# Patient Record
Sex: FEMALE | Race: WHITE | NOT HISPANIC OR LATINO | Employment: PART TIME | URBAN - METROPOLITAN AREA
[De-identification: names, ages, dates, MRNs, and addresses within clinical notes are randomized per-mention and may not be internally consistent; named-entity substitution may affect disease eponyms.]

---

## 2017-02-14 ENCOUNTER — OFFICE VISIT (OUTPATIENT)
Dept: PSYCHIATRY | Facility: CLINIC | Age: 67
End: 2017-02-14
Payer: MEDICARE

## 2017-02-14 VITALS
SYSTOLIC BLOOD PRESSURE: 132 MMHG | BODY MASS INDEX: 26.37 KG/M2 | HEIGHT: 67 IN | DIASTOLIC BLOOD PRESSURE: 80 MMHG | HEART RATE: 58 BPM | WEIGHT: 168 LBS

## 2017-02-14 DIAGNOSIS — F33.41 MDD (MAJOR DEPRESSIVE DISORDER), RECURRENT, IN PARTIAL REMISSION: ICD-10-CM

## 2017-02-14 DIAGNOSIS — F41.1 GAD (GENERALIZED ANXIETY DISORDER): Primary | ICD-10-CM

## 2017-02-14 PROCEDURE — 90833 PSYTX W PT W E/M 30 MIN: CPT | Mod: PBBFAC,PO | Performed by: PSYCHIATRY & NEUROLOGY

## 2017-02-14 PROCEDURE — 90833 PSYTX W PT W E/M 30 MIN: CPT | Mod: S$PBB,,, | Performed by: PSYCHIATRY & NEUROLOGY

## 2017-02-14 PROCEDURE — 99999 PR PBB SHADOW E&M-EST. PATIENT-LVL III: CPT | Mod: PBBFAC,,, | Performed by: PSYCHIATRY & NEUROLOGY

## 2017-02-14 PROCEDURE — 99214 OFFICE O/P EST MOD 30 MIN: CPT | Mod: S$PBB,,, | Performed by: PSYCHIATRY & NEUROLOGY

## 2017-02-14 PROCEDURE — 99213 OFFICE O/P EST LOW 20 MIN: CPT | Mod: PBBFAC,PO | Performed by: PSYCHIATRY & NEUROLOGY

## 2017-02-14 RX ORDER — DULOXETIN HYDROCHLORIDE 60 MG/1
60 CAPSULE, DELAYED RELEASE ORAL DAILY
Qty: 90 CAPSULE | Refills: 1 | Status: SHIPPED | OUTPATIENT
Start: 2017-02-14 | End: 2017-05-16 | Stop reason: SDUPTHER

## 2017-02-14 RX ORDER — MIRTAZAPINE 15 MG/1
15 TABLET, FILM COATED ORAL NIGHTLY
Qty: 90 TABLET | Refills: 1 | Status: SHIPPED | OUTPATIENT
Start: 2017-02-14 | End: 2017-05-16 | Stop reason: SDUPTHER

## 2017-02-14 RX ORDER — DULOXETIN HYDROCHLORIDE 30 MG/1
30 CAPSULE, DELAYED RELEASE ORAL DAILY
Qty: 90 CAPSULE | Refills: 1 | Status: SHIPPED | OUTPATIENT
Start: 2017-02-14 | End: 2017-05-16 | Stop reason: SDUPTHER

## 2017-02-14 NOTE — PROGRESS NOTES
"ID: 66yo WF with a previous diag of depression. Referred by PCP, , for eval of depressive sxs. Currently on cymbalta, recently added remeron, and valium. Pt has cont'd to remain stable on current meds. No longer using valium.     CC: "depression"    Interim hx: presents on time, chart reviewed, pt seen. Medications stable, mood continues to be greatly affected by anxiety which manifests as "guilt" surrounding her children, grandchild, relationship with daughter who lives in [place] is particularly difficult when they are together but when apart they are close via phone and/or text. When sharing space the pt feels greatly "concerned" about her life and choices and parenting. Daughter feels judged by this and therefore visits are hard. Leads to the pt limiting time she sees her grandson, then feels like he "doesn't know (her)."     Consistently rec therapy and pt consistently declines. Does better on weeks with work and bike riding. As weather improves and days lengthen the pt finds she rides more- mood follows.     PSYCHOTHERAPY ADD-ON   30 (16-37*) minutes    Time: 20 minutes  Participants: Met with patient    Therapeutic Intervention Type: insight oriented psychotherapy, behavior modifying psychotherapy, supportive psychotherapy  Why chosen therapy is appropriate versus another modality: relevant to diagnosis    Target symptoms: anxiety   Primary focus: anxiety surrounding family interactions, relationship with children, loneliness  Psychotherapeutic techniques: cbt, supportive, beh modification    Outcome monitoring methods: self-report, observation    Patient's response to intervention:  The patient's response to intervention is accepting, a little hesitant/weary as anxiety is a chronic constant since childhood for this pt    Progress toward goals:  The patient's progress toward goals is limited.    On Psychiatric ROS:    Endorses better sleep on the remeron, denies anhedonia, denies feeling " "helpless/hopeless, improved energy, riding bike regularly, denies dec concentration, denies change in appetite (despite the remeron), denies dec PMA "I push myself. I know keeping busy helps me." Denies thoughts of SI/intent/plan.     Denies recently feeling more easily overwhelmed, denies recent ruminative thinking "the obsessive thinking is MUCH better on the meds." denies feeling tense/"on edge"     PPHx: Denies h/o self injury, inpt psych hospitalization, denies h/o suicide attempt     Current Psych Meds: cymbalta 90mg po qam, remeron 15mg po qhs  Past Psych Meds: Lexapro and Wellbutrin (effective but led to h/a's), celexa (nausea), valium    PMHx: hypothyroid, GERD    SubstHx:   T- none  E- very rare  D- none    FamPHx: brother- suicide 1990, M-anxiety, F-anxiety, dtr- seizure while on wellbutrin    MSE: appears stated age, well groomed, appropriate dress, engages well with examiner. Good e/c. Speech reg rate and vol, nonpressured. Mood is "pretty good." Affect congruent with some mild anxiety. dec'd verbosity. Sensorium fully intact. Oriented to date/day/location, current events. Narrative memory intact. Intellectual function is avg based on vocab and basic fund of knowledge. Thought is c/l/gd. No tangentiality or circumstantiality. No FOI/LEBRON. Denies SI/HI. Denies A/VH. No evidence of delusions. Insight and Judgment intact.     Suicide Risk Assessment:   Protective- age, gender, no prior attempts, no prior hospitalizations, no ongoing substance abuse, no psychosis, , has children, denies SI/intent/plan, seeking treatment, access to treatment, future oriented, good primary support, Baptism  Risk- race, ongoing Axis I sxs, family h/o suicide (brother-1990)    **Pt is at LOW imminent and long term risk of suicide given current risk factors.     Assessment:  65yo WF with a previous diag of depression. Referred by PCP, , for eval of depressive sxs. Currently on cymbalta, recently added remeron, " "and valium per chart review. On eval the pt is reporting an improvement in all mood and anxiety sxs in the 3 wks leading to initial eval which correlates with the time Remeron was added. She had done well for many years on lexapro and wellbutrin but was having chronic headaches and self initiated a taper off meds and the headaches did remit, but mood/anxiety suffered until the recent combination of cymbalta and remeron which is currently managing sxs quite well. Prior to meds becoming effective the pt endorses sxs c/w diagnoses of MDD and YASMANI. No acute safety concerns and but the pt has cont'd to feel mood is "a little down. Other than the headaches, I feel that I was really better on the lexapro/wellbutrin combination". Pt is active, finds good support through kerry, family and friends. Will try an inc in cymbalta to 90mg and if ineffective may try an addition of wellbutrin xl 150mg- in the past pt headaches were on the 300mg dose when in combo with lexapro. On f/u the inc in cymbalta was effective- pt other ongoing issues are therapy related. Pt continues to decline therapy as she doesn't see the need at this time- majority of her appts are spent in therapy. Will f/u in 3mos.    Axis I: MDD, recurrent, in partial remission; YASMANI  Axis II: none at this time   Axis III: hypothyroid, GERD, HTN  Axis IV:  from , chronic mental illness  Axis V: GAF 70    Plan:   1. Cont cymbalta 90mg po qam  2. Cont remeron 15mg po qhs  3. Cont exercise, encouraged mindfulness of dietary choices in an effort to dec inflammation  4. rec therapy but pt declines at this time  5. RTC 3mos    -Spent 30min face to face with the pt; >50% time spent in counseling   -Supportive therapy and psychoeducation provided  -R/B/SE's of medications discussed with the pt who expresses understanding and chooses to take medications as prescribed.   -Pt instructed to call clinic, 911 or go to nearest emergency room if sxs worsen or pt is in "   crisis. The pt expresses understanding.

## 2017-03-02 ENCOUNTER — LAB VISIT (OUTPATIENT)
Dept: LAB | Facility: HOSPITAL | Age: 67
End: 2017-03-02
Attending: FAMILY MEDICINE
Payer: MEDICARE

## 2017-03-02 ENCOUNTER — PATIENT OUTREACH (OUTPATIENT)
Dept: ADMINISTRATIVE | Facility: HOSPITAL | Age: 67
End: 2017-03-02

## 2017-03-02 DIAGNOSIS — E78.5 HYPERLIPIDEMIA, UNSPECIFIED HYPERLIPIDEMIA TYPE: ICD-10-CM

## 2017-03-02 LAB
ALBUMIN SERPL BCP-MCNC: 3.7 G/DL
ALP SERPL-CCNC: 62 U/L
ALT SERPL W/O P-5'-P-CCNC: 23 U/L
ANION GAP SERPL CALC-SCNC: 6 MMOL/L
AST SERPL-CCNC: 24 U/L
BILIRUB SERPL-MCNC: 0.5 MG/DL
BUN SERPL-MCNC: 21 MG/DL
CALCIUM SERPL-MCNC: 9.5 MG/DL
CHLORIDE SERPL-SCNC: 106 MMOL/L
CHOLEST/HDLC SERPL: 4.2 {RATIO}
CO2 SERPL-SCNC: 29 MMOL/L
CREAT SERPL-MCNC: 0.9 MG/DL
EST. GFR  (AFRICAN AMERICAN): >60 ML/MIN/1.73 M^2
EST. GFR  (NON AFRICAN AMERICAN): >60 ML/MIN/1.73 M^2
GLUCOSE SERPL-MCNC: 94 MG/DL
HDL/CHOLESTEROL RATIO: 23.9 %
HDLC SERPL-MCNC: 247 MG/DL
HDLC SERPL-MCNC: 59 MG/DL
LDLC SERPL CALC-MCNC: 169 MG/DL
NONHDLC SERPL-MCNC: 188 MG/DL
POTASSIUM SERPL-SCNC: 4.5 MMOL/L
PROT SERPL-MCNC: 7 G/DL
SODIUM SERPL-SCNC: 141 MMOL/L
TRIGL SERPL-MCNC: 95 MG/DL

## 2017-03-02 PROCEDURE — 36415 COLL VENOUS BLD VENIPUNCTURE: CPT | Mod: PO

## 2017-03-02 PROCEDURE — 80061 LIPID PANEL: CPT

## 2017-03-02 PROCEDURE — 80053 COMPREHEN METABOLIC PANEL: CPT

## 2017-03-16 ENCOUNTER — LAB VISIT (OUTPATIENT)
Dept: LAB | Facility: HOSPITAL | Age: 67
End: 2017-03-16
Attending: FAMILY MEDICINE
Payer: MEDICARE

## 2017-03-16 ENCOUNTER — OFFICE VISIT (OUTPATIENT)
Dept: FAMILY MEDICINE | Facility: CLINIC | Age: 67
End: 2017-03-16
Payer: MEDICARE

## 2017-03-16 VITALS
WEIGHT: 164.88 LBS | SYSTOLIC BLOOD PRESSURE: 99 MMHG | DIASTOLIC BLOOD PRESSURE: 71 MMHG | HEIGHT: 67 IN | HEART RATE: 67 BPM | OXYGEN SATURATION: 96 % | BODY MASS INDEX: 25.88 KG/M2

## 2017-03-16 DIAGNOSIS — E03.9 HYPOTHYROIDISM, UNSPECIFIED TYPE: ICD-10-CM

## 2017-03-16 DIAGNOSIS — E78.5 HYPERLIPIDEMIA, UNSPECIFIED HYPERLIPIDEMIA TYPE: ICD-10-CM

## 2017-03-16 DIAGNOSIS — E03.9 HYPOTHYROIDISM, UNSPECIFIED TYPE: Primary | ICD-10-CM

## 2017-03-16 DIAGNOSIS — I10 ESSENTIAL HYPERTENSION: ICD-10-CM

## 2017-03-16 DIAGNOSIS — F33.41 MDD (MAJOR DEPRESSIVE DISORDER), RECURRENT, IN PARTIAL REMISSION: ICD-10-CM

## 2017-03-16 LAB — TSH SERPL DL<=0.005 MIU/L-ACNC: 0.76 UIU/ML

## 2017-03-16 PROCEDURE — 84443 ASSAY THYROID STIM HORMONE: CPT

## 2017-03-16 PROCEDURE — 99999 PR PBB SHADOW E&M-EST. PATIENT-LVL III: CPT | Mod: PBBFAC,,, | Performed by: FAMILY MEDICINE

## 2017-03-16 PROCEDURE — 99214 OFFICE O/P EST MOD 30 MIN: CPT | Mod: S$PBB,,, | Performed by: FAMILY MEDICINE

## 2017-03-16 PROCEDURE — 36415 COLL VENOUS BLD VENIPUNCTURE: CPT | Mod: PO

## 2017-03-16 NOTE — MR AVS SNAPSHOT
[facility]  [address] [facility] [address]  [place] [address]  Phone: [phone]  Fax: [phone]                  Coral Pandya   3/16/2017 11:20 AM   Office Visit    Description:  Female : 1950   Provider:  Ebenezer Souza MD   Department:  [facility]           Reason for Visit     Results           Diagnoses this Visit        Comments    Hypothyroidism, unspecified type    -  Primary     Hyperlipidemia, unspecified hyperlipidemia type                To Do List           Future Appointments        Provider Department Dept Phone    3/16/2017 12:05 PM LAB, [place] [facility] [phone]    2017 8:00 AM LAB, [place] [facility] [phone]    2017 11:20 AM Ebenezer Souza MD [facility] [phone]      Goals (5 Years of Data)     None      Follow-Up and Disposition     Return in about 6 months (around 2017).      [facility]     [facility] Care [facility] - 24/7 Assistance  Registered nurses in the [facility] provide clinical advisement, health education, appointment booking, and other advisory services.  Call for this free service at [phone].             Medications           Message regarding Medications     Verify the changes and/or additions to your medication regime listed below are the same as discussed with your clinician today.  If any of these changes or additions are incorrect, please notify your healthcare provider.             Verify that the below list of medications is an accurate representation of the medications you are currently taking.  If none reported, the list may be blank. If incorrect, please contact your healthcare provider. Carry this list with you in case of emergency.           Current Medications     calcium carbonate-vitamin D3 (CALCIUM 600 + D,3,) 600 mg calcium- 200 unit Cap Take 1 capsule by mouth Daily. 1 Capsule Oral Every  "day    desonide (DESOWEN) 0.05 % cream APPLY TO EYELID SKIN NIGHTLY PRF ITCHY LIDS    DOCOSAHEXANOIC ACID/EPA (FISH OIL ORAL) Take by mouth.    duloxetine (CYMBALTA) 30 MG capsule Take 1 capsule (30 mg total) by mouth once daily.    duloxetine (CYMBALTA) 60 MG capsule Take 1 capsule (60 mg total) by mouth once daily.    flaxseed oil 1,000 mg Cap Take by mouth 2 (two) times daily.    glucosamine-chondroitin 500-400 mg tablet     ketotifen (ZADITOR) 0.025 % ophthalmic solution 1 drop 2 (two) times daily.    levothyroxine (SYNTHROID) 100 MCG tablet Take 1 tablet (100 mcg total) by mouth once daily.    mirtazapine (REMERON) 15 MG tablet Take 1 tablet (15 mg total) by mouth every evening.    mometasone (NASONEX) 50 mcg/actuation nasal spray 2 sprays by Nasal route once daily.    multivitamin capsule Take 1 capsule by mouth once daily.    nebivolol (BYSTOLIC) 5 MG Tab Take 1 tablet (5 mg total) by mouth once daily.    rabeprazole (ACIPHEX) 20 mg tablet Take 1 tablet (20 mg total) by mouth once daily.    ranitidine (ZANTAC) 150 MG tablet TAKE 2 TABLETS (300MG      TOTAL) NIGHTLY    vitamin A-vitamin C-vit E-min (VISION FORMULA) Tab Take 1 tablet by mouth Daily. 1 Tablet Oral Every day           Clinical Reference Information           Your Vitals Were     BP Pulse Height Weight SpO2 BMI    99/71 67 5' 7" (1.702 m) 74.8 kg (164 lb 14.5 oz) 96% 25.83 kg/m2      Blood Pressure          Most Recent Value    BP  99/71      Allergies as of 3/16/2017     Codeine    Wellbutrin [Bupropion Hcl]      Immunizations Administered on Date of Encounter - 3/16/2017     None      Orders Placed During Today's Visit     Future Labs/Procedures Expected by Expires    TSH  3/16/2017 6/14/2017    Comprehensive metabolic panel  9/16/2017 6/14/2018    Lipid panel  9/16/2017 6/14/2018      Language Assistance Services     ATTENTION: Language assistance services are available, free of charge. Please call [phone].      ATENCIÓN: Cherry sparks " español, tiene a warner disposición servicios gratuitos de asistencia lingüística. Fabian al [phone].     NAFISA Ý: N?u b?n nói Ti?ng Vi?t, có các d?ch v? h? tr? ngôn ng? mi?n phí dành cho b?n. G?i s? [phone].         [facility] complies with applicable Federal civil rights laws and does not discriminate on the basis of race, color, national origin, age, disability, or sex.

## 2017-03-16 NOTE — PROGRESS NOTES
Subjective:       Patient ID: Coral Pandya is a 66 y.o. female.    Chief Complaint: Results    HPI     Reviewed recent labs.  Mildly elevated lipids. Normal cmp.      htn stable.      Sees Dr. Coe, psychiatrist, for treatment of depression and anxiety.         Review of Systems   Constitutional: Negative for activity change and unexpected weight change.   HENT: Negative for hearing loss, rhinorrhea and trouble swallowing.    Eyes: Negative for discharge and visual disturbance.   Respiratory: Negative for chest tightness and wheezing.    Cardiovascular: Negative for chest pain and palpitations.   Gastrointestinal: Negative for blood in stool, constipation, diarrhea and vomiting.   Endocrine: Negative for polydipsia and polyuria.   Genitourinary: Negative for difficulty urinating, dysuria, hematuria and menstrual problem.   Musculoskeletal: Positive for arthralgias and joint swelling.   Neurological: Negative for weakness and headaches.   Psychiatric/Behavioral: Positive for dysphoric mood. Negative for confusion.       Objective:      Physical Exam   Constitutional: She appears well-developed.   HENT:   Head: Normocephalic and atraumatic.   Eyes: Conjunctivae are normal. Pupils are equal, round, and reactive to light.   Neck: Normal range of motion.   Cardiovascular: Normal rate and regular rhythm.    No murmur heard.  Pulmonary/Chest: Effort normal and breath sounds normal. She has no wheezes.   Lymphadenopathy:     She has no cervical adenopathy.         Lab Visit on 03/16/2017   Component Date Value Ref Range Status    TSH 03/16/2017 0.760  0.400 - 4.000 uIU/mL Final   Lab Visit on 03/02/2017   Component Date Value Ref Range Status    Sodium 03/02/2017 141  136 - 145 mmol/L Final    Potassium 03/02/2017 4.5  3.5 - 5.1 mmol/L Final    Chloride 03/02/2017 106  95 - 110 mmol/L Final    CO2 03/02/2017 29  23 - 29 mmol/L Final    Glucose 03/02/2017 94  70 - 110 mg/dL Final    BUN, Bld 03/02/2017 21   8 - 23 mg/dL Final    Creatinine 03/02/2017 0.9  0.5 - 1.4 mg/dL Final    Calcium 03/02/2017 9.5  8.7 - 10.5 mg/dL Final    Total Protein 03/02/2017 7.0  6.0 - 8.4 g/dL Final    Albumin 03/02/2017 3.7  3.5 - 5.2 g/dL Final    Total Bilirubin 03/02/2017 0.5  0.1 - 1.0 mg/dL Final    Comment: For infants and newborns, interpretation of results should be based  on gestational age, weight and in agreement with clinical  observations.  Premature Infant recommended reference ranges:  Up to 24 hours.............<8.0 mg/dL  Up to 48 hours............<12.0 mg/dL  3-5 days..................<15.0 mg/dL  6-29 days.................<15.0 mg/dL      Alkaline Phosphatase 03/02/2017 62  55 - 135 U/L Final    AST 03/02/2017 24  10 - 40 U/L Final    ALT 03/02/2017 23  10 - 44 U/L Final    Anion Gap 03/02/2017 6* 8 - 16 mmol/L Final    eGFR if African American 03/02/2017 >60.0  >60 mL/min/1.73 m^2 Final    eGFR if non African American 03/02/2017 >60.0  >60 mL/min/1.73 m^2 Final    Comment: Calculation used to obtain the estimated glomerular filtration  rate (eGFR) is the CKD-EPI equation. Since race is unknown   in our information system, the eGFR values for   -American and Non--American patients are given   for each creatinine result.      Cholesterol 03/02/2017 247* 120 - 199 mg/dL Final    Comment: The National Cholesterol Education Program (NCEP) has set the  following guidelines (reference ranges) for Cholesterol:  Optimal.....................<200 mg/dL  Borderline High.............200-239 mg/dL  High........................> or = 240 mg/dL      Triglycerides 03/02/2017 95  30 - 150 mg/dL Final    Comment: The National Cholesterol Education Program (NCEP) has set the  following guidelines (reference values) for triglycerides:  Normal......................<150 mg/dL  Borderline High.............150-199 mg/dL  High........................200-499 mg/dL      HDL 03/02/2017 59  40 - 75 mg/dL Final     Comment: The National Cholesterol Education Program (NCEP) has set the  following guidelines (reference values) for HDL Cholesterol:  Low...............<40 mg/dL  Optimal...........>60 mg/dL      LDL Cholesterol 03/02/2017 169.0* 63.0 - 159.0 mg/dL Final    Comment: The National Cholesterol Education Program (NCEP) has set the  following guidelines (reference values) for LDL Cholesterol:  Optimal.......................<130 mg/dL  Borderline High...............130-159 mg/dL  High..........................160-189 mg/dL  Very High.....................>190 mg/dL      HDL/Chol Ratio 03/02/2017 23.9  20.0 - 50.0 % Final    Total Cholesterol/HDL Ratio 03/02/2017 4.2  2.0 - 5.0 Final    Non-HDL Cholesterol 03/02/2017 188  mg/dL Final    Comment: Risk category and Non-HDL cholesterol goals:  Coronary heart disease (CHD)or equivalent (10-year risk of CHD >20%):  Non-HDL cholesterol goal     <130 mg/dL  Two or more CHD risk factors and 10-year risk of CHD <= 20%:  Non-HDL cholesterol goal     <160 mg/dL  0 to 1 CHD risk factor:  Non-HDL cholesterol goal     <190 mg/dL             Assessment:       1. Hypothyroidism, unspecified type    2. Hyperlipidemia, unspecified hyperlipidemia type    3. Essential hypertension    4. MDD (major depressive disorder), recurrent, in partial remission        Plan:       Hypothyroidism, unspecified type  -     TSH; Future; Expected date: 3/16/17    Hyperlipidemia, unspecified hyperlipidemia type  -     Comprehensive metabolic panel; Future; Expected date: 9/16/17  -     Lipid panel; Future; Expected date: 9/16/17    Essential hypertension    MDD (major depressive disorder), recurrent, in partial remission      Plan:  See orders  Cont current meds        Medication List with Changes/Refills   Current Medications    CALCIUM CARBONATE-VITAMIN D3 (CALCIUM 600 + D,3,) 600 MG CALCIUM- 200 UNIT CAP    Take 1 capsule by mouth Daily. 1 Capsule Oral Every day    DESONIDE (DESOWEN) 0.05 % CREAM     APPLY TO EYELID SKIN NIGHTLY PRF ITCHY LIDS    DOCOSAHEXANOIC ACID/EPA (FISH OIL ORAL)    Take by mouth.    DULOXETINE (CYMBALTA) 30 MG CAPSULE    Take 1 capsule (30 mg total) by mouth once daily.    DULOXETINE (CYMBALTA) 60 MG CAPSULE    Take 1 capsule (60 mg total) by mouth once daily.    FLAXSEED OIL 1,000 MG CAP    Take by mouth 2 (two) times daily.    GLUCOSAMINE-CHONDROITIN 500-400 MG TABLET        KETOTIFEN (ZADITOR) 0.025 % OPHTHALMIC SOLUTION    1 drop 2 (two) times daily.    LEVOTHYROXINE (SYNTHROID) 100 MCG TABLET    Take 1 tablet (100 mcg total) by mouth once daily.    MIRTAZAPINE (REMERON) 15 MG TABLET    Take 1 tablet (15 mg total) by mouth every evening.    MOMETASONE (NASONEX) 50 MCG/ACTUATION NASAL SPRAY    2 sprays by Nasal route once daily.    MULTIVITAMIN CAPSULE    Take 1 capsule by mouth once daily.    NEBIVOLOL (BYSTOLIC) 5 MG TAB    Take 1 tablet (5 mg total) by mouth once daily.    RABEPRAZOLE (ACIPHEX) 20 MG TABLET    Take 1 tablet (20 mg total) by mouth once daily.    RANITIDINE (ZANTAC) 150 MG TABLET    TAKE 2 TABLETS (300MG      TOTAL) NIGHTLY    VITAMIN A-VITAMIN C-VIT E-MIN (VISION FORMULA) TAB    Take 1 tablet by mouth Daily. 1 Tablet Oral Every day

## 2017-03-19 ENCOUNTER — PATIENT MESSAGE (OUTPATIENT)
Dept: FAMILY MEDICINE | Facility: CLINIC | Age: 67
End: 2017-03-19

## 2017-03-20 RX ORDER — LEVOTHYROXINE SODIUM 100 UG/1
TABLET ORAL
Qty: 90 TABLET | Refills: 3 | Status: SHIPPED | OUTPATIENT
Start: 2017-03-20 | End: 2018-02-27 | Stop reason: SDUPTHER

## 2017-03-20 RX ORDER — NEBIVOLOL HYDROCHLORIDE 5 MG/1
TABLET ORAL
Qty: 90 TABLET | Refills: 3 | Status: SHIPPED | OUTPATIENT
Start: 2017-03-20 | End: 2018-02-27 | Stop reason: SDUPTHER

## 2017-05-16 ENCOUNTER — OFFICE VISIT (OUTPATIENT)
Dept: PSYCHIATRY | Facility: CLINIC | Age: 67
End: 2017-05-16
Payer: MEDICARE

## 2017-05-16 VITALS
DIASTOLIC BLOOD PRESSURE: 69 MMHG | WEIGHT: 165.88 LBS | HEIGHT: 67 IN | HEART RATE: 58 BPM | SYSTOLIC BLOOD PRESSURE: 113 MMHG | BODY MASS INDEX: 26.03 KG/M2

## 2017-05-16 DIAGNOSIS — F41.1 GAD (GENERALIZED ANXIETY DISORDER): Primary | ICD-10-CM

## 2017-05-16 DIAGNOSIS — F33.41 MDD (MAJOR DEPRESSIVE DISORDER), RECURRENT, IN PARTIAL REMISSION: ICD-10-CM

## 2017-05-16 PROCEDURE — 99214 OFFICE O/P EST MOD 30 MIN: CPT | Mod: S$PBB,,, | Performed by: PSYCHIATRY & NEUROLOGY

## 2017-05-16 PROCEDURE — 90833 PSYTX W PT W E/M 30 MIN: CPT | Mod: PBBFAC,PO | Performed by: PSYCHIATRY & NEUROLOGY

## 2017-05-16 PROCEDURE — 99213 OFFICE O/P EST LOW 20 MIN: CPT | Mod: PBBFAC,PO | Performed by: PSYCHIATRY & NEUROLOGY

## 2017-05-16 PROCEDURE — 90833 PSYTX W PT W E/M 30 MIN: CPT | Mod: S$PBB,,, | Performed by: PSYCHIATRY & NEUROLOGY

## 2017-05-16 PROCEDURE — 99999 PR PBB SHADOW E&M-EST. PATIENT-LVL III: CPT | Mod: PBBFAC,,, | Performed by: PSYCHIATRY & NEUROLOGY

## 2017-05-16 RX ORDER — MIRTAZAPINE 15 MG/1
15 TABLET, FILM COATED ORAL NIGHTLY
Qty: 90 TABLET | Refills: 1 | Status: SHIPPED | OUTPATIENT
Start: 2017-05-16 | End: 2017-08-16 | Stop reason: SDUPTHER

## 2017-05-16 RX ORDER — DULOXETIN HYDROCHLORIDE 60 MG/1
60 CAPSULE, DELAYED RELEASE ORAL DAILY
Qty: 90 CAPSULE | Refills: 1 | Status: SHIPPED | OUTPATIENT
Start: 2017-05-16 | End: 2017-08-16 | Stop reason: SDUPTHER

## 2017-05-16 RX ORDER — DULOXETIN HYDROCHLORIDE 30 MG/1
30 CAPSULE, DELAYED RELEASE ORAL DAILY
Qty: 90 CAPSULE | Refills: 1 | Status: SHIPPED | OUTPATIENT
Start: 2017-05-16 | End: 2017-08-16 | Stop reason: SDUPTHER

## 2017-05-16 NOTE — PROGRESS NOTES
"ID: 66yo WF with a previous diag of depression. Referred by PCP, , for eval of depressive sxs. Currently on cymbalta, recently added remeron, and valium. Pt has cont'd to remain stable on current meds. No longer using valium.     CC: "depression"    Interim hx: presents on time, chart reviewed, pt seen. Pt asking about cholesterol as it has inc'd in past 1.5 yrs- wondering if cymbalta causing but there has not been assoc weight gain.     Also inquiring about morning headaches- wellbutrin caused headaches but she is no longer taking- headaches had gone away until recent yardwork and "tree trimming" and now she notices headaches each morning which are resolved by afternoon without medication. Also notes some tingling down left arm assoc with headaches. Perhaps a nerve impingement in neck. Pt does have richa and uses an oral device through dentist for treatment- I rec'd that she see him as perhaps morning h/a's are from un-treated richa.     Picked up an extra day of work and got another raise "so I'm really noticing it because it's more per hour but also more hours." is going to [place] to see her daughter next week. "i'm doing a little better with that. I don't ask her any more. If she tells me then I listen, but I'm not going to ask for information that worries me." going next week and decided to rent a car "it gives me more freedom and it gives me the ability to offer to go somewhere." grandson is now 1yo "and it will be great to be outside with him."     Consistently rec therapy and pt consistently declines. Does better on weeks with work and bike riding. As weather improves and days lengthen the pt finds she rides more- mood follows.     PSYCHOTHERAPY ADD-ON   30 (16-37*) minutes    Time: 20 minutes  Participants: Met with patient    Therapeutic Intervention Type: insight oriented psychotherapy, behavior modifying psychotherapy, supportive psychotherapy  Why chosen therapy is appropriate versus another " "modality: relevant to diagnosis    Target symptoms: anxiety   Primary focus: anxiety surrounding family interactions, relationship with children, loneliness  Psychotherapeutic techniques: cbt, supportive, beh modification    Outcome monitoring methods: self-report, observation    Patient's response to intervention:  The patient's response to intervention is accepting, a little hesitant/weary as anxiety is a chronic constant since childhood for this pt    Progress toward goals:  The patient's progress toward goals is good-  Noticeable improvement today in boundary setting.     On Psychiatric ROS:    Endorses better sleep on the remeron, denies anhedonia, denies feeling helpless/hopeless, improved energy, riding bike regularly, denies dec concentration, denies change in appetite (despite the remeron), denies dec PMA "I push myself. I know keeping busy helps me." Denies thoughts of SI/intent/plan.     Denies recently feeling more easily overwhelmed, denies recent ruminative thinking "the obsessive thinking is MUCH better on the meds." denies feeling tense/"on edge"     PPHx: Denies h/o self injury, inpt psych hospitalization, denies h/o suicide attempt     Current Psych Meds: cymbalta 90mg po qam, remeron 15mg po qhs  Past Psych Meds: Lexapro and Wellbutrin (effective but led to h/a's), celexa (nausea), valium    PMHx: hypothyroid, GERD    SubstHx:   T- none  E- very rare  D- none    FamPHx: brother- suicide 1990, M-anxiety, F-anxiety, dtr- seizure while on wellbutrin    MSE: appears stated age, well groomed, appropriate dress, engages well with examiner. Good e/c. Speech reg rate and vol, nonpressured. Mood is "I've been feeling really good." Affect congruent with some mild anxiety. dec'd verbosity. Sensorium fully intact. Oriented to date/day/location, current events. Narrative memory intact. Intellectual function is avg based on vocab and basic fund of knowledge. Thought is c/l/gd. No tangentiality or " "circumstantiality. No FOI/LEBRON. Denies SI/HI. Denies A/VH. No evidence of delusions. Insight and Judgment intact.     Suicide Risk Assessment:   Protective- age, gender, no prior attempts, no prior hospitalizations, no ongoing substance abuse, no psychosis, , has children, denies SI/intent/plan, seeking treatment, access to treatment, future oriented, good primary support, Taoist  Risk- race, ongoing Axis I sxs, family h/o suicide (brother-1990)    **Pt is at LOW imminent and long term risk of suicide given current risk factors.     Assessment:  67yo WF with a previous diag of depression. Referred by PCP, , for eval of depressive sxs. Currently on cymbalta, recently added remeron, and valium per chart review. On eval the pt is reporting an improvement in all mood and anxiety sxs in the 3 wks leading to initial eval which correlates with the time Remeron was added. She had done well for many years on lexapro and wellbutrin but was having chronic headaches and self initiated a taper off meds and the headaches did remit, but mood/anxiety suffered until the recent combination of cymbalta and remeron which is currently managing sxs quite well. Prior to meds becoming effective the pt endorses sxs c/w diagnoses of MDD and YASMANI. No acute safety concerns but the pt has cont'd to feel mood is "a little down. Other than the headaches, I feel that I was really better on the lexapro/wellbutrin combination". Pt is active, finds good support through kerry, family and friends. Will try an inc in cymbalta to 90mg and if ineffective may try an addition of wellbutrin xl 150mg- in the past pt headaches were on the 300mg dose when in combo with lexapro. On f/u the inc in cymbalta was effective- pt other ongoing issues are therapy related. Pt continues to decline therapy as she doesn't see the need at this time- majority of her appts are spent in therapy, but today she is showing some signs of improved boundary setting " and themes are more positive. Doing well. meds stable. Will f/u in 3mos.    Axis I: MDD, recurrent, in partial remission; YASMANI  Axis II: none at this time   Axis III: hypothyroid, GERD, HTN  Axis IV:  from , chronic mental illness  Axis V: GAF 70    Plan:   1. Cont cymbalta 90mg po qam  2. Cont remeron 15mg po qhs  3. Cont exercise, encouraged mindfulness of dietary choices in an effort to dec inflammation  4. rec therapy but pt declines at this time  5. RTC 3mos    -Spent 30min face to face with the pt; >50% time spent in counseling   -Supportive therapy and psychoeducation provided  -R/B/SE's of medications discussed with the pt who expresses understanding and chooses to take medications as prescribed.   -Pt instructed to call clinic, 911 or go to nearest emergency room if sxs worsen or pt is in   crisis. The pt expresses understanding.

## 2017-08-11 ENCOUNTER — LAB VISIT (OUTPATIENT)
Dept: LAB | Facility: HOSPITAL | Age: 67
End: 2017-08-11
Attending: NURSE PRACTITIONER
Payer: MEDICARE

## 2017-08-11 ENCOUNTER — OFFICE VISIT (OUTPATIENT)
Dept: FAMILY MEDICINE | Facility: CLINIC | Age: 67
End: 2017-08-11
Payer: MEDICARE

## 2017-08-11 VITALS
BODY MASS INDEX: 25.96 KG/M2 | HEART RATE: 66 BPM | TEMPERATURE: 98 F | DIASTOLIC BLOOD PRESSURE: 74 MMHG | SYSTOLIC BLOOD PRESSURE: 120 MMHG | RESPIRATION RATE: 16 BRPM | OXYGEN SATURATION: 98 % | HEIGHT: 67 IN | WEIGHT: 165.38 LBS

## 2017-08-11 DIAGNOSIS — D72.9 ABNORMAL WHITE BLOOD CELL: ICD-10-CM

## 2017-08-11 DIAGNOSIS — Z01.818 PRE-OP EVALUATION: Primary | ICD-10-CM

## 2017-08-11 DIAGNOSIS — Z01.818 PRE-OP EVALUATION: ICD-10-CM

## 2017-08-11 DIAGNOSIS — H26.9 CATARACT OF BOTH EYES, UNSPECIFIED CATARACT TYPE: ICD-10-CM

## 2017-08-11 LAB
BASOPHILS # BLD AUTO: 0.02 K/UL
BASOPHILS NFR BLD: 0.4 %
DIFFERENTIAL METHOD: ABNORMAL
EOSINOPHIL # BLD AUTO: 0.1 K/UL
EOSINOPHIL NFR BLD: 2.4 %
ERYTHROCYTE [DISTWIDTH] IN BLOOD BY AUTOMATED COUNT: 13.2 %
HCT VFR BLD AUTO: 42.4 %
HGB BLD-MCNC: 14.3 G/DL
LYMPHOCYTES # BLD AUTO: 1.6 K/UL
LYMPHOCYTES NFR BLD: 35.5 %
MCH RBC QN AUTO: 31.5 PG
MCHC RBC AUTO-ENTMCNC: 33.7 G/DL
MCV RBC AUTO: 93 FL
MONOCYTES # BLD AUTO: 0.5 K/UL
MONOCYTES NFR BLD: 11.3 %
NEUTROPHILS # BLD AUTO: 2.3 K/UL
NEUTROPHILS NFR BLD: 50.2 %
PLATELET # BLD AUTO: 186 K/UL
PMV BLD AUTO: 11.5 FL
RBC # BLD AUTO: 4.54 M/UL
WBC # BLD AUTO: 4.59 K/UL

## 2017-08-11 PROCEDURE — 36415 COLL VENOUS BLD VENIPUNCTURE: CPT | Mod: PO

## 2017-08-11 PROCEDURE — 99213 OFFICE O/P EST LOW 20 MIN: CPT | Mod: S$PBB,,, | Performed by: NURSE PRACTITIONER

## 2017-08-11 PROCEDURE — 3078F DIAST BP <80 MM HG: CPT | Mod: ,,, | Performed by: NURSE PRACTITIONER

## 2017-08-11 PROCEDURE — 1159F MED LIST DOCD IN RCRD: CPT | Mod: ,,, | Performed by: NURSE PRACTITIONER

## 2017-08-11 PROCEDURE — 99999 PR PBB SHADOW E&M-EST. PATIENT-LVL IV: CPT | Mod: PBBFAC,,, | Performed by: NURSE PRACTITIONER

## 2017-08-11 PROCEDURE — 85025 COMPLETE CBC W/AUTO DIFF WBC: CPT

## 2017-08-11 PROCEDURE — 3074F SYST BP LT 130 MM HG: CPT | Mod: ,,, | Performed by: NURSE PRACTITIONER

## 2017-08-11 NOTE — PROGRESS NOTES
Subjective:       Patient ID: Coral Pandya is a 66 y.o. female.    Chief Complaint: Pre-op Exam  She was last seen in March 16,2017 by her PCP. This is her first time seeing me in the clinic  HPI   She is scheduled to have cataract surgery per Dr. Tio Olsen on 08/21/2017 and then again 09/18/2017.  Vitals:    08/11/17 1600   BP: 120/74   Pulse: 66   Resp: 16   Temp: 98.1 °F (36.7 °C)     Past Medical History:   Diagnosis Date    Anxiety     Arthralgia of knee     Cervical spondylosis     Depression     Fibromyalgia     GERD (gastroesophageal reflux disease)     HTN (hypertension)     Hypothyroid     DANYELL (obstructive sleep apnea)      Lab Results   Component Value Date    WBC 4.59 08/11/2017    HGB 14.3 08/11/2017    HCT 42.4 08/11/2017    MCV 93 08/11/2017     08/11/2017     CMP  Sodium   Date Value Ref Range Status   03/02/2017 141 136 - 145 mmol/L Final     Potassium   Date Value Ref Range Status   03/02/2017 4.5 3.5 - 5.1 mmol/L Final     Chloride   Date Value Ref Range Status   03/02/2017 106 95 - 110 mmol/L Final     CO2   Date Value Ref Range Status   03/02/2017 29 23 - 29 mmol/L Final     Glucose   Date Value Ref Range Status   03/02/2017 94 70 - 110 mg/dL Final     BUN, Bld   Date Value Ref Range Status   03/02/2017 21 8 - 23 mg/dL Final     Creatinine   Date Value Ref Range Status   03/02/2017 0.9 0.5 - 1.4 mg/dL Final     Calcium   Date Value Ref Range Status   03/02/2017 9.5 8.7 - 10.5 mg/dL Final     Total Protein   Date Value Ref Range Status   03/02/2017 7.0 6.0 - 8.4 g/dL Final     Albumin   Date Value Ref Range Status   03/02/2017 3.7 3.5 - 5.2 g/dL Final     Total Bilirubin   Date Value Ref Range Status   03/02/2017 0.5 0.1 - 1.0 mg/dL Final     Comment:     For infants and newborns, interpretation of results should be based  on gestational age, weight and in agreement with clinical  observations.  Premature Infant recommended reference ranges:  Up to 24  hours.............<8.0 mg/dL  Up to 48 hours............<12.0 mg/dL  3-5 days..................<15.0 mg/dL  6-29 days.................<15.0 mg/dL       Alkaline Phosphatase   Date Value Ref Range Status   03/02/2017 62 55 - 135 U/L Final     AST   Date Value Ref Range Status   03/02/2017 24 10 - 40 U/L Final     ALT   Date Value Ref Range Status   03/02/2017 23 10 - 44 U/L Final     Anion Gap   Date Value Ref Range Status   03/02/2017 6 (L) 8 - 16 mmol/L Final     eGFR if    Date Value Ref Range Status   03/02/2017 >60.0 >60 mL/min/1.73 m^2 Final     eGFR if non    Date Value Ref Range Status   03/02/2017 >60.0 >60 mL/min/1.73 m^2 Final     Comment:     Calculation used to obtain the estimated glomerular filtration  rate (eGFR) is the CKD-EPI equation. Since race is unknown   in our information system, the eGFR values for   -American and Non--American patients are given   for each creatinine result.       No results found for: LABA1C, HGBA1C  EKG: none  Cardiac Clearance Needed: NO   Anticoagulants: yes (fish oil)    Review of Systems    She has no voiced complaints today  Objective:      Physical Exam   Constitutional: She is oriented to person, place, and time. Vital signs are normal. She appears well-developed and well-nourished. She is active and cooperative.   HENT:   Head: Normocephalic and atraumatic.   Right Ear: Hearing, tympanic membrane, external ear and ear canal normal.   Left Ear: Hearing, tympanic membrane, external ear and ear canal normal.   Nose: Nose normal.   Mouth/Throat: Uvula is midline, oropharynx is clear and moist and mucous membranes are normal.   Eyes: Conjunctivae, EOM and lids are normal. Pupils are equal, round, and reactive to light. Right eye exhibits chemosis. Right eye exhibits no discharge, no exudate and no hordeolum. No foreign body present in the right eye. Left eye exhibits no chemosis, no discharge, no exudate and no hordeolum. No  foreign body present in the left eye. No scleral icterus.   Neck: Normal range of motion and full passive range of motion without pain. Neck supple.   Cardiovascular: Normal rate, regular rhythm, S1 normal, S2 normal and normal heart sounds.    Pulmonary/Chest: Effort normal and breath sounds normal.   Abdominal: Soft. Bowel sounds are normal. There is no splenomegaly or hepatomegaly. There is no tenderness.   Musculoskeletal: Normal range of motion.   Lymphadenopathy:        Head (right side): No submental, no submandibular, no tonsillar, no preauricular, no posterior auricular and no occipital adenopathy present.        Head (left side): No submental, no submandibular, no tonsillar, no preauricular, no posterior auricular and no occipital adenopathy present.     She has no cervical adenopathy.   Neurological: She is alert and oriented to person, place, and time.   Skin: Skin is warm, dry and intact.   Psychiatric: She has a normal mood and affect. Her speech is normal and behavior is normal. Judgment and thought content normal. Cognition and memory are normal.   Nursing note and vitals reviewed.      Assessment & Plan:       Pre-op evaluation  -     CBC auto differential; Future; Expected date: 08/11/2017    Cataract of both eyes, unspecified cataract type    Abnormal white blood cell  -     CBC auto differential; Future; Expected date: 08/11/2017    She is cleared for cataract surgery. I will have her form faxed to her eye surgeon office.      Return if symptoms worsen or fail to improve.

## 2017-08-16 ENCOUNTER — OFFICE VISIT (OUTPATIENT)
Dept: PSYCHIATRY | Facility: CLINIC | Age: 67
End: 2017-08-16
Payer: MEDICARE

## 2017-08-16 VITALS
HEART RATE: 64 BPM | SYSTOLIC BLOOD PRESSURE: 106 MMHG | HEIGHT: 67 IN | BODY MASS INDEX: 26.08 KG/M2 | DIASTOLIC BLOOD PRESSURE: 63 MMHG | WEIGHT: 166.13 LBS

## 2017-08-16 DIAGNOSIS — F41.1 GAD (GENERALIZED ANXIETY DISORDER): Primary | ICD-10-CM

## 2017-08-16 DIAGNOSIS — F33.41 MDD (MAJOR DEPRESSIVE DISORDER), RECURRENT, IN PARTIAL REMISSION: ICD-10-CM

## 2017-08-16 PROCEDURE — 1126F AMNT PAIN NOTED NONE PRSNT: CPT | Mod: ,,, | Performed by: PSYCHIATRY & NEUROLOGY

## 2017-08-16 PROCEDURE — 3074F SYST BP LT 130 MM HG: CPT | Mod: ,,, | Performed by: PSYCHIATRY & NEUROLOGY

## 2017-08-16 PROCEDURE — 3008F BODY MASS INDEX DOCD: CPT | Mod: ,,, | Performed by: PSYCHIATRY & NEUROLOGY

## 2017-08-16 PROCEDURE — 1159F MED LIST DOCD IN RCRD: CPT | Mod: ,,, | Performed by: PSYCHIATRY & NEUROLOGY

## 2017-08-16 PROCEDURE — 99999 PR PBB SHADOW E&M-EST. PATIENT-LVL III: CPT | Mod: PBBFAC,,, | Performed by: PSYCHIATRY & NEUROLOGY

## 2017-08-16 PROCEDURE — 99214 OFFICE O/P EST MOD 30 MIN: CPT | Mod: S$PBB,,, | Performed by: PSYCHIATRY & NEUROLOGY

## 2017-08-16 PROCEDURE — 99213 OFFICE O/P EST LOW 20 MIN: CPT | Mod: PBBFAC,PO | Performed by: PSYCHIATRY & NEUROLOGY

## 2017-08-16 PROCEDURE — 3078F DIAST BP <80 MM HG: CPT | Mod: ,,, | Performed by: PSYCHIATRY & NEUROLOGY

## 2017-08-16 PROCEDURE — 90833 PSYTX W PT W E/M 30 MIN: CPT | Mod: PBBFAC,PO | Performed by: PSYCHIATRY & NEUROLOGY

## 2017-08-16 PROCEDURE — 90833 PSYTX W PT W E/M 30 MIN: CPT | Mod: S$PBB,,, | Performed by: PSYCHIATRY & NEUROLOGY

## 2017-08-16 RX ORDER — DULOXETIN HYDROCHLORIDE 60 MG/1
60 CAPSULE, DELAYED RELEASE ORAL DAILY
Qty: 90 CAPSULE | Refills: 1 | Status: SHIPPED | OUTPATIENT
Start: 2017-08-16 | End: 2017-09-14 | Stop reason: SDUPTHER

## 2017-08-16 RX ORDER — DULOXETIN HYDROCHLORIDE 30 MG/1
30 CAPSULE, DELAYED RELEASE ORAL DAILY
Qty: 90 CAPSULE | Refills: 1 | Status: SHIPPED | OUTPATIENT
Start: 2017-08-16 | End: 2017-11-16 | Stop reason: SDUPTHER

## 2017-08-16 RX ORDER — MIRTAZAPINE 15 MG/1
15 TABLET, FILM COATED ORAL NIGHTLY
Qty: 90 TABLET | Refills: 1 | Status: SHIPPED | OUTPATIENT
Start: 2017-08-16 | End: 2017-11-16 | Stop reason: SDUPTHER

## 2017-08-16 NOTE — PROGRESS NOTES
"ID: 64yo WF with a previous diag of depression. Referred by PCP, , for eval of depressive sxs. Currently on cymbalta, recently added remeron, and valium. Pt has cont'd to remain stable on current meds. No longer using valium.     CC: "depression"    Interim hx: presents on time, chart reviewed, pt seen. Appears well and enters appt with conversation- positive themes, social, engaged.     When I comment on this she responds, "yes. I think that's true. I've been feeling better. Definitely. I'm not working as hard to not be depressed. I don't wake up in the morning depressed."     Went to [employer] and enjoyed her time there. Rented a car which helped with independence. While there her daughter told her she was engaged to be . This is something the pt has been eager for and yet today     Daughter came in to town for 2wks and only came to visit the pt 4 days. Only spent the night 1 night. "i've gotten a lot stronger. I didn't take off work. I didn't bring up the wedding." then reports that her daughter is engaged.     Consistently rec therapy and pt consistently declines. Does better on weeks with work and bike riding. As weather improves and days lengthen the pt finds she rides more- mood follows.     PSYCHOTHERAPY ADD-ON   30 (16-37*) minutes    Time: 20 minutes  Participants: Met with patient    Therapeutic Intervention Type: insight oriented psychotherapy, behavior modifying psychotherapy, supportive psychotherapy  Why chosen therapy is appropriate versus another modality: relevant to diagnosis    Target symptoms: anxiety   Primary focus: anxiety surrounding family interactions, relationship with children, loneliness  Psychotherapeutic techniques: cbt, supportive, beh modification    Outcome monitoring methods: self-report, observation    Patient's response to intervention:  The patient's response to intervention is accepting, a little hesitant/weary as anxiety is a chronic constant since " "childhood for this pt    Progress toward goals:  The patient's progress toward goals is good-  Noticeable improvement today in boundary setting.     On Psychiatric ROS:    Endorses better sleep on the remeron, denies anhedonia, denies feeling helpless/hopeless, improved energy, riding bike regularly, denies dec concentration, denies change in appetite (despite the remeron), denies dec PMA "I push myself. I know keeping busy helps me." Denies thoughts of SI/intent/plan.     Denies recently feeling more easily overwhelmed, denies recent ruminative thinking "the obsessive thinking is MUCH better on the meds." denies feeling tense/"on edge"     PPHx: Denies h/o self injury, inpt psych hospitalization, denies h/o suicide attempt     Current Psych Meds: cymbalta 90mg po qam, remeron 15mg po qhs  Past Psych Meds: Lexapro and Wellbutrin (effective but led to h/a's), celexa (nausea), valium    PMHx: hypothyroid, GERD    SubstHx:   T- none  E- very rare  D- none    FamPHx: brother- suicide 1990, M-anxiety, F-anxiety, dtr- seizure while on wellbutrin    MSE: appears stated age, well groomed, appropriate dress, engages well with examiner. Good e/c. Speech reg rate and vol, nonpressured. Mood is "I think i'm in a good mood. I feel pretty relaxed and good." Affect congruent with some mild anxiety. dec'd verbosity. Sensorium fully intact. Oriented to date/day/location, current events. Narrative memory intact. Intellectual function is avg based on vocab and basic fund of knowledge. Thought is c/l/gd. No tangentiality or circumstantiality. No FOI/LEBRON. Denies SI/HI. Denies A/VH. No evidence of delusions. Insight and Judgment intact.     Blood pressure 106/63, pulse 64, height 5' 7" (1.702 m), weight 75.4 kg (166 lb 1.9 oz).    Suicide Risk Assessment:   Protective- age, gender, no prior attempts, no prior hospitalizations, no ongoing substance abuse, no psychosis, , has children, denies SI/intent/plan, seeking treatment, " "access to treatment, future oriented, good primary support, Anglican  Risk- race, ongoing Axis I sxs, family h/o suicide (brother-1990)    **Pt is at LOW imminent and long term risk of suicide given current risk factors.     Assessment:  67yo WF with a previous diag of depression. Referred by PCP, , for eval of depressive sxs. Currently on cymbalta, recently added remeron, and valium per chart review. On eval the pt is reporting an improvement in all mood and anxiety sxs in the 3 wks leading to initial eval which correlates with the time Remeron was added. She had done well for many years on lexapro and wellbutrin but was having chronic headaches and self initiated a taper off meds and the headaches did remit, but mood/anxiety suffered until the recent combination of cymbalta and remeron which is currently managing sxs quite well. Prior to meds becoming effective the pt endorses sxs c/w diagnoses of MDD and YASMANI. No acute safety concerns but the pt has cont'd to feel mood is "a little down. Other than the headaches, I feel that I was really better on the lexapro/wellbutrin combination". Pt is active, finds good support through kerry, family and friends. Will try an inc in cymbalta to 90mg and if ineffective may try an addition of wellbutrin xl 150mg- in the past pt headaches were on the 300mg dose when in combo with lexapro. On f/u the inc in cymbalta was effective- pt other ongoing issues are therapy related. Pt continues to decline therapy as she doesn't see the need at this time- majority of her appts are spent in therapy, but today she is showing some signs of improved boundary setting and themes are more positive. Doing well. meds stable. Will f/u in 3mos.    Axis I: MDD, recurrent, in partial remission; YASMANI  Axis II: none at this time   Axis III: hypothyroid, GERD, HTN  Axis IV:  from , chronic mental illness  Axis V: GAF 70    Plan:   1. Cont cymbalta 90mg po qam  2. Cont remeron " 15mg po qhs  3. Cont exercise, encouraged mindfulness of dietary choices in an effort to dec inflammation  4. rec therapy but pt declines at this time  5. RTC 3mos    -Spent 30min face to face with the pt; >50% time spent in counseling   -Supportive therapy and psychoeducation provided  -R/B/SE's of medications discussed with the pt who expresses understanding and chooses to take medications as prescribed.   -Pt instructed to call clinic, 911 or go to nearest emergency room if sxs worsen or pt is in   crisis. The pt expresses understanding.

## 2017-09-09 ENCOUNTER — LAB VISIT (OUTPATIENT)
Dept: LAB | Facility: HOSPITAL | Age: 67
End: 2017-09-09
Attending: FAMILY MEDICINE
Payer: MEDICARE

## 2017-09-09 DIAGNOSIS — E78.5 HYPERLIPIDEMIA, UNSPECIFIED HYPERLIPIDEMIA TYPE: ICD-10-CM

## 2017-09-09 LAB
ALBUMIN SERPL BCP-MCNC: 3.5 G/DL
ALP SERPL-CCNC: 63 U/L
ALT SERPL W/O P-5'-P-CCNC: 24 U/L
ANION GAP SERPL CALC-SCNC: 7 MMOL/L
AST SERPL-CCNC: 25 U/L
BILIRUB SERPL-MCNC: 0.5 MG/DL
BUN SERPL-MCNC: 24 MG/DL
CALCIUM SERPL-MCNC: 9.1 MG/DL
CHLORIDE SERPL-SCNC: 104 MMOL/L
CHOLEST SERPL-MCNC: 227 MG/DL
CHOLEST/HDLC SERPL: 4.1 {RATIO}
CO2 SERPL-SCNC: 28 MMOL/L
CREAT SERPL-MCNC: 0.9 MG/DL
EST. GFR  (AFRICAN AMERICAN): >60 ML/MIN/1.73 M^2
EST. GFR  (NON AFRICAN AMERICAN): >60 ML/MIN/1.73 M^2
GLUCOSE SERPL-MCNC: 93 MG/DL
HDLC SERPL-MCNC: 56 MG/DL
HDLC SERPL: 24.7 %
LDLC SERPL CALC-MCNC: 155 MG/DL
NONHDLC SERPL-MCNC: 171 MG/DL
POTASSIUM SERPL-SCNC: 4.3 MMOL/L
PROT SERPL-MCNC: 6.8 G/DL
SODIUM SERPL-SCNC: 139 MMOL/L
TRIGL SERPL-MCNC: 80 MG/DL

## 2017-09-09 PROCEDURE — 36415 COLL VENOUS BLD VENIPUNCTURE: CPT | Mod: PO

## 2017-09-09 PROCEDURE — 80061 LIPID PANEL: CPT

## 2017-09-09 PROCEDURE — 80053 COMPREHEN METABOLIC PANEL: CPT

## 2017-09-14 ENCOUNTER — OFFICE VISIT (OUTPATIENT)
Dept: FAMILY MEDICINE | Facility: CLINIC | Age: 67
End: 2017-09-14
Payer: MEDICARE

## 2017-09-14 VITALS
DIASTOLIC BLOOD PRESSURE: 70 MMHG | OXYGEN SATURATION: 96 % | SYSTOLIC BLOOD PRESSURE: 98 MMHG | HEART RATE: 65 BPM | HEIGHT: 67 IN | BODY MASS INDEX: 25.92 KG/M2 | WEIGHT: 165.13 LBS

## 2017-09-14 DIAGNOSIS — I10 ESSENTIAL HYPERTENSION: Primary | ICD-10-CM

## 2017-09-14 DIAGNOSIS — E03.9 HYPOTHYROIDISM, UNSPECIFIED TYPE: ICD-10-CM

## 2017-09-14 DIAGNOSIS — E78.5 HYPERLIPIDEMIA, UNSPECIFIED HYPERLIPIDEMIA TYPE: ICD-10-CM

## 2017-09-14 DIAGNOSIS — L30.9 ECZEMA, UNSPECIFIED TYPE: ICD-10-CM

## 2017-09-14 DIAGNOSIS — M17.11 ARTHRITIS OF RIGHT KNEE: ICD-10-CM

## 2017-09-14 PROCEDURE — 1125F AMNT PAIN NOTED PAIN PRSNT: CPT | Mod: ,,, | Performed by: FAMILY MEDICINE

## 2017-09-14 PROCEDURE — 3074F SYST BP LT 130 MM HG: CPT | Mod: ,,, | Performed by: FAMILY MEDICINE

## 2017-09-14 PROCEDURE — 3078F DIAST BP <80 MM HG: CPT | Mod: ,,, | Performed by: FAMILY MEDICINE

## 2017-09-14 PROCEDURE — 1159F MED LIST DOCD IN RCRD: CPT | Mod: ,,, | Performed by: FAMILY MEDICINE

## 2017-09-14 PROCEDURE — 99214 OFFICE O/P EST MOD 30 MIN: CPT | Mod: S$PBB,,, | Performed by: FAMILY MEDICINE

## 2017-09-14 PROCEDURE — G0008 ADMIN INFLUENZA VIRUS VAC: HCPCS | Mod: PBBFAC,PO

## 2017-09-14 PROCEDURE — 99999 PR PBB SHADOW E&M-EST. PATIENT-LVL III: CPT | Mod: PBBFAC,,, | Performed by: FAMILY MEDICINE

## 2017-09-14 PROCEDURE — 99213 OFFICE O/P EST LOW 20 MIN: CPT | Mod: PBBFAC,PO | Performed by: FAMILY MEDICINE

## 2017-09-14 RX ORDER — DESONIDE 0.5 MG/G
CREAM TOPICAL
Qty: 60 G | Refills: 11 | Status: SHIPPED | OUTPATIENT
Start: 2017-09-14 | End: 2020-07-16 | Stop reason: SDUPTHER

## 2017-09-14 NOTE — PROGRESS NOTES
Subjective:       Patient ID: Coral Pandya is a 67 y.o. female.    Chief Complaint: Follow-up (6 mos)    HPI     Here for a f/u.     Reviewed recent labs.  Mildly elevated total cholesterol. Normal cmp.       htn stable.      Sees Dr. Coe, psychiatrist, for treatment of depression and anxiety.     Has intermittent chronic right knee arthritis. Reports that riding her bike helps with pain control.      Needs rf of desonide for eczema of bilat eyelids and skin.      Review of Systems   Constitutional: Negative for activity change and unexpected weight change.   HENT: Negative for hearing loss, rhinorrhea and trouble swallowing.    Eyes: Negative for discharge and visual disturbance.   Respiratory: Negative for chest tightness and wheezing.    Cardiovascular: Negative for chest pain and palpitations.   Gastrointestinal: Negative for blood in stool, constipation, diarrhea and vomiting.   Endocrine: Negative for polydipsia and polyuria.   Genitourinary: Negative for difficulty urinating, dysuria, hematuria and menstrual problem.   Musculoskeletal: Positive for arthralgias and joint swelling. Negative for neck pain.   Neurological: Positive for headaches. Negative for weakness.   Psychiatric/Behavioral: Negative for confusion and dysphoric mood.       Objective:      Physical Exam   Constitutional: She appears well-developed.   HENT:   Head: Normocephalic and atraumatic.   Eyes: Conjunctivae are normal. Pupils are equal, round, and reactive to light.   Neck: Normal range of motion.   Cardiovascular: Normal rate and regular rhythm.    No murmur heard.  Pulmonary/Chest: Effort normal and breath sounds normal. She has no wheezes.   Lymphadenopathy:     She has no cervical adenopathy.         Lab Visit on 09/09/2017   Component Date Value Ref Range Status    Sodium 09/09/2017 139  136 - 145 mmol/L Final    Potassium 09/09/2017 4.3  3.5 - 5.1 mmol/L Final    Chloride 09/09/2017 104  95 - 110 mmol/L Final     CO2 09/09/2017 28  23 - 29 mmol/L Final    Glucose 09/09/2017 93  70 - 110 mg/dL Final    BUN, Bld 09/09/2017 24* 8 - 23 mg/dL Final    Creatinine 09/09/2017 0.9  0.5 - 1.4 mg/dL Final    Calcium 09/09/2017 9.1  8.7 - 10.5 mg/dL Final    Total Protein 09/09/2017 6.8  6.0 - 8.4 g/dL Final    Albumin 09/09/2017 3.5  3.5 - 5.2 g/dL Final    Total Bilirubin 09/09/2017 0.5  0.1 - 1.0 mg/dL Final    Comment: For infants and newborns, interpretation of results should be based  on gestational age, weight and in agreement with clinical  observations.  Premature Infant recommended reference ranges:  Up to 24 hours.............<8.0 mg/dL  Up to 48 hours............<12.0 mg/dL  3-5 days..................<15.0 mg/dL  6-29 days.................<15.0 mg/dL      Alkaline Phosphatase 09/09/2017 63  55 - 135 U/L Final    AST 09/09/2017 25  10 - 40 U/L Final    ALT 09/09/2017 24  10 - 44 U/L Final    Anion Gap 09/09/2017 7* 8 - 16 mmol/L Final    eGFR if African American 09/09/2017 >60.0  >60 mL/min/1.73 m^2 Final    eGFR if non African American 09/09/2017 >60.0  >60 mL/min/1.73 m^2 Final    Comment: Calculation used to obtain the estimated glomerular filtration  rate (eGFR) is the CKD-EPI equation. Since race is unknown   in our information system, the eGFR values for   -American and Non--American patients are given   for each creatinine result.      Cholesterol 09/09/2017 227* 120 - 199 mg/dL Final    Comment: The National Cholesterol Education Program (NCEP) has set the  following guidelines (reference ranges) for Cholesterol:  Optimal.....................<200 mg/dL  Borderline High.............200-239 mg/dL  High........................> or = 240 mg/dL      Triglycerides 09/09/2017 80  30 - 150 mg/dL Final    Comment: The National Cholesterol Education Program (NCEP) has set the  following guidelines (reference values) for triglycerides:  Normal......................<150 mg/dL  Borderline  High.............150-199 mg/dL  High........................200-499 mg/dL      HDL 09/09/2017 56  40 - 75 mg/dL Final    Comment: The National Cholesterol Education Program (NCEP) has set the  following guidelines (reference values) for HDL Cholesterol:  Low...............<40 mg/dL  Optimal...........>60 mg/dL      LDL Cholesterol 09/09/2017 155.0  63.0 - 159.0 mg/dL Final    Comment: The National Cholesterol Education Program (NCEP) has set the  following guidelines (reference values) for LDL Cholesterol:  Optimal.......................<130 mg/dL  Borderline High...............130-159 mg/dL  High..........................160-189 mg/dL  Very High.....................>190 mg/dL      HDL/Chol Ratio 09/09/2017 24.7  20.0 - 50.0 % Final    Total Cholesterol/HDL Ratio 09/09/2017 4.1  2.0 - 5.0 Final    Non-HDL Cholesterol 09/09/2017 171  mg/dL Final    Comment: Risk category and Non-HDL cholesterol goals:  Coronary heart disease (CHD)or equivalent (10-year risk of CHD >20%):  Non-HDL cholesterol goal     <130 mg/dL  Two or more CHD risk factors and 10-year risk of CHD <= 20%:  Non-HDL cholesterol goal     <160 mg/dL  0 to 1 CHD risk factor:  Non-HDL cholesterol goal     <190 mg/dL             Assessment:       1. Essential hypertension    2. Hyperlipidemia, unspecified hyperlipidemia type    3. Hypothyroidism, unspecified type    4. Arthritis of right knee    5. Eczema, unspecified type        Plan:       Essential hypertension    Hyperlipidemia, unspecified hyperlipidemia type    Hypothyroidism, unspecified type    Arthritis of right knee    Eczema, unspecified type    Other orders  -     desonide (DESOWEN) 0.05 % cream; APPLY TO EYELID SKIN NIGHTLY PRF ITCHY LIDS  Dispense: 60 g; Refill: 11  -     Influenza - High Dose (65+) (PF) (IM)      Plan:  Cont current meds

## 2017-09-29 ENCOUNTER — PATIENT MESSAGE (OUTPATIENT)
Dept: FAMILY MEDICINE | Facility: CLINIC | Age: 67
End: 2017-09-29

## 2017-11-16 ENCOUNTER — OFFICE VISIT (OUTPATIENT)
Dept: PSYCHIATRY | Facility: CLINIC | Age: 67
End: 2017-11-16
Payer: MEDICARE

## 2017-11-16 VITALS
BODY MASS INDEX: 26.55 KG/M2 | HEIGHT: 67 IN | SYSTOLIC BLOOD PRESSURE: 99 MMHG | DIASTOLIC BLOOD PRESSURE: 65 MMHG | HEART RATE: 64 BPM | WEIGHT: 169.13 LBS

## 2017-11-16 DIAGNOSIS — F41.1 GAD (GENERALIZED ANXIETY DISORDER): ICD-10-CM

## 2017-11-16 DIAGNOSIS — F33.41 MDD (MAJOR DEPRESSIVE DISORDER), RECURRENT, IN PARTIAL REMISSION: Primary | ICD-10-CM

## 2017-11-16 DIAGNOSIS — I10 HYPERTENSION, UNSPECIFIED TYPE: ICD-10-CM

## 2017-11-16 PROCEDURE — 90833 PSYTX W PT W E/M 30 MIN: CPT | Mod: S$PBB,,, | Performed by: PSYCHIATRY & NEUROLOGY

## 2017-11-16 PROCEDURE — 99999 PR PBB SHADOW E&M-EST. PATIENT-LVL III: CPT | Mod: PBBFAC,,, | Performed by: PSYCHIATRY & NEUROLOGY

## 2017-11-16 PROCEDURE — 99214 OFFICE O/P EST MOD 30 MIN: CPT | Mod: S$PBB,,, | Performed by: PSYCHIATRY & NEUROLOGY

## 2017-11-16 PROCEDURE — 90833 PSYTX W PT W E/M 30 MIN: CPT | Mod: PBBFAC,PO | Performed by: PSYCHIATRY & NEUROLOGY

## 2017-11-16 PROCEDURE — 99213 OFFICE O/P EST LOW 20 MIN: CPT | Mod: PBBFAC,PO | Performed by: PSYCHIATRY & NEUROLOGY

## 2017-11-16 RX ORDER — MIRTAZAPINE 15 MG/1
15 TABLET, FILM COATED ORAL NIGHTLY
Qty: 90 TABLET | Refills: 1 | Status: SHIPPED | OUTPATIENT
Start: 2017-11-16 | End: 2018-12-04 | Stop reason: SDUPTHER

## 2017-11-16 RX ORDER — DULOXETIN HYDROCHLORIDE 60 MG/1
60 CAPSULE, DELAYED RELEASE ORAL DAILY
COMMUNITY
End: 2017-11-16 | Stop reason: SDUPTHER

## 2017-11-16 RX ORDER — DULOXETIN HYDROCHLORIDE 30 MG/1
30 CAPSULE, DELAYED RELEASE ORAL DAILY
Qty: 90 CAPSULE | Refills: 1 | Status: SHIPPED | OUTPATIENT
Start: 2017-11-16 | End: 2018-10-16 | Stop reason: SDUPTHER

## 2017-11-16 RX ORDER — DULOXETIN HYDROCHLORIDE 60 MG/1
60 CAPSULE, DELAYED RELEASE ORAL DAILY
Qty: 90 CAPSULE | Refills: 1 | Status: SHIPPED | OUTPATIENT
Start: 2017-11-16 | End: 2018-10-16 | Stop reason: SDUPTHER

## 2017-11-16 NOTE — PROGRESS NOTES
"ID: 66yo WF with a previous diag of depression. Referred by PCP, , for eval of depressive sxs. Currently on cymbalta, recently added remeron, and valium. Pt has cont'd to remain stable on current meds. No longer using valium.     CC: "depression"    Interim hx: presents on time, chart reviewed, pt seen. Appears well    Has been working more b/c they asked her to and "it's been good. I've actually really enjoyed it. 4 days a week and I've really had a lot of fun there so that's hard to beat."     Went for a visit to [place] and "it's better each time"-from pt tweaking her trip each time. This time flew in rented a car and drove herself. Allowed for even more independence.  "it's just so hard to see her having to live that way. No mother would feel ok about it." discuss that her daughter continues to choose it and therefore there must be some positive for her.     Consistently rec therapy and pt consistently declines. Does better on weeks with work and bike riding.     Had an awkward interaction when  had a recent health concern and the pt took him to . On introduction she is "wife", but in truth they no longer have any interaction.  had to undress with her in room. Neither sure of what to do. She laughs about this, but it did raise some questions about what is their relationship as the aging process continues. "i would never turn my back on him and he knows that."     On Psychiatric ROS:    Endorses better sleep on the remeron, denies anhedonia, denies feeling helpless/hopeless, improved energy, riding bike regularly, denies dec concentration, denies change in appetite (despite the remeron), denies dec PMA "I push myself. I know keeping busy helps me." Denies thoughts of SI/intent/plan.     Denies recently feeling more easily overwhelmed, denies recent ruminative thinking "the obsessive thinking is MUCH better on the meds." denies feeling tense/"on edge"     PSYCHOTHERAPY ADD-ON   30 " "(16-37*) minutes    Time: 20 minutes  Participants: Met with patient    Therapeutic Intervention Type: insight oriented psychotherapy, behavior modifying psychotherapy, supportive psychotherapy  Why chosen therapy is appropriate versus another modality: relevant to diagnosis    Target symptoms: anxiety   Primary focus: anxiety surrounding family interactions, relationship with children, loneliness  Psychotherapeutic techniques: cbt, supportive, beh modification    Outcome monitoring methods: self-report, observation    Patient's response to intervention:  The patient's response to intervention is accepting, a little hesitant/weary as anxiety is a chronic constant since childhood for this pt    Progress toward goals:  The patient's progress toward goals is good-  Noticeable improvement today in boundary setting.     On Psychiatric ROS:    Endorses better sleep on the remeron, denies anhedonia, denies feeling helpless/hopeless, improved energy, riding bike regularly, denies dec concentration, denies change in appetite (despite the remeron), denies dec PMA "I push myself. I know keeping busy helps me." Denies thoughts of SI/intent/plan.     Denies recently feeling more easily overwhelmed, denies recent ruminative thinking "the obsessive thinking is MUCH better on the meds." denies feeling tense/"on edge"     PPHx: Denies h/o self injury, inpt psych hospitalization, denies h/o suicide attempt     Current Psych Meds: cymbalta 90mg po qam, remeron 15mg po qhs  Past Psych Meds: Lexapro and Wellbutrin (effective but led to h/a's), celexa (nausea), valium    PMHx: hypothyroid, GERD    SubstHx:   T- none  E- very rare  D- none    FamPHx: brother- suicide 1990, M-anxiety, F-anxiety, dtr- seizure while on wellbutrin    MSE: appears stated age, well groomed, appropriate dress, engages well with examiner. Good e/c. Speech reg rate and vol, nonpressured. Mood is "I feel really good." Affect congruent with some mild anxiety. dec'd " "verbosity. Sensorium fully intact. Oriented to date/day/location, current events. Narrative memory intact. Intellectual function is avg based on vocab and basic fund of knowledge. Thought is c/l/gd. No tangentiality or circumstantiality. No FOI/LEBRON. Denies SI/HI. Denies A/VH. No evidence of delusions. Insight and Judgment intact.     Blood pressure 99/65, pulse 64, height 5' 7" (1.702 m), weight 76.7 kg (169 lb 1.6 oz).    Suicide Risk Assessment:   Protective- age, gender, no prior attempts, no prior hospitalizations, no ongoing substance abuse, no psychosis, , has children, denies SI/intent/plan, seeking treatment, access to treatment, future oriented, good primary support, Confucianist  Risk- race, ongoing Axis I sxs, family h/o suicide (brother-1990)    **Pt is at LOW imminent and long term risk of suicide given current risk factors.     Assessment:  65yo WF with a previous diag of depression. Referred by PCP, , for eval of depressive sxs. Currently on cymbalta, recently added remeron, and valium per chart review. On eval the pt is reporting an improvement in all mood and anxiety sxs in the 3 wks leading to initial eval which correlates with the time Remeron was added. She had done well for many years on lexapro and wellbutrin but was having chronic headaches and self initiated a taper off meds and the headaches did remit, but mood/anxiety suffered until the recent combination of cymbalta and remeron which is currently managing sxs quite well. Prior to meds becoming effective the pt endorses sxs c/w diagnoses of MDD and YASMANI. No acute safety concerns but the pt has cont'd to feel mood is "a little down. Other than the headaches, I feel that I was really better on the lexapro/wellbutrin combination". Pt is active, finds good support through kerry, family and friends. Will try an inc in cymbalta to 90mg and if ineffective may try an addition of wellbutrin xl 150mg- in the past pt headaches were on the " 300mg dose when in combo with lexapro. On f/u the inc in cymbalta was effective- pt other ongoing issues are therapy related. Pt continues to decline therapy as she doesn't see the need at this time- majority of her appts are spent in therapy, but today she is showing some signs of improved boundary setting and themes are more positive. Doing well. meds stable. Will f/u in 3mos.    Axis I: MDD, recurrent, in partial remission; YASMANI  Axis II: none at this time   Axis III: hypothyroid, GERD, HTN  Axis IV:  from , chronic mental illness  Axis V: GAF 70    Plan:   1. Cont cymbalta 90mg po qam  2. Cont remeron 15mg po qhs  3. Cont exercise, encouraged mindfulness of dietary choices in an effort to dec inflammation  4. rec therapy but pt declines at this time  5. RTC 3mos    -Spent 30min face to face with the pt; >50% time spent in counseling   -Supportive therapy and psychoeducation provided  -R/B/SE's of medications discussed with the pt who expresses understanding and chooses to take medications as prescribed.   -Pt instructed to call clinic, 911 or go to nearest emergency room if sxs worsen or pt is in   crisis. The pt expresses understanding.

## 2017-11-22 RX ORDER — RABEPRAZOLE SODIUM 20 MG/1
TABLET, DELAYED RELEASE ORAL
Qty: 90 TABLET | Refills: 3 | Status: SHIPPED | OUTPATIENT
Start: 2017-11-22 | End: 2018-12-05 | Stop reason: SDUPTHER

## 2018-02-08 ENCOUNTER — OFFICE VISIT (OUTPATIENT)
Dept: PSYCHIATRY | Facility: CLINIC | Age: 68
End: 2018-02-08
Payer: MEDICARE

## 2018-02-08 VITALS
DIASTOLIC BLOOD PRESSURE: 68 MMHG | SYSTOLIC BLOOD PRESSURE: 111 MMHG | BODY MASS INDEX: 26.32 KG/M2 | HEIGHT: 67 IN | WEIGHT: 167.69 LBS | HEART RATE: 68 BPM

## 2018-02-08 DIAGNOSIS — F33.41 MDD (MAJOR DEPRESSIVE DISORDER), RECURRENT, IN PARTIAL REMISSION: Primary | ICD-10-CM

## 2018-02-08 DIAGNOSIS — F41.1 GAD (GENERALIZED ANXIETY DISORDER): ICD-10-CM

## 2018-02-08 PROCEDURE — 99999 PR PBB SHADOW E&M-EST. PATIENT-LVL III: CPT | Mod: PBBFAC,,, | Performed by: PSYCHIATRY & NEUROLOGY

## 2018-02-08 PROCEDURE — 90833 PSYTX W PT W E/M 30 MIN: CPT | Mod: PBBFAC,PO | Performed by: PSYCHIATRY & NEUROLOGY

## 2018-02-08 PROCEDURE — 99213 OFFICE O/P EST LOW 20 MIN: CPT | Mod: PBBFAC,PO | Performed by: PSYCHIATRY & NEUROLOGY

## 2018-02-08 PROCEDURE — 1159F MED LIST DOCD IN RCRD: CPT | Mod: ,,, | Performed by: PSYCHIATRY & NEUROLOGY

## 2018-02-08 PROCEDURE — 99214 OFFICE O/P EST MOD 30 MIN: CPT | Mod: S$PBB,,, | Performed by: PSYCHIATRY & NEUROLOGY

## 2018-02-08 PROCEDURE — 90833 PSYTX W PT W E/M 30 MIN: CPT | Mod: S$PBB,,, | Performed by: PSYCHIATRY & NEUROLOGY

## 2018-02-08 PROCEDURE — 1126F AMNT PAIN NOTED NONE PRSNT: CPT | Mod: ,,, | Performed by: PSYCHIATRY & NEUROLOGY

## 2018-02-08 NOTE — PROGRESS NOTES
"ID: 64yo WF with a previous diag of depression. Referred by PCP, , for eval of depressive sxs. Currently on cymbalta, recently added remeron, and valium. Pt has cont'd to remain stable on current meds. No longer using valium.     CC: "depression"    Interim hx: presents on time, chart reviewed, pt seen. Appears well, but has on surgical shoes- had hammer toe surgery on both feet.     Has been hard for pt's vanity- going to work in the shoes "so it's just embarrassing, but that's not the big deal, although it has affected my mood a little to not feel I look my best, but the big things are i'm also not gardening or riding my bike which you know affects me but the BIG thing is, my daughter had a seizure while at my house. after 8 years. i'm so upset. I thought it was maybe gone- that's where I let my brain go. I was moving through life thinking she no longer had a seizure problem. We had even talked about her stopping her meds. i've just been so upset by that. It's situational and she sounds totally great. She's riding her bike to work and just sounds great, but i've had a hard time and now I have to get myself back to the place where I'm not worrying she's going to have the next seizure."      Pt recognizes this will dissipate with time and states for herself, "the worrying never did anything to help her the first time."     Consistently rec therapy and pt consistently declines. Does better on weeks with work and bike riding.     On Psychiatric ROS:    Endorses better sleep on the remeron, denies anhedonia, denies feeling helpless/hopeless, improved energy, riding bike regularly, denies dec concentration, denies change in appetite (despite the remeron), denies dec PMA "I push myself. I know keeping busy helps me." Denies thoughts of SI/intent/plan.     Denies recently feeling more easily overwhelmed, denies recent ruminative thinking "the obsessive thinking is MUCH better on the meds." denies feeling " "tense/"on edge"     PSYCHOTHERAPY ADD-ON   30 (16-37*) minutes    Time: 20 minutes  Participants: Met with patient    Therapeutic Intervention Type: insight oriented psychotherapy, behavior modifying psychotherapy, supportive psychotherapy  Why chosen therapy is appropriate versus another modality: relevant to diagnosis    Target symptoms: anxiety   Primary focus: anxiety surrounding family interactions, relationship with children, loneliness  Psychotherapeutic techniques: cbt, supportive, beh modification    Outcome monitoring methods: self-report, observation    Patient's response to intervention:  The patient's response to intervention is accepting, a little hesitant/weary as anxiety is a chronic constant since childhood for this pt    Progress toward goals:  The patient's progress toward goals is good-  Noticeable improvement today in boundary setting for her own worry.     On Psychiatric ROS:    Endorses better sleep on the remeron, denies anhedonia, denies feeling helpless/hopeless, improved energy, riding bike regularly, denies dec concentration, denies change in appetite (despite the remeron), denies dec PMA "I push myself. I know keeping busy helps me." Denies thoughts of SI/intent/plan.     Denies recently feeling more easily overwhelmed, denies recent ruminative thinking "the obsessive thinking is MUCH better on the meds." denies feeling tense/"on edge"     PPHx: Denies h/o self injury, inpt psych hospitalization, denies h/o suicide attempt     Current Psych Meds: cymbalta 90mg po qam, remeron 15mg po qhs  Past Psych Meds: Lexapro and Wellbutrin (effective but led to h/a's), celexa (nausea), valium    PMHx: hypothyroid, GERD    SubstHx:   T- none  E- very rare  D- none    FamPHx: brother- suicide 1990, M-anxiety, F-anxiety, dtr- seizure while on wellbutrin    MSE: appears stated age, well groomed, appropriate dress, engages well with examiner. Good e/c. Speech reg rate and vol, nonpressured. Mood is "I'm " "going to get there. I was feeling the best I had in a long time and then this happened with my daughter but i'll feel better." Affect congruent with some mild anxiety. dec'd verbosity. Sensorium fully intact. Oriented to date/day/location, current events. Narrative memory intact. Intellectual function is avg based on vocab and basic fund of knowledge. Thought is c/l/gd. No tangentiality or circumstantiality. No FOI/LEBRON. Denies SI/HI. Denies A/VH. No evidence of delusions. Insight and Judgment intact.     Blood pressure 111/68, pulse 68, height 5' 7" (1.702 m), weight 76.1 kg (167 lb 11.2 oz).    Suicide Risk Assessment:   Protective- age, gender, no prior attempts, no prior hospitalizations, no ongoing substance abuse, no psychosis, , has children, denies SI/intent/plan, seeking treatment, access to treatment, future oriented, good primary support, [employer]  Risk- race, ongoing Axis I sxs, family h/o suicide (brother-1990)    **Pt is at LOW imminent and long term risk of suicide given current risk factors.     Assessment:  65yo WF with a previous diag of depression. Referred by PCP, , for eval of depressive sxs. Currently on cymbalta, recently added remeron, and valium per chart review. On eval the pt is reporting an improvement in all mood and anxiety sxs in the 3 wks leading to initial eval which correlates with the time Remeron was added. She had done well for many years on lexapro and wellbutrin but was having chronic headaches and self initiated a taper off meds and the headaches did remit, but mood/anxiety suffered until the recent combination of cymbalta and remeron which is currently managing sxs quite well. Prior to meds becoming effective the pt endorses sxs c/w diagnoses of MDD and YASMANI. No acute safety concerns but the pt has cont'd to feel mood is "a little down. Other than the headaches, I feel that I was really better on the lexapro/wellbutrin combination". Pt is active, finds good " support through kerry, family and friends. Will try an inc in cymbalta to 90mg and if ineffective may try an addition of wellbutrin xl 150mg- in the past pt headaches were on the 300mg dose when in combo with lexapro. On f/u the inc in cymbalta was effective- pt other ongoing issues are therapy related. Pt continues to decline therapy as she doesn't see the need at this time- majority of her appts are spent in therapy, but today she is showing some signs of improved boundary setting and themes are more positive. Doing well. meds stable. Will f/u in 3mos.    Axis I: MDD, recurrent, in partial remission; YASMANI  Axis II: none at this time   Axis III: hypothyroid, GERD, HTN  Axis IV:  from , chronic mental illness  Axis V: GAF 70    Plan:   1. Cont cymbalta 90mg po qam  2. Cont remeron 15mg po qhs  3. Cont exercise, encouraged mindfulness of dietary choices in an effort to dec inflammation  4. rec therapy but pt declines at this time  5. RTC 3mos    -Spent 30min face to face with the pt; >50% time spent in counseling   -Supportive therapy and psychoeducation provided  -R/B/SE's of medications discussed with the pt who expresses understanding and chooses to take medications as prescribed.   -Pt instructed to call clinic, 911 or go to nearest emergency room if sxs worsen or pt is in   crisis. The pt expresses understanding.

## 2018-02-28 RX ORDER — NEBIVOLOL HYDROCHLORIDE 5 MG/1
TABLET ORAL
Qty: 90 TABLET | Refills: 3 | Status: SHIPPED | OUTPATIENT
Start: 2018-02-28 | End: 2019-04-22 | Stop reason: SDUPTHER

## 2018-02-28 RX ORDER — LEVOTHYROXINE SODIUM 100 UG/1
TABLET ORAL
Qty: 90 TABLET | Refills: 3 | Status: SHIPPED | OUTPATIENT
Start: 2018-02-28 | End: 2019-04-05 | Stop reason: SDUPTHER

## 2018-05-21 ENCOUNTER — TELEPHONE (OUTPATIENT)
Dept: GASTROENTEROLOGY | Facility: CLINIC | Age: 68
End: 2018-05-21

## 2018-05-21 NOTE — TELEPHONE ENCOUNTER
----- Message from Beatrice Mills sent at 5/21/2018  1:34 PM CDT -----  Contact: patient  Type: Needs Medical Advice    Who Called: Patient  Symptoms (please be specific): MARIA T  How long has patient had these symptoms:  MARIA T  Pharmacy name and phone #:  MARIA T  Best Call Back Number:   Additional Information: She received her recall letter and is ready to schedule her Colonoscopy. Please advise. She asked me to call the office to schedule now, because she is at work. Call to pod. No answer.

## 2018-05-22 ENCOUNTER — TELEPHONE (OUTPATIENT)
Dept: GASTROENTEROLOGY | Facility: CLINIC | Age: 68
End: 2018-05-22

## 2018-05-22 NOTE — TELEPHONE ENCOUNTER
----- Message from Kylie Herrera sent at 5/22/2018  9:40 AM CDT -----  Contact: kami Pina is returning phone call. Please contact patient back at [phone] cell phone

## 2018-05-22 NOTE — TELEPHONE ENCOUNTER
----- Message from Saskia Driscoll sent at 5/22/2018 12:38 PM CDT -----  Contact: PT  Type:  Patient Returning Call    Who Called:  Coral Pandya  Who Left Message for Patient:  Gloria  Does the patient know what this is regarding?: n/a    Best Call Back Number:    Additional Information:

## 2018-05-22 NOTE — TELEPHONE ENCOUNTER
Pt has been scheduled for colon on 6/14. Reviewed mg citrate prep. Pt is aware I will be mailing instructions and confirmation letter.

## 2018-05-24 ENCOUNTER — OFFICE VISIT (OUTPATIENT)
Dept: PSYCHIATRY | Facility: CLINIC | Age: 68
End: 2018-05-24
Payer: MEDICARE

## 2018-05-24 VITALS
DIASTOLIC BLOOD PRESSURE: 76 MMHG | HEART RATE: 60 BPM | WEIGHT: 166.63 LBS | BODY MASS INDEX: 26.15 KG/M2 | HEIGHT: 67 IN | SYSTOLIC BLOOD PRESSURE: 119 MMHG

## 2018-05-24 DIAGNOSIS — I10 HYPERTENSION, UNSPECIFIED TYPE: ICD-10-CM

## 2018-05-24 DIAGNOSIS — F33.41 MDD (MAJOR DEPRESSIVE DISORDER), RECURRENT, IN PARTIAL REMISSION: Primary | ICD-10-CM

## 2018-05-24 DIAGNOSIS — F41.1 GAD (GENERALIZED ANXIETY DISORDER): ICD-10-CM

## 2018-05-24 PROCEDURE — 99213 OFFICE O/P EST LOW 20 MIN: CPT | Mod: PBBFAC,PO | Performed by: PSYCHIATRY & NEUROLOGY

## 2018-05-24 PROCEDURE — 99213 OFFICE O/P EST LOW 20 MIN: CPT | Mod: S$PBB,,, | Performed by: PSYCHIATRY & NEUROLOGY

## 2018-05-24 PROCEDURE — 90833 PSYTX W PT W E/M 30 MIN: CPT | Mod: S$PBB,,, | Performed by: PSYCHIATRY & NEUROLOGY

## 2018-05-24 PROCEDURE — 99999 PR PBB SHADOW E&M-EST. PATIENT-LVL III: CPT | Mod: PBBFAC,,, | Performed by: PSYCHIATRY & NEUROLOGY

## 2018-05-24 PROCEDURE — 90833 PSYTX W PT W E/M 30 MIN: CPT | Mod: PBBFAC,PO | Performed by: PSYCHIATRY & NEUROLOGY

## 2018-05-24 NOTE — PROGRESS NOTES
"ID: 64yo WF with a previous diag of depression. Referred by PCP, , for eval of depressive sxs. Currently on cymbalta, recently added remeron, and valium. Pt has cont'd to remain stable on current meds. No longer using valium.     CC: "depression"    Interim hx: presents on time, chart reviewed, pt seen. Appears well    Reports she's doing well and going to [place] on Saturday- other daughter is going with her "so that should be good and she needed to get back because of work so i'll be ready to come back then anyway. It should be fun."     Mood/anxiety stable other than sciatic pain in rt leg. I rec PT and she says, "well, that's true. They really helped me in the past. i'll do that."  Remains very active with work and family and [employer] and friends- maintains her home, garden and pool and cycles more in these months with warm weather. "i really know how to keep myself busy and going."     Consistently rec therapy and pt consistently declines.     On Psychiatric ROS:    Endorses better sleep on the remeron, denies anhedonia, denies feeling helpless/hopeless, improved energy, riding bike regularly, denies dec concentration, denies change in appetite (despite the remeron), denies dec PMA "I push myself. I know keeping busy helps me." Denies thoughts of SI/intent/plan.     Denies recently feeling more easily overwhelmed, denies recent ruminative thinking "the obsessive thinking is MUCH better on the meds." denies feeling tense/"on edge"     On Psychiatric ROS:    Endorses better sleep on the remeron, denies anhedonia, denies feeling helpless/hopeless, improved energy, riding bike regularly, denies dec concentration, denies change in appetite (despite the remeron), denies dec PMA "I push myself. I know keeping busy helps me." Denies thoughts of SI/intent/plan.     Denies recently feeling more easily overwhelmed, denies recent ruminative thinking "the obsessive thinking is MUCH better on the meds." denies " "feeling tense/"on edge"     PSYCHOTHERAPY ADD-ON   30 (16-37*) minutes    Time: 20 minutes  Participants: Met with patient    Therapeutic Intervention Type: insight oriented psychotherapy, behavior modifying psychotherapy, supportive psychotherapy  Why chosen therapy is appropriate versus another modality: relevant to diagnosis    Target symptoms: anxiety   Primary focus: anxiety surrounding family interactions, relationship with children, loneliness  Psychotherapeutic techniques: cbt, supportive, beh modification    Outcome monitoring methods: self-report, observation    Patient's response to intervention:  The patient's response to intervention is accepting, a little hesitant/weary as anxiety is a chronic constant since childhood for this pt    Progress toward goals:  The patient's progress toward goals is good-  Noticeable improvement in boundary setting for her own worry.     PPHx: Denies h/o self injury, inpt psych hospitalization, denies h/o suicide attempt     Current Psych Meds: cymbalta 90mg po qam, remeron 15mg po qhs  Past Psych Meds: Lexapro and Wellbutrin (effective but led to h/a's), celexa (nausea), valium    PMHx: hypothyroid, GERD    SubstHx:   T- none  E- very rare  D- none    FamPHx: brother- suicide 1990, M-anxiety, F-anxiety, dtr- seizure while on wellbutrin    MSE: appears stated age, well groomed, appropriate dress, engages well with examiner. Good e/c. Speech reg rate and vol, nonpressured. Mood is "I'm doing pretty well." Affect congruent with some mild anxiety. dec'd verbosity. Sensorium fully intact. Oriented to date/day/location, current events. Narrative memory intact. Intellectual function is avg based on vocab and basic fund of knowledge. Thought is c/l/gd. No tangentiality or circumstantiality. No FOI/LEBRON. Denies SI/HI. Denies A/VH. No evidence of delusions. Insight and Judgment intact.     Blood pressure 119/76, pulse 60, height 5' 7" (1.702 m), weight 75.6 kg (166 lb 9.6 " "oz).    Suicide Risk Assessment:   Protective- age, gender, no prior attempts, no prior hospitalizations, no ongoing substance abuse, no psychosis, , has children, denies SI/intent/plan, seeking treatment, access to treatment, future oriented, good primary support, [employer]  Risk- race, ongoing Axis I sxs, family h/o suicide (brother-1990)    **Pt is at LOW imminent and long term risk of suicide given current risk factors.     Assessment:  65yo WF with a previous diag of depression. Referred by PCP, , for eval of depressive sxs. Currently on cymbalta, recently added remeron, and valium per chart review. On eval the pt is reporting an improvement in all mood and anxiety sxs in the 3 wks leading to initial eval which correlates with the time Remeron was added. She had done well for many years on lexapro and wellbutrin but was having chronic headaches and self initiated a taper off meds and the headaches did remit, but mood/anxiety suffered until the recent combination of cymbalta and remeron which is currently managing sxs quite well. Prior to meds becoming effective the pt endorses sxs c/w diagnoses of MDD and YASMANI. No acute safety concerns but the pt has cont'd to feel mood is "a little down. Other than the headaches, I feel that I was really better on the lexapro/wellbutrin combination". Pt is active, finds good support through kerry, family and friends. Will try an inc in cymbalta to 90mg and if ineffective may try an addition of wellbutrin xl 150mg- in the past pt headaches were on the 300mg dose when in combo with lexapro. On f/u the inc in cymbalta was effective- pt other ongoing issues are therapy related. Pt continues to decline therapy as she doesn't see the need at this time- majority of her appts are spent in therapy, but today she has showed continued signs of improved boundary setting and themes are more positive. Doing well. meds stable. Will f/u in 3mos.    Axis I: MDD, recurrent, in " partial remission; YASMANI  Axis II: none at this time   Axis III: hypothyroid, GERD, HTN  Axis IV:  from , chronic mental illness  Axis V: GAF 70    Plan:   1. Cont cymbalta 90mg po qam  2. Cont remeron 15mg po qhs  3. Cont exercise, encouraged mindfulness of dietary choices in an effort to dec inflammation  4. rec therapy but pt declines at this time  5. RTC 3mos    -Spent 30min face to face with the pt; >50% time spent in counseling   -Supportive therapy and psychoeducation provided  -R/B/SE's of medications discussed with the pt who expresses understanding and chooses to take medications as prescribed.   -Pt instructed to call clinic, 911 or go to nearest emergency room if sxs worsen or pt is in   crisis. The pt expresses understanding.

## 2018-05-31 ENCOUNTER — TELEPHONE (OUTPATIENT)
Dept: GASTROENTEROLOGY | Facility: CLINIC | Age: 68
End: 2018-05-31

## 2018-05-31 NOTE — TELEPHONE ENCOUNTER
----- Message from Debbie Mccarthy sent at 5/31/2018  9:04 AM CDT -----  Contact: Self  Call placed to POD     Patient needs to speak to Gloria about her upcoming Colonoscopy     Please call back [phone]

## 2018-06-14 ENCOUNTER — SURGERY (OUTPATIENT)
Age: 68
End: 2018-06-14

## 2018-06-14 ENCOUNTER — HOSPITAL ENCOUNTER (OUTPATIENT)
Facility: HOSPITAL | Age: 68
Discharge: HOME | End: 2018-06-14
Attending: INTERNAL MEDICINE | Admitting: INTERNAL MEDICINE
Payer: MEDICARE

## 2018-06-14 ENCOUNTER — ANESTHESIA EVENT (OUTPATIENT)
Dept: ENDOSCOPY | Facility: HOSPITAL | Age: 68
End: 2018-06-14
Payer: MEDICARE

## 2018-06-14 ENCOUNTER — ANESTHESIA (OUTPATIENT)
Dept: ENDOSCOPY | Facility: HOSPITAL | Age: 68
End: 2018-06-14
Payer: MEDICARE

## 2018-06-14 VITALS
HEART RATE: 70 BPM | DIASTOLIC BLOOD PRESSURE: 70 MMHG | BODY MASS INDEX: 26.06 KG/M2 | OXYGEN SATURATION: 98 % | WEIGHT: 166 LBS | HEIGHT: 67 IN | SYSTOLIC BLOOD PRESSURE: 110 MMHG | RESPIRATION RATE: 18 BRPM | TEMPERATURE: 98 F

## 2018-06-14 DIAGNOSIS — Z80.0 FHX: COLON CANCER: ICD-10-CM

## 2018-06-14 PROCEDURE — D9220A PRA ANESTHESIA: Mod: CRNA,,, | Performed by: NURSE ANESTHETIST, CERTIFIED REGISTERED

## 2018-06-14 PROCEDURE — 37000008 HC ANESTHESIA 1ST 15 MINUTES: Mod: PO | Performed by: INTERNAL MEDICINE

## 2018-06-14 PROCEDURE — G0105 COLORECTAL SCRN; HI RISK IND: HCPCS | Mod: ,,, | Performed by: INTERNAL MEDICINE

## 2018-06-14 PROCEDURE — D9220A PRA ANESTHESIA: Mod: ANES,,, | Performed by: ANESTHESIOLOGY

## 2018-06-14 PROCEDURE — G0105 COLORECTAL SCRN; HI RISK IND: HCPCS | Mod: PO | Performed by: INTERNAL MEDICINE

## 2018-06-14 PROCEDURE — 25000003 PHARM REV CODE 250: Mod: PO | Performed by: INTERNAL MEDICINE

## 2018-06-14 PROCEDURE — 63600175 PHARM REV CODE 636 W HCPCS: Mod: PO | Performed by: NURSE ANESTHETIST, CERTIFIED REGISTERED

## 2018-06-14 PROCEDURE — 37000009 HC ANESTHESIA EA ADD 15 MINS: Mod: PO | Performed by: INTERNAL MEDICINE

## 2018-06-14 RX ORDER — LIDOCAINE HCL/PF 100 MG/5ML
SYRINGE (ML) INTRAVENOUS
Status: DISCONTINUED | OUTPATIENT
Start: 2018-06-14 | End: 2018-06-14

## 2018-06-14 RX ORDER — LIDOCAINE HYDROCHLORIDE 10 MG/ML
1 INJECTION, SOLUTION EPIDURAL; INFILTRATION; INTRACAUDAL; PERINEURAL ONCE
Status: COMPLETED | OUTPATIENT
Start: 2018-06-14 | End: 2018-06-14

## 2018-06-14 RX ORDER — SODIUM CHLORIDE, SODIUM LACTATE, POTASSIUM CHLORIDE, CALCIUM CHLORIDE 600; 310; 30; 20 MG/100ML; MG/100ML; MG/100ML; MG/100ML
INJECTION, SOLUTION INTRAVENOUS CONTINUOUS
Status: DISCONTINUED | OUTPATIENT
Start: 2018-06-15 | End: 2018-06-14 | Stop reason: HOSPADM

## 2018-06-14 RX ORDER — PROPOFOL 10 MG/ML
VIAL (ML) INTRAVENOUS
Status: DISCONTINUED | OUTPATIENT
Start: 2018-06-14 | End: 2018-06-14

## 2018-06-14 RX ADMIN — LIDOCAINE HYDROCHLORIDE 5 MG: 10 INJECTION, SOLUTION EPIDURAL; INFILTRATION; INTRACAUDAL; PERINEURAL at 10:06

## 2018-06-14 RX ADMIN — PROPOFOL 50 MG: 10 INJECTION, EMULSION INTRAVENOUS at 10:06

## 2018-06-14 RX ADMIN — PROPOFOL 125 MG: 10 INJECTION, EMULSION INTRAVENOUS at 10:06

## 2018-06-14 RX ADMIN — SODIUM CHLORIDE, SODIUM LACTATE, POTASSIUM CHLORIDE, AND CALCIUM CHLORIDE: .6; .31; .03; .02 INJECTION, SOLUTION INTRAVENOUS at 10:06

## 2018-06-14 RX ADMIN — LIDOCAINE HYDROCHLORIDE 100 MG: 20 INJECTION, SOLUTION INTRAVENOUS at 10:06

## 2018-06-14 NOTE — H&P
History & Physical - Short Stay  Gastroenterology      SUBJECTIVE:     Procedure: Colonoscopy    Chief Complaint/Indication for Procedure: Family History of Colon Cancer    PTA Medications   Medication Sig    BYSTOLIC 5 mg Tab TAKE 1 TABLET ONCE DAILY    calcium carbonate-vitamin D3 (CALCIUM 600 + D,3,) 600 mg calcium- 200 unit Cap Take 1 capsule by mouth Daily. 1 Capsule Oral Every day    DOCOSAHEXANOIC ACID/EPA (FISH OIL ORAL) Take by mouth.    DULoxetine (CYMBALTA) 30 MG capsule Take 1 capsule (30 mg total) by mouth once daily.    DULoxetine (CYMBALTA) 60 MG capsule Take 1 capsule (60 mg total) by mouth once daily.    flaxseed oil 1,000 mg Cap Take by mouth 2 (two) times daily.    glucosamine-chondroitin 500-400 mg tablet     ketotifen (ZADITOR) 0.025 % ophthalmic solution 1 drop 2 (two) times daily.    mirtazapine (REMERON) 15 MG tablet Take 1 tablet (15 mg total) by mouth every evening.    multivitamin capsule Take 1 capsule by mouth once daily.    RABEprazole (ACIPHEX) 20 mg tablet TAKE 1 TABLET DAILY    ranitidine (ZANTAC) 300 MG tablet Take 1 tablet (300 mg total) by mouth every evening.    SYNTHROID 100 mcg tablet TAKE 1 TABLET ONCE DAILY    vitamin A-vitamin C-vit E-min (VISION FORMULA) Tab Take 1 tablet by mouth Daily. 1 Tablet Oral Every day    desonide (DESOWEN) 0.05 % cream APPLY TO EYELID SKIN NIGHTLY PRF ITCHY LIDS    mometasone (NASONEX) 50 mcg/actuation nasal spray 2 sprays by Nasal route once daily.       Review of patient's allergies indicates:   Allergen Reactions    Codeine      Other reaction(s): Nausea    Wellbutrin [bupropion hcl] Other (See Comments)     headache        Past Medical History:   Diagnosis Date    Anxiety     Arthralgia of knee     Cervical spondylosis     Depression     Fibromyalgia     GERD (gastroesophageal reflux disease)     HTN (hypertension)     Hypothyroid     DANYELL (obstructive sleep apnea)      Past Surgical History:   Procedure Laterality  Date    CARPAL TUNNEL RELEASE Bilateral     EYE SURGERY Bilateral     cataract surgery    FOOT SURGERY Bilateral     bilat joints removed second toe    TONSILLECTOMY, ADENOIDECTOMY      TUBAL LIGATION       Family History   Problem Relation Age of Onset    Cancer Paternal Aunt         COLON CANCER    Cancer Paternal Uncle         COLON CANCER     Social History   Substance Use Topics    Smoking status: Never Smoker    Smokeless tobacco: Never Used    Alcohol use No         OBJECTIVE:     Vital Signs (Most Recent)       Physical Exam:                                                       GENERAL:  Comfortable, in no acute distress.                                 HEENT EXAM:  Nonicteric.  No adenopathy.  Oropharynx is clear.               NECK:  Supple.                                                               LUNGS:  Clear.                                                               CARDIAC:  Regular rate and rhythm.  S1, S2.  No murmur.                      ABDOMEN:  Soft, positive bowel sounds, nontender.  No hepatosplenomegaly or masses.  No rebound or guarding.                                             EXTREMITIES:  No edema.     MENTAL STATUS:  Normal, alert and oriented.      ASSESSMENT/PLAN:     Assessment: Family History of Colon Cancer    Plan: Colonoscopy    Anesthesia Plan: General    ASA Grade: ASA 2 - Patient with mild systemic disease with no functional limitations    MALLAMPATI SCORE:  I (soft palate, uvula, fauces, and tonsillar pillars visible)

## 2018-06-14 NOTE — DISCHARGE INSTRUCTIONS
Recovery After Procedural Sedation (Adult)  You have been given medicine by vein to make you sleep during your surgery. This may have included both a pain medicine and sleeping medicine. Most of the effects have worn off. But you may still have some drowsiness for the next 6 to 8 hours.  Home care  Follow these guidelines when you get home:  · For the next 8 hours, you should be watched by a responsible adult. This person should make sure your condition is not getting worse.  · Don't drink any alcohol for the next 24 hours.  · Don't drive, operate dangerous machinery, or make important business or personal decisions during the next 24 hours.  Note: Your healthcare provider may tell you not to take any medicine by mouth for pain or sleep in the next 4 hours. These medicines may react with the medicines you were given in the hospital. This could cause a much stronger response than usual.  Follow-up care  Follow up with your healthcare provider if you are not alert and back to your usual level of activity within 12 hours.  When to seek medical advice  Call your healthcare provider right away if any of these occur:  · Drowsiness gets worse  · Weakness or dizziness gets worse  · Repeated vomiting  · You can't be awakened   Date Last Reviewed: 10/18/2016  © 6790-9555 [employer]. [address] [place] [address], [place] [address]. All rights reserved. This information is not intended as a substitute for professional medical care. Always follow your healthcare professional's instructions.

## 2018-06-14 NOTE — TRANSFER OF CARE
"Anesthesia Transfer of Care Note    Patient: Coral Pandya    Procedure(s) Performed: Procedure(s) (LRB):  COLONOSCOPY (N/A)    Patient location: PACU    Anesthesia Type: general    Transport from OR: Transported from OR on room air with adequate spontaneous ventilation    Post pain: adequate analgesia    Post assessment: no apparent anesthetic complications    Post vital signs: stable    Level of consciousness: awake    Nausea/Vomiting: no nausea/vomiting    Complications: none    Transfer of care protocol was followed      Last vitals:   Visit Vitals  /76 (BP Location: Right arm, Patient Position: Lying)   Pulse (!) 59   Temp 36.4 °C (97.5 °F) (Skin)   Resp 18   Ht 5' 7" (1.702 m)   Wt 75.3 kg (166 lb)   SpO2 96%   Breastfeeding? No   BMI 26.00 kg/m²     "

## 2018-06-14 NOTE — ANESTHESIA POSTPROCEDURE EVALUATION
"Anesthesia Post Evaluation    Patient: Coral Pandya    Procedure(s) Performed: Procedure(s) (LRB):  COLONOSCOPY (N/A)    Final Anesthesia Type: general  Patient location during evaluation: PACU  Patient participation: Yes- Able to Participate  Level of consciousness: awake and alert  Post-procedure vital signs: reviewed and stable  Pain management: adequate  Airway patency: patent  PONV status at discharge: No PONV  Anesthetic complications: no      Cardiovascular status: hemodynamically stable and blood pressure returned to baseline  Respiratory status: unassisted, spontaneous ventilation and room air  Hydration status: euvolemic  Follow-up not needed.        Visit Vitals  /70 (BP Location: Left arm, Patient Position: Lying)   Pulse 70   Temp 36.4 °C (97.5 °F) (Skin)   Resp 18   Ht 5' 7" (1.702 m)   Wt 75.3 kg (166 lb)   SpO2 98%   Breastfeeding? No   BMI 26.00 kg/m²       Pain/Lev Score: Pain Assessment Performed: Yes (6/14/2018 11:10 AM)  Presence of Pain: denies (6/14/2018 11:10 AM)  Lev Score: 10 (6/14/2018 11:10 AM)      "

## 2018-06-14 NOTE — [employer] PATIENT INSTRUCTIONS
Discharge Summary/Instructions after an Endoscopic Procedure  Patient Name: Coral Pandya  Patient MRN: [id number]  Patient YOB: 1950 Thursday, June 14, 2018  Amaury Chambers MD  RESTRICTIONS:  During your procedure today, you received medications for sedation.  These   medications may affect your judgment, balance and coordination.  Therefore,   for 24 hours, you have the following restrictions:   - DO NOT drive a car, operate machinery, make legal/financial decisions,   sign important papers or drink alcohol.    ACTIVITY:  Today: no heavy lifting, straining or running due to procedural   sedation/anesthesia.  The following day: return to full activity including work.  DIET:  Eat and drink normally unless instructed otherwise.     TREATMENT FOR COMMON SIDE EFFECTS:  - Mild abdominal pain, nausea, belching, bloating or excessive gas:  rest,   eat lightly and use a heating pad.  - Sore Throat: treat with throat lozenges and/or gargle with warm salt   water.  - Because air was used during the procedure, expelling large amounts of air   from your rectum or belching is normal.  - If a bowel prep was taken, you may not have a bowel movement for 1-3 days.    This is normal.  SYMPTOMS TO WATCH FOR AND REPORT TO YOUR PHYSICIAN:  1. Abdominal pain or bloating, other than gas cramps.  2. Chest pain.  3. Back pain.  4. Signs of infection such as: chills or fever occurring within 24 hours   after the procedure.  5. Rectal bleeding, which would show as bright red, maroon, or black stools.   (A tablespoon of blood from the rectum is not serious, especially if   hemorrhoids are present.)  6. Vomiting.  7. Weakness or dizziness.  GO DIRECTLY TO THE NEAREST EMERGENCY ROOM IF YOU HAVE ANY OF THE FOLLOWING:      Difficulty breathing              Chills and/or fever over 101 F   Persistent vomiting and/or vomiting blood   Severe abdominal pain   Severe chest pain   Black, tarry stools   Bleeding- more than one  tablespoon   Any other symptom or condition that you feel may need urgent attention  Your doctor recommends these additional instructions:  If any biopsies were taken, your doctors clinic will contact you in 1 to 2   weeks with any results.  - Repeat colonoscopy in 5 years for screening purposes.   - Discharge patient to home (ambulatory).  For questions, problems or results please call your physician - Amaury Chambers MD at Work: [phone].  EMERGENCY PHONE NUMBER: [phone], LAB RESULTS: [phone]  IF A COMPLICATION OR EMERGENCY SITUATION ARISES AND YOU ARE UNABLE TO REACH   YOUR PHYSICIAN - GO DIRECTLY TO THE EMERGENCY ROOM.  ___________________________________________  Nurse Signature  ___________________________________________  Patient/Designated Responsible Party Signature  Amaury Chambers MD  6/14/2018 10:47:48 AM  This report has been verified and signed electronically.  [employer]

## 2018-06-14 NOTE — DISCHARGE SUMMARY
Discharge Note  Short Stay      SUMMARY     Admit Date: 6/14/2018    Attending Physician: Amaury Chambers MD     Discharge Physician: Amaury Chambers MD    Discharge Date: 6/14/2018 10:48 AM    Final Diagnosis: Family history of colon cancer [Z80.0]    Disposition: HOME OR SELF CARE    Patient Instructions:   Current Discharge Medication List      CONTINUE these medications which have NOT CHANGED    Details   BYSTOLIC 5 mg Tab TAKE 1 TABLET ONCE DAILY  Qty: 90 tablet, Refills: 3      calcium carbonate-vitamin D3 (CALCIUM 600 + D,3,) 600 mg calcium- 200 unit Cap Take 1 capsule by mouth Daily. 1 Capsule Oral Every day      DOCOSAHEXANOIC ACID/EPA (FISH OIL ORAL) Take by mouth.      !! DULoxetine (CYMBALTA) 30 MG capsule Take 1 capsule (30 mg total) by mouth once daily.  Qty: 90 capsule, Refills: 1      !! DULoxetine (CYMBALTA) 60 MG capsule Take 1 capsule (60 mg total) by mouth once daily.  Qty: 90 capsule, Refills: 1      flaxseed oil 1,000 mg Cap Take by mouth 2 (two) times daily.      glucosamine-chondroitin 500-400 mg tablet       ketotifen (ZADITOR) 0.025 % ophthalmic solution 1 drop 2 (two) times daily.      mirtazapine (REMERON) 15 MG tablet Take 1 tablet (15 mg total) by mouth every evening.  Qty: 90 tablet, Refills: 1      multivitamin capsule Take 1 capsule by mouth once daily.      RABEprazole (ACIPHEX) 20 mg tablet TAKE 1 TABLET DAILY  Qty: 90 tablet, Refills: 3      ranitidine (ZANTAC) 300 MG tablet Take 1 tablet (300 mg total) by mouth every evening.  Qty: 90 tablet, Refills: 3      SYNTHROID 100 mcg tablet TAKE 1 TABLET ONCE DAILY  Qty: 90 tablet, Refills: 3      vitamin A-vitamin C-vit E-min (VISION FORMULA) Tab Take 1 tablet by mouth Daily. 1 Tablet Oral Every day      desonide (DESOWEN) 0.05 % cream APPLY TO EYELID SKIN NIGHTLY PRF ITCHY LIDS  Qty: 60 g, Refills: 11      mometasone (NASONEX) 50 mcg/actuation nasal spray 2 sprays by Nasal route once daily.  Qty: 3 each, Refills: 3       !! -  Potential duplicate medications found. Please discuss with provider.          Discharge Procedure Orders (must include Diet, Follow-up, Activity)    Follow Up:  Follow up with PCP as previously scheduled  Resume routine diet.  Activity as tolerated.    No driving day of procedure.

## 2018-06-14 NOTE — ANESTHESIA PREPROCEDURE EVALUATION
06/14/2018  Coral Pandya is a 67 y.o., female.    Anesthesia Evaluation      I have reviewed the Medications.     Review of Systems  Anesthesia Hx:  No problems with previous Anesthesia   Social:  Non-Smoker, No Alcohol Use    Cardiovascular:   Hypertension    Pulmonary:   Sleep Apnea    Renal/:  Renal/ Normal     Hepatic/GI:   GERD    Neurological:  Neurology Normal    Endocrine:   Hypothyroidism    Psych:   anxiety depression          Physical Exam  General:  Well nourished    Airway/Jaw/Neck:  Airway Findings: Mouth Opening: Normal General Airway Assessment: Adult  Mallampati: II  Jaw/Neck Findings:  Neck ROM: Extension Decreased, Mild       Chest/Lungs:  Chest/Lungs Findings: Clear to auscultation, Normal Respiratory Rate     Heart/Vascular:  Heart Findings: Rate: Normal  Rhythm: Regular Rhythm  Sounds: Normal  Heart murmur: negative Vascular Findings: Normal (No carotid bruits.)       Mental Status:  Mental Status Findings:  Cooperative, Alert and Oriented         Anesthesia Plan  Type of Anesthesia, risks & benefits discussed:  Anesthesia Type:  general  Patient's Preference:   Intra-op Monitoring Plan:   Intra-op Monitoring Plan Comments:   Post Op Pain Control Plan:   Post Op Pain Control Plan Comments:   Induction:   IV  Beta Blocker:  Patient is on a Beta-Blocker and has received one dose within the past 24 hours (No further documentation required).       Informed Consent: Patient understands risks and agrees with Anesthesia plan.  Questions answered. Anesthesia consent signed with patient.  ASA Score: 2     Day of Surgery Review of History & Physical:        Anesthesia Plan Notes: Propofol general.        Ready For Surgery From Anesthesia Perspective.

## 2018-08-23 ENCOUNTER — OFFICE VISIT (OUTPATIENT)
Dept: PSYCHIATRY | Facility: CLINIC | Age: 68
End: 2018-08-23
Payer: MEDICARE

## 2018-08-23 VITALS
HEIGHT: 67 IN | DIASTOLIC BLOOD PRESSURE: 75 MMHG | WEIGHT: 167.31 LBS | BODY MASS INDEX: 26.26 KG/M2 | HEART RATE: 57 BPM | SYSTOLIC BLOOD PRESSURE: 120 MMHG

## 2018-08-23 DIAGNOSIS — F33.41 MDD (MAJOR DEPRESSIVE DISORDER), RECURRENT, IN PARTIAL REMISSION: Primary | ICD-10-CM

## 2018-08-23 DIAGNOSIS — F41.1 GAD (GENERALIZED ANXIETY DISORDER): ICD-10-CM

## 2018-08-23 PROCEDURE — 99213 OFFICE O/P EST LOW 20 MIN: CPT | Mod: S$PBB,,, | Performed by: PSYCHIATRY & NEUROLOGY

## 2018-08-23 PROCEDURE — 90833 PSYTX W PT W E/M 30 MIN: CPT | Mod: S$PBB,,, | Performed by: PSYCHIATRY & NEUROLOGY

## 2018-08-23 PROCEDURE — 90833 PSYTX W PT W E/M 30 MIN: CPT | Mod: PBBFAC,PO | Performed by: PSYCHIATRY & NEUROLOGY

## 2018-08-23 PROCEDURE — 99213 OFFICE O/P EST LOW 20 MIN: CPT | Mod: PBBFAC,PO | Performed by: PSYCHIATRY & NEUROLOGY

## 2018-08-23 PROCEDURE — 99999 PR PBB SHADOW E&M-EST. PATIENT-LVL III: CPT | Mod: PBBFAC,,, | Performed by: PSYCHIATRY & NEUROLOGY

## 2018-08-23 NOTE — PROGRESS NOTES
"ID: 66yo WF with a previous diag of depression. Referred by PCP, , for eval of depressive sxs. Currently on cymbalta, recently added remeron, and valium. Pt has cont'd to remain stable on current meds. No longer using valium.     CC: "depression"    Interim hx: presents on time, chart reviewed, pt seen. Appears well    Reports she's doing well and had a fun time in [place] both daughters. But now her daughter is visiting and the pt was able to spend the whole day with him yesterday- daughter offered for the pt to keep him over night and he has NEVER stayed overnight anywhere. Pt was unable due to her own schedule this morning, "but I think we're coming around. She seems much less tense now that he's a little older."     Going to the beach labor day weekend with her 2 other children and looking forward to this.     Mood/anxiety stable- "I struggle sometimes but i've been pretty good and I do better most days, I mean I have more good days and the struggle is typically around Angelic coming to town but I think that's even getting better."     Consistently rec therapy and pt consistently declines.     On Psychiatric ROS:    Endorses better sleep on the remeron, denies anhedonia, denies feeling helpless/hopeless, improved energy, riding bike regularly, denies dec concentration, denies change in appetite (despite the remeron), denies dec PMA "I push myself. I know keeping busy helps me." Denies thoughts of SI/intent/plan.     Denies recently feeling more easily overwhelmed, denies recent ruminative thinking "the obsessive thinking is MUCH better on the meds." denies feeling tense/"on edge"     PSYCHOTHERAPY ADD-ON   30 (16-37*) minutes    Time: 25 minutes  Participants: Met with patient    Therapeutic Intervention Type: insight oriented psychotherapy, behavior modifying psychotherapy, supportive psychotherapy  Why chosen therapy is appropriate versus another modality: relevant to diagnosis    Target symptoms: " "anxiety   Primary focus: anxiety surrounding family interactions, relationship with children, history of mental health and it's impact now on her perspective of mood variation  Psychotherapeutic techniques: cbt, supportive, beh modification    Outcome monitoring methods: self-report, observation    Patient's response to intervention:  The patient's response to intervention is accepting, a little hesitant/weary as anxiety is a chronic constant since childhood for this pt    Progress toward goals:  The patient's progress toward goals is good.     PPHx: Denies h/o self injury, inpt psych hospitalization, denies h/o suicide attempt     Current Psych Meds: cymbalta 90mg po qam, remeron 15mg po qhs  Past Psych Meds: Lexapro and Wellbutrin (effective but led to h/a's), celexa (nausea), valium    PMHx: hypothyroid, GERD    SubstHx:   T- none  E- very rare  D- none    FamPHx: brother- suicide 1990, M-anxiety, F-anxiety, dtr- seizure while on wellbutrin    MSE: appears stated age, well groomed, appropriate dress, engages well with examiner. Good e/c. Speech reg rate and vol, nonpressured. Mood is "I feel good. I spent the whole day with Shiv yesterday and I think he feels comfortable with me. It was fun." Affect congruent with some mild anxiety. dec'd verbosity. Sensorium fully intact. Oriented to date/day/location, current events. Narrative memory intact. Intellectual function is avg based on vocab and basic fund of knowledge. Thought is c/l/gd. No tangentiality or circumstantiality. No FOI/LEBRON. Denies SI/HI. Denies A/VH. No evidence of delusions. Insight and Judgment intact.     Blood pressure 120/75, pulse (!) 57, height 5' 7" (1.702 m), weight 75.9 kg (167 lb 5.3 oz).    Suicide Risk Assessment:   Protective- age, gender, no prior attempts, no prior hospitalizations, no ongoing substance abuse, no psychosis, , has children, denies SI/intent/plan, seeking treatment, access to treatment, future oriented, good " "primary support, Orthodoxy  Risk- race, ongoing Axis I sxs, family h/o suicide (brother-1990)    **Pt is at LOW imminent and long term risk of suicide given current risk factors.     Assessment:  65yo WF with a previous diag of depression. Referred by PCP, , for eval of depressive sxs. Currently on cymbalta, recently added remeron, and valium per chart review. On eval the pt is reporting an improvement in all mood and anxiety sxs in the 3 wks leading to initial eval which correlates with the time Remeron was added. She had done well for many years on lexapro and wellbutrin but was having chronic headaches and self initiated a taper off meds and the headaches did remit, but mood/anxiety suffered until the recent combination of cymbalta and remeron which is currently managing sxs quite well. Prior to meds becoming effective the pt endorses sxs c/w diagnoses of MDD and YASMANI. No acute safety concerns but the pt has cont'd to feel mood is "a little down. Other than the headaches, I feel that I was really better on the lexapro/wellbutrin combination". Pt is active, finds good support through kerry, family and friends. Will try an inc in cymbalta to 90mg and if ineffective may try an addition of wellbutrin xl 150mg- in the past pt headaches were on the 300mg dose when in combo with lexapro. On f/u the inc in cymbalta was effective- pt other ongoing issues are therapy related. Pt continues to decline therapy as she doesn't see the need at this time- majority of her appts are spent in therapy, but today she has showed continued signs of improved boundary setting and themes are more positive. Doing well. meds stable. Will f/u in 3mos.    Axis I: MDD, recurrent, in partial remission; YASMANI  Axis II: none at this time   Axis III: hypothyroid, GERD, HTN  Axis IV:  from , chronic mental illness  Axis V: GAF 70    Plan:   1. Cont cymbalta 90mg po qam  2. Cont remeron 15mg po qhs  3. Cont exercise, encouraged " mindfulness of dietary choices in an effort to dec inflammation  4. rec therapy but pt declines at this time  5. RTC 3mos    -Spent 30min face to face with the pt; >50% time spent in counseling   -Supportive therapy and psychoeducation provided  -R/B/SE's of medications discussed with the pt who expresses understanding and chooses to take medications as prescribed.   -Pt instructed to call clinic, 911 or go to nearest emergency room if sxs worsen or pt is in   crisis. The pt expresses understanding.

## 2018-09-07 DIAGNOSIS — K21.9 GASTROESOPHAGEAL REFLUX DISEASE, ESOPHAGITIS PRESENCE NOT SPECIFIED: Primary | ICD-10-CM

## 2018-09-07 DIAGNOSIS — E03.9 HYPOTHYROIDISM, UNSPECIFIED TYPE: ICD-10-CM

## 2018-09-10 NOTE — PROGRESS NOTES
Refill Authorization Note     is requesting a refill authorization.    Brief assessment and rationale for refill: APPROVE; Needs Labs/ Needs Appt  Amount/Quantity of medication ordered: 90d  Date of last appointment: 9/17/2017     Refills Authorized: Yes  If authorized number of refills: 0        Medication-related problems identified:   Requires appointment  Requires labs  Medication Therapy Plan: GERD- not commented on recently; Labs Pended (CBC/Cr/TSH); Due for Annual; Approve 3 more months   Name and strength of medication: ranitidine (ZANTAC) 300 MG tablet  How patient will take medication: t1t po pm  Medication reconciliation completed: No        Comments:     Lab Results   Component Value Date    WBC 4.59 08/11/2017    HGB 14.3 08/11/2017    HCT 42.4 08/11/2017    MCV 93 08/11/2017     08/11/2017       Lab Results   Component Value Date    CREATININE 0.9 09/09/2017    BUN 24 (H) 09/09/2017     09/09/2017    K 4.3 09/09/2017     09/09/2017    CO2 28 09/09/2017

## 2018-09-12 ENCOUNTER — LAB VISIT (OUTPATIENT)
Dept: LAB | Facility: HOSPITAL | Age: 68
End: 2018-09-12
Attending: FAMILY MEDICINE
Payer: MEDICARE

## 2018-09-12 DIAGNOSIS — K21.9 GASTROESOPHAGEAL REFLUX DISEASE, ESOPHAGITIS PRESENCE NOT SPECIFIED: ICD-10-CM

## 2018-09-12 DIAGNOSIS — E03.9 HYPOTHYROIDISM, UNSPECIFIED TYPE: ICD-10-CM

## 2018-09-12 LAB
CREAT SERPL-MCNC: 0.8 MG/DL
EST. GFR  (AFRICAN AMERICAN): >60 ML/MIN/1.73 M^2
EST. GFR  (NON AFRICAN AMERICAN): >60 ML/MIN/1.73 M^2
TSH SERPL DL<=0.005 MIU/L-ACNC: 1.69 UIU/ML

## 2018-09-12 PROCEDURE — 82565 ASSAY OF CREATININE: CPT

## 2018-09-12 PROCEDURE — 36415 COLL VENOUS BLD VENIPUNCTURE: CPT | Mod: PN

## 2018-09-12 PROCEDURE — 84443 ASSAY THYROID STIM HORMONE: CPT

## 2018-09-25 ENCOUNTER — PATIENT MESSAGE (OUTPATIENT)
Dept: FAMILY MEDICINE | Facility: CLINIC | Age: 68
End: 2018-09-25

## 2018-09-25 DIAGNOSIS — M54.31 SCIATICA OF RIGHT SIDE: Primary | ICD-10-CM

## 2018-09-26 NOTE — TELEPHONE ENCOUNTER
She has not been seen by Dr Souza in over 1 year.  Sh should follow-up with him before physical therapy is ordered.

## 2018-09-29 ENCOUNTER — PATIENT MESSAGE (OUTPATIENT)
Dept: FAMILY MEDICINE | Facility: CLINIC | Age: 68
End: 2018-09-29

## 2018-09-29 DIAGNOSIS — M79.604 RIGHT LEG PAIN: Primary | ICD-10-CM

## 2018-09-29 DIAGNOSIS — M54.30 SCIATICA, UNSPECIFIED LATERALITY: ICD-10-CM

## 2018-10-02 ENCOUNTER — PATIENT MESSAGE (OUTPATIENT)
Dept: FAMILY MEDICINE | Facility: CLINIC | Age: 68
End: 2018-10-02

## 2018-10-09 ENCOUNTER — OFFICE VISIT (OUTPATIENT)
Dept: FAMILY MEDICINE | Facility: CLINIC | Age: 68
End: 2018-10-09
Payer: MEDICARE

## 2018-10-09 VITALS
HEART RATE: 64 BPM | HEIGHT: 67 IN | RESPIRATION RATE: 18 BRPM | WEIGHT: 167.56 LBS | TEMPERATURE: 98 F | OXYGEN SATURATION: 93 % | BODY MASS INDEX: 26.3 KG/M2 | DIASTOLIC BLOOD PRESSURE: 70 MMHG | SYSTOLIC BLOOD PRESSURE: 128 MMHG

## 2018-10-09 DIAGNOSIS — F33.41 MDD (MAJOR DEPRESSIVE DISORDER), RECURRENT, IN PARTIAL REMISSION: ICD-10-CM

## 2018-10-09 DIAGNOSIS — E03.9 HYPOTHYROIDISM, UNSPECIFIED TYPE: ICD-10-CM

## 2018-10-09 DIAGNOSIS — F41.1 GAD (GENERALIZED ANXIETY DISORDER): ICD-10-CM

## 2018-10-09 DIAGNOSIS — M17.11 ARTHRITIS OF RIGHT KNEE: ICD-10-CM

## 2018-10-09 DIAGNOSIS — I10 HYPERTENSION, UNSPECIFIED TYPE: Primary | ICD-10-CM

## 2018-10-09 PROCEDURE — 99999 PR PBB SHADOW E&M-EST. PATIENT-LVL IV: CPT | Mod: PBBFAC,,, | Performed by: FAMILY MEDICINE

## 2018-10-09 PROCEDURE — 99214 OFFICE O/P EST MOD 30 MIN: CPT | Mod: PBBFAC,PO | Performed by: FAMILY MEDICINE

## 2018-10-09 PROCEDURE — 99214 OFFICE O/P EST MOD 30 MIN: CPT | Mod: S$PBB,,, | Performed by: FAMILY MEDICINE

## 2018-10-10 NOTE — PROGRESS NOTES
Subjective:       Patient ID: Coral Pandya is a 68 y.o. female.    Chief Complaint: Hypertension    HPI       Here for a check up.    Reviewed recent labs.  Normal tsh and cr.      htn stable.      Sees Dr. Coe, psychiatrist, for treatment of depression and anxiety.      Has intermittent chronic right knee arthritis. Reports that riding her bike helps with pain control.          Review of Systems   Constitutional: Negative for activity change and unexpected weight change.   HENT: Negative for hearing loss, rhinorrhea and trouble swallowing.    Eyes: Negative for discharge and visual disturbance.   Respiratory: Negative for chest tightness and wheezing.    Cardiovascular: Negative for chest pain and palpitations.   Gastrointestinal: Negative for blood in stool, constipation, diarrhea and vomiting.   Endocrine: Negative for polydipsia and polyuria.   Genitourinary: Negative for difficulty urinating, dysuria, hematuria and menstrual problem.   Musculoskeletal: Positive for arthralgias and joint swelling. Negative for neck pain.   Neurological: Positive for headaches. Negative for weakness.   Psychiatric/Behavioral: Positive for dysphoric mood. Negative for confusion.       Objective:      Physical Exam   Constitutional: She appears well-developed.   HENT:   Head: Normocephalic and atraumatic.   Eyes: Conjunctivae are normal. Pupils are equal, round, and reactive to light.   Neck: Normal range of motion.   Cardiovascular: Normal rate and regular rhythm.   No murmur heard.  Pulmonary/Chest: Effort normal and breath sounds normal.   Lymphadenopathy:     She has no cervical adenopathy.       Assessment:       1. Hypertension, unspecified type    2. Hypothyroidism, unspecified type    3. MDD (major depressive disorder), recurrent, in partial remission    4. YASMANI (generalized anxiety disorder)    5. Arthritis of right knee        Plan:       Hypertension, unspecified type  -     Comprehensive metabolic panel;  Future; Expected date: 10/09/2018  -     Lipid panel; Future; Expected date: 10/09/2018    Hypothyroidism, unspecified type    MDD (major depressive disorder), recurrent, in partial remission    YASMANI (generalized anxiety disorder)    Arthritis of right knee              Plan:  Cont current meds       Medication List           Accurate as of 10/9/18 11:59 PM. If you have any questions, ask your nurse or doctor.               CONTINUE taking these medications    BYSTOLIC 5 MG Tab  Generic drug:  nebivolol  TAKE 1 TABLET ONCE DAILY     CALCIUM 600 + D(3) 600 mg calcium- 200 unit Cap  Generic drug:  calcium carbonate-vitamin D3     desonide 0.05 % cream  Commonly known as:  DESOWEN  APPLY TO EYELID SKIN NIGHTLY PRF ITCHY LIDS     * DULoxetine 30 MG capsule  Commonly known as:  CYMBALTA  Take 1 capsule (30 mg total) by mouth once daily.     * DULoxetine 60 MG capsule  Commonly known as:  CYMBALTA  Take 1 capsule (60 mg total) by mouth once daily.     FISH OIL ORAL     flaxseed oil 1,000 mg Cap     FLUZONE HIGH-DOSE 2018-19 (PF) 180 mcg/0.5 mL vaccine  Generic drug:  influenza     glucosamine-chondroitin 500-400 mg tablet     ketotifen 0.025 % (0.035 %) ophthalmic solution  Commonly known as:  ZADITOR     mirtazapine 15 MG tablet  Commonly known as:  REMERON  Take 1 tablet (15 mg total) by mouth every evening.     mometasone 50 mcg/actuation nasal spray  Commonly known as:  NASONEX  2 sprays by Nasal route once daily.     multivitamin capsule     mupirocin 2 % ointment  Commonly known as:  BACTROBAN  Apply topically 3 (three) times daily.     RABEprazole 20 mg tablet  Commonly known as:  ACIPHEX  TAKE 1 TABLET DAILY     ranitidine 300 MG tablet  Commonly known as:  ZANTAC  TAKE 1 TABLET EVERY EVENING     SYNTHROID 100 MCG tablet  Generic drug:  levothyroxine  TAKE 1 TABLET ONCE DAILY     VISION FORMULA Tab  Generic drug:  vitamin A-vitamin C-vit E-min         * This list has 2 medication(s) that are the same as other  medications prescribed for you. Read the directions carefully, and ask your doctor or other care provider to review them with you.

## 2018-10-13 ENCOUNTER — LAB VISIT (OUTPATIENT)
Dept: LAB | Facility: HOSPITAL | Age: 68
End: 2018-10-13
Attending: FAMILY MEDICINE
Payer: MEDICARE

## 2018-10-13 DIAGNOSIS — I10 HYPERTENSION, UNSPECIFIED TYPE: ICD-10-CM

## 2018-10-13 LAB
ALBUMIN SERPL BCP-MCNC: 3.6 G/DL
ALP SERPL-CCNC: 64 U/L
ALT SERPL W/O P-5'-P-CCNC: 27 U/L
ANION GAP SERPL CALC-SCNC: 6 MMOL/L
AST SERPL-CCNC: 23 U/L
BILIRUB SERPL-MCNC: 0.6 MG/DL
BUN SERPL-MCNC: 19 MG/DL
CALCIUM SERPL-MCNC: 9.3 MG/DL
CHLORIDE SERPL-SCNC: 105 MMOL/L
CHOLEST SERPL-MCNC: 221 MG/DL
CHOLEST/HDLC SERPL: 4.4 {RATIO}
CO2 SERPL-SCNC: 29 MMOL/L
CREAT SERPL-MCNC: 0.8 MG/DL
EST. GFR  (AFRICAN AMERICAN): >60 ML/MIN/1.73 M^2
EST. GFR  (NON AFRICAN AMERICAN): >60 ML/MIN/1.73 M^2
GLUCOSE SERPL-MCNC: 106 MG/DL
HDLC SERPL-MCNC: 50 MG/DL
HDLC SERPL: 22.6 %
LDLC SERPL CALC-MCNC: 154.4 MG/DL
NONHDLC SERPL-MCNC: 171 MG/DL
POTASSIUM SERPL-SCNC: 4.2 MMOL/L
PROT SERPL-MCNC: 6.4 G/DL
SODIUM SERPL-SCNC: 140 MMOL/L
TRIGL SERPL-MCNC: 83 MG/DL

## 2018-10-13 PROCEDURE — 80053 COMPREHEN METABOLIC PANEL: CPT

## 2018-10-13 PROCEDURE — 36415 COLL VENOUS BLD VENIPUNCTURE: CPT | Mod: PO

## 2018-10-13 PROCEDURE — 80061 LIPID PANEL: CPT

## 2018-10-15 ENCOUNTER — PATIENT MESSAGE (OUTPATIENT)
Dept: FAMILY MEDICINE | Facility: CLINIC | Age: 68
End: 2018-10-15

## 2018-10-15 DIAGNOSIS — M25.512 LEFT SHOULDER PAIN, UNSPECIFIED CHRONICITY: Primary | ICD-10-CM

## 2018-10-17 RX ORDER — DULOXETIN HYDROCHLORIDE 30 MG/1
CAPSULE, DELAYED RELEASE ORAL
Qty: 90 CAPSULE | Refills: 1 | Status: SHIPPED | OUTPATIENT
Start: 2018-10-17 | End: 2019-01-07 | Stop reason: SDUPTHER

## 2018-10-17 RX ORDER — DULOXETIN HYDROCHLORIDE 60 MG/1
CAPSULE, DELAYED RELEASE ORAL
Qty: 90 CAPSULE | Refills: 1 | Status: SHIPPED | OUTPATIENT
Start: 2018-10-17 | End: 2019-01-07 | Stop reason: SDUPTHER

## 2018-11-28 ENCOUNTER — OFFICE VISIT (OUTPATIENT)
Dept: PSYCHIATRY | Facility: CLINIC | Age: 68
End: 2018-11-28
Payer: MEDICARE

## 2018-11-28 VITALS
HEIGHT: 67 IN | DIASTOLIC BLOOD PRESSURE: 77 MMHG | WEIGHT: 160.88 LBS | HEART RATE: 66 BPM | SYSTOLIC BLOOD PRESSURE: 127 MMHG | BODY MASS INDEX: 25.25 KG/M2

## 2018-11-28 DIAGNOSIS — F41.1 GAD (GENERALIZED ANXIETY DISORDER): ICD-10-CM

## 2018-11-28 DIAGNOSIS — F33.41 MDD (MAJOR DEPRESSIVE DISORDER), RECURRENT, IN PARTIAL REMISSION: Primary | ICD-10-CM

## 2018-11-28 PROCEDURE — 99999 PR PBB SHADOW E&M-EST. PATIENT-LVL III: CPT | Mod: PBBFAC,,, | Performed by: PSYCHIATRY & NEUROLOGY

## 2018-11-28 PROCEDURE — 90833 PSYTX W PT W E/M 30 MIN: CPT | Mod: S$PBB,,, | Performed by: PSYCHIATRY & NEUROLOGY

## 2018-11-28 PROCEDURE — 99214 OFFICE O/P EST MOD 30 MIN: CPT | Mod: S$PBB,,, | Performed by: PSYCHIATRY & NEUROLOGY

## 2018-11-28 PROCEDURE — 99213 OFFICE O/P EST LOW 20 MIN: CPT | Mod: PBBFAC,PO | Performed by: PSYCHIATRY & NEUROLOGY

## 2018-11-28 PROCEDURE — 90833 PSYTX W PT W E/M 30 MIN: CPT | Mod: PBBFAC,PO | Performed by: PSYCHIATRY & NEUROLOGY

## 2018-11-28 NOTE — PROGRESS NOTES
"ID: 64yo WF with a previous diag of depression. Referred by PCP, , for eval of depressive sxs. Currently on cymbalta, recently added remeron, and valium. Pt has cont'd to remain stable on current meds. No longer using valium.     CC: "depression"    Interim hx: presents on time, chart reviewed, pt seen. Appears well.     Reports she is doing well, was pleased with her weight loss, but then offers that it may be due to lack of cycling due to the time change- dark by the time she is finished at work.     "so I took your suggestion and went to PT for sciatica and they fixed it. They did dry needling and helped the way I sit when I drive and this girl is just wonderful, but the PT- who works to get things better withOUT surgery- said my knee was bone on bone and i've already scheduled it. I went to  on the [place] and he was surprised I hadn't done something sooner. There is no cartilage at all so I guess I just got used to the pain."     "so the weird thing is: i'm not worried about the surgery, i'm worried about the down time. I can't drive for 4 wks." plans to be out of work also for 4 wks. Does think she can just go in for shorter shifts as tolerated. We discuss arranging one thing to do daily during that time as she does greatly respond to a sense of productivity. Pt acknowledges family and friends have already offered help and she can be well cared for IF she asks for it. Encouraged this "ask"-    Mood/anxiety stable- really enjoyed last visit to [place] and jeremias allowed robert to spend the night with pt which she "loved. He really knows me and trusts me and we had a lot of fun together."     Consistently rec therapy and pt consistently declines.     On Psychiatric ROS:    Endorses better sleep on the remeron, denies anhedonia, denies feeling helpless/hopeless, improved energy, riding bike regularly, denies dec concentration, denies change in appetite (despite the remeron), denies dec PMA " ""I push myself. I know keeping busy helps me." Denies thoughts of SI/intent/plan.     Denies recently feeling more easily overwhelmed, denies recent ruminative thinking "the obsessive thinking is MUCH better on the meds." denies feeling tense/"on edge"     PSYCHOTHERAPY ADD-ON   30 (16-37*) minutes    Time: 25 minutes  Participants: Met with patient    Therapeutic Intervention Type: insight oriented psychotherapy, behavior modifying psychotherapy, supportive psychotherapy  Why chosen therapy is appropriate versus another modality: relevant to diagnosis    Target symptoms: anxiety   Primary focus: anxiety surrounding family interactions, upcoming surgery  Psychotherapeutic techniques: cbt, supportive, beh modification    Outcome monitoring methods: self-report, observation    Patient's response to intervention:  The patient's response to intervention is accepting    Progress toward goals:  The patient's progress toward goals is good.     PPHx: Denies h/o self injury, inpt psych hospitalization, denies h/o suicide attempt     Current Psych Meds: cymbalta 90mg po qam, remeron 15mg po qhs  Past Psych Meds: Lexapro and Wellbutrin (effective but led to h/a's), celexa (nausea), valium    PMHx: hypothyroid, GERD    SubstHx:   T- none  E- very rare  D- none    FamPHx: brother- suicide 1990, M-anxiety, F-anxiety, dtr- seizure while on wellbutrin    MSE: appears stated age, well groomed, appropriate dress, engages well with examiner. Good e/c. Speech reg rate and vol, nonpressured. Mood is "I feel good. I love the holidays and my big party is on the 16th and things are good." Affect congruent with some mild anxiety. dec'd verbosity. Sensorium fully intact. Oriented to date/day/location, current events. Narrative memory intact. Intellectual function is avg based on vocab and basic fund of knowledge. Thought is c/l/gd. No tangentiality or circumstantiality. No FOI/LEBRON. Denies SI/HI. Denies A/VH. No evidence of delusions. Insight " "and Judgment intact.     Blood pressure 127/77, pulse 66, height 5' 7" (1.702 m), weight 73 kg (160 lb 14.4 oz).    Suicide Risk Assessment:   Protective- age, gender, no prior attempts, no prior hospitalizations, no ongoing substance abuse, no psychosis, , has children, denies SI/intent/plan, seeking treatment, access to treatment, future oriented, good primary support, Confucianism  Risk- race, ongoing Axis I sxs, family h/o suicide (brother-1990)    **Pt is at LOW imminent and long term risk of suicide given current risk factors.     Assessment:  67yo WF with a previous diag of depression. Referred by PCP, , for eval of depressive sxs. Currently on cymbalta, recently added remeron, and valium per chart review. On eval the pt is reporting an improvement in all mood and anxiety sxs in the 3 wks leading to initial eval which correlates with the time Remeron was added. She had done well for many years on lexapro and wellbutrin but was having chronic headaches and self initiated a taper off meds and the headaches did remit, but mood/anxiety suffered until the recent combination of cymbalta and remeron which is currently managing sxs quite well. Prior to meds becoming effective the pt endorses sxs c/w diagnoses of MDD and YASMANI. No acute safety concerns but the pt has cont'd to feel mood is "a little down. Other than the headaches, I feel that I was really better on the lexapro/wellbutrin combination". Pt is active, finds good support through kerry, family and friends. Will try an inc in cymbalta to 90mg and if ineffective may try an addition of wellbutrin xl 150mg- in the past pt headaches were on the 300mg dose when in combo with lexapro. On f/u the inc in cymbalta was effective- pt other ongoing issues are therapy related. Pt continues to decline therapy as she doesn't see the need at this time- majority of her appts are spent in therapy, but today she has showed continued signs of improved boundary " setting and themes are more positive. Doing well. meds stable. Will f/u in 3mos.    Axis I: MDD, recurrent, in partial remission; YASMANI  Axis II: none at this time   Axis III: hypothyroid, GERD, HTN  Axis IV:  from , chronic mental illness  Axis V: GAF 70    Plan:   1. Cont cymbalta 90mg po qam  2. Cont remeron 15mg po qhs  3. Cont exercise, encouraged mindfulness of dietary choices in an effort to dec inflammation  4. rec therapy but pt declines at this time  5. RTC 3mos    -Spent 30min face to face with the pt; >50% time spent in counseling   -Supportive therapy and psychoeducation provided  -R/B/SE's of medications discussed with the pt who expresses understanding and chooses to take medications as prescribed.   -Pt instructed to call clinic, 911 or go to nearest emergency room if sxs worsen or pt is in   crisis. The pt expresses understanding.

## 2018-12-04 DIAGNOSIS — K21.9 GASTROESOPHAGEAL REFLUX DISEASE, ESOPHAGITIS PRESENCE NOT SPECIFIED: Primary | ICD-10-CM

## 2018-12-04 RX ORDER — MIRTAZAPINE 15 MG/1
TABLET, FILM COATED ORAL
Qty: 90 TABLET | Refills: 1 | Status: SHIPPED | OUTPATIENT
Start: 2018-12-04 | End: 2019-04-29 | Stop reason: SDUPTHER

## 2018-12-04 NOTE — PROGRESS NOTES
Refill Authorization Note     is requesting a refill authorization.    Brief assessment and rationale for refill: APPROVE; prr  Amount/Quantity of medication ordered: 90d  Date of last appointment: 10/9/2018     Refills Authorized: Yes  If authorized number of refills: 1           Medication Therapy Plan: Hx of GERD; Cont. current meds,list includes ranitidine, lco(lov); Labs-wnl; nov-12/27/18; Approve 6 more months   Name and strength of medication: ranitidine (ZANTAC) 300 MG tablet  How patient will take medication: t1t po pm  Medication reconciliation completed: No        Comments:     Last Kidney Last Creatinine (12 months)    Lab Results   Component Value Date    CREATININE 0.8 10/13/2018     10/13/2018    K 4.2 10/13/2018     10/13/2018    CO2 29 10/13/2018    BUN 19 10/13/2018

## 2018-12-04 NOTE — TELEPHONE ENCOUNTER
I have reviewed and agree with the assessment below.  Thanks. Defer to psychiatry for two Duloxetine rx.

## 2018-12-05 RX ORDER — RABEPRAZOLE SODIUM 20 MG/1
20 TABLET, DELAYED RELEASE ORAL DAILY
Qty: 30 TABLET | Refills: 0 | Status: SHIPPED | OUTPATIENT
Start: 2018-12-05 | End: 2018-12-27 | Stop reason: SDUPTHER

## 2018-12-20 RX ORDER — NEBIVOLOL HYDROCHLORIDE 5 MG/1
TABLET ORAL
Qty: 90 TABLET | Refills: 3 | OUTPATIENT
Start: 2018-12-20

## 2018-12-20 NOTE — PROGRESS NOTES
Refill Authorization Note     is requesting a refill authorization.    Brief assessment and rationale for refill: Quick DC; rts (2/19)           Refills Authorized: No           Medication-related problems identified: Therapeutic duplication  Medication Therapy Plan: last escripted to [facility] on 02/18 for 12 month supply, refill too soon until 2/19; QUICK DC DCed duplicates  Name and strength of medication: BYSTOLIC 5 mg Tab     Medication reconciliation completed: No        Comments:

## 2018-12-24 RX ORDER — RABEPRAZOLE SODIUM 20 MG/1
TABLET, DELAYED RELEASE ORAL
Qty: 90 TABLET | Refills: 0 | Status: CANCELLED | OUTPATIENT
Start: 2018-12-24

## 2018-12-24 NOTE — PROGRESS NOTES
Refill Authorization Note     is requesting a refill authorization.    Brief assessment and rationale for refill: DEFER; not commented on   Amount/Quantity of medication ordered: 90d        Refills Authorized: Yes  If authorized number of refills: 0           Medication Therapy Plan: GERD- nco; Defer to you   Name and strength of medication: RABEprazole (ACIPHEX) 20 mg tablet  How patient will take medication: t1t po qd  Medication reconciliation completed: No        Comments:   Lab Results   Component Value Date    WBC 4.59 2017    HGB 14.3 2017    RBC 4.54 2017    HCT 42.4 2017    MCV 93 2017     2017     No results found for: MG  Lab Results   Component Value Date    KJZIITHP26 433 05/10/2012     Last visit with authorizing provider:   Dr. Souza 10/09/2018    Next visit with authorizing provider: Dr Souza: 2018

## 2018-12-26 NOTE — TELEPHONE ENCOUNTER
DEFER: GERD - not commented on since 2016. NOV tomorrow 12/27/18. Also on Ranitidine. Defer to you.

## 2018-12-27 ENCOUNTER — OFFICE VISIT (OUTPATIENT)
Dept: FAMILY MEDICINE | Facility: CLINIC | Age: 68
End: 2018-12-27
Payer: MEDICARE

## 2018-12-27 ENCOUNTER — LAB VISIT (OUTPATIENT)
Dept: LAB | Facility: HOSPITAL | Age: 68
End: 2018-12-27
Attending: FAMILY MEDICINE
Payer: MEDICARE

## 2018-12-27 VITALS
SYSTOLIC BLOOD PRESSURE: 110 MMHG | OXYGEN SATURATION: 98 % | BODY MASS INDEX: 26.51 KG/M2 | HEIGHT: 67 IN | DIASTOLIC BLOOD PRESSURE: 70 MMHG | WEIGHT: 168.88 LBS | HEART RATE: 75 BPM

## 2018-12-27 DIAGNOSIS — G89.29 CHRONIC PAIN OF RIGHT KNEE: ICD-10-CM

## 2018-12-27 DIAGNOSIS — K21.9 GASTROESOPHAGEAL REFLUX DISEASE, ESOPHAGITIS PRESENCE NOT SPECIFIED: ICD-10-CM

## 2018-12-27 DIAGNOSIS — I10 HYPERTENSION, UNSPECIFIED TYPE: ICD-10-CM

## 2018-12-27 DIAGNOSIS — F33.41 MDD (MAJOR DEPRESSIVE DISORDER), RECURRENT, IN PARTIAL REMISSION: ICD-10-CM

## 2018-12-27 DIAGNOSIS — M25.561 CHRONIC PAIN OF RIGHT KNEE: ICD-10-CM

## 2018-12-27 DIAGNOSIS — E03.9 HYPOTHYROIDISM, UNSPECIFIED TYPE: ICD-10-CM

## 2018-12-27 DIAGNOSIS — Z01.818 PREOP EXAMINATION: Primary | ICD-10-CM

## 2018-12-27 LAB
ANION GAP SERPL CALC-SCNC: 9 MMOL/L
BASOPHILS # BLD AUTO: 0.03 K/UL
BASOPHILS NFR BLD: 0.7 %
BUN SERPL-MCNC: 22 MG/DL
CALCIUM SERPL-MCNC: 9.7 MG/DL
CHLORIDE SERPL-SCNC: 103 MMOL/L
CO2 SERPL-SCNC: 28 MMOL/L
CREAT SERPL-MCNC: 0.9 MG/DL
DIFFERENTIAL METHOD: NORMAL
EOSINOPHIL # BLD AUTO: 0.1 K/UL
EOSINOPHIL NFR BLD: 2.1 %
ERYTHROCYTE [DISTWIDTH] IN BLOOD BY AUTOMATED COUNT: 13 %
EST. GFR  (AFRICAN AMERICAN): >60 ML/MIN/1.73 M^2
EST. GFR  (NON AFRICAN AMERICAN): >60 ML/MIN/1.73 M^2
GLUCOSE SERPL-MCNC: 98 MG/DL
HCT VFR BLD AUTO: 44.3 %
HGB BLD-MCNC: 14.2 G/DL
IMM GRANULOCYTES # BLD AUTO: 0.01 K/UL
IMM GRANULOCYTES NFR BLD AUTO: 0.2 %
LYMPHOCYTES # BLD AUTO: 1.2 K/UL
LYMPHOCYTES NFR BLD: 28 %
MCH RBC QN AUTO: 30.8 PG
MCHC RBC AUTO-ENTMCNC: 32.1 G/DL
MCV RBC AUTO: 96 FL
MONOCYTES # BLD AUTO: 0.6 K/UL
MONOCYTES NFR BLD: 13.8 %
NEUTROPHILS # BLD AUTO: 2.3 K/UL
NEUTROPHILS NFR BLD: 55.2 %
NRBC BLD-RTO: 0 /100 WBC
PLATELET # BLD AUTO: 189 K/UL
PMV BLD AUTO: 12.2 FL
POTASSIUM SERPL-SCNC: 4.3 MMOL/L
RBC # BLD AUTO: 4.61 M/UL
SODIUM SERPL-SCNC: 140 MMOL/L
WBC # BLD AUTO: 4.21 K/UL

## 2018-12-27 PROCEDURE — 85025 COMPLETE CBC W/AUTO DIFF WBC: CPT

## 2018-12-27 PROCEDURE — 80048 BASIC METABOLIC PNL TOTAL CA: CPT

## 2018-12-27 PROCEDURE — 36415 COLL VENOUS BLD VENIPUNCTURE: CPT | Mod: PO

## 2018-12-27 PROCEDURE — 93005 ELECTROCARDIOGRAM TRACING: CPT | Mod: PBBFAC,PO | Performed by: INTERNAL MEDICINE

## 2018-12-27 PROCEDURE — 93010 ELECTROCARDIOGRAM REPORT: CPT | Mod: S$PBB,,, | Performed by: INTERNAL MEDICINE

## 2018-12-27 PROCEDURE — 99999 PR PBB SHADOW E&M-EST. PATIENT-LVL III: CPT | Mod: PBBFAC,,, | Performed by: FAMILY MEDICINE

## 2018-12-27 PROCEDURE — 99214 OFFICE O/P EST MOD 30 MIN: CPT | Mod: S$PBB,,, | Performed by: FAMILY MEDICINE

## 2018-12-27 PROCEDURE — 99213 OFFICE O/P EST LOW 20 MIN: CPT | Mod: PBBFAC,PO | Performed by: FAMILY MEDICINE

## 2018-12-27 RX ORDER — RABEPRAZOLE SODIUM 20 MG/1
20 TABLET, DELAYED RELEASE ORAL DAILY
Qty: 90 TABLET | Refills: 3 | Status: SHIPPED | OUTPATIENT
Start: 2018-12-27 | End: 2019-11-01 | Stop reason: SDUPTHER

## 2018-12-27 NOTE — PROGRESS NOTES
Subjective:       Patient ID: Coral Pandya is a 68 y.o. female.    Chief Complaint: Pre-op Exam (TKR right )    HPI     Referred by Dr. Angelo Rodas, orthopedic surgery, for a preop exam prior to total right knee arthroplasty on 1/14/19.     htn controlled.     Sees Dr. Coe, psychiatrist, for treatment of depression and anxiety.     Hypothyroidism stable.         Review of Systems      Review of Systems   Constitutional: Negative for fever and chills.   HENT: Negative for hearing loss and neck stiffness.    Eyes: Negative for redness and itching.   Respiratory: Negative for cough and choking.    Cardiovascular: Negative for chest pain and leg swelling.  Abdomen: Negative for abdominal pain and blood in stool.   Genitourinary: Negative for dysuria and flank pain.   Musculoskeletal: Negative for back pain and gait problem.   Neurological: Negative for light-headedness and headaches.   Hematological: Negative for adenopathy.   Psychiatric/Behavioral: Negative for behavioral problems.     Objective:      Physical Exam   Constitutional: She appears well-developed.   HENT:   Head: Normocephalic and atraumatic.   Eyes: Conjunctivae are normal. Pupils are equal, round, and reactive to light.   Neck: Normal range of motion.   Cardiovascular: Normal rate and regular rhythm.   No murmur heard.  Pulmonary/Chest: Effort normal and breath sounds normal.   Lymphadenopathy:     She has no cervical adenopathy.       Assessment:       1. Preop examination    2. Chronic pain of right knee    3. Hypertension, unspecified type    4. Gastroesophageal reflux disease, esophagitis presence not specified    5. Hypothyroidism, unspecified type    6. MDD (major depressive disorder), recurrent, in partial remission        Plan:       Preop examination  -     EKG 12-lead; Future    Chronic pain of right knee    Hypertension, unspecified type  -     CBC auto differential; Future; Expected date: 12/27/2018  -     Basic metabolic panel;  Future; Expected date: 12/27/2018    Gastroesophageal reflux disease, esophagitis presence not specified    Hypothyroidism, unspecified type    MDD (major depressive disorder), recurrent, in partial remission    Other orders  -     RABEprazole (ACIPHEX) 20 mg tablet; Take 1 tablet (20 mg total) by mouth once daily.  Dispense: 90 tablet; Refill: 3      Plan:  rf aciphex.  See orders  [id number]-[id number] with right bundle br block  Pending labs, will clear patient        ADDENDUM:    This patient is medically cleared for surgery.      Thank you for referring this patient to me and allowing me to share the care.

## 2018-12-31 ENCOUNTER — TELEPHONE (OUTPATIENT)
Dept: FAMILY MEDICINE | Facility: CLINIC | Age: 68
End: 2018-12-31

## 2018-12-31 NOTE — TELEPHONE ENCOUNTER
----- Message from Ebenezer Souza MD sent at 12/30/2018  4:15 PM CST -----  inform pt via phone that I reviewed the test results and note the following:    Labs are normal. She is cleared for surgery. Please fax my office note.

## 2019-01-02 NOTE — DISCHARGE INSTRUCTIONS
Knee Athroscopy Discharge Instructions    1) Pain: After surgery your knee will be sore. The knee will likely have been injected with a numbing medicine (Exparel) prior to completion of surgery for pain control. This is indicated on a green bracelet that you will continue to wear for 4 days after surgery. You will   also get a prescription for pain control before you leave the hospital. Ice and elevation will assist with pain control.    a) Apply ice as much as possible for the first 72 hours. After 72 hours, apply ice for 20minutes 3-4 times a day, after therapy,after exercising or whenever experiencing pain. Avoid direct skin contact with ice to prevent frostbit.          b) Elevate the affected leg with the pillow the length of the leg  to assist with swelling and pain.  2) Incision Care:  a) Some drainage from the incision in the first 72 hours is normal. If drainage is excessive,remove bandage,  pat dry, cover with sterile gauze and secure with tape. Notify physician about excessive drainage. Staples will be removed 14 days after surgery   3) Activity:  a) Perform exercises 2-3 x day.  b) You may shower 48 hours after surgery providing the dressing is waterproof. No tub or hot tub usage.. Support help is mandatory during showering. If the dressing becomes wet, replace with a new dressing.   4) Wear thigh high michael hose stockings for 3 weeks after surgery .You may remove stockings for 1- 2 hours during the day only. Send patient home with an extra pair michael hoseFor lifetime after your replacement surgery, you may need antibiotic coverage before dental or minor surgical procedures.  5) Possible Complications: Call Surgeon  a) Infection: Report these signs and symptoms to your surgeon.  i) Unexpected redness around incision   ii) Persistent drainage from wound after 72 hours.  iii) Temperature ,can be treated with Tylenol. Do not go to the emergency room or urgent care center, call your surgeon.   iv) Additional  swelling  v) Pain not controlled with current pain medication  b) Blood Clot: Report theses signs and symptoms to your surgeon  i) Unusual pain  ii) Red or discolored skin  iii) Swelling in the leg  iv) Unusual warm skin

## 2019-01-02 NOTE — NURSING
Total Joint Replacement Questionnaire     Coral Pandya participated in Joint Class Dec    1. Have you ever had a Joint Replacement?   []Yes  [x] No    2. How many stairs/steps do you have to enter your home? 0  When going up, on what side is the railing?  [] Left  [] Right  [] Bilateral  [x] None    3. Do you own any Durable Medical Equipment?   [] Rolling Walker  [] Standard Walker  [] Rollator  [] Cane  [] Crutches  [] Bed Side Commode  [] Hip Kit  [] Tub Transfer Bench  [] Shower Chair     4. Have you used a Home Health Company before?   [x] Yes  [] No  If Yes, Name of Company: n/a  Would you like to use this company again?   [] Yes  [] No  [x] Would you like to use your physician's preference?  [x] Yes  [] No    5. Have you arranged for someone to help you, for at least for 3 days, when you are discharged from the hospital?  [x] Yes  [] No  If Yes, Name(s) of person assisting: adult children      Cally Fritz  1/2/2019

## 2019-01-03 ENCOUNTER — TELEPHONE (OUTPATIENT)
Dept: FAMILY MEDICINE | Facility: CLINIC | Age: 69
End: 2019-01-03

## 2019-01-03 ENCOUNTER — HOSPITAL ENCOUNTER (OUTPATIENT)
Dept: PREADMISSION TESTING | Facility: OTHER | Age: 69
Discharge: HOME | End: 2019-01-03
Attending: ORTHOPAEDIC SURGERY
Payer: MEDICARE

## 2019-01-03 ENCOUNTER — ANESTHESIA EVENT (OUTPATIENT)
Dept: SURGERY | Facility: OTHER | Age: 69
End: 2019-01-03
Payer: MEDICARE

## 2019-01-03 VITALS
TEMPERATURE: 98 F | HEART RATE: 75 BPM | DIASTOLIC BLOOD PRESSURE: 80 MMHG | SYSTOLIC BLOOD PRESSURE: 129 MMHG | OXYGEN SATURATION: 97 % | WEIGHT: 168 LBS | RESPIRATION RATE: 16 BRPM | BODY MASS INDEX: 26.37 KG/M2 | HEIGHT: 67 IN

## 2019-01-03 DIAGNOSIS — M17.11 PRIMARY OSTEOARTHRITIS OF RIGHT KNEE: Primary | ICD-10-CM

## 2019-01-03 LAB
ABO + RH BLD: NORMAL
BILIRUB UR QL STRIP: NEGATIVE
BLD GP AB SCN CELLS X3 SERPL QL: NORMAL
CLARITY UR: CLEAR
COLOR UR: YELLOW
GLUCOSE UR QL STRIP: NEGATIVE
HGB UR QL STRIP: NEGATIVE
KETONES UR QL STRIP: NEGATIVE
LEUKOCYTE ESTERASE UR QL STRIP: NEGATIVE
NITRITE UR QL STRIP: NEGATIVE
PH UR STRIP: 6 [PH] (ref 5–8)
PROT UR QL STRIP: NEGATIVE
SP GR UR STRIP: 1.02 (ref 1–1.03)
URN SPEC COLLECT METH UR: NORMAL
UROBILINOGEN UR STRIP-ACNC: NEGATIVE EU/DL

## 2019-01-03 PROCEDURE — 87086 URINE CULTURE/COLONY COUNT: CPT

## 2019-01-03 PROCEDURE — 86850 RBC ANTIBODY SCREEN: CPT

## 2019-01-03 PROCEDURE — 81003 URINALYSIS AUTO W/O SCOPE: CPT

## 2019-01-03 PROCEDURE — 36415 COLL VENOUS BLD VENIPUNCTURE: CPT

## 2019-01-03 RX ORDER — CHOLECALCIFEROL (VITAMIN D3) 25 MCG
TABLET ORAL DAILY
COMMUNITY
End: 2020-10-27

## 2019-01-03 RX ORDER — SODIUM CHLORIDE, SODIUM LACTATE, POTASSIUM CHLORIDE, CALCIUM CHLORIDE 600; 310; 30; 20 MG/100ML; MG/100ML; MG/100ML; MG/100ML
INJECTION, SOLUTION INTRAVENOUS CONTINUOUS
Status: CANCELLED | OUTPATIENT
Start: 2019-01-03

## 2019-01-03 NOTE — TELEPHONE ENCOUNTER
Placed call to pt, no answer at this time. Left detailed voicemail that she is cleared for surgery and note faxed to Dr. Rodas's office by MARLEN Monsalve. Call back number provided.

## 2019-01-03 NOTE — DISCHARGE INSTRUCTIONS
PRE-ADMIT TESTING -  [phone]    [address]  [place] [facility]          Your surgery has been scheduled at [facility]. We are pleased to have the opportunity to serve you. For Further Information please call [phone].    On the day of surgery please report to the Information Desk on the 1st floor.    · CONTACT YOUR PHYSICIAN'S OFFICE THE DAY PRIOR TO YOUR SURGERY TO OBTAIN YOUR ARRIVAL TIME.     · The evening before surgery do not eat anything after 9 p.m. ( this includes hard candy, chewing gum and mints).  You may only have GATORADE, POWERADE AND WATER  from 9 p.m. until you leave your home.   DO NOT DRINK ANY LIQUIDS ON THE WAY TO THE HOSPITAL.      SPECIAL MEDICATION INSTRUCTIONS: TAKE medications checked off by the Anesthesiologist on your Medication List.    Angiogram Patients: Take medications as instructed by your physician, including aspirin.     Surgery Patients:    If you take ASPIRIN - Your PHYSICIAN/SURGEON will need to inform you IF/OR when you need to stop taking aspirin prior to your surgery.     Do Not take any medications containing IBUPROFEN.  Do Not Wear any make-up or dark nail polish   (especially eye make-up) to surgery. If you come to surgery with makeup on you will be required to remove the makeup or nail polish.    Do not shave your surgical area at least 5 days prior to your surgery. The surgical prep will be performed at the hospital according to Infection Control regulations.    Leave all valuables at home.   Do Not wear any jewelry or watches, including any metal in body piercings.  Contact Lens must be removed before surgery. Either do not wear the contact lens or bring a case and solution for storage.  Please bring a container for eyeglasses or dentures as required.  Bring any paperwork your physician has provided, such as consent forms,  history and physicals, doctor's orders, etc.   Bring comfortable clothes that are loose fitting to wear upon  discharge. Take into consideration the type of surgery being performed.  Maintain your diet as advised per your physician the day prior to surgery.      Adequate rest the night before surgery is advised.   Park in the Parking lot behind the hospital or in the [facility] across the street from the parking lot. Parking is complimentary.  If you will be discharged the same day as your procedure, please arrange for a responsible adult to drive you home or to accompany you if traveling by taxi.   YOU WILL NOT BE PERMITTED TO DRIVE OR TO LEAVE THE HOSPITAL ALONE AFTER SURGERY.   It is strongly recommended that you arrange for someone to remain with you for the first 24 hrs following your surgery.       Thank you for your cooperation.  The Staff of [facility].        Bathing Instructions                                                                 Please shower the evening before and morning of your procedure with    ANTIBACTERIAL SOAP. ( DIAL, etc )  Concentrate on the surgical area   for at least 3 minutes and rinse completely. Dry off as usual.   Do not use any deodorant, powder, body lotions, perfume, after shave or    cologne.

## 2019-01-03 NOTE — TELEPHONE ENCOUNTER
----- Message from Maia Goode sent at 1/3/2019 12:12 PM CST -----  Contact: pt [phone]  Call placed to pod. Patient called stating she missed a call from the Nurse Gricel.  Patient was not sure what the call was about. please call back.

## 2019-01-04 NOTE — TELEPHONE ENCOUNTER
----- Message from Shelbie Goodman sent at 1/4/2019  8:33 AM CST -----  Type:  Patient Returning Call    Who Called:  Patient  Who Left Message for Patient:  Soraya  Does the patient know what this is regarding?:  yes  Best Call Back Number:  [phone] (home)     Additional Information:  Na

## 2019-01-05 LAB — BACTERIA UR CULT: NORMAL

## 2019-01-07 RX ORDER — DULOXETIN HYDROCHLORIDE 60 MG/1
CAPSULE, DELAYED RELEASE ORAL
Qty: 90 CAPSULE | Refills: 1 | Status: SHIPPED | OUTPATIENT
Start: 2019-01-07 | End: 2019-09-14 | Stop reason: SDUPTHER

## 2019-01-07 RX ORDER — DULOXETIN HYDROCHLORIDE 30 MG/1
CAPSULE, DELAYED RELEASE ORAL
Qty: 90 CAPSULE | Refills: 1 | Status: SHIPPED | OUTPATIENT
Start: 2019-01-07 | End: 2019-09-14 | Stop reason: SDUPTHER

## 2019-01-08 RX ORDER — CEFAZOLIN SODIUM 1 G/3ML
2 INJECTION, POWDER, FOR SOLUTION INTRAMUSCULAR; INTRAVENOUS
Status: CANCELLED | OUTPATIENT
Start: 2019-01-14 | End: 2019-01-14

## 2019-01-08 RX ORDER — ACETAMINOPHEN 10 MG/ML
1000 INJECTION, SOLUTION INTRAVENOUS
Status: CANCELLED | OUTPATIENT
Start: 2019-01-14 | End: 2019-01-14

## 2019-01-14 ENCOUNTER — ANESTHESIA (OUTPATIENT)
Dept: SURGERY | Facility: OTHER | Age: 69
End: 2019-01-14
Payer: MEDICARE

## 2019-01-14 ENCOUNTER — HOSPITAL ENCOUNTER (OUTPATIENT)
Facility: OTHER | Age: 69
Discharge: HOME HEALTH | End: 2019-01-15
Attending: ORTHOPAEDIC SURGERY | Admitting: ORTHOPAEDIC SURGERY
Payer: MEDICARE

## 2019-01-14 DIAGNOSIS — M17.11 PRIMARY OSTEOARTHRITIS OF RIGHT KNEE: Primary | ICD-10-CM

## 2019-01-14 PROCEDURE — C1729 CATH, DRAINAGE: HCPCS | Performed by: ORTHOPAEDIC SURGERY

## 2019-01-14 PROCEDURE — 63600175 PHARM REV CODE 636 W HCPCS: Performed by: ORTHOPAEDIC SURGERY

## 2019-01-14 PROCEDURE — 63600175 PHARM REV CODE 636 W HCPCS

## 2019-01-14 PROCEDURE — 37000009 HC ANESTHESIA EA ADD 15 MINS: Performed by: ORTHOPAEDIC SURGERY

## 2019-01-14 PROCEDURE — 99900035 HC TECH TIME PER 15 MIN (STAT)

## 2019-01-14 PROCEDURE — 36000710: Performed by: ORTHOPAEDIC SURGERY

## 2019-01-14 PROCEDURE — 97110 THERAPEUTIC EXERCISES: CPT

## 2019-01-14 PROCEDURE — 94761 N-INVAS EAR/PLS OXIMETRY MLT: CPT

## 2019-01-14 PROCEDURE — 25000003 PHARM REV CODE 250: Performed by: NURSE ANESTHETIST, CERTIFIED REGISTERED

## 2019-01-14 PROCEDURE — 27201423 OPTIME MED/SURG SUP & DEVICES STERILE SUPPLY: Performed by: ORTHOPAEDIC SURGERY

## 2019-01-14 PROCEDURE — C1776 JOINT DEVICE (IMPLANTABLE): HCPCS | Performed by: ORTHOPAEDIC SURGERY

## 2019-01-14 PROCEDURE — 94799 UNLISTED PULMONARY SVC/PX: CPT

## 2019-01-14 PROCEDURE — 97116 GAIT TRAINING THERAPY: CPT | Mod: 59

## 2019-01-14 PROCEDURE — 71000033 HC RECOVERY, INTIAL HOUR: Performed by: ORTHOPAEDIC SURGERY

## 2019-01-14 PROCEDURE — 71000039 HC RECOVERY, EACH ADD'L HOUR: Performed by: ORTHOPAEDIC SURGERY

## 2019-01-14 PROCEDURE — 97161 PT EVAL LOW COMPLEX 20 MIN: CPT

## 2019-01-14 PROCEDURE — 25000003 PHARM REV CODE 250: Performed by: ORTHOPAEDIC SURGERY

## 2019-01-14 PROCEDURE — C9290 INJ, BUPIVACAINE LIPOSOME: HCPCS | Performed by: ORTHOPAEDIC SURGERY

## 2019-01-14 PROCEDURE — 63600175 PHARM REV CODE 636 W HCPCS: Performed by: NURSE ANESTHETIST, CERTIFIED REGISTERED

## 2019-01-14 PROCEDURE — C1713 ANCHOR/SCREW BN/BN,TIS/BN: HCPCS | Performed by: ORTHOPAEDIC SURGERY

## 2019-01-14 PROCEDURE — 36000711: Performed by: ORTHOPAEDIC SURGERY

## 2019-01-14 PROCEDURE — 97530 THERAPEUTIC ACTIVITIES: CPT

## 2019-01-14 PROCEDURE — 25000003 PHARM REV CODE 250: Performed by: ANESTHESIOLOGY

## 2019-01-14 PROCEDURE — 37000008 HC ANESTHESIA 1ST 15 MINUTES: Performed by: ORTHOPAEDIC SURGERY

## 2019-01-14 PROCEDURE — 63600175 PHARM REV CODE 636 W HCPCS: Performed by: SPECIALIST

## 2019-01-14 DEVICE — IMPLANTABLE DEVICE: Type: IMPLANTABLE DEVICE | Site: KNEE | Status: FUNCTIONAL

## 2019-01-14 DEVICE — PATELLA NEXGEN ALL-POLY 35MM D: Type: IMPLANTABLE DEVICE | Site: KNEE | Status: FUNCTIONAL

## 2019-01-14 DEVICE — TIBIAL NEXGEN OPTION STEM: Type: IMPLANTABLE DEVICE | Site: KNEE | Status: FUNCTIONAL

## 2019-01-14 DEVICE — CEMENT REFOBACIN BCR 1X40: Type: IMPLANTABLE DEVICE | Site: KNEE | Status: FUNCTIONAL

## 2019-01-14 RX ORDER — ACETAMINOPHEN 10 MG/ML
1000 INJECTION, SOLUTION INTRAVENOUS
Status: COMPLETED | OUTPATIENT
Start: 2019-01-14 | End: 2019-01-14

## 2019-01-14 RX ORDER — ASPIRIN 325 MG
325 TABLET ORAL EVERY 12 HOURS
Status: DISCONTINUED | OUTPATIENT
Start: 2019-01-15 | End: 2019-01-15 | Stop reason: HOSPADM

## 2019-01-14 RX ORDER — OXYCODONE HYDROCHLORIDE 5 MG/1
5 TABLET ORAL
Status: DISCONTINUED | OUTPATIENT
Start: 2019-01-14 | End: 2019-01-14 | Stop reason: HOSPADM

## 2019-01-14 RX ORDER — OXYCODONE HYDROCHLORIDE 5 MG/1
5 TABLET ORAL EVERY 4 HOURS PRN
Status: DISCONTINUED | OUTPATIENT
Start: 2019-01-14 | End: 2019-01-15 | Stop reason: HOSPADM

## 2019-01-14 RX ORDER — POLYETHYLENE GLYCOL 3350 17 G/17G
17 POWDER, FOR SOLUTION ORAL DAILY
Status: DISCONTINUED | OUTPATIENT
Start: 2019-01-15 | End: 2019-01-15 | Stop reason: HOSPADM

## 2019-01-14 RX ORDER — BACITRACIN 50000 [IU]/1
INJECTION, POWDER, FOR SOLUTION INTRAMUSCULAR
Status: DISCONTINUED | OUTPATIENT
Start: 2019-01-14 | End: 2019-01-14 | Stop reason: HOSPADM

## 2019-01-14 RX ORDER — BUPIVACAINE HCL/EPINEPHRINE 0.25-.0005
VIAL (ML) INJECTION
Status: DISCONTINUED | OUTPATIENT
Start: 2019-01-14 | End: 2019-01-14 | Stop reason: HOSPADM

## 2019-01-14 RX ORDER — HYDROMORPHONE HYDROCHLORIDE 1 MG/ML
0.5 INJECTION, SOLUTION INTRAMUSCULAR; INTRAVENOUS; SUBCUTANEOUS EVERY 4 HOURS PRN
Status: DISCONTINUED | OUTPATIENT
Start: 2019-01-14 | End: 2019-01-15 | Stop reason: HOSPADM

## 2019-01-14 RX ORDER — NEBIVOLOL 2.5 MG/1
5 TABLET ORAL DAILY
Status: DISCONTINUED | OUTPATIENT
Start: 2019-01-15 | End: 2019-01-15 | Stop reason: HOSPADM

## 2019-01-14 RX ORDER — KETOROLAC TROMETHAMINE 30 MG/ML
INJECTION, SOLUTION INTRAMUSCULAR; INTRAVENOUS
Status: DISCONTINUED | OUTPATIENT
Start: 2019-01-14 | End: 2019-01-14 | Stop reason: HOSPADM

## 2019-01-14 RX ORDER — MUPIROCIN 20 MG/G
1 OINTMENT TOPICAL 2 TIMES DAILY
Status: DISCONTINUED | OUTPATIENT
Start: 2019-01-14 | End: 2019-01-15 | Stop reason: HOSPADM

## 2019-01-14 RX ORDER — PROPOFOL 10 MG/ML
VIAL (ML) INTRAVENOUS
Status: DISCONTINUED | OUTPATIENT
Start: 2019-01-14 | End: 2019-01-14

## 2019-01-14 RX ORDER — DEXTROSE MONOHYDRATE AND SODIUM CHLORIDE 5; .9 G/100ML; G/100ML
INJECTION, SOLUTION INTRAVENOUS CONTINUOUS
Status: DISCONTINUED | OUTPATIENT
Start: 2019-01-14 | End: 2019-01-15 | Stop reason: HOSPADM

## 2019-01-14 RX ORDER — DULOXETIN HYDROCHLORIDE 30 MG/1
30 CAPSULE, DELAYED RELEASE ORAL DAILY
Status: DISCONTINUED | OUTPATIENT
Start: 2019-01-15 | End: 2019-01-15 | Stop reason: HOSPADM

## 2019-01-14 RX ORDER — MEPERIDINE HYDROCHLORIDE 50 MG/ML
12.5 INJECTION INTRAMUSCULAR; INTRAVENOUS; SUBCUTANEOUS ONCE AS NEEDED
Status: DISCONTINUED | OUTPATIENT
Start: 2019-01-14 | End: 2019-01-14 | Stop reason: HOSPADM

## 2019-01-14 RX ORDER — DIPHENHYDRAMINE HYDROCHLORIDE 50 MG/ML
25 INJECTION INTRAMUSCULAR; INTRAVENOUS EVERY 6 HOURS PRN
Status: DISCONTINUED | OUTPATIENT
Start: 2019-01-14 | End: 2019-01-15 | Stop reason: HOSPADM

## 2019-01-14 RX ORDER — PROPOFOL 10 MG/ML
VIAL (ML) INTRAVENOUS CONTINUOUS PRN
Status: DISCONTINUED | OUTPATIENT
Start: 2019-01-14 | End: 2019-01-14

## 2019-01-14 RX ORDER — SODIUM CHLORIDE 0.9 % (FLUSH) 0.9 %
3 SYRINGE (ML) INJECTION
Status: DISCONTINUED | OUTPATIENT
Start: 2019-01-14 | End: 2019-01-15 | Stop reason: HOSPADM

## 2019-01-14 RX ORDER — ROPIVACAINE HYDROCHLORIDE 5 MG/ML
INJECTION, SOLUTION EPIDURAL; INFILTRATION; PERINEURAL
Status: COMPLETED | OUTPATIENT
Start: 2019-01-14 | End: 2019-01-14

## 2019-01-14 RX ORDER — FAMOTIDINE 20 MG/1
20 TABLET, FILM COATED ORAL 2 TIMES DAILY
Status: DISCONTINUED | OUTPATIENT
Start: 2019-01-14 | End: 2019-01-15 | Stop reason: HOSPADM

## 2019-01-14 RX ORDER — PHENYLEPHRINE HYDROCHLORIDE 10 MG/ML
INJECTION INTRAVENOUS
Status: DISCONTINUED | OUTPATIENT
Start: 2019-01-14 | End: 2019-01-14

## 2019-01-14 RX ORDER — DOCUSATE SODIUM 100 MG/1
100 CAPSULE, LIQUID FILLED ORAL EVERY 12 HOURS
Status: DISCONTINUED | OUTPATIENT
Start: 2019-01-14 | End: 2019-01-15 | Stop reason: HOSPADM

## 2019-01-14 RX ORDER — CEFAZOLIN SODIUM 1 G/3ML
2 INJECTION, POWDER, FOR SOLUTION INTRAMUSCULAR; INTRAVENOUS
Status: COMPLETED | OUTPATIENT
Start: 2019-01-14 | End: 2019-01-14

## 2019-01-14 RX ORDER — EPHEDRINE SULFATE 50 MG/ML
INJECTION, SOLUTION INTRAVENOUS
Status: DISCONTINUED | OUTPATIENT
Start: 2019-01-14 | End: 2019-01-14

## 2019-01-14 RX ORDER — ACETAMINOPHEN 10 MG/ML
1000 INJECTION, SOLUTION INTRAVENOUS EVERY 8 HOURS
Status: DISCONTINUED | OUTPATIENT
Start: 2019-01-14 | End: 2019-01-15 | Stop reason: HOSPADM

## 2019-01-14 RX ORDER — OXYCODONE HYDROCHLORIDE 5 MG/1
10 TABLET ORAL EVERY 4 HOURS PRN
Status: DISCONTINUED | OUTPATIENT
Start: 2019-01-14 | End: 2019-01-15 | Stop reason: HOSPADM

## 2019-01-14 RX ORDER — CEFAZOLIN SODIUM 2 G/50ML
2 SOLUTION INTRAVENOUS
Status: COMPLETED | OUTPATIENT
Start: 2019-01-14 | End: 2019-01-14

## 2019-01-14 RX ORDER — HYDROMORPHONE HYDROCHLORIDE 2 MG/ML
0.4 INJECTION, SOLUTION INTRAMUSCULAR; INTRAVENOUS; SUBCUTANEOUS EVERY 5 MIN PRN
Status: DISCONTINUED | OUTPATIENT
Start: 2019-01-14 | End: 2019-01-14 | Stop reason: HOSPADM

## 2019-01-14 RX ORDER — MIDAZOLAM HYDROCHLORIDE 1 MG/ML
INJECTION INTRAMUSCULAR; INTRAVENOUS
Status: DISCONTINUED | OUTPATIENT
Start: 2019-01-14 | End: 2019-01-14

## 2019-01-14 RX ORDER — CELECOXIB 200 MG/1
200 CAPSULE ORAL 2 TIMES DAILY
Status: DISCONTINUED | OUTPATIENT
Start: 2019-01-14 | End: 2019-01-15 | Stop reason: HOSPADM

## 2019-01-14 RX ORDER — FENTANYL CITRATE 50 UG/ML
25 INJECTION, SOLUTION INTRAMUSCULAR; INTRAVENOUS EVERY 5 MIN PRN
Status: DISCONTINUED | OUTPATIENT
Start: 2019-01-14 | End: 2019-01-14 | Stop reason: HOSPADM

## 2019-01-14 RX ORDER — LIDOCAINE HCL/PF 100 MG/5ML
SYRINGE (ML) INTRAVENOUS
Status: DISCONTINUED | OUTPATIENT
Start: 2019-01-14 | End: 2019-01-14

## 2019-01-14 RX ORDER — SODIUM CHLORIDE, SODIUM LACTATE, POTASSIUM CHLORIDE, CALCIUM CHLORIDE 600; 310; 30; 20 MG/100ML; MG/100ML; MG/100ML; MG/100ML
INJECTION, SOLUTION INTRAVENOUS CONTINUOUS
Status: DISCONTINUED | OUTPATIENT
Start: 2019-01-14 | End: 2019-01-14

## 2019-01-14 RX ORDER — SODIUM CHLORIDE 0.9 % (FLUSH) 0.9 %
5 SYRINGE (ML) INJECTION
Status: DISCONTINUED | OUTPATIENT
Start: 2019-01-14 | End: 2019-01-15 | Stop reason: HOSPADM

## 2019-01-14 RX ORDER — MIRTAZAPINE 15 MG/1
15 TABLET, FILM COATED ORAL NIGHTLY PRN
Status: DISCONTINUED | OUTPATIENT
Start: 2019-01-14 | End: 2019-01-15 | Stop reason: HOSPADM

## 2019-01-14 RX ORDER — DULOXETIN HYDROCHLORIDE 30 MG/1
60 CAPSULE, DELAYED RELEASE ORAL DAILY
Status: DISCONTINUED | OUTPATIENT
Start: 2019-01-15 | End: 2019-01-14

## 2019-01-14 RX ORDER — LEVOTHYROXINE SODIUM 100 UG/1
100 TABLET ORAL
Status: DISCONTINUED | OUTPATIENT
Start: 2019-01-15 | End: 2019-01-15 | Stop reason: HOSPADM

## 2019-01-14 RX ORDER — TRANEXAMIC ACID 100 MG/ML
INJECTION, SOLUTION INTRAVENOUS
Status: DISCONTINUED | OUTPATIENT
Start: 2019-01-14 | End: 2019-01-14

## 2019-01-14 RX ORDER — ONDANSETRON 2 MG/ML
4 INJECTION INTRAMUSCULAR; INTRAVENOUS DAILY PRN
Status: DISCONTINUED | OUTPATIENT
Start: 2019-01-14 | End: 2019-01-14 | Stop reason: HOSPADM

## 2019-01-14 RX ORDER — ONDANSETRON 2 MG/ML
8 INJECTION INTRAMUSCULAR; INTRAVENOUS EVERY 8 HOURS PRN
Status: DISCONTINUED | OUTPATIENT
Start: 2019-01-14 | End: 2019-01-15 | Stop reason: HOSPADM

## 2019-01-14 RX ADMIN — EPHEDRINE SULFATE 10 MG: 50 INJECTION INTRAMUSCULAR; INTRAVENOUS; SUBCUTANEOUS at 11:01

## 2019-01-14 RX ADMIN — MUPIROCIN 1 G: 20 OINTMENT TOPICAL at 09:01

## 2019-01-14 RX ADMIN — MIDAZOLAM HYDROCHLORIDE 2 MG: 1 INJECTION, SOLUTION INTRAMUSCULAR; INTRAVENOUS at 10:01

## 2019-01-14 RX ADMIN — PHENYLEPHRINE HYDROCHLORIDE 100 MCG: 10 INJECTION INTRAVENOUS at 11:01

## 2019-01-14 RX ADMIN — SODIUM CHLORIDE, SODIUM LACTATE, POTASSIUM CHLORIDE, AND CALCIUM CHLORIDE: 600; 310; 30; 20 INJECTION, SOLUTION INTRAVENOUS at 08:01

## 2019-01-14 RX ADMIN — TRANEXAMIC ACID 750 MG: 100 INJECTION, SOLUTION INTRAVENOUS at 11:01

## 2019-01-14 RX ADMIN — SODIUM CHLORIDE, SODIUM LACTATE, POTASSIUM CHLORIDE, AND CALCIUM CHLORIDE: 600; 310; 30; 20 INJECTION, SOLUTION INTRAVENOUS at 11:01

## 2019-01-14 RX ADMIN — LIDOCAINE HYDROCHLORIDE 20 MG: 20 INJECTION, SOLUTION INTRAVENOUS at 10:01

## 2019-01-14 RX ADMIN — FAMOTIDINE 20 MG: 20 TABLET, FILM COATED ORAL at 08:01

## 2019-01-14 RX ADMIN — PROPOFOL 100 MCG/KG/MIN: 10 INJECTION, EMULSION INTRAVENOUS at 10:01

## 2019-01-14 RX ADMIN — ACETAMINOPHEN 1000 MG: 10 INJECTION, SOLUTION INTRAVENOUS at 06:01

## 2019-01-14 RX ADMIN — OXYCODONE HYDROCHLORIDE 5 MG: 5 TABLET ORAL at 01:01

## 2019-01-14 RX ADMIN — ROPIVACAINE HYDROCHLORIDE 3 ML: 5 INJECTION, SOLUTION EPIDURAL; INFILTRATION; PERINEURAL at 10:01

## 2019-01-14 RX ADMIN — CELECOXIB 200 MG: 200 CAPSULE ORAL at 08:01

## 2019-01-14 RX ADMIN — PROPOFOL 70 MG: 10 INJECTION, EMULSION INTRAVENOUS at 10:01

## 2019-01-14 RX ADMIN — DOCUSATE SODIUM 100 MG: 100 CAPSULE, LIQUID FILLED ORAL at 08:01

## 2019-01-14 RX ADMIN — DEXTROSE AND SODIUM CHLORIDE: 5; .9 INJECTION, SOLUTION INTRAVENOUS at 03:01

## 2019-01-14 RX ADMIN — VANCOMYCIN HYDROCHLORIDE 1250 MG: 10 INJECTION, POWDER, LYOPHILIZED, FOR SOLUTION INTRAVENOUS at 08:01

## 2019-01-14 RX ADMIN — CEFAZOLIN 2 G: 330 INJECTION, POWDER, FOR SOLUTION INTRAMUSCULAR; INTRAVENOUS at 11:01

## 2019-01-14 RX ADMIN — ACETAMINOPHEN 1000 MG: 10 INJECTION, SOLUTION INTRAVENOUS at 11:01

## 2019-01-14 RX ADMIN — CEFAZOLIN SODIUM 2 G: 2 SOLUTION INTRAVENOUS at 09:01

## 2019-01-14 RX ADMIN — OXYCODONE HYDROCHLORIDE 5 MG: 5 TABLET ORAL at 05:01

## 2019-01-14 RX ADMIN — CEFAZOLIN SODIUM 2 G: 2 SOLUTION INTRAVENOUS at 03:01

## 2019-01-14 RX ADMIN — OXYCODONE HYDROCHLORIDE 10 MG: 5 TABLET ORAL at 09:01

## 2019-01-14 NOTE — PLAN OF CARE
"Problem: Physical Therapy Goal  Goal: Physical Therapy Goal  Goals to be met by: 19     Patient will increase functional independence with mobility by performin. Supine to sit with supervision.   2. Sit to supine with supervision.   3. Sit<>stand transfer with supervision using rolling walker.   4. Gait > 150 feet with SBA using rolling walker.   5. Ascend/descend 4" curb step with CGA and RW   6.  Able to perform 1 x10 reps of TKR ex with assist as needed   Outcome: Ongoing (interventions implemented as appropriate)  PT eval.  amb 160' with RW and CGA after TKR ex with assist.  anticipate dc tomorrow after am tx.  REC:  Home with HH  DME:  TWAN, 3 in 1 commode, SC      "

## 2019-01-14 NOTE — CONSULTS
Consult Note  IM    Consult Requested By: Angelo Rodas MD  Reason for Consult: osteoarthritis, DANYELL, hypothyroidism, GERD, insomnia, fibromyalgia, GERD, anxiety, depression and HTN    SUBJECTIVE:     History of Present Illness:   68 y.o. female presents with a scheduled right knee repair.  Epic and paper chart reviewed prior to eval.  Up in chair for eval, just completed PT session. Tolerated well. Denies CP,SOB,F,C,N or V.     Past Medical History:   Diagnosis Date    Anxiety     Arthralgia of knee     Cervical spondylosis     Depression     Fibromyalgia     GERD (gastroesophageal reflux disease)     HTN (hypertension)     Hypothyroid     DANYELL (obstructive sleep apnea)      Past Surgical History:   Procedure Laterality Date    CARPAL TUNNEL RELEASE Bilateral     COLONOSCOPY N/A 6/14/2018    Performed by Amaury Chambers MD at [facility] ENDO    EYE SURGERY Bilateral     cataract surgery    FOOT SURGERY Bilateral     bilat joints removed second toe    TONSILLECTOMY, ADENOIDECTOMY      TUBAL LIGATION       Family History   Problem Relation Age of Onset    Cancer Paternal Aunt         COLON CANCER    Cancer Paternal Uncle         COLON CANCER     Social History     Tobacco Use    Smoking status: Never Smoker    Smokeless tobacco: Never Used   Substance Use Topics    Alcohol use: No    Drug use: No       Review of patient's allergies indicates:   Allergen Reactions    Codeine      Other reaction(s): Nausea    Wellbutrin [bupropion hcl] Other (See Comments)     headache        Review of Systems:  Constitutional: No fever or chills  Respiratory: No cough or shortness of breath  Cardiovascular: No chest pain or palpitations  Gastrointestinal: No nausea or vomiting  Neurological: No confusion or weakness    OBJECTIVE:     Vital Signs (Most Recent)  Temp: 97.5 °F (36.4 °C) (01/14/19 1350)  Pulse: 67 (01/14/19 1350)  Resp: 16 (01/14/19 1350)  BP: (!) 117/58 (01/14/19 1350)  SpO2: 96 % (01/14/19  1350)    Vital Signs Range (Last 24H):  Temp:  [97.5 °F (36.4 °C)-97.9 °F (36.6 °C)]   Pulse:  [63-75]   Resp:  [16-18]   BP: (117-136)/(58-76)   SpO2:  [94 %-98 %]       Intake/Output Summary (Last 24 hours) at 1/14/2019 1509  Last data filed at 1/14/2019 1300  Gross per 24 hour   Intake 1500 ml   Output 800 ml   Net 700 ml       Physical Exam:  General appearance: Well developed, well nourished  Eyes:  Conjunctivae/corneas clear. PERRL.  Lungs: Normal respiratory effort,   clear to auscultation bilaterally   Heart: Regular rate and rhythm, S1, S2 normal, no murmur, rub or zayda.  Abdomen: Soft, non-tender non-distended; bowel sounds normal; no masses,  no organomegaly  Extremities: No cyanosis or clubbing. No edema.  +2 pulses BLE  Skin: Skin color, texture, turgor normal. No rashes or lesions, ACE wrap right knee CDI  Neurologic: Normal strength and tone. No focal numbness or weakness   Cali    Laboratory:    Reviewed    Diagnostic Results:      ASSESSMENT/PLAN:     1. Right knee repair (M17.11): per therapy and ortho teams  2. DANYELL (G47.33):  Uses a mouth device, no CPAP  3. Hypothyroidism (E03.9): continue home med  4. GERD (K21.9): famotidine 20 mg BID  5. Fibromyalgia (M79.7): per Dr. Souza  6. Depression and Anxiety (F32.9/F41.9): per Dr. Coe. Continue home med  7. Insomnia (G47.00): continue Remeron PRN. Caution with use with narcotics.   8. HTN (I10): continue home med with hold parameters  9. DVT prophy:  mg BID, PEYTON and SCD    Plan: Thanks for consult, See above recommendations and orders. Will follow along.    Patient seen and examined. Labs reviewed. Agree with A/P as written.

## 2019-01-14 NOTE — TRANSFER OF CARE
"Anesthesia Transfer of Care Note    Patient: Coral Pandya    Procedure(s) Performed: Procedure(s) (LRB):  ARTHROPLASTY, KNEE, TOTAL SITE ASSISTED (Right)    Patient location: PACU    Anesthesia Type: general    Transport from OR: Transported from OR on 2-3 L/min O2 by NC with adequate spontaneous ventilation    Post pain: adequate analgesia    Post assessment: no apparent anesthetic complications    Post vital signs: stable    Level of consciousness: awake and alert    Nausea/Vomiting: no nausea/vomiting    Complications: none    Transfer of care protocol was followed      Last vitals:   Visit Vitals  /76 (BP Location: Left arm, Patient Position: Lying)   Pulse 67   Temp 36.6 °C (97.8 °F) (Oral)   Resp 16   Ht 5' 7" (1.702 m)   Wt 75.8 kg (167 lb)   SpO2 98%   Breastfeeding? No   BMI 26.16 kg/m²     "

## 2019-01-14 NOTE — NURSING
Pt arrived to floor via stretcher with JESUS Mcgrath and transferred to bed. IVF started, SCDs applied, oriented to room, call light placed within reach, bed low and locked, and family at bedside. No complaints of pain. Cali noted draining clear yellow urine to gravity. Incisions noted to right knee,CDI with polar care. No acute distress noted at this time. Will continue to monitor.

## 2019-01-14 NOTE — PLAN OF CARE
SW met with pt at bedside to complete discharge assessment, verified PCP and uses [facility] on [address] in [place].  Pt's daughter, Emma present and will provide transportation home.  Pt needs RW, BSC and SC and MARVIN gave pt a list of  agencies on [facility] and pt would like to be placed with Dr. Rodas's preferred provider and signed choice form.     01/14/19 1430   Discharge Assessment   Assessment Type Discharge Planning Assessment   Confirmed/corrected address and phone number on facesheet? Yes   Assessment information obtained from? Patient   Communicated expected length of stay with patient/caregiver no   Prior to hospitilization cognitive status: Alert/Oriented   Prior to hospitalization functional status: Independent   Current cognitive status: Alert/Oriented   Current Functional Status: Assistive Equipment;Needs Assistance   Able to Return to Prior Arrangements yes   Is patient able to care for self after discharge? Unable to determine at this time (comments)   Who are your caregiver(s) and their phone number(s)? (Emma, daughter, [phone])   Readmission Within the Last 30 Days no previous admission in last 30 days   Patient currently being followed by outpatient case management? No   Patient currently receives any other outside agency services? No   Equipment Currently Used at Home none   Do you have any problems affording any of your prescribed medications? No   Is the patient taking medications as prescribed? yes   Does the patient have transportation home? Yes   Transportation Anticipated family or friend will provide   Does the patient receive services at the [facility]? No   Discharge Plan A Home Health   DME Needed Upon Discharge  bedside commode;walker, rolling;shower chair   Patient/Family in Agreement with Plan yes

## 2019-01-14 NOTE — ANESTHESIA POSTPROCEDURE EVALUATION
"Anesthesia Post Evaluation    Patient: Coral Pandya    Procedure(s) Performed: Procedure(s) (LRB):  ARTHROPLASTY, KNEE, TOTAL SITE ASSISTED (Right)    Final Anesthesia Type: spinal  Patient location during evaluation: PACU  Patient participation: Yes- Able to Participate  Level of consciousness: awake and alert  Post-procedure vital signs: reviewed and stable  Pain management: adequate  Airway patency: patent  PONV status at discharge: No PONV  Anesthetic complications: no      Cardiovascular status: blood pressure returned to baseline  Respiratory status: unassisted and room air  Hydration status: euvolemic  Follow-up not needed.        Visit Vitals  BP (!) 117/58   Pulse 67   Temp 36.4 °C (97.5 °F) (Oral)   Resp 16   Ht 5' 7.01" (1.702 m)   Wt 75.8 kg (166 lb 16 oz)   SpO2 96%   Breastfeeding? No   BMI 26.15 kg/m²       Pain/Lev Score: Pain Rating Prior to Med Admin: 2 (1/14/2019  1:28 PM)  Pain Rating Post Med Admin: 0 (1/14/2019  1:30 PM)  Lev Score: 10 (1/14/2019  1:30 PM)        "

## 2019-01-14 NOTE — ANESTHESIA PROCEDURE NOTES
Spinal    Diagnosis: OA R KNEE  Patient location during procedure: holding area  Start time: 1/14/2019 10:37 AM  Timeout: 1/14/2019 10:37 AM  End time: 1/14/2019 10:45 AM  Staffing  Anesthesiologist: Miguel Bennett MD  Performed: anesthesiologist   Preanesthetic Checklist  Completed: patient identified, site marked, surgical consent, pre-op evaluation, timeout performed, IV checked, risks and benefits discussed and monitors and equipment checked  Spinal Block  Patient position: sitting  Prep: ChloraPrep  Patient monitoring: heart rate, cardiac monitor, continuous pulse ox and frequent blood pressure checks  Approach: left paramedian  Location: L3-4  Injection technique: single shot  CSF Fluid: clear free-flowing CSF  Needle  Needle type: pencil-tip   Needle gauge: 25 G  Needle length: 3.5 in  Additional Documentation: incremental injection, negative aspiration for heme and no paresthesia on injection  Needle localization: anatomical landmarks  Assessment  Sensory level: T5   Dermatomal levels determined by alcohol wipe and pinch or prick  Ease of block: easy  Patient's tolerance of the procedure: comfortable throughout block and no complaints

## 2019-01-14 NOTE — OP NOTE
[facility]  Operative Report    SUMMARY     Surgery Date: 1/14/2019     Surgeon(s) and Role:     * Angelo Rodas MD - Primary    Assistant: RODOLFO Chen FA    Pre-op Diagnosis:  Primary osteoarthritis of right knee [M17.11]    Post-op Diagnosis:  Post-Op Diagnosis Codes:     * Primary osteoarthritis of right knee [M17.11]    Procedure(s) (LRB):  ARTHROPLASTY, KNEE, TOTAL SITE ASSISTED (Right) ([employer])    Anesthesia: Spinal    Description of Procedure: The appropriate consent was signed. The patient understood and except all risks and complications. The patient was brought to the Operating Room after undergoing spinal anesthetic. Tourniquet was applied to the proximal operative leg. The lower extremity was then prepped and draped in a sterile manner. After exsanguination of Esmarch bandage, tourniquet was inflated to 300 mmHg. An anterior incision with a medial parapatellar arthrotomy was performed. The menisci, anterior cruciate ligament and patellar fat pad were excised. The patella was resurfaced and sized to a 35 mm patella.   Three holes were then drilled for the lugs and this was trialed. A hole was then  drilled in the distal femur, harvey was placed down the femoral canal and the   distal femur cut in 6 degrees of valgus. The tibia was then cut utilizing the   Juan Luis navigation system in 0 degree varus valgus with 5 degrees in posterior   slope. Extension block was placed and full extension was noted. The femur was   then sized to a #E and #E cutting block was placed and the anterior and   posterior cuts as well as chamfer cuts were performed. The tibia was sized to a  size 4 and a trial was performed.Trials were performed and a 12 mm spacer was felt to be appropriate.The tibia was then prepared with the drill and the punch, and trials were   removed and the knee washed out. Aquamantys was used to paint the posterior   capsule and corners. Palacos cement with gentamicin was then  mixed and   pressurized sequentially in tibia, femur and patella. Components were impacted   and excess cement was removed. A trial 12 mm spacer was placed and knee   brought out to full extension. Once the cement was allowed to harden, the 12 mm  spacer was felt to be appropriate and this was locked into the tibial   baseplate. Tourniquet was deflated and hemostasis was obtained. Good patellar   tracking was noted. Exparel cocktail was injected into the fascia and   subcutaneous tissue. The fascial closure was obtained with a running #2 Quill suture. Subcutaneous closure was obtained with #1 and 2-0 Vicryl. Skin was then closed with the staples and a sterile compressive dressing was applied. The patient was then brought to the Recovery Room in a good condition.        Estimated Blood Loss: 100cc         Specimens:   Specimen (12h ago, onward)    None

## 2019-01-14 NOTE — PT/OT/SLP EVAL
Physical Therapy Evaluation and treatment    Patient Name:  Coral Pandya   MRN:  [id number]    Recommendations:     Discharge Recommendations:  other (see comments)(home with HH)   Discharge Equipment Recommendations: shower chair, walker, rolling, commode(3 in 1)   Barriers to discharge: None    Assessment:     Coral Pandya is a 68 y.o. female admitted with a medical diagnosis of Primary osteoarthritis of right knee.  She presents with the following impairments/functional limitations:  weakness, impaired endurance, impaired functional mobilty, impaired balance, gait instability, decreased lower extremity function, decreased ROM, pain .  Did well initial visit, anticipate dc tomorrow after am tx.    Rehab Prognosis: Good; patient would benefit from acute skilled PT services to address these deficits and reach maximum level of function.    Recent Surgery: Procedure(s) (LRB):  ARTHROPLASTY, KNEE, TOTAL SITE ASSISTED (Right) Day of Surgery    Plan:     During this hospitalization, patient to be seen BID to address the identified rehab impairments via gait training, therapeutic activities, therapeutic exercises and progress toward the following goals:    · Plan of Care Expires:  02/13/19    Subjective     Chief Complaint: knee pain brian when wt bearing during walking   Patient/Family Comments/goals: states she has friends and family that will be able to help her out  Pain/Comfort:  · Pain Rating 1: 0/10  · Location - Side 1: Right  · Location - Orientation 1: generalized  · Location 1: knee  · Pain Addressed 1: Pre-medicate for activity, Reposition, Distraction, Cessation of Activity, Nurse notified  · Pain Rating Post-Intervention 1: 2/10    Patients cultural, spiritual, Methodist conflicts given the current situation: no    Living Environment:  Pt lives alone in 2 story house with 1 KANDI.  Her bed and bath on 1st floor with WIS  Prior to admission, patients level of function was I PTA with ADL and amb  without AD, drives, works part time at [employer].  Equipment used at home: none.  DME owned (not currently used): none.  Upon discharge, patient will have assistance from children(daugher and son) , ex  and friends.    Objective:     Communicated with nurse prior to session.  Patient found sitting up in bed with  peripheral IV, cryotherapy, SCD, FCD, chambers catheter  upon PT entry to room.    General Precautions: Standard, fall   Orthopedic Precautions:RLE weight bearing as tolerated   Braces: N/A     Exams:  · Cognitive Exam:  Patient is oriented to Person, Place, Time and Situation  · Gross Motor Coordination:  WFL  · Postural Exam:  Patient presented with the following abnormalities:    · -       Rounded shoulders  · Sensation:    · -       Intact  light/touch BLE  · Skin Integrity/Edema:      · -       Skin integrity: R lower leg in surgical bandage  · -       Edema: R knee also mild in area of thenar eminence on L   · RLE ROM: Deficits: decreased in knee, ankle WFL  · RLE Strength: Deficits: fair quad set, unable to SLR -needs assist, ankle WFL  · LLE ROM: WFL  · LLE Strength: WFL    Functional Mobility:  · Bed Mobility:     · Supine to Sit: minimum assistance  · Transfers:     · Sit to Stand:  contact guard assistance with rolling walker  · Gait: pt amb 160' with RW and CGA.  instructed in heel toe reciprocal gait.    · Balance: fair standing       Therapeutic Activities and Exercises:   PT eval.   Pt performed 1 set of 10 reps of RLE  1. Glut sets  2. Quad sets  3. Ankle pumps  4. Hip abd/add  5. SLR  6. Knee flexion (heel slides)  7. Short arc quads  Pt required verbal cues, tactile cues and visual cues for technique in order to complete.     Gait as above        AM-PAC 6 CLICK MOBILITY  Total Score:18     Patient left up in chair with all lines intact, call button in reach and nursing notified.    GOALS:   Multidisciplinary Problems     Physical Therapy Goals        Problem: Physical Therapy  "Goal    Goal Priority Disciplines Outcome Goal Variances Interventions   Physical Therapy Goal     PT, PT/OT Ongoing (interventions implemented as appropriate)     Description:  Goals to be met by: 19     Patient will increase functional independence with mobility by performin. Supine to sit with supervision.   2. Sit to supine with supervision.   3. Sit<>stand transfer with supervision using rolling walker.   4. Gait > 150 feet with SBA using rolling walker.   5. Ascend/descend 4" curb step with CGA and RW   6.  Able to perform 1 x10 reps of TKR ex with assist as needed                     History:     Past Medical History:   Diagnosis Date    Anxiety     Arthralgia of knee     Cervical spondylosis     Depression     Fibromyalgia     GERD (gastroesophageal reflux disease)     HTN (hypertension)     Hypothyroid     DANYELL (obstructive sleep apnea)        Past Surgical History:   Procedure Laterality Date    CARPAL TUNNEL RELEASE Bilateral     COLONOSCOPY N/A 2018    Performed by Amaury Chambers MD at [facility] ENDO    EYE SURGERY Bilateral     cataract surgery    FOOT SURGERY Bilateral     bilat joints removed second toe    TONSILLECTOMY, ADENOIDECTOMY      TUBAL LIGATION         Clinical Decision Making:     History  Co-morbidities and personal factors that may impact the plan of care Examination  Body Structures and Functions, activity limitations and participation restrictions that may impact the plan of care Clinical Presentation   Decision Making/ Complexity Score   Co-morbidities:   [] Time since onset of injury / illness / exacerbation  [] Status of current condition  []Patient's cognitive status and safety concerns    [] Multiple Medical Problems (see med hx)  Personal Factors:   [] Patient's age  [] Prior Level of function   [] Patient's home situation (environment and family support)  [] Patient's level of motivation  [] Expected progression of " patient      HISTORY:(criteria)    [] 38322 - no personal factors/history    [] 00088 - has 1-2 personal factor/comorbidity     [] 79132 - has >3 personal factor/comorbidity     Body Regions:  [] Objective examination findings  [] Head     []  Neck  [] Trunk   [] Upper Extremity  [] Lower Extremity    Body Systems:  [] For communication ability, affect, cognition, language, and learning style: the assessment of the ability to make needs known, consciousness, orientation (person, place, and time), expected emotional /behavioral responses, and learning preferences (eg, learning barriers, education  needs)  [] For the neuromuscular system: a general assessment of gross coordinated movement (eg, balance, gait, locomotion, transfers, and transitions) and motor function  (motor control and motor learning)  [] For the musculoskeletal system: the assessment of gross symmetry, gross range of motion, gross strength, height, and weight  [] For the integumentary system: the assessment of pliability(texture), presence of scar formation, skin color, and skin integrity  [] For cardiovascular/pulmonary system: the assessment of heart rate, respiratory rate, blood pressure, and edema     Activity limitations:    [] Patient's cognitive status and saf ety concerns          [] Status of current condition      [] Weight bearing restriction  [] Cardiopulmunary Restriction    Participation Restrictions:   [] Goals and goal agreement with the patient     [] Rehab potential (prognosis) and probable outcome      Examination of Body System: (criteria)    [] 67550 - addressing 1-2 elements    [] 05386 - addressing a total of 3 or more elements     [] 67552 -  Addressing a total of 4 or more elements         Clinical Presentation: (criteria)  Choose one     On examination of body system using standardized tests and measures patient presents with (CHOOSE ONE) elements from any of the following: body structures and functions, activity  limitations, and/or participation restrictions.  Leading to a clinical presentation that is considered (CHOOSE ONE)                              Clinical Decision Making  (Eval Complexity):  Choose One     Time Tracking:     PT Received On: 01/14/19  PT Start Time: 1506     PT Stop Time: 1549  PT Total Time (min): 43 min     Billable Minutes: Evaluation 15, Gait Training 13 and Therapeutic Exercise 15      Laura Joe, PT  01/14/2019

## 2019-01-15 VITALS
SYSTOLIC BLOOD PRESSURE: 98 MMHG | HEIGHT: 67 IN | DIASTOLIC BLOOD PRESSURE: 66 MMHG | OXYGEN SATURATION: 93 % | HEART RATE: 60 BPM | RESPIRATION RATE: 17 BRPM | WEIGHT: 167 LBS | BODY MASS INDEX: 26.21 KG/M2 | TEMPERATURE: 98 F

## 2019-01-15 LAB
ERYTHROCYTE [DISTWIDTH] IN BLOOD BY AUTOMATED COUNT: 13.1 %
HCT VFR BLD AUTO: 35.8 %
HGB BLD-MCNC: 11.8 G/DL
MCH RBC QN AUTO: 31 PG
MCHC RBC AUTO-ENTMCNC: 33 G/DL
MCV RBC AUTO: 94 FL
PLATELET # BLD AUTO: 182 K/UL
PMV BLD AUTO: 11 FL
RBC # BLD AUTO: 3.81 M/UL
WBC # BLD AUTO: 6.46 K/UL

## 2019-01-15 PROCEDURE — 25000003 PHARM REV CODE 250: Performed by: NURSE PRACTITIONER

## 2019-01-15 PROCEDURE — 85027 COMPLETE CBC AUTOMATED: CPT

## 2019-01-15 PROCEDURE — 97165 OT EVAL LOW COMPLEX 30 MIN: CPT

## 2019-01-15 PROCEDURE — 36415 COLL VENOUS BLD VENIPUNCTURE: CPT

## 2019-01-15 PROCEDURE — 25000003 PHARM REV CODE 250: Performed by: ORTHOPAEDIC SURGERY

## 2019-01-15 PROCEDURE — 63600175 PHARM REV CODE 636 W HCPCS: Performed by: ORTHOPAEDIC SURGERY

## 2019-01-15 PROCEDURE — 97535 SELF CARE MNGMENT TRAINING: CPT

## 2019-01-15 RX ORDER — ASPIRIN 325 MG
325 TABLET ORAL EVERY 12 HOURS
Qty: 60 TABLET | Refills: 0 | Status: SHIPPED | OUTPATIENT
Start: 2019-01-15 | End: 2019-02-27

## 2019-01-15 RX ORDER — OXYCODONE AND ACETAMINOPHEN 5; 325 MG/1; MG/1
TABLET ORAL
Qty: 60 TABLET | Refills: 0 | Status: SHIPPED | OUTPATIENT
Start: 2019-01-15 | End: 2019-02-27 | Stop reason: SDUPTHER

## 2019-01-15 RX ADMIN — OXYCODONE HYDROCHLORIDE 5 MG: 5 TABLET ORAL at 10:01

## 2019-01-15 RX ADMIN — ACETAMINOPHEN 1000 MG: 10 INJECTION, SOLUTION INTRAVENOUS at 05:01

## 2019-01-15 RX ADMIN — OXYCODONE HYDROCHLORIDE 10 MG: 5 TABLET ORAL at 05:01

## 2019-01-15 RX ADMIN — DULOXETINE 30 MG: 30 CAPSULE, DELAYED RELEASE ORAL at 08:01

## 2019-01-15 RX ADMIN — LEVOTHYROXINE SODIUM 100 MCG: 100 TABLET ORAL at 05:01

## 2019-01-15 RX ADMIN — CELECOXIB 200 MG: 200 CAPSULE ORAL at 08:01

## 2019-01-15 RX ADMIN — DEXTROSE AND SODIUM CHLORIDE: 5; .9 INJECTION, SOLUTION INTRAVENOUS at 05:01

## 2019-01-15 RX ADMIN — DOCUSATE SODIUM 100 MG: 100 CAPSULE, LIQUID FILLED ORAL at 08:01

## 2019-01-15 RX ADMIN — MUPIROCIN 1 G: 20 OINTMENT TOPICAL at 08:01

## 2019-01-15 RX ADMIN — FAMOTIDINE 20 MG: 20 TABLET, FILM COATED ORAL at 08:01

## 2019-01-15 RX ADMIN — OXYCODONE HYDROCHLORIDE 10 MG: 5 TABLET ORAL at 01:01

## 2019-01-15 RX ADMIN — ASPIRIN 325 MG ORAL TABLET 325 MG: 325 PILL ORAL at 08:01

## 2019-01-15 RX ADMIN — POLYETHYLENE GLYCOL 3350 17 G: 17 POWDER, FOR SOLUTION ORAL at 08:01

## 2019-01-15 NOTE — PT/OT/SLP PROGRESS
"Physical Therapy Treatment    Patient Name:  Coral Pandya   MRN:  [id number]    Recommendations:     Discharge Recommendations:  (HHPT )   Discharge Equipment Recommendations: shower chair, walker, rolling, commode(3 in 1)   Barriers to discharge: None    Assessment:     Coral Pandya is a 68 y.o. female admitted with a medical diagnosis of Primary osteoarthritis of right knee.  She presents with the following impairments/functional limitations:    Patient tolerated treatment session well and progressing well towards goals.    Rehab Prognosis: Good; patient would benefit from acute skilled PT services to address these deficits and reach maximum level of function.    Recent Surgery: Procedure(s) (LRB):  ARTHROPLASTY, KNEE, TOTAL SITE ASSISTED (Right) 1 Day Post-Op    Plan:     During this hospitalization, patient to be seen BID to address the identified rehab impairments via gait training, therapeutic activities, therapeutic exercises and progress toward the following goals:    · Plan of Care Expires:  02/13/19    Subjective     Chief Complaint: patient stated " I feel pretty good"   Patient/Family Comments/goals: home   Pain/Comfort:  · Pain Rating 1: 2/10  · Location - Side 1: Right  · Location - Orientation 1: generalized  · Location 1: knee  · Pain Addressed 1: Reposition, Distraction, Pre-medicate for activity      Objective:     Communicated with nurse prior to session.  Patient found supine peripheral IV  upon PT entry to room.     General Precautions: Standard, fall   Orthopedic Precautions:RLE weight bearing as tolerated   Braces: N/A     Functional Mobility:  · Supine to sit Mod I   · Sit to stand from bed with RW with Supervision   · Patient gait trained 225 feet with Rolling walker with Supervision patient demo reciprocal gait pattern.     AM-PAC 6 CLICK MOBILITY  Turning over in bed (including adjusting bedclothes, sheets and blankets)?: 4  Sitting down on and standing up from a chair " "with arms (e.g., wheelchair, bedside commode, etc.): 4  Moving from lying on back to sitting on the side of the bed?: 4  Moving to and from a bed to a chair (including a wheelchair)?: 4  Need to walk in hospital room?: 3  Climbing 3-5 steps with a railing?: 3  Basic Mobility Total Score: 22       Therapeutic Activities and Exercises:  Patient ascend/descend 4" curb step with RW with Supervision   Patient performed therex X 15 reps AROM per TKA protocol     Patient left up in chair with all lines intact, call button in reach, nurse  notified and daughter  present..    GOALS:   Multidisciplinary Problems     Physical Therapy Goals        Problem: Physical Therapy Goal    Goal Priority Disciplines Outcome Goal Variances Interventions   Physical Therapy Goal     PT, PT/OT Ongoing (interventions implemented as appropriate)     Description:  Goals to be met by: 19     Patient will increase functional independence with mobility by performin. Supine to sit with supervision. MET 1/15/19  2. Sit to supine with supervision. MET 1/15/19  3. Sit<>stand transfer with supervision using rolling walker. MET 1/15/19  4. Gait > 150 feet with SBA using rolling walker. MET 1/15/19  5. Ascend/descend 4" curb step with CGA and RW MET 1/15/19  6.  Able to perform 1 x10 reps of TKR ex with assist as needed                      Time Tracking:     PT Received On: 01/15/19  PT Start Time: 725     PT Stop Time: 0810  PT Total Time (min): 45 min     Billable Minutes: Gait Training 15, Therapeutic Activity 15 and Therapeutic Exercise 15    Treatment Type: Treatment  PT/PTA: PTA     PTA Visit Number: 1     Marlee Panda PTA  01/15/2019  "

## 2019-01-15 NOTE — PLAN OF CARE
01/15/19 0947   Final Note   Assessment Type Final Discharge Note   Anticipated Discharge Disposition Home-Health   What phone number can be called within the next 1-3 days to see how you are doing after discharge? [phone]   Right Care Referral Info   Post Acute Recommendation Home-care   Facility Name ([facility])    Per MARVIN Thomas, Spoke with Destini at [facility] who accepted for .

## 2019-01-15 NOTE — PT/OT/SLP DISCHARGE
Occupational Therapy Discharge Summary    Coral Pandya  MRN: [id number]   Principal Problem: Primary osteoarthritis of right knee      Patient Discharged from acute Occupational Therapy on 1/15/2019.  Please refer to prior OT note dated 1/15/2019 for functional status.    Assessment:      Patient appropriate for care in another setting.    Objective:     GOALS:   Multidisciplinary Problems     Occupational Therapy Goals        Problem: Occupational Therapy Goal    Goal Priority Disciplines Outcome Interventions   Occupational Therapy Goal     OT, PT/OT     Description:  Goals to be met by: 1/22/2019     Patient will increase functional independence with ADLs by performing:    LE Dressing with Supervision.  Grooming while standing at sink with [place].  Toileting from toilet with Supervision for hygiene and clothing management.   Toilet transfer to toilet with Supervision.                      Reasons for Discontinuation of Therapy Services  Transfer to alternate level of care.      Plan:     Patient Discharged to: Home with Home Health Service    ISABELLA Wiggins  1/15/2019

## 2019-01-15 NOTE — NURSING
Discharge instructions reviewed with pt and dght at length and verbalized 100% understanding. Saline lock rt hand dc with cath intact and site without redness or swelling.Extra pair michael moreno sent home with pt.

## 2019-01-15 NOTE — PROGRESS NOTES
Progress Note  Medicine    Admit Date: 1/14/2019   LOS: 0 days     SUBJECTIVE:     Follow-up For:      Interval History:     No current complaints.    Review of Systems:  Constitutional: No fever or chills  Respiratory: No cough or shortness of breath  Cardiovascular: No chest pain or palpitations  Gastrointestinal: No nausea or vomiting  Neurological: No confusion or weakness    OBJECTIVE:     Vital Signs Range (Last 24H):  Vitals:    01/15/19 0711   BP: 98/66   Pulse: 60   Resp: 17   Temp: 98.3 °F (36.8 °C)       Temp:  [97.2 °F (36.2 °C)-98.3 °F (36.8 °C)]   Pulse:  [60-77]   Resp:  [16-18]   BP: ()/(58-76)   SpO2:  [92 %-98 %]     I & O (Last 24H):    Intake/Output Summary (Last 24 hours) at 1/15/2019 0817  Last data filed at 1/15/2019 0446  Gross per 24 hour   Intake 2987.5 ml   Output 3150 ml   Net -162.5 ml       Physical Exam:  General appearance: Well developed, well nourished  Eyes:  Conjunctivae/corneas clear. PERRL.  Lungs: Normal respiratory effort,   clear to auscultation bilaterally   Heart: Regular rate and rhythm, S1, S2 normal, no murmur, rub or zayda.  Abdomen: Soft, non-tender non-distended; bowel sounds normal; no masses,  no organomegaly  Extremities: No cyanosis or clubbing. No edema.  DP pulses 2+/4+ bilaterally.  Skin: Skin color, texture, turgor normal. No rashes or lesions    Laboratory Data:    No results for input(s): NA, K, CL, CO2, BUN, CREATININE, LABGLOM, GLUCOSE, CALCIUM, PHOS in the last 168 hours.  Lab Results   Component Value Date    CALCIUM 9.7 12/27/2018     No results found for: IRON, TIBC, FERRITIN, SATURATEDIRO    Medications:  Medication list was reviewed and changes noted under Assessment/Plan.    Diagnostic Results:  Reviewed most recent CT/US/Echo/MRI    ASSESSMENT/PLAN:     1. Right knee repair (M17.11): per therapy and ortho teams  2. DANYELL (G47.33):  Uses a mouth device, no CPAP  3. Hypothyroidism (E03.9): continue home med  4. GERD (K21.9): famotidine 20 mg  BID  5. Fibromyalgia (M79.7): per Dr. Souza  6. Depression and Anxiety (F32.9/F41.9): per Dr. Coe. Continue home med  7. Insomnia (G47.00): continue Remeron PRN. Caution with use with narcotics.   8. HTN (I10): continue home med with hold parameters  9. DVT prophy:  mg BID, PEYTON and SCD     Plan:  okay for discharge home from my perspective.  Advised patient not to take blood pressure medications until her blood pressure is greater than 140 systolic.  Her pressure is likely low due to pain medications.  I reviewed side effect profile of her current medications and patient understands to limit their use as possible.

## 2019-01-15 NOTE — PLAN OF CARE
Problem: Adult Inpatient Plan of Care  Goal: Plan of Care Review  Outcome: Ongoing (interventions implemented as appropriate)  Pt remains free from falls. Vitals were stable throughout the night on room air. Positions self independently. Pain managed with PO medications, no complaints of nausea. Bed in low position and call light within reach. Will continue to monitor.

## 2019-01-15 NOTE — PLAN OF CARE
"Problem: Physical Therapy Goal  Goal: Physical Therapy Goal  Goals to be met by: 19     Patient will increase functional independence with mobility by performin. Supine to sit with supervision. MET 1/15/19  2. Sit to supine with supervision. MET 1/15/19  3. Sit<>stand transfer with supervision using rolling walker. MET 1/15/19  4. Gait > 150 feet with SBA using rolling walker. MET 1/15/19  5. Ascend/descend 4" curb step with CGA and RW MET 1/15/19  6.  Able to perform 1 x10 reps of TKR ex with assist as needed    Outcome: Ongoing (interventions implemented as appropriate)    Patient is progressing well towards goals       "

## 2019-01-15 NOTE — PT/OT/SLP PROGRESS
Occupational Therapy  Not Seen    Coral Pandya   MRN: [id number]     Patient not seen for Occupational Therapy today due to ( ) departmental protocol for elective surgery patients.    Patient with Primary osteoarthritis of right knee [M17.11], s/p Procedure(s):  ARTHROPLASTY, KNEE, TOTAL SITE ASSISTED 1/14/2019 who will be seen for Occupational Therapy evaluation POD#1.    Lori Latif, OT   1/14/2019

## 2019-01-15 NOTE — PT/OT/SLP PROGRESS
Physical Therapy Treatment    Patient Name:  Coral Pandya   MRN:  [id number]    Recommendations:     Discharge Recommendations:  other (see comments)(home with HH)   Discharge Equipment Recommendations: shower chair, walker, rolling, commode(3 in 1)   Barriers to discharge: None    Assessment:     Coral Pandya is a 68 y.o. female admitted with a medical diagnosis of Primary osteoarthritis of right knee.  She presents with the following impairments/functional limitations:  weakness, impaired endurance, impaired functional mobilty, impaired balance, gait instability, decreased lower extremity function, decreased ROM, pain.   progressing toward goals. Significant pain from prolonged sitting with legs in ext.  Decreased after gait    Rehab Prognosis: Good; patient would benefit from acute skilled PT services to address these deficits and reach maximum level of function.    Recent Surgery: Procedure(s) (LRB):  ARTHROPLASTY, KNEE, TOTAL SITE ASSISTED (Right) Day of Surgery    Plan:     During this hospitalization, patient to be seen BID to address the identified rehab impairments via gait training, therapeutic activities, therapeutic exercises and progress toward the following goals:    · Plan of Care Expires:  02/13/19    Subjective     Chief Complaint: R knee pain  Patient/Family Comments/goals: The pain behind my knee was starting to get really bad.  Felt like I was going to break out in a sweat.    Pain/Comfort:  · Pain Rating 1: 6/10  · Location - Side 1: Right  · Location - Orientation 1: generalized  · Location 1: knee  · Pain Addressed 1: Reposition, Distraction, Cessation of Activity, Nurse notified(given pain medsd during session)  · Pain Rating Post-Intervention 1: 2/10      Objective:     Communicated with nurse prior to session.  Patient found sitting up in chair in mod distress and  peripheral IV, cryotherapy, SCD, FCD, chambers catheter  upon PT entry to room. Immediate relief with lowering  legs to ground and then getting up to amb.     General Precautions: Standard, fall   Orthopedic Precautions:RLE weight bearing as tolerated   Braces: N/A     Functional Mobility:  · Bed Mobility:     · Sit to Supine: contact guard assistance  · Transfers:     · Sit to Stand:  stand by assistance with rolling walker and with cues for hand placement  · Gait: pt amb 210' with RW and CGA with cues for walker management and gt sequence.  · Balance: fair standing      AM-PAC 6 CLICK MOBILITY  Turning over in bed (including adjusting bedclothes, sheets and blankets)?: 3  Sitting down on and standing up from a chair with arms (e.g., wheelchair, bedside commode, etc.): 3  Moving from lying on back to sitting on the side of the bed?: 3  Moving to and from a bed to a chair (including a wheelchair)?: 3  Need to walk in hospital room?: 3  Climbing 3-5 steps with a railing?: 3  Basic Mobility Total Score: 18       Therapeutic Activities and Exercises:   Gait as above.   Pt performed 1 set of 10 reps of RLE  1. Glut sets  2. Quad sets  3. Ankle pumps  4. Hip abd/add  5. SLR  6. Knee flexion (heel slides)  7. Short arc quads  Pt required verbal cues, tactile cues and visual cues for technique in order to complete.   As pt was completing ex  daughter came in .  Reviewed with dtr ex and home activities as daughter will be with pt most of the time.  Questions answered    Patient left HOB elevated with all lines intact, call button in reach, nurse notified and daughter present..    GOALS:   Multidisciplinary Problems     Physical Therapy Goals        Problem: Physical Therapy Goal    Goal Priority Disciplines Outcome Goal Variances Interventions   Physical Therapy Goal     PT, PT/OT Ongoing (interventions implemented as appropriate)     Description:  Goals to be met by: 19     Patient will increase functional independence with mobility by performin. Supine to sit with supervision.   2. Sit to supine with supervision.   3.  "Sit<>stand transfer with supervision using rolling walker.   4. Gait > 150 feet with SBA using rolling walker.   5. Ascend/descend 4" curb step with CGA and RW   6.  Able to perform 1 x10 reps of TKR ex with assist as needed                     Time Tracking:     PT Received On: 01/14/19  PT Start Time: 1711     PT Stop Time: 1802  PT Total Time (min): 51 min     Billable Minutes: Gait Training 16, Therapeutic Activity 12 and Therapeutic Exercise 22    Treatment Type: Treatment  PT/PTA: PT     PTA Visit Number: 0     Laura Joe, PT  01/14/2019  "

## 2019-01-15 NOTE — PT/OT/SLP DISCHARGE
"Physical Therapy Discharge Summary    Name: Coral Pandya  MRN: [id number]   Principal Problem: Primary osteoarthritis of right knee     Patient Discharged from acute Physical Therapy on 1/15/19.  Please refer to prior PT noted date on 1/15/19 for functional status.     Assessment:     Goals partially met.    Objective:     GOALS:   Multidisciplinary Problems     Physical Therapy Goals        Problem: Physical Therapy Goal    Goal Priority Disciplines Outcome Goal Variances Interventions   Physical Therapy Goal     PT, PT/OT Ongoing (interventions implemented as appropriate)     Description:  Goals to be met by: 19     Patient will increase functional independence with mobility by performin. Supine to sit with supervision. MET 1/15/19  2. Sit to supine with supervision. MET 1/15/19  3. Sit<>stand transfer with supervision using rolling walker. MET 1/15/19  4. Gait > 150 feet with SBA using rolling walker. MET 1/15/19  5. Ascend/descend 4" curb step with CGA and RW MET 1/15/19  6.  Able to perform 1 x10 reps of TKR ex with assist as needed                      Reasons for Discontinuation of Therapy Services  Transfer to alternate level of care.      Plan:     Patient Discharged to: Home with Home Health Service.  PT of record not available for documentation.      Cristina Ribera, PT  1/15/2019  "

## 2019-01-15 NOTE — PLAN OF CARE
Problem: Adult Inpatient Plan of Care  Goal: Plan of Care Review  Outcome: Ongoing (interventions implemented as appropriate)  Patient on RA with Sats 95%. IS done with good effort. No distress noted. Will continue to monitor.

## 2019-01-15 NOTE — PLAN OF CARE
[facility]  [address]  [place] [address]  [phone]               [facility] Orthopedic Discharge Orders    Home [place]           Expected Discharge Date: 1/15    Diagnoses:  Post-op  knee replacement    Patient is homebound due to:   Pt requires home health services due to taxing effort to leave the home as a result of immobility from Post-op knee replacement    Weight Bearing Status:   full weight bearing: right leg  FWB unless otherwise indicated.  Progress to cane as able.  Set up for outpatient PT as soon as able after staple removal once patient is MOD I with cane.    Physical Therapy:   3 times a week for 2 weeks (Call for further orders beyond 2 weeks)  - Ambulate with a rolling walker  - Progress to cane  - Instruct on ROM and strengthening of knee    Aide to provide assistance with personal care and  ADLs  2 times a week for 2 weeks    Wound Care:   Surgical dressing change:Starting on  post op day 2 then 3 times per week and prn saturation.Change dressing while knee in flexed position utilizing island dressing or apply gauze and cover with tegaderm.  Teach patient to change daily if draining.  Pt may shower if surgical dressing is waterproof. Protect surgical dressing from getting wet by using press and seal saranwrap or garbage bag. Removal and replacement of dressing after shower only needed if incision is suspected  to have gotten wet during shower.  Otherwise change as previously described depending on dressing/drainage. No soaking in the tub or hot tub use for 6 weeks.    PT/SN to remove staples 14 days Post-op and apply skin prep and steri-strips.    · Cold therapy/Ice: Encouraged at least 20 minutes 2-3 times daily or more if desired.  Incision must be kept waterproof while icing.  · Wear TEDS Bilateral Thigh High Stockings for 3 weeks. Michael hose x 3 weeks. Ok to remove michael hose 1-2 hours/day max if desired.       Contact:  Please contact the nurse practitioner,  Ramila at [phone] at Ext [phone]. with concerns.  She is in surgery M,W,F so if urgent and needs to be addressed prior to the end of the day call the  and they with contact her in the OR or Clinic.         BLOOD THINNER:    If sent home on Xarelto         -14 days post-op for TKR       -30 days post-op for THR     If sent home home on ASA    325mg   BID x 4 weeks     Once finished with prescribed blood thinner, patients can return to pre-surgical ASA dosage if they took ASA before surgery.       Outpatient Therapy: [facility]    [address] [address]   or  [address]  [place] [address]    Call [phone] to schedule appointment  Fax [phone]    If need orders: Call Viki at Ext [phone]    DME:  - rolling Walker  - 3 in 1 commode  - tub bench / shower chair  - Per PT/OT recommendation          Ramila Arango

## 2019-01-15 NOTE — PT/OT/SLP EVAL
Occupational Therapy   Evaluation    Name: Coral Pandya  MRN: [id number]  Admitting Diagnosis:  Primary osteoarthritis of right knee 1 Day Post-Op    Recommendations:     Discharge Recommendations: (HHOT)  Discharge Equipment Recommendations:  shower chair, walker, rolling(3 in 1 commode)  Barriers to discharge:   None    Assessment:     Coral Pandya is a 68 y.o. female with a medical diagnosis of Primary osteoarthritis of right knee.  She presents with the following performance deficits affecting function: weakness, impaired endurance, impaired functional mobilty, impaired balance, decreased lower extremity function, decreased ROM.  Pt demonstrates strength and ROM in (B) UE needed for ADLs that is WNL, and is able to perform sit to stand transfer with SBA and RW.  Pt able to perform LE dressing with SBA, toileting with BSC placed over toilet with SBA, and grooming while standing with supervision.  PTA pt reports being (I) with ADLs and mobility, and maintained active lifestyle.  She would benefit from skilled OT services to address problems listed below and increase independence with ADLs.  Home health is recommended upon d/c from acute care to further address deficits and help pt improve overall functional independence.      Rehab Prognosis: Good; patient would benefit from acute skilled OT services to address these deficits and reach maximum level of function.       Plan:     Patient to be seen   to address the above listed problems via self-care/home management, therapeutic activities, therapeutic exercises  · Plan of Care Expires:    · Plan of Care Reviewed with: patient    Subjective     Chief Complaint: None stated  Patient/Family Comments/goals: Resume prior activities/bike again    Occupational Profile:  Living Environment: Pt lives alone in 2SH, 1STE; bathroom and bedroom on 1st floor.  Bathroom has walk-in shower with small lip.  Previous level of function: Pt reports being (I) with all  ADLs and mobility.    Roles and Routines: She drives, is a mother, works part time at [employer], enjoys biking and exercise  Equipment Used at Home:  none  Assistance upon Discharge: Daughter and son able to provide assist upon d/c.    Pain/Comfort:  · Pain Rating 1: 2/10  · Location - Orientation 1: generalized  · Location 1: knee  · Pain Addressed 1: Reposition, Pre-medicate for activity  · Pain Rating Post-Intervention 1: 2/10    Patients cultural, spiritual, Jew conflicts given the current situation:      Objective:     Communicated with: RN prior to session.  Patient found up in chair with peripheral IV upon OT entry to room.  Daughter present.    General Precautions: Standard, fall   Orthopedic Precautions:RLE weight bearing as tolerated   Braces: N/A     Occupational Performance:    Bed Mobility:    · Not assessed 2* pt up in chair upon arrival.    Functional Mobility/Transfers:  · Patient completed Sit <> Stand Transfer with stand by assistance  with  rolling walker x 3 trials from chair  · Toilet: SBA x 1 trial from BSC placed over toilet with RW  · Functional Mobility: Pt ambulated 30 ft within room with SBA and RW.  No instances of postural sway noted.    Activities of Daily Living:  · Feeding:  independence for feeding herself breakfast.  · Grooming: supervision for washing hands while standing at sink.  Rw placed in front.  · Upper Body Dressing: Independent for bra and shirts while seated up in chair.  · Lower Body Dressing: SBA for doffing hospital socks/donning socks from home.  CGA for donning R shoe (some assist required to push heel into shoe).  SBA for donning underwear and pants.  Pt performed task in seated position, then stood to pull underwear and pants up.  · Toileting: SBA from BSC placed over toilet.    Cognitive/Visual Perceptual:  Cognitive/Psychosocial Skills:    -       Oriented to: Person, Place, Time and Situation   -       Follows Commands/attention:Follows multistep  commands  -        Communication: clear/fluent  -       Memory: No Deficits noted  -       Safety awareness/insight to disability: intact   -       Mood/Affect/Coping skills/emotional control: Appropriate to situation    Physical Exam:  Postural examination/scapula alignment:    -       No postural abnormalities identified  Skin integrity: Visible skin intact  Edema:  mild in R knee  Sensation: -       Intact  Motor Planning: -       WNL  Dominant hand: -       Right  Upper Extremity Range of Motion:    -       Right Upper Extremity: WNL  -       Left Upper Extremity: WNL  Upper Extremity Strength: -       Right Upper Extremity: WNL  -       Left Upper Extremity: WNL   Strength: 5/5 both hands  Fine Motor Coordination:  Intatct  Gross motor coordination:   WFL  Balance: Sitting- Independent; Standing- SBA    AMPAC 6 Click ADL:  AMPAC Total Score: 22    Treatment & Education:  *Pt educated on how to perform LB dressing with visual cues provided.  Pt donned socks, shoes, underwear, and pants.  *Pt performed toileting and grooming while standing.  *POC and precautions for dressing and performing IADLs reviewed with pt and daughter  Education:    Patient left up in chair with all lines intact, call button in reach and RN and daughter present    GOALS:   Multidisciplinary Problems     Occupational Therapy Goals        Problem: Occupational Therapy Goal    Goal Priority Disciplines Outcome Interventions   Occupational Therapy Goal     OT, PT/OT     Description:  Goals to be met by: 1/22/2019     Patient will increase functional independence with ADLs by performing:    LE Dressing with Supervision.  Grooming while standing at sink with [place].  Toileting from toilet with Supervision for hygiene and clothing management.   Toilet transfer to toilet with Supervision.                      History:     Past Medical History:   Diagnosis Date    Anxiety     Arthralgia of knee     Cervical spondylosis     Depression      "Fibromyalgia     GERD (gastroesophageal reflux disease)     HTN (hypertension)     Hypothyroid     DANYELL (obstructive sleep apnea)        Past Surgical History:   Procedure Laterality Date    ARTHROPLASTY, KNEE, TOTAL SITE ASSISTED Right 2019    [facility]    CARPAL TUNNEL RELEASE Bilateral     COLONOSCOPY N/A 2018    [facility]O    EYE SURGERY Bilateral     cataract surgery    FOOT SURGERY Bilateral     bilat joints removed second toe    TONSILLECTOMY, ADENOIDECTOMY      TUBAL LIGATION         Clinical Decision Makin.  OT Low:  "Pt evaluation falls under low complexity for evaluation coding due to performance deficits noted in 1-3 areas as stated above and 0 co-morbities affecting current functional status. Data obtained from problem focused assessments. No modifications or assistance was required for completion of evaluation. Only brief occupational profile and history review completed."     Time Tracking:     OT Date of Treatment: 01/15/19  OT Start Time: 847  OT Stop Time: 917  OT Total Time (min): 30 min    Billable Minutes:Evaluation 15  Self Care/Home Management 15    ISABELLA Wiggins  1/15/2019    "

## 2019-01-15 NOTE — PROGRESS NOTES
MARVIN faxed  order and medical records to [facility]  [phone], unable to attach documents to right care, pt is outpt recovery.

## 2019-01-15 NOTE — PROGRESS NOTES
"Progress Note  Orthopedics    Admit Date: 1/14/2019   Patient ID: Coral Pandya is a 68 y.o. female.. POD#1 R TKR.  Doing well, dressing dry, NV intact..  Amb 210 ft.  OK felix RODRIGUEZ.            Angelo W [facility]      Vital Sign (recent):  BP 98/66 (BP Location: Left arm, Patient Position: Lying)   Pulse 60   Temp 98.3 °F (36.8 °C) (Oral)   Resp 17   Ht 5' 7.01" (1.702 m)   Wt 75.8 kg (166 lb 16 oz)   SpO2 (!) 93%   Breastfeeding? No   BMI 26.15 kg/m²       Laboratory:    CBC:   Recent Labs   Lab 01/15/19  0525   WBC 6.46   RBC 3.81*   HGB 11.8*   HCT 35.8*      MCV 94   MCH 31.0   MCHC 33.0       CMP: No results for input(s): GLU, CALCIUM, ALBUMIN, PROT, NA, K, CO2, CL, BUN, CREATININE, ALKPHOS, ALT, AST, BILITOT in the last 168 hours.        Intake/Output Summary (Last 24 hours) at 1/15/2019 0836  Last data filed at 1/15/2019 0446  Gross per 24 hour   Intake 2987.5 ml   Output 3150 ml   Net -162.5 ml         Current Medications:   acetaminophen  1,000 mg Intravenous Q8H    aspirin  325 mg Oral Q12H    celecoxib  200 mg Oral BID    docusate sodium  100 mg Oral Q12H    DULoxetine  30 mg Oral Daily    famotidine  20 mg Oral BID    levothyroxine  100 mcg Oral Before breakfast    mupirocin  1 g Nasal BID    nebivolol  5 mg Oral Daily    polyethylene glycol  17 g Oral Daily       Continuous Infusions:   dextrose 5 % and 0.9 % NaCl 75 mL/hr at 01/15/19 0531     PRN Meds:.diphenhydrAMINE, HYDROmorphone, mirtazapine, ondansetron, oxyCODONE, oxyCODONE, promethazine (PHENERGAN) IVPB, sodium chloride 0.9%, sodium chloride 0.9%  "

## 2019-01-15 NOTE — PLAN OF CARE
Problem: Occupational Therapy Goal  Goal: Occupational Therapy Goal  Goals to be met by: 1/22/2019     Patient will increase functional independence with ADLs by performing:    LE Dressing with Supervision.  Grooming while standing at sink with [place].  Toileting from toilet with Supervision for hygiene and clothing management.   Toilet transfer to toilet with Supervision.      OT evaluation complete and POC established.  Home health is recommended upon d/c from acute care to further address deficits and help pt improve overall functional independence.     ISABELLA Wiggins  1/15/2019

## 2019-01-17 ENCOUNTER — TELEPHONE (OUTPATIENT)
Dept: MEDSURG UNIT | Facility: OTHER | Age: 69
End: 2019-01-17

## 2019-01-18 NOTE — DISCHARGE SUMMARY
[facility]  Short Stay  Discharge Summary  TOTAL KNEE REPLACEMENT    Admit Date: 1/14/2019    Discharge Date and Time: 1/15/2019 10:42 AM      Discharge Attending Physician: Angelo Rodas MD    Hospital Course:  Coral Pandya,is a 68 y.o. female with severe osteoarthritis R knee, unrelieved with the conservative measures. The patient was admitted on  1/14/2019 and underwent R total knee replacement with computer-assisted navigation. Postoperatively, weightbearing as tolerated with a walker and CPM machine was initiated on postop day #1. Coral Pandya did quite well and was discharged on 1/15/2019  with home health physical therapy and nursing. The patient will be on Percocet for pain and aspirin 325 mg p.o. b.i.d. with meals x4 weeks.  A CPM machine will will be sent home with the patient. Postoperative follow up will be in the office in 4 weeks.       Final Diagnoses:    Principal Problem: Primary osteoarthritis of right knee   Secondary Diagnoses:   Active Hospital Problems    Diagnosis  POA    *Primary osteoarthritis of right knee [M17.11]  Yes      Resolved Hospital Problems   No resolved problems to display.       Discharged Condition: good    [place] [facility] [place]/Patient Instructions:    Medications:  Reconciled Home Medications:      Medication List      START taking these medications    aspirin 325 MG tablet  TAKE 1 TABLET BY MOUTH EVERY 12 HOURS FOR 4 WEEKS POST-OP     oxyCODONE-acetaminophen 5-325 mg per tablet  Commonly known as:  PERCOCET  take 1 tablet by mouth every 4 hours or 2 tablets every 6 hours as needed for pain        CONTINUE taking these medications    BYSTOLIC 5 MG Tab  Generic drug:  nebivolol  TAKE 1 TABLET ONCE DAILY     CALCIUM 600 + D(3) 600 mg calcium- 200 unit Cap  Generic drug:  calcium carbonate-vitamin D3  Take 1 capsule by mouth Daily. 1 Capsule Oral Every day     desonide 0.05 % cream  Commonly known as:  DESOWEN  APPLY TO  "EYELID SKIN NIGHTLY PRF ITCHY LIDS     * DULoxetine 30 MG capsule  Commonly known as:  CYMBALTA  TAKE 1 CAPSULE ONCE DAILY     * DULoxetine 60 MG capsule  Commonly known as:  CYMBALTA  TAKE 1 CAPSULE ONCE DAILY     ketotifen 0.025 % (0.035 %) ophthalmic solution  Commonly known as:  ZADITOR  1 drop 2 (two) times daily.     mirtazapine 15 MG tablet  Commonly known as:  REMERON  TAKE 1 TABLET EVERY EVENING     mometasone 50 mcg/actuation nasal spray  Commonly known as:  NASONEX  2 sprays by Nasal route once daily.     RABEprazole 20 mg tablet  Commonly known as:  ACIPHEX  Take 1 tablet (20 mg total) by mouth once daily.     ranitidine 300 MG tablet  Commonly known as:  ZANTAC  Take 1 tablet (300 mg total) by mouth every evening.     SYNTHROID 100 MCG tablet  Generic drug:  levothyroxine  TAKE 1 TABLET ONCE DAILY     vitamin D 1000 units Tab  Commonly known as:  VITAMIN D3  Take 5,000 Units by mouth once daily.         * This list has 2 medication(s) that are the same as other medications prescribed for you. Read the directions carefully, and ask your doctor or other care provider to review them with you.            STOP taking these medications    FISH OIL ORAL     flaxseed oil 1,000 mg Cap     glucosamine-chondroitin 500-400 mg tablet     multivitamin capsule     VISION FORMULA Tab  Generic drug:  vitamin A-vitamin C-vit E-min          Discharge Procedure Orders   WALKER FOR HOME USE     Order Specific Question Answer Comments   Type of Walker: Adult (5'4"-6'6")    With wheels? Yes    Height: 5' 7.01" (1.702 m)    Weight: 75.8 kg (166 lb 16 oz)    Length of need (1-99 months): 99    Does patient have medical equipment at home? none    Please check all that apply: Patient's condition impairs ambulation.    Please check all that apply: Patient is unable to safely ambulate without equipment.    Please check all that apply: Walker will be used for gait training.    Vendor: [facility]    Expected Date of Delivery: " "1/14/2019      COMMODE FOR HOME USE     Order Specific Question Answer Comments   Type: Standard    Height: 5' 7.01" (1.702 m)    Weight: 75.8 kg (166 lb 16 oz)    Does patient have medical equipment at home? none    Length of need (1-99 months): 99    Vendor: [facility] [place] to pull from depot   Expected Date of Delivery: 1/14/2019      BATH/SHOWER CHAIR FOR HOME USE     Order Specific Question Answer Comments   Height: 5' 7.01" (1.702 m)    Weight: 75.8 kg (166 lb 16 oz)    Does patient have medical equipment at home? none    Length of need (1-99 months): 99    Type: With back    Vendor: [facility]    Expected Date of Delivery: 1/14/2019      Follow-up Information     Angelo Rodas MD In 4 weeks.    Specialty:  Orthopedic Surgery  Contact information:  [address]  [facility]  [facility] [address]  [phone]             [facility] On 1/16/2019.    Specialties:  Home Health Services, DME Provider  Why:  For Follow Up: [facility] Nurse will call to set up appointment for Wednesday 1/16/19  Contact information:  [address] [facility] [address]  [phone]                     "

## 2019-02-25 ENCOUNTER — PATIENT MESSAGE (OUTPATIENT)
Dept: GASTROENTEROLOGY | Facility: CLINIC | Age: 69
End: 2019-02-25

## 2019-02-27 ENCOUNTER — OFFICE VISIT (OUTPATIENT)
Dept: PSYCHIATRY | Facility: CLINIC | Age: 69
End: 2019-02-27
Payer: MEDICARE

## 2019-02-27 VITALS
HEIGHT: 67 IN | SYSTOLIC BLOOD PRESSURE: 118 MMHG | BODY MASS INDEX: 26.43 KG/M2 | DIASTOLIC BLOOD PRESSURE: 76 MMHG | HEART RATE: 75 BPM | WEIGHT: 168.38 LBS

## 2019-02-27 DIAGNOSIS — M17.11 PRIMARY OSTEOARTHRITIS OF RIGHT KNEE: ICD-10-CM

## 2019-02-27 DIAGNOSIS — F33.41 MDD (MAJOR DEPRESSIVE DISORDER), RECURRENT, IN PARTIAL REMISSION: Primary | ICD-10-CM

## 2019-02-27 DIAGNOSIS — F41.1 GAD (GENERALIZED ANXIETY DISORDER): ICD-10-CM

## 2019-02-27 PROCEDURE — 99214 PR OFFICE/OUTPT VISIT, EST, LEVL IV, 30-39 MIN: ICD-10-PCS | Mod: S$PBB,,, | Performed by: PSYCHIATRY & NEUROLOGY

## 2019-02-27 PROCEDURE — 99214 OFFICE O/P EST MOD 30 MIN: CPT | Mod: S$PBB,,, | Performed by: PSYCHIATRY & NEUROLOGY

## 2019-02-27 PROCEDURE — 99213 OFFICE O/P EST LOW 20 MIN: CPT | Mod: PBBFAC,PO | Performed by: PSYCHIATRY & NEUROLOGY

## 2019-02-27 PROCEDURE — 99999 PR PBB SHADOW E&M-EST. PATIENT-LVL III: CPT | Mod: PBBFAC,,, | Performed by: PSYCHIATRY & NEUROLOGY

## 2019-02-27 PROCEDURE — 99999 PR PBB SHADOW E&M-EST. PATIENT-LVL III: ICD-10-PCS | Mod: PBBFAC,,, | Performed by: PSYCHIATRY & NEUROLOGY

## 2019-02-27 RX ORDER — IBUPROFEN 200 MG
200 TABLET ORAL
COMMUNITY
End: 2019-02-27 | Stop reason: SDUPTHER

## 2019-02-27 RX ORDER — MELOXICAM 15 MG/1
TABLET ORAL
Refills: 4 | COMMUNITY
Start: 2019-02-14 | End: 2020-05-29 | Stop reason: CLARIF

## 2019-02-27 RX ORDER — TRAMADOL HYDROCHLORIDE 50 MG/1
TABLET ORAL
Refills: 0 | COMMUNITY
Start: 2019-02-14 | End: 2019-02-27 | Stop reason: SDUPTHER

## 2019-02-27 NOTE — PROGRESS NOTES
"ID: 66yo WF with a previous diag of depression. Referred by PCP, , for eval of depressive sxs. Currently on cymbalta, recently added remeron, and valium. Pt has cont'd to remain stable on current meds. No longer using valium.     CC: "depression"    Interim hx: presents on time, chart reviewed, pt seen. Appears well.  In the interim had knee replacement surgery.     "it's going ok, but it's not for wimps. The physical therapy is tough." started at 76 degrees ROM and today, 4 wks later, she is at 101 degrees. Very proud of her progress, "and really, i'll tell you the truth. I think a lot of people my age couldn't do some of the stuff i'm doing at therapy and i'm doing it after surgery so I think it's going well and I work hard."    Already notices that her knee feels better than it did prior to replacement. "I did really well emotionally through it. I had this wonderful feeling with how sweet the children were to me. They were really concerned and helpful and it was so nice. It really was." daughter moved in x 2wks after surgery and son was helpful, too. Friends also brought meals, etc. "it almost got to be too much".    Continues sleeping in the recliner. "I had terrible indigestion and my sleep was terrible, but now both of those are better." has not yet gone back to work, but is doing Phys therapy mult times/wk "and once I started driving the down time got better."     Mood/anxiety stable-   Consistently rec therapy and pt consistently declines.     On Psychiatric ROS:    Endorses better sleep on the remeron, denies anhedonia, denies feeling helpless/hopeless, improved energy, riding bike regularly, denies dec concentration, denies change in appetite (despite the remeron), denies dec PMA "I push myself. I know keeping busy helps me." Denies thoughts of SI/intent/plan.     Denies recently feeling more easily overwhelmed, denies recent ruminative thinking "the obsessive thinking is MUCH better on the " "meds." denies feeling tense/"on edge"     PPHx: Denies h/o self injury, inpt psych hospitalization, denies h/o suicide attempt     Current Psych Meds: cymbalta 90mg po qam, remeron 15mg po qhs  Past Psych Meds: Lexapro and Wellbutrin (effective but led to h/a's), celexa (nausea), valium    PMHx: hypothyroid, GERD    SubstHx:   T- none  E- very rare  D- none    FamPHx: brother- suicide 1990, M-anxiety, F-anxiety, dtr- seizure while on wellbutrin    MSE: appears stated age, well groomed, appropriate dress, engages well with examiner. Good e/c. Speech reg rate and vol, nonpressured. Mood is "I think it's great. Especially because of that great progress at phys therapy." Affect congruent with some mild anxiety. dec'd verbosity. Sensorium fully intact. Oriented to date/day/location, current events. Narrative memory intact. Intellectual function is avg based on vocab and basic fund of knowledge. Thought is c/l/gd. No tangentiality or circumstantiality. No FOI/LEBRON. Denies SI/HI. Denies A/VH. No evidence of delusions. Insight and Judgment intact.     Blood pressure 118/76, pulse 75, height 5' 7.01" (1.702 m), weight 76.4 kg (168 lb 6.4 oz).    Suicide Risk Assessment:   Protective- age, gender, no prior attempts, no prior hospitalizations, no ongoing substance abuse, no psychosis, , has children, denies SI/intent/plan, seeking treatment, access to treatment, future oriented, good primary support, [employer]  Risk- race, ongoing Axis I sxs, family h/o suicide (brother-1990)    **Pt is at LOW imminent and long term risk of suicide given current risk factors.     Assessment:  65yo WF with a previous diag of depression. Referred by PCP, , for eval of depressive sxs. Currently on cymbalta, recently added remeron, and valium per chart review. On eval the pt is reporting an improvement in all mood and anxiety sxs in the 3 wks leading to initial eval which correlates with the time Remeron was added. She had done " "well for many years on lexapro and wellbutrin but was having chronic headaches and self initiated a taper off meds and the headaches did remit, but mood/anxiety suffered until the recent combination of cymbalta and remeron which is currently managing sxs quite well. Prior to meds becoming effective the pt endorses sxs c/w diagnoses of MDD and YASMANI. No acute safety concerns but the pt has cont'd to feel mood is "a little down. Other than the headaches, I feel that I was really better on the lexapro/wellbutrin combination". Pt is active, finds good support through kerry, family and friends. Will try an inc in cymbalta to 90mg and if ineffective may try an addition of wellbutrin xl 150mg- in the past pt headaches were on the 300mg dose when in combo with lexapro. On f/u the inc in cymbalta was effective- pt other ongoing issues are therapy related. Pt continues to decline therapy as she doesn't see the need at this time- majority of her appts are spent in therapy, but today she has showed continued signs of improved boundary setting and themes are more positive. Doing well. meds stable. Will f/u in 3mos.    Axis I: MDD, recurrent, in partial remission; YASMANI  Axis II: none at this time   Axis III: hypothyroid, GERD, HTN  Axis IV:  from , chronic mental illness  Axis V: GAF 70    Plan:   1. Cont cymbalta 90mg po qam  2. Cont remeron 15mg po qhs  3. Cont exercise, encouraged mindfulness of dietary choices in an effort to dec inflammation  4. rec therapy but pt declines at this time  5. RTC 3mos    -Spent 30min face to face with the pt; >50% time spent in counseling   -Supportive therapy and psychoeducation provided  -R/B/SE's of medications discussed with the pt who expresses understanding and chooses to take medications as prescribed.   -Pt instructed to call clinic, 911 or go to nearest emergency room if sxs worsen or pt is in   crisis. The pt expresses understanding.  "

## 2019-03-15 NOTE — PROGRESS NOTES
Refill Authorization Note     is requesting a refill authorization.    Brief assessment and rationale for refill: DEFER; new start   Name and strength of medication: mometasone (NASONEX) 50 mcg/actuation nasal spray       Medication Therapy Plan: pt requesting refill; adherence data indicates no fills; new start outside of protocol; route to you     Medication reconciliation completed: No                         Comments:   APPOINTMENTS (past 12 m or future 3m authorizing provider)  LAST VISIT DATE  Ebenezer Souza MD 12/27/2018         NEXT VISIT DATE  Ebenezer Souza MD 6/27/2019

## 2019-03-17 RX ORDER — MOMETASONE FUROATE 50 UG/1
2 SPRAY, METERED NASAL DAILY
Qty: 17 G | Refills: 5 | Status: SHIPPED | OUTPATIENT
Start: 2019-03-17

## 2019-04-05 ENCOUNTER — PATIENT MESSAGE (OUTPATIENT)
Dept: FAMILY MEDICINE | Facility: CLINIC | Age: 69
End: 2019-04-05

## 2019-04-05 RX ORDER — LEVOTHYROXINE SODIUM 100 UG/1
100 TABLET ORAL DAILY
Qty: 90 TABLET | Refills: 1 | Status: SHIPPED | OUTPATIENT
Start: 2019-04-05 | End: 2019-09-14 | Stop reason: SDUPTHER

## 2019-04-05 NOTE — TELEPHONE ENCOUNTER
Refill Authorization Note     is requesting a refill authorization.    Brief assessment and rationale for refill: APPROVE: prr  Name and strength of medication: levothyroxine (SYNTHROID) 100 MCG tablet       Medication Therapy Plan: TSH WNL: yudi 6 more                   How patient will take medication: t1t po daily           Comments:   Lab Results   Component Value Date    TSH 1.685 09/12/2018    TSH 0.760 03/16/2017    TSH 0.907 01/09/2016    FREET4 0.92 04/28/2012    FREET4 0.83 12/24/2011    FREET4 0.83 09/13/2010

## 2019-04-22 NOTE — TELEPHONE ENCOUNTER
----- Message from Anni Buckley sent at 4/22/2019  9:54 AM CDT -----  Contact: pt  Pt is requesting a refill on her medication(BYSTOLIC 5 mg Tab), Pt would like for a 90 day supply sent in to [facility]'s pharmacy, if not 90 day pt will take 1 month supply and have the rest sent through mail order.  Please call to advise  Call back   Thanks     [facility] [address] [place] [address] & [address] [address]  Phone: [phone] Fax: [phone]

## 2019-04-23 RX ORDER — NEBIVOLOL 5 MG/1
5 TABLET ORAL DAILY
Qty: 90 TABLET | Refills: 0 | Status: SHIPPED | OUTPATIENT
Start: 2019-04-23 | End: 2019-06-07 | Stop reason: SDUPTHER

## 2019-04-29 RX ORDER — MIRTAZAPINE 15 MG/1
TABLET, FILM COATED ORAL
Qty: 90 TABLET | Refills: 1 | Status: SHIPPED | OUTPATIENT
Start: 2019-04-29 | End: 2019-12-23

## 2019-05-22 ENCOUNTER — OFFICE VISIT (OUTPATIENT)
Dept: PSYCHIATRY | Facility: CLINIC | Age: 69
End: 2019-05-22
Payer: MEDICARE

## 2019-05-22 VITALS
HEART RATE: 69 BPM | WEIGHT: 168.31 LBS | SYSTOLIC BLOOD PRESSURE: 114 MMHG | BODY MASS INDEX: 26.42 KG/M2 | HEIGHT: 67 IN | DIASTOLIC BLOOD PRESSURE: 72 MMHG

## 2019-05-22 DIAGNOSIS — F33.41 MDD (MAJOR DEPRESSIVE DISORDER), RECURRENT, IN PARTIAL REMISSION: Primary | ICD-10-CM

## 2019-05-22 DIAGNOSIS — I10 HYPERTENSION, UNSPECIFIED TYPE: ICD-10-CM

## 2019-05-22 DIAGNOSIS — F41.1 GAD (GENERALIZED ANXIETY DISORDER): ICD-10-CM

## 2019-05-22 DIAGNOSIS — M17.11 PRIMARY OSTEOARTHRITIS OF RIGHT KNEE: ICD-10-CM

## 2019-05-22 PROCEDURE — 90833 PR PSYCHOTHERAPY W/PATIENT W/E&M, 30 MIN (ADD ON): ICD-10-PCS | Mod: ,,, | Performed by: PSYCHIATRY & NEUROLOGY

## 2019-05-22 PROCEDURE — 99999 PR PBB SHADOW E&M-EST. PATIENT-LVL III: ICD-10-PCS | Mod: PBBFAC,,, | Performed by: PSYCHIATRY & NEUROLOGY

## 2019-05-22 PROCEDURE — 99999 PR PBB SHADOW E&M-EST. PATIENT-LVL III: CPT | Mod: PBBFAC,,, | Performed by: PSYCHIATRY & NEUROLOGY

## 2019-05-22 PROCEDURE — 90833 PSYTX W PT W E/M 30 MIN: CPT | Mod: ,,, | Performed by: PSYCHIATRY & NEUROLOGY

## 2019-05-22 PROCEDURE — 99213 OFFICE O/P EST LOW 20 MIN: CPT | Mod: PBBFAC,PO | Performed by: PSYCHIATRY & NEUROLOGY

## 2019-05-22 PROCEDURE — 99214 PR OFFICE/OUTPT VISIT, EST, LEVL IV, 30-39 MIN: ICD-10-PCS | Mod: S$PBB,,, | Performed by: PSYCHIATRY & NEUROLOGY

## 2019-05-22 PROCEDURE — 99214 OFFICE O/P EST MOD 30 MIN: CPT | Mod: S$PBB,,, | Performed by: PSYCHIATRY & NEUROLOGY

## 2019-05-22 NOTE — PROGRESS NOTES
"ID: 64yo WF with a previous diag of depression. Referred by PCP, , for eval of depressive sxs. Currently on cymbalta, recently added remeron, and valium. Pt has cont'd to remain stable on current meds. No longer using valium.     CC: "depression"    Interim hx: presents on time, chart reviewed, pt seen. Appears well.      Continues improving from knee replacement but has plateau ed in progress and is disappointed. Mood suffering as a result. Is bringing up feeling of aging, "what next?", feeling "old", less attractive. Is also just missing riding her bike outside in this weather with longer days.     Is back at work which she enjoys, but working a new schedule of M,T,W longer hours and is about to re start PT after completing it in April but now finding she still can't ride her bike.     "you know i'm a fighter. i'm going to push through and do everything I need to do, but I am aware that it's been much much harder lately. A little depressed honestly but I don't want to add any meds or make changes."     On Psychiatric ROS:    Endorses better sleep on the remeron, denies anhedonia, denies feeling helpless/hopeless, improved energy, riding bike regularly, denies dec concentration, denies change in appetite (despite the remeron), denies dec PMA "I push myself. I know keeping busy helps me." Denies thoughts of SI/intent/plan.     Denies recently feeling more easily overwhelmed, denies recent ruminative thinking "the obsessive thinking is MUCH better on the meds." denies feeling tense/"on edge"     PSYCHOTHERAPY ADD-ON   30 (16-37*) minutes    Time: 20 minutes  Participants: Met with patient    Therapeutic Intervention Type: insight oriented psychotherapy, behavior modifying psychotherapy, supportive psychotherapy  Why chosen therapy is appropriate versus another modality: relevant to diagnosis, patient responds to this modality, evidence based practice    Target symptoms: health and mental wellness  Primary " "focus: diet, exercise, riding her bike as a form of mood mgmt  Psychotherapeutic techniques: support, education, validation, reframing, motivational interviewing    Outcome monitoring methods: self-report, observation    Patient's response to intervention:  The patient's response to intervention is accepting, motivated.    Progress toward goals:  The patient's progress toward goals is good.    PPHx: Denies h/o self injury, inpt psych hospitalization, denies h/o suicide attempt     Current Psych Meds: cymbalta 90mg po qam, remeron 15mg po qhs  Past Psych Meds: Lexapro and Wellbutrin (effective but led to h/a's), celexa (nausea), valium    PMHx: hypothyroid, GERD    SubstHx:   T- none  E- very rare  D- none    FamPHx: brother- suicide 1990, M-anxiety, F-anxiety, dtr- seizure while on wellbutrin    MSE: appears stated age, well groomed, appropriate dress, engages well with examiner. Good e/c. Speech reg rate and vol, nonpressured. Mood is "i've been having a harder time but I know what it's from. It's just this knee and it worries me that it will never get better." Affect congruent with some mild anxiety. dec'd verbosity. Sensorium fully intact. Oriented to date/day/location, current events. Narrative memory intact. Intellectual function is avg based on vocab and basic fund of knowledge. Thought is c/l/gd. No tangentiality or circumstantiality. No FOI/LEBRON. Denies SI/HI. Denies A/VH. No evidence of delusions. Insight and Judgment intact.     Blood pressure 114/72, pulse 69, height 5' 7" (1.702 m), weight 76.3 kg (168 lb 4.8 oz).    Suicide Risk Assessment:   Protective- age, gender, no prior attempts, no prior hospitalizations, no ongoing substance abuse, no psychosis, , has children, denies SI/intent/plan, seeking treatment, access to treatment, future oriented, good primary support, Christian  Risk- race, ongoing Axis I sxs, family h/o suicide (brother-1990)    **Pt is at LOW imminent and long term risk of " "suicide given current risk factors.     Assessment:  67yo WF with a previous diag of depression. Referred by PCP, , for eval of depressive sxs. Currently on cymbalta, recently added remeron, and valium per chart review. On eval the pt is reporting an improvement in all mood and anxiety sxs in the 3 wks leading to initial eval which correlates with the time Remeron was added. She had done well for many years on lexapro and wellbutrin but was having chronic headaches and self initiated a taper off meds and the headaches did remit, but mood/anxiety suffered until the recent combination of cymbalta and remeron which is currently managing sxs quite well. Prior to meds becoming effective the pt endorses sxs c/w diagnoses of MDD and YASMANI. No acute safety concerns but the pt has cont'd to feel mood is "a little down. Other than the headaches, I feel that I was really better on the lexapro/wellbutrin combination". Pt is active, finds good support through kerry, family and friends. Will try an inc in cymbalta to 90mg and if ineffective may try an addition of wellbutrin xl 150mg- in the past pt headaches were on the 300mg dose when in combo with lexapro. On f/u the inc in cymbalta was effective- pt other ongoing issues are therapy related. Pt continues to decline therapy as she doesn't see the need at this time- majority of her appts are spent in therapy, today struggling due to lack of progress with knee following knee replacement- will re start PT and is hopeful. Does not want to change meds today. Will f/u in 3mos.    Axis I: MDD, recurrent, in partial remission; YASMANI  Axis II: none at this time   Axis III: hypothyroid, GERD, HTN  Axis IV:  from , chronic mental illness  Axis V: GAF 70    Plan:   1. Cont cymbalta 90mg po qam  2. Cont remeron 15mg po qhs  3. Cont exercise, encouraged mindfulness of dietary choices in an effort to dec inflammation  4. rec therapy but pt declines at this time  5. RTC " 3mos    -Spent 30min face to face with the pt; >50% time spent in counseling   -Supportive therapy and psychoeducation provided  -R/B/SE's of medications discussed with the pt who expresses understanding and chooses to take medications as prescribed.   -Pt instructed to call clinic, 911 or go to nearest emergency room if sxs worsen or pt is in   crisis. The pt expresses understanding.

## 2019-06-03 ENCOUNTER — TELEPHONE (OUTPATIENT)
Dept: FAMILY MEDICINE | Facility: CLINIC | Age: 69
End: 2019-06-03

## 2019-06-03 NOTE — TELEPHONE ENCOUNTER
Attempted to contact pt. To reschedule appt. On 6/27.  will be out of the office.  No answer. No voicemail

## 2019-06-05 NOTE — TELEPHONE ENCOUNTER
----- Message from Colette Young sent at 6/5/2019  4:58 PM CDT -----  Contact: [phone]  Patient is returning nurse's phone call.  Please call patient back at [phone].

## 2019-06-06 ENCOUNTER — PATIENT MESSAGE (OUTPATIENT)
Dept: FAMILY MEDICINE | Facility: CLINIC | Age: 69
End: 2019-06-06

## 2019-06-10 RX ORDER — NEBIVOLOL HYDROCHLORIDE 5 MG/1
TABLET ORAL
Qty: 90 TABLET | Refills: 1 | Status: SHIPPED | OUTPATIENT
Start: 2019-06-10 | End: 2019-07-29 | Stop reason: SDUPTHER

## 2019-06-10 NOTE — PROGRESS NOTES
Refill Authorization Note     is requesting a refill authorization.    Brief assessment and rationale for refill: APPROVE: prr  Name and strength of medication: BYSTOLIC 5 mg Tab       Medication Therapy Plan: HTN- controlled, lco(12/18); BP-controlled; Approve 6 more months     Medication reconciliation completed: No              How patient will take medication: t1t po qd          Comments:   Blood Pressure Readings: <139/89mmHg (12 months) Heart Rate: > 50bpm (BB/[facility])   BP Readings from Last 3 Encounters:   01/15/19 98/66   01/03/19 129/80   12/27/18 110/70    Pulse Readings from Last 3 Encounters:   01/15/19 60   01/03/19 75   12/27/18 75          APPOINTMENTS (past 12m or future 3m authorizing provider)  LAST VISIT DATE  Ebenezer Souza MD 12/27/2018         NEXT VISIT DATE  Ebenezer Souza MD 7/29/2019

## 2019-07-15 ENCOUNTER — PATIENT OUTREACH (OUTPATIENT)
Dept: ADMINISTRATIVE | Facility: HOSPITAL | Age: 69
End: 2019-07-15

## 2019-07-15 NOTE — PROGRESS NOTES
Health Maintenance Due   Topic Date Due    Shingles Vaccine (1 of 2) 09/04/2000     Chart review completed 07/15/2019  Future Appointments   Date Time Provider Department Center   7/29/2019  4:40 PM Ebenezer Souza MD [facility]   8/22/2019 10:30 AM Samreen Coe MD [facility] [place]

## 2019-07-29 ENCOUNTER — OFFICE VISIT (OUTPATIENT)
Dept: FAMILY MEDICINE | Facility: CLINIC | Age: 69
End: 2019-07-29
Payer: MEDICARE

## 2019-07-29 VITALS
OXYGEN SATURATION: 99 % | DIASTOLIC BLOOD PRESSURE: 72 MMHG | RESPIRATION RATE: 16 BRPM | TEMPERATURE: 98 F | HEIGHT: 67 IN | BODY MASS INDEX: 26.82 KG/M2 | WEIGHT: 170.88 LBS | SYSTOLIC BLOOD PRESSURE: 124 MMHG | HEART RATE: 68 BPM

## 2019-07-29 DIAGNOSIS — I10 HYPERTENSION, UNSPECIFIED TYPE: Primary | ICD-10-CM

## 2019-07-29 DIAGNOSIS — E03.9 HYPOTHYROIDISM, UNSPECIFIED TYPE: ICD-10-CM

## 2019-07-29 DIAGNOSIS — I45.10 RIGHT BUNDLE BRANCH BLOCK: ICD-10-CM

## 2019-07-29 DIAGNOSIS — K21.9 GASTROESOPHAGEAL REFLUX DISEASE, ESOPHAGITIS PRESENCE NOT SPECIFIED: ICD-10-CM

## 2019-07-29 DIAGNOSIS — F41.1 GAD (GENERALIZED ANXIETY DISORDER): ICD-10-CM

## 2019-07-29 DIAGNOSIS — F33.41 MDD (MAJOR DEPRESSIVE DISORDER), RECURRENT, IN PARTIAL REMISSION: ICD-10-CM

## 2019-07-29 PROCEDURE — 99999 PR PBB SHADOW E&M-EST. PATIENT-LVL III: CPT | Mod: PBBFAC,,, | Performed by: FAMILY MEDICINE

## 2019-07-29 PROCEDURE — 99999 PR PBB SHADOW E&M-EST. PATIENT-LVL III: ICD-10-PCS | Mod: PBBFAC,,, | Performed by: FAMILY MEDICINE

## 2019-07-29 PROCEDURE — 99214 OFFICE O/P EST MOD 30 MIN: CPT | Mod: S$PBB,,, | Performed by: FAMILY MEDICINE

## 2019-07-29 PROCEDURE — 99213 OFFICE O/P EST LOW 20 MIN: CPT | Mod: PBBFAC,PO | Performed by: FAMILY MEDICINE

## 2019-07-29 PROCEDURE — 99214 PR OFFICE/OUTPT VISIT, EST, LEVL IV, 30-39 MIN: ICD-10-PCS | Mod: S$PBB,,, | Performed by: FAMILY MEDICINE

## 2019-07-29 RX ORDER — NEBIVOLOL 5 MG/1
5 TABLET ORAL DAILY
Qty: 90 TABLET | Refills: 3 | Status: SHIPPED | OUTPATIENT
Start: 2019-07-29 | End: 2020-05-19

## 2019-07-29 NOTE — PROGRESS NOTES
Subjective:       Patient ID: Coral Pandya is a 68 y.o. female.    Chief Complaint: EKG followup (Right side )    HPI     Here for a f/u.    htn controlled.    C/o intermittent nasal congestion over the past 6 weeks. Reports that nasonex helps.     ekg in 12/2019 showed bundle branch block. Rides her bike with no chest pain or sob.     gerd controlled.      Sees Dr. Coe, psychiatrist, for treatment of depression and anxiety.      Hypothyroidism stable.        Review of Systems      Review of Systems   Constitutional: Negative for fever and chills.   HENT: Negative for hearing loss and neck stiffness.    Eyes: Negative for redness and itching.   Respiratory: Negative for cough and choking.    Cardiovascular: Negative for chest pain and leg swelling.  Abdomen: Negative for abdominal pain and blood in stool.   Genitourinary: Negative for dysuria and flank pain.   Musculoskeletal: Negative for back pain and gait problem.   Neurological: Negative for light-headedness and headaches.   Hematological: Negative for adenopathy.   Psychiatric/Behavioral: Negative for behavioral problems.     Objective:      Physical Exam   Constitutional: She appears well-developed.   HENT:   Head: Normocephalic and atraumatic.   Eyes: Pupils are equal, round, and reactive to light. Conjunctivae are normal.   Neck: Normal range of motion.   Cardiovascular: Normal rate and regular rhythm.   No murmur heard.  Pulmonary/Chest: Effort normal and breath sounds normal.   Lymphadenopathy:     She has no cervical adenopathy.       Assessment:       1. Hypertension, unspecified type    2. Right bundle branch block    3. Gastroesophageal reflux disease, esophagitis presence not specified    4. Hypothyroidism, unspecified type    5. YASMANI (generalized anxiety disorder)    6. MDD (major depressive disorder), recurrent, in partial remission        Plan:       Hypertension, unspecified type  -     Ambulatory referral to Cardiology    Right bundle  branch block  -     Ambulatory referral to Cardiology    Gastroesophageal reflux disease, esophagitis presence not specified    Hypothyroidism, unspecified type    YASMANI (generalized anxiety disorder)    MDD (major depressive disorder), recurrent, in partial remission    Other orders  -     nebivolol (BYSTOLIC) 5 MG Tab; Take 1 tablet (5 mg total) by mouth once daily.  Dispense: 90 tablet; Refill: 3          Plan:  See referral  Cont current meds        Medication List with Changes/Refills   Current Medications    CALCIUM CARBONATE-VITAMIN D3 (CALCIUM 600 + D,3,) 600 MG CALCIUM- 200 UNIT CAP    Take 1 capsule by mouth Daily. 1 Capsule Oral Every day    DESONIDE (DESOWEN) 0.05 % CREAM    APPLY TO EYELID SKIN NIGHTLY PRF ITCHY LIDS    DULOXETINE (CYMBALTA) 30 MG CAPSULE    TAKE 1 CAPSULE ONCE DAILY    DULOXETINE (CYMBALTA) 60 MG CAPSULE    TAKE 1 CAPSULE ONCE DAILY    KETOTIFEN (ZADITOR) 0.025 % OPHTHALMIC SOLUTION    1 drop 2 (two) times daily.    LEVOTHYROXINE (SYNTHROID) 100 MCG TABLET    Take 1 tablet (100 mcg total) by mouth once daily.    M-VIT,TX,IRON,MINS/CALC/FOLIC (MULTIVITAMIN) TAB        MELOXICAM (MOBIC) 15 MG TABLET    TK 1 T PO QD    MIRTAZAPINE (REMERON) 15 MG TABLET    TAKE 1 TABLET EVERY EVENING    MOMETASONE (NASONEX) 50 MCG/ACTUATION NASAL SPRAY    2 sprays by Nasal route once daily.    NAPHAZOLINE-PHENIRAMINE 0.43143-8.315% (NAPHCON-A) 0.52340-7.315 % OPHTHALMIC SOLUTION    ONCE DAILY    RABEPRAZOLE (ACIPHEX) 20 MG TABLET    Take 1 tablet (20 mg total) by mouth once daily.    RANITIDINE (ZANTAC) 300 MG TABLET    Take 1 tablet (300 mg total) by mouth every evening.    VITAMIN D (VITAMIN D3) 1000 UNITS TAB    Take 5,000 Units by mouth once daily.   Changed and/or Refilled Medications    Modified Medication Previous Medication    NEBIVOLOL (BYSTOLIC) 5 MG TAB BYSTOLIC 5 mg Tab       Take 1 tablet (5 mg total) by mouth once daily.    TAKE 1 TABLET DAILY

## 2019-08-22 ENCOUNTER — OFFICE VISIT (OUTPATIENT)
Dept: PSYCHIATRY | Facility: CLINIC | Age: 69
End: 2019-08-22
Payer: MEDICARE

## 2019-08-22 VITALS
HEART RATE: 59 BPM | DIASTOLIC BLOOD PRESSURE: 81 MMHG | HEIGHT: 67 IN | WEIGHT: 168.44 LBS | SYSTOLIC BLOOD PRESSURE: 122 MMHG | BODY MASS INDEX: 26.44 KG/M2

## 2019-08-22 DIAGNOSIS — F41.1 GAD (GENERALIZED ANXIETY DISORDER): ICD-10-CM

## 2019-08-22 DIAGNOSIS — F33.41 MDD (MAJOR DEPRESSIVE DISORDER), RECURRENT, IN PARTIAL REMISSION: Primary | ICD-10-CM

## 2019-08-22 PROCEDURE — 99213 OFFICE O/P EST LOW 20 MIN: CPT | Mod: PBBFAC,PO | Performed by: PSYCHIATRY & NEUROLOGY

## 2019-08-22 PROCEDURE — 99214 OFFICE O/P EST MOD 30 MIN: CPT | Mod: S$PBB,,, | Performed by: PSYCHIATRY & NEUROLOGY

## 2019-08-22 PROCEDURE — 90833 PR PSYCHOTHERAPY W/PATIENT W/E&M, 30 MIN (ADD ON): ICD-10-PCS | Mod: ,,, | Performed by: PSYCHIATRY & NEUROLOGY

## 2019-08-22 PROCEDURE — 99214 PR OFFICE/OUTPT VISIT, EST, LEVL IV, 30-39 MIN: ICD-10-PCS | Mod: S$PBB,,, | Performed by: PSYCHIATRY & NEUROLOGY

## 2019-08-22 PROCEDURE — 90833 PSYTX W PT W E/M 30 MIN: CPT | Mod: ,,, | Performed by: PSYCHIATRY & NEUROLOGY

## 2019-08-22 PROCEDURE — 99999 PR PBB SHADOW E&M-EST. PATIENT-LVL III: ICD-10-PCS | Mod: PBBFAC,,, | Performed by: PSYCHIATRY & NEUROLOGY

## 2019-08-22 PROCEDURE — 99999 PR PBB SHADOW E&M-EST. PATIENT-LVL III: CPT | Mod: PBBFAC,,, | Performed by: PSYCHIATRY & NEUROLOGY

## 2019-08-22 RX ORDER — BUPROPION HYDROCHLORIDE 150 MG/1
150 TABLET ORAL DAILY
Qty: 30 TABLET | Refills: 0 | Status: SHIPPED | OUTPATIENT
Start: 2019-08-22 | End: 2019-09-13 | Stop reason: SDUPTHER

## 2019-08-22 NOTE — PROGRESS NOTES
"ID: 64yo WF with a previous diag of depression. Referred by PCP, , for eval of depressive sxs. Currently on cymbalta, recently added remeron, and valium. Pt has cont'd to remain stable on current meds. No longer using valium.     CC: "depression"    Interim hx: presents on time, chart reviewed, pt seen. Appears well.      Continues improving from knee replacement- bought a special bike and has been able to ride more often- riding 17mi/outing and is about to be able to go back to her previous bike. "but really even with that, it's still been really tough. i've been crying more often and i've had some really low days. I know what this is because i've felt it for a lot of my life. It's more of a feeling than something I can describe. It's depression. I push myself. I just do. That's who I am so I know it would surprise some people, but i'm almost feeling like i'm getting used to feeling this way and my daughter and sister and best friend all encouraged me to talk to you about it." inquiring about starting adjunct txmt.     Is back at work which she enjoys, but working a new schedule of M,T,W longer hours. They have offered more hours, "but I haven't decided yet. 4 more hours would be nice."     On Psychiatric ROS:    Endorses better sleep on the remeron, denies anhedonia, denies feeling helpless/hopeless, improved energy, riding bike regularly, denies dec concentration, denies change in appetite (despite the remeron), denies dec PMA "I push myself. I know keeping busy helps me." Denies thoughts of SI/intent/plan.     Denies recently feeling more easily overwhelmed, denies recent ruminative thinking "the obsessive thinking is MUCH better on the meds." denies feeling tense/"on edge"     PSYCHOTHERAPY ADD-ON   30 (16-37*) minutes    Time: 20 minutes  Participants: Met with patient    Therapeutic Intervention Type: insight oriented psychotherapy, behavior modifying psychotherapy, supportive psychotherapy  Why " "chosen therapy is appropriate versus another modality: relevant to diagnosis, patient responds to this modality, evidence based practice    Target symptoms: health and mental wellness  Primary focus: diet, exercise, riding her bike as a form of mood mgmt  Psychotherapeutic techniques: support, education, validation, reframing, motivational interviewing    Outcome monitoring methods: self-report, observation    Patient's response to intervention:  The patient's response to intervention is accepting, motivated.    Progress toward goals:  The patient's progress toward goals is good.    PPHx: Denies h/o self injury, inpt psych hospitalization, denies h/o suicide attempt     Current Psych Meds: cymbalta 90mg po qam, remeron 15mg po qhs  Past Psych Meds: Lexapro and Wellbutrin (effective but led to h/a's), celexa (nausea), valium    PMHx: hypothyroid, GERD    SubstHx:   T- none  E- very rare  D- none    FamPHx: brother- suicide 1990, M-anxiety, F-anxiety, dtr- seizure while on wellbutrin    MSE: appears stated age, well groomed, appropriate dress, engages well with examiner. Good e/c. Speech reg rate and vol, nonpressured. Mood is "moderate. Not a bad day today." Affect congruent with some mild anxiety. dec'd verbosity. Sensorium fully intact. Oriented to date/day/location, current events. Narrative memory intact. Intellectual function is avg based on vocab and basic fund of knowledge. Thought is c/l/gd. No tangentiality or circumstantiality. No FOI/LEBRON. Denies SI/HI. Denies A/VH. No evidence of delusions. Insight and Judgment intact.     Blood pressure 122/81, pulse (!) 59, height 5' 7" (1.702 m), weight 76.4 kg (168 lb 6.9 oz).    Suicide Risk Assessment:   Protective- age, gender, no prior attempts, no prior hospitalizations, no ongoing substance abuse, no psychosis, , has children, denies SI/intent/plan, seeking treatment, access to treatment, future oriented, good primary support, Sikh  Risk- race, " "ongoing Axis I sxs, family h/o suicide (brother-1990)    **Pt is at LOW imminent and long term risk of suicide given current risk factors.     Assessment:  67yo WF with a previous diag of depression. Referred by PCP, , for eval of depressive sxs. Currently on cymbalta, recently added remeron, and valium per chart review. On eval the pt is reporting an improvement in all mood and anxiety sxs in the 3 wks leading to initial eval which correlates with the time Remeron was added. She had done well for many years on lexapro and wellbutrin but was having chronic headaches and self initiated a taper off meds and the headaches did remit, but mood/anxiety suffered until the recent combination of cymbalta and remeron which is currently managing sxs quite well. Prior to meds becoming effective the pt endorses sxs c/w diagnoses of MDD and YASMANI. No acute safety concerns but the pt has cont'd to feel mood is "a little down. Other than the headaches, I feel that I was really better on the lexapro/wellbutrin combination". Pt is active, finds good support through kerry, family and friends. Will try an inc in cymbalta to 90mg and if ineffective may try an addition of wellbutrin xl 150mg- in the past pt headaches were on the 300mg dose when in combo with lexapro. On f/u the inc in cymbalta was effective- pt other ongoing issues are therapy related. Pt continues to decline therapy as she doesn't see the need at this time- majority of her appts are spent in therapy, today struggling due to lack of progress with knee following knee replacement- will re start PT and is hopeful. Does not want to change meds today. Will f/u in 3mos.    Axis I: MDD, recurrent, in partial remission; YASMANI  Axis II: none at this time   Axis III: hypothyroid, GERD, HTN  Axis IV:  from , chronic mental illness  Axis V: GAF 70    Plan:   1. Cont cymbalta 90mg po qam  2. Cont remeron 15mg po qhs  3. Start trial of wellbutrin xl 150mg po " qam  4. Cont exercise, encouraged mindfulness of dietary choices in an effort to dec inflammation  5. rec therapy but pt declines at this time  6. RTC 1mo    -Spent 30min face to face with the pt; >50% time spent in counseling   -Supportive therapy and psychoeducation provided  -R/B/SE's of medications discussed with the pt who expresses understanding and chooses to take medications as prescribed.   -Pt instructed to call clinic, 911 or go to nearest emergency room if sxs worsen or pt is in   crisis. The pt expresses understanding.

## 2019-09-13 RX ORDER — BUPROPION HYDROCHLORIDE 150 MG/1
TABLET ORAL
Qty: 30 TABLET | Refills: 0 | Status: SHIPPED | OUTPATIENT
Start: 2019-09-13 | End: 2019-10-13 | Stop reason: SDUPTHER

## 2019-09-14 DIAGNOSIS — I10 HYPERTENSION, UNSPECIFIED TYPE: Primary | ICD-10-CM

## 2019-09-14 DIAGNOSIS — E03.9 HYPOTHYROIDISM, UNSPECIFIED TYPE: ICD-10-CM

## 2019-09-14 RX ORDER — DULOXETIN HYDROCHLORIDE 30 MG/1
CAPSULE, DELAYED RELEASE ORAL
Qty: 90 CAPSULE | Refills: 1 | Status: SHIPPED | OUTPATIENT
Start: 2019-09-14 | End: 2020-02-05

## 2019-09-14 RX ORDER — DULOXETIN HYDROCHLORIDE 60 MG/1
CAPSULE, DELAYED RELEASE ORAL
Qty: 90 CAPSULE | Refills: 1 | Status: SHIPPED | OUTPATIENT
Start: 2019-09-14 | End: 2020-02-05

## 2019-09-16 RX ORDER — LEVOTHYROXINE SODIUM 100 UG/1
TABLET ORAL
Qty: 90 TABLET | Refills: 0 | Status: SHIPPED | OUTPATIENT
Start: 2019-09-16 | End: 2019-12-23

## 2019-09-16 NOTE — PROGRESS NOTES
Refill Authorization Note     is requesting a refill authorization.    Brief assessment and rationale for refill: APPROVE: needs labs (TSH)  Name and strength of medication: SYNTHROID TAB 0.1MG  Medication-related problems identified: Requires labs    Medication Therapy Plan: LCO/LOV(7/19) Hypothyroid stable; NTBO(CMP, Lipid, TSH); TSH WNL (9/18); Approve 3 more                   How patient will take medication: t1t po qd          Comments:     Lab Results   Component Value Date    TSH 1.685 09/12/2018       Appointments (past 12m or future 3m authorizing provider)   Date Provider   Last Visit 7/29/2019 Ebenezer Souza MD   Next visit  1/14/2020 Ebenezer Souza MD

## 2019-09-18 ENCOUNTER — TELEPHONE (OUTPATIENT)
Dept: FAMILY MEDICINE | Facility: CLINIC | Age: 69
End: 2019-09-18

## 2019-09-18 NOTE — TELEPHONE ENCOUNTER
----- Message from Silvia Short sent at 9/18/2019  9:44 AM CDT -----  Type:  Patient Returning Call    Who Called:  Coral  Who Left Message for Patient:  Shakila  Does the patient know what this is regarding?:    Best Call Back Number:  [phone]  Additional Information:  Recently she saw a cardiologist that has ordered labs that include the ones ordered by you.  She wants to know if she can have the results sent to you! Thank you!

## 2019-09-19 ENCOUNTER — TELEPHONE (OUTPATIENT)
Dept: FAMILY MEDICINE | Facility: CLINIC | Age: 69
End: 2019-09-19

## 2019-09-19 NOTE — TELEPHONE ENCOUNTER
----- Message from Maia Goode sent at 9/19/2019 10:03 AM CDT -----  Contact: pt cell [phone]  Call placed to pod patient is returning a call back to the office she wanted to speak to Yadira.

## 2019-09-19 NOTE — TELEPHONE ENCOUNTER
Spoke to pt. Pt states that she will be having labs done for her cardiologist soon and it is the same labs that Dr. Souza is ordering. Pt states she will fax us a copy of the labs once she has it done. Pt denies any other needs.

## 2019-09-19 NOTE — TELEPHONE ENCOUNTER
Ms. Pandya returned my call but declined to schedule AWV appt. Pt requests a follow up in December. (Pt states she has a lot of test going on )

## 2019-10-14 RX ORDER — BUPROPION HYDROCHLORIDE 150 MG/1
TABLET ORAL
Qty: 30 TABLET | Refills: 0 | Status: SHIPPED | OUTPATIENT
Start: 2019-10-14 | End: 2019-10-24 | Stop reason: SDUPTHER

## 2019-10-24 ENCOUNTER — OFFICE VISIT (OUTPATIENT)
Dept: PSYCHIATRY | Facility: CLINIC | Age: 69
End: 2019-10-24
Payer: MEDICARE

## 2019-10-24 VITALS
BODY MASS INDEX: 25.9 KG/M2 | WEIGHT: 165 LBS | HEIGHT: 67 IN | HEART RATE: 56 BPM | SYSTOLIC BLOOD PRESSURE: 107 MMHG | DIASTOLIC BLOOD PRESSURE: 72 MMHG | RESPIRATION RATE: 16 BRPM

## 2019-10-24 DIAGNOSIS — F33.41 MDD (MAJOR DEPRESSIVE DISORDER), RECURRENT, IN PARTIAL REMISSION: Primary | ICD-10-CM

## 2019-10-24 DIAGNOSIS — F41.1 GAD (GENERALIZED ANXIETY DISORDER): ICD-10-CM

## 2019-10-24 PROCEDURE — 99214 PR OFFICE/OUTPT VISIT, EST, LEVL IV, 30-39 MIN: ICD-10-PCS | Mod: S$PBB,,, | Performed by: PSYCHIATRY & NEUROLOGY

## 2019-10-24 PROCEDURE — 99213 OFFICE O/P EST LOW 20 MIN: CPT | Mod: PBBFAC,PO | Performed by: PSYCHIATRY & NEUROLOGY

## 2019-10-24 PROCEDURE — 99999 PR PBB SHADOW E&M-EST. PATIENT-LVL III: ICD-10-PCS | Mod: PBBFAC,,, | Performed by: PSYCHIATRY & NEUROLOGY

## 2019-10-24 PROCEDURE — 99999 PR PBB SHADOW E&M-EST. PATIENT-LVL III: CPT | Mod: PBBFAC,,, | Performed by: PSYCHIATRY & NEUROLOGY

## 2019-10-24 PROCEDURE — 99214 OFFICE O/P EST MOD 30 MIN: CPT | Mod: S$PBB,,, | Performed by: PSYCHIATRY & NEUROLOGY

## 2019-10-24 RX ORDER — BUPROPION HYDROCHLORIDE 150 MG/1
TABLET ORAL
Qty: 90 TABLET | Refills: 0 | Status: SHIPPED | OUTPATIENT
Start: 2019-10-24 | End: 2019-11-21 | Stop reason: SDUPTHER

## 2019-10-24 NOTE — PROGRESS NOTES
"ID: 64yo WF with a previous diag of depression. Referred by PCP, , for eval of depressive sxs. Currently on cymbalta, recently added remeron, and valium. Pt has cont'd to remain stable on current meds. No longer using valium.     CC: "depression"    Interim hx: presents on time, chart reviewed, pt seen. Appears well.  Last appt we started a trial of wellbutrin and today she reports, "i'm much much better. And i'm not overly anxious with it like you had warned."     Reports her sister and close friends have noticed the improvement, as well.     Has some anxiety related to upcoming cardiac exams she has planned for 2wks from now- while getting "clearance" for knee replacement they found a Rt bundle branch block which she's having worked up. This creates some anxiety for her, but she is aware that it's information she needs and it will lead to intervention only if she needs it.     On Psychiatric ROS:    Endorses better sleep on the remeron, denies anhedonia, denies feeling helpless/hopeless, improved energy, riding bike regularly, denies dec concentration, denies change in appetite (despite the remeron), denies dec PMA "I push myself. I know keeping busy helps me." Denies thoughts of SI/intent/plan.     Denies recently feeling more easily overwhelmed, denies recent ruminative thinking "the obsessive thinking is MUCH better on the meds." denies feeling tense/"on edge"     PSYCHOTHERAPY ADD-ON   30 (16-37*) minutes    Time: 20 minutes  Participants: Met with patient    Therapeutic Intervention Type: insight oriented psychotherapy, behavior modifying psychotherapy, supportive psychotherapy  Why chosen therapy is appropriate versus another modality: relevant to diagnosis, patient responds to this modality, evidence based practice    Target symptoms: health and mental wellness  Primary focus: diet, exercise, riding her bike as a form of mood mgmt  Psychotherapeutic techniques: support, education, validation, " "reframing, motivational interviewing    Outcome monitoring methods: self-report, observation    Patient's response to intervention:  The patient's response to intervention is accepting, motivated.    Progress toward goals:  The patient's progress toward goals is good.    PPHx: Denies h/o self injury, inpt psych hospitalization, denies h/o suicide attempt     Current Psych Meds: cymbalta 90mg po qam, remeron 15mg po qhs  Past Psych Meds: Lexapro and Wellbutrin (effective but led to h/a's), celexa (nausea), valium    PMHx: hypothyroid, GERD    SubstHx:   T- none  E- very rare  D- none    FamPHx: brother- suicide 1990, M-anxiety, F-anxiety, dtr- seizure while on wellbutrin    MSE: appears stated age, well groomed, appropriate dress, engages well with examiner. Good e/c. Speech reg rate and vol, nonpressured. Mood is "good. Much better, actually." Affect congruent with some mild anxiety. dec'd verbosity. Sensorium fully intact. Oriented to date/day/location, current events. Narrative memory intact. Intellectual function is avg based on vocab and basic fund of knowledge. Thought is c/l/gd. No tangentiality or circumstantiality. No FOI/LEBRON. Denies SI/HI. Denies A/VH. No evidence of delusions. Insight and Judgment intact.     Blood pressure 107/72, pulse (!) 56, resp. rate 16, height 5' 7" (1.702 m), weight 74.8 kg (165 lb).    Suicide Risk Assessment:   Protective- age, gender, no prior attempts, no prior hospitalizations, no ongoing substance abuse, no psychosis, , has children, denies SI/intent/plan, seeking treatment, access to treatment, future oriented, good primary support, Confucianist  Risk- race, ongoing Axis I sxs, family h/o suicide (brother-1990)    **Pt is at LOW imminent and long term risk of suicide given current risk factors.     Assessment:  67yo WF with a previous diag of depression. Referred by PCP, , for eval of depressive sxs. Currently on cymbalta, recently added remeron, and valium per " "chart review. On eval the pt is reporting an improvement in all mood and anxiety sxs in the 3 wks leading to initial eval which correlates with the time Remeron was added. She had done well for many years on lexapro and wellbutrin but was having chronic headaches and self initiated a taper off meds and the headaches did remit, but mood/anxiety suffered until the recent combination of cymbalta and remeron which is currently managing sxs quite well. Prior to meds becoming effective the pt endorses sxs c/w diagnoses of MDD and YASMANI. No acute safety concerns but the pt has cont'd to feel mood is "a little down. Other than the headaches, I feel that I was really better on the lexapro/wellbutrin combination". Pt is active, finds good support through kerry, family and friends. Will try an inc in cymbalta to 90mg and if ineffective may try an addition of wellbutrin xl 150mg- in the past pt headaches were on the 300mg dose when in combo with lexapro. On f/u the inc in cymbalta was effective- pt other ongoing issues are therapy related. Pt continues to decline therapy as she doesn't see the need at this time- majority of her appts are spent in therapy, today struggling due to lack of progress with knee following knee replacement- will re start PT and is hopeful. Added wellbutrin last appt and today she's reporting feeling "much much better."     Axis I: MDD, recurrent, in partial remission; YASMANI  Axis II: none at this time   Axis III: hypothyroid, GERD, HTN  Axis IV:  from , chronic mental illness  Axis V: GAF 70    Plan:   1. Cont cymbalta 90mg po qam  2. Cont remeron 15mg po qhs  3. Cont wellbutrin xl 150mg po qam  4. Cont exercise, encouraged mindfulness of dietary choices in an effort to dec inflammation  5. rec therapy but pt declines at this time  6. RTC 3mos    -Spent 30min face to face with the pt; >50% time spent in counseling   -Supportive therapy and psychoeducation provided  -R/B/SE's of medications " discussed with the pt who expresses understanding and chooses to take medications as prescribed.   -Pt instructed to call clinic, 911 or go to nearest emergency room if sxs worsen or pt is in   crisis. The pt expresses understanding.

## 2019-11-04 RX ORDER — RABEPRAZOLE SODIUM 20 MG/1
TABLET, DELAYED RELEASE ORAL
Qty: 90 TABLET | Refills: 0 | Status: SHIPPED | OUTPATIENT
Start: 2019-11-04 | End: 2019-12-23

## 2019-11-04 NOTE — PROGRESS NOTES
Refill Authorization Note     is requesting a refill authorization.       Name and strength of medication: RABEPRAZOLE  TAB 20MG             Medication reconciliation completed: No              How patient will take medication: t1t po qd           Comments:   Requested Prescriptions   Pending Prescriptions Disp Refills    RABEprazole (ACIPHEX) 20 mg tablet [Pharmacy Med Name: RABEPRAZOLE  TAB 20MG ] 90 tablet 0     Sig: TAKE 1 TABLET ONCE DAILY       Gastroenterology: Proton Pump Inhibitors Passed - 11/4/2019  8:27 AM        Passed - Patient is at least 18 years old        Passed - Osteoporosis is not on problem list        Passed - Plavix is not on active medication list        Passed - Valid encounter within last 12 months     Recent Outpatient Visits            1 week ago MDD (major depressive disorder), recurrent, in partial remission    [facility] Samreen Coe MD    2 months ago MDD (major depressive disorder), recurrent, in partial remission    [facility] Samreen Coe MD    3 months ago Hypertension, unspecified type    [facility] Ebenezer Souza MD    5 months ago MDD (major depressive disorder), recurrent, in partial remission    [facility] Samreen Coe MD    8 months ago MDD (major depressive disorder), recurrent, in partial remission    [facility] Samreen Coe MD          Future Appointments              In 2 months Ebenezer Souza MD [facility]

## 2019-11-04 NOTE — PROGRESS NOTES
.Refill Authorization Note     is requesting a refill authorization.    Brief assessment and rationale for refill: APPROVE: prr   Name and strength of medication: RANITIDINE TAB 300MG            Medication reconciliation completed: No              How patient will take medication: t1t po qpm           Comments:   Requested Prescriptions   Pending Prescriptions Disp Refills    ranitidine (ZANTAC) 300 MG tablet [Pharmacy Med Name: RANITIDINE TAB 300MG] 90 tablet 0     Sig: TAKE 1 TABLET EVERY EVENING       Gastroenterology:  H2 Antagonists Passed - 11/2/2019 12:56 AM        Passed - Patient is at least 18 years old        Passed - Office visit in past 12 months or future 90 days     Recent Outpatient Visits            1 week ago MDD (major depressive disorder), recurrent, in partial remission    [facility] Psychiat[facility] Samreen Coe MD    2 months ago MDD (major depressive disorder), recurrent, in partial remission    [facility] Samreen Coe MD    3 months ago Hypertension, unspecified type    [facility] Ebenezer Souza MD    5 months ago MDD (major depressive disorder), recurrent, in partial remission    [facility] Samreen Coe MD    8 months ago MDD (major depressive disorder), recurrent, in partial remission    [facility] Samreen Coe MD          Future Appointments              In 2 months Ebenezer Souza MD [facility]                Passed - Cr is 1.4 or below and within 360 days     Creatinine   Date Value Ref Range Status   12/27/2018 0.9 0.5 - 1.4 mg/dL Final   10/13/2018 0.8 0.5 - 1.4 mg/dL Final   09/12/2018 0.8 0.5 - 1.4 mg/dL Final              Passed - eGFR is 50 or above and within 360 days     eGFR if non    Date Value Ref Range Status   12/27/2018 >60.0 >60 mL/min/1.73 m^2 Final     Comment:     Calculation used to obtain the estimated glomerular filtration  rate  (eGFR) is the CKD-EPI equation.      10/13/2018 >60.0 >60 mL/min/1.73 m^2 Final     Comment:     Calculation used to obtain the estimated glomerular filtration  rate (eGFR) is the CKD-EPI equation.      09/12/2018 >60.0 >60 mL/min/1.73 m^2 Final     Comment:     Calculation used to obtain the estimated glomerular filtration  rate (eGFR) is the CKD-EPI equation.

## 2019-11-15 LAB
CHOL/HDLC RATIO: 4.5
CHOLESTEROL, TOTAL: 242
HDLC SERPL-MCNC: 54 MG/DL
LDLC SERPL CALC-MCNC: 159 MG/DL
NONHDLC SERPL-MCNC: 188 MG/DL
TRIGLYCERIDES: 159

## 2019-11-21 RX ORDER — BUPROPION HYDROCHLORIDE 150 MG/1
TABLET ORAL
Qty: 30 TABLET | Refills: 0 | Status: SHIPPED | OUTPATIENT
Start: 2019-11-21 | End: 2019-11-22 | Stop reason: SDUPTHER

## 2019-11-22 RX ORDER — BUPROPION HYDROCHLORIDE 150 MG/1
TABLET ORAL
Qty: 30 TABLET | Refills: 0 | Status: SHIPPED | OUTPATIENT
Start: 2019-11-22 | End: 2020-02-05

## 2019-12-06 ENCOUNTER — PATIENT OUTREACH (OUTPATIENT)
Dept: ADMINISTRATIVE | Facility: HOSPITAL | Age: 69
End: 2019-12-06

## 2019-12-06 ENCOUNTER — TELEPHONE (OUTPATIENT)
Dept: ADMINISTRATIVE | Facility: HOSPITAL | Age: 69
End: 2019-12-06

## 2019-12-11 ENCOUNTER — PATIENT OUTREACH (OUTPATIENT)
Dept: ADMINISTRATIVE | Facility: HOSPITAL | Age: 69
End: 2019-12-11

## 2019-12-13 ENCOUNTER — TELEPHONE (OUTPATIENT)
Dept: GASTROENTEROLOGY | Facility: CLINIC | Age: 69
End: 2019-12-13

## 2019-12-13 NOTE — TELEPHONE ENCOUNTER
Spoke with pt. Informed that Dr. Souza has been filling her zantac prescription & that she has not been seen in GI. Pt asked that message be sent to PCP with her concern.     Pt states she has a prescription with a recalled lot number for zantac. Pt would like an alternative called in. Pt needs short term supply sent to local pharmacy & also 90 day supply sent to mail order.   Please advise

## 2019-12-13 NOTE — TELEPHONE ENCOUNTER
----- Message from Colette Young sent at 12/13/2019 11:19 AM CST -----  Contact: self [phone]  Patient is requesting a call back from the nurse stated she received letter from [facility] in regards to recall on zantac.    Please call the patient upon request at phone number [phone].

## 2019-12-16 ENCOUNTER — TELEPHONE (OUTPATIENT)
Dept: FAMILY MEDICINE | Facility: CLINIC | Age: 69
End: 2019-12-16

## 2019-12-16 NOTE — TELEPHONE ENCOUNTER
----- Message from Tamela Mendenhall sent at 12/16/2019  8:33 AM CST -----  Contact: self  Type:  RX Refill Request    Who Called:  self  Refill or New Rx:  new  RX Name and Strength:  pepcid  How is the patient currently taking it? (ex. 1XDay):    Is this a 30 day or 90 day RX:  90  Preferred Pharmacy with phone number:    [facility] - [place], [place] - [address] AT [address] [facility]  [address]  [facility] [address]  Phone: [phone] Fax: [phone]  Local or Mail Order:  Mail order  Ordering Provider:  Dr Libby Pulliam Call Back Number:  [phone] (home)   Additional Information:  Patient states that the Zantac she was taking is on a recall. Dr Chambers's office recommended Pepcid. Patient needs the 90 day supply but also needs a short prescription sent to [facility]'s pharmacy. Please call patient if any questions. Thanks!

## 2019-12-18 RX ORDER — FAMOTIDINE 40 MG/1
40 TABLET, FILM COATED ORAL DAILY
Qty: 90 TABLET | Refills: 3 | Status: SHIPPED | OUTPATIENT
Start: 2019-12-18 | End: 2019-12-18 | Stop reason: SDUPTHER

## 2019-12-18 RX ORDER — FAMOTIDINE 40 MG/1
40 TABLET, FILM COATED ORAL DAILY
Qty: 30 TABLET | Refills: 11 | Status: SHIPPED | OUTPATIENT
Start: 2019-12-18 | End: 2020-10-23

## 2019-12-23 DIAGNOSIS — E03.9 HYPOTHYROIDISM, UNSPECIFIED TYPE: ICD-10-CM

## 2019-12-23 RX ORDER — RABEPRAZOLE SODIUM 20 MG/1
TABLET, DELAYED RELEASE ORAL
Qty: 90 TABLET | Refills: 0 | Status: SHIPPED | OUTPATIENT
Start: 2019-12-23 | End: 2020-02-07

## 2019-12-23 RX ORDER — LEVOTHYROXINE SODIUM 100 UG/1
TABLET ORAL
Qty: 90 TABLET | Refills: 0 | Status: SHIPPED | OUTPATIENT
Start: 2019-12-23 | End: 2020-02-07

## 2019-12-23 RX ORDER — MIRTAZAPINE 15 MG/1
TABLET, FILM COATED ORAL
Qty: 90 TABLET | Refills: 1 | Status: SHIPPED | OUTPATIENT
Start: 2019-12-23 | End: 2020-05-18

## 2019-12-31 NOTE — TELEPHONE ENCOUNTER
Famotidine 40 MG tabs is not available from . Pharmacy is asking for an alternative for the patient.  Please advise. Thanks.

## 2020-01-02 RX ORDER — FAMOTIDINE 40 MG/1
40 TABLET, FILM COATED ORAL DAILY
Qty: 30 TABLET | Refills: 11 | OUTPATIENT
Start: 2020-01-02 | End: 2021-01-01

## 2020-01-02 NOTE — PROGRESS NOTES
Refill Authorization Note     is requesting a refill authorization.    Brief assessment and rationale for refill: QUICK DC: refill too soon (12/20)          Medication Therapy Plan: Pt states that she received med from mail order and does not need supply from local pharmacy; Quick DC                              Comments:     Requested Prescriptions     Refused Prescriptions Disp Refills    famotidine (PEPCID) 40 MG tablet 30 tablet 11     Sig: Take 1 tablet (40 mg total) by mouth once daily.     Refused By: RADHA TIMMONS     Reason for Refusal: Patient has requested refill too soon       Last Prescribed Info:   Original Order:  famotidine (PEPCID) 40 MG tablet [[id number]]      Pharmacy:  [id number] [place] [address] AT [place] [address] & [facility] CONI #:  [id number]     Pharmacy Comments:  --          Fill quantity remaining:  -- Fill quantity used:  -- Next fill due: --       Outpatient Medication Detail      Disp Refills Start End    famotidine (PEPCID) 40 MG tablet 30 tablet 11 12/18/2019 12/17/2020    Sig - Route: Take 1 tablet (40 mg total) by mouth once daily. - Oral    Sent to pharmacy as: famotidine (PEPCID) 40 MG tablet    E-Prescribing Status: Receipt confirmed by pharmacy (12/18/2019  4:47 PM CST)        Dispenses      Dispensed Days Supply Quantity Provider Pharmacy   FAMOTIDINE   TAB 40MG 12/19/2019 90 90 tablet  [facility] PRESCRIPTION ...

## 2020-01-02 NOTE — TELEPHONE ENCOUNTER
Call Documentation    Person Called: Patient Call Time: 10:36   Spoke with: LALA Pandya        Reason for call: Clarification- dosage and frequency of medications listed below      Patient stated: Pt reported that she received famotidine 40 mg from mail order.       Clarification details and Actions Taken: Confirmed that pt received famotidine 40 mg tablets and does not need bridge supply at this time. Will close this encounter as patient has 90 day supply. Patient verbalized understanding.        Current Medication Requested:   Requested Prescriptions     Pending Prescriptions Disp Refills    famotidine (PEPCID) 40 MG tablet 30 tablet 11     Sig: Take 1 tablet (40 mg total) by mouth once daily.

## 2020-01-14 ENCOUNTER — OFFICE VISIT (OUTPATIENT)
Dept: FAMILY MEDICINE | Facility: CLINIC | Age: 70
End: 2020-01-14
Payer: MEDICARE

## 2020-01-14 VITALS
WEIGHT: 168.63 LBS | DIASTOLIC BLOOD PRESSURE: 84 MMHG | OXYGEN SATURATION: 98 % | BODY MASS INDEX: 26.41 KG/M2 | HEART RATE: 66 BPM | SYSTOLIC BLOOD PRESSURE: 124 MMHG

## 2020-01-14 DIAGNOSIS — I10 HYPERTENSION, UNSPECIFIED TYPE: ICD-10-CM

## 2020-01-14 DIAGNOSIS — R05.9 COUGH: Primary | ICD-10-CM

## 2020-01-14 DIAGNOSIS — E03.9 HYPOTHYROIDISM, UNSPECIFIED TYPE: ICD-10-CM

## 2020-01-14 DIAGNOSIS — K21.9 GASTROESOPHAGEAL REFLUX DISEASE, ESOPHAGITIS PRESENCE NOT SPECIFIED: ICD-10-CM

## 2020-01-14 PROCEDURE — 1126F PR PAIN SEVERITY QUANTIFIED, NO PAIN PRESENT: ICD-10-PCS | Mod: ,,, | Performed by: FAMILY MEDICINE

## 2020-01-14 PROCEDURE — 1159F PR MEDICATION LIST DOCUMENTED IN MEDICAL RECORD: ICD-10-PCS | Mod: ,,, | Performed by: FAMILY MEDICINE

## 2020-01-14 PROCEDURE — 1126F AMNT PAIN NOTED NONE PRSNT: CPT | Mod: ,,, | Performed by: FAMILY MEDICINE

## 2020-01-14 PROCEDURE — 99999 PR PBB SHADOW E&M-EST. PATIENT-LVL III: CPT | Mod: PBBFAC,,, | Performed by: FAMILY MEDICINE

## 2020-01-14 PROCEDURE — 99213 OFFICE O/P EST LOW 20 MIN: CPT | Mod: PBBFAC,PO | Performed by: FAMILY MEDICINE

## 2020-01-14 PROCEDURE — 99214 OFFICE O/P EST MOD 30 MIN: CPT | Mod: S$PBB,,, | Performed by: FAMILY MEDICINE

## 2020-01-14 PROCEDURE — 99999 PR PBB SHADOW E&M-EST. PATIENT-LVL III: ICD-10-PCS | Mod: PBBFAC,,, | Performed by: FAMILY MEDICINE

## 2020-01-14 PROCEDURE — 1159F MED LIST DOCD IN RCRD: CPT | Mod: ,,, | Performed by: FAMILY MEDICINE

## 2020-01-14 PROCEDURE — 99214 PR OFFICE/OUTPT VISIT, EST, LEVL IV, 30-39 MIN: ICD-10-PCS | Mod: S$PBB,,, | Performed by: FAMILY MEDICINE

## 2020-01-14 RX ORDER — TELMISARTAN 20 MG/1
20 TABLET ORAL DAILY
Qty: 30 TABLET | Refills: 0 | Status: SHIPPED | OUTPATIENT
Start: 2020-01-14 | End: 2020-01-31 | Stop reason: SDUPTHER

## 2020-01-14 RX ORDER — LISINOPRIL 2.5 MG/1
TABLET ORAL
COMMUNITY
Start: 2019-12-05 | End: 2020-01-14

## 2020-01-14 NOTE — PROGRESS NOTES
Subjective:       Patient ID: Coral Pandya is a 69 y.o. female.    Chief Complaint: 6month follow up    HPI     Here for a f/u.     htn controlled.     Saw her cardiologist, Dr. Collins, recently. Was started on lisinopril about 1 month ago.  Since then, reports a dry cough.      gerd controlled.      Sees Dr. Coe, psychiatrist, for treatment of depression and anxiety.       Review of Systems      Review of Systems   Constitutional: Negative for fever and chills.   HENT: Negative for hearing loss and neck stiffness.    Eyes: Negative for redness and itching.   Respiratory: Negative for choking.    Cardiovascular: Negative for chest pain and leg swelling.  Abdomen: Negative for abdominal pain and blood in stool.   Genitourinary: Negative for dysuria and flank pain.   Musculoskeletal: Negative for back pain and gait problem.   Neurological: Negative for light-headedness and headaches.   Hematological: Negative for adenopathy.   Psychiatric/Behavioral: Negative for behavioral problems.     Objective:      Physical Exam   Constitutional: She appears well-developed.   HENT:   Head: Normocephalic and atraumatic.   Eyes: Pupils are equal, round, and reactive to light. Conjunctivae are normal.   Neck: Normal range of motion.   Cardiovascular: Normal rate and regular rhythm.   No murmur heard.  Pulmonary/Chest: Effort normal and breath sounds normal.   Lymphadenopathy:     She has no cervical adenopathy.       Assessment:       1. Cough    2. Hypertension, unspecified type    3. Gastroesophageal reflux disease, esophagitis presence not specified    4. Hypothyroidism, unspecified type        Plan:       Cough    Hypertension, unspecified type    Gastroesophageal reflux disease, esophagitis presence not specified    Hypothyroidism, unspecified type    Other orders  -     telmisartan (MICARDIS) 20 MG Tab; Take 1 tablet (20 mg total) by mouth once daily.  Dispense: 30 tablet; Refill: 0      Plan:  D/c  lisinopril  Start micardis  Cont all other meds      Medication List with Changes/Refills   New Medications    TELMISARTAN (MICARDIS) 20 MG TAB    Take 1 tablet (20 mg total) by mouth once daily.   Current Medications    BUPROPION (WELLBUTRIN XL) 150 MG TB24 TABLET    TAKE 1 TABLET(150 MG) BY MOUTH EVERY DAY    CALCIUM CARBONATE-VITAMIN D3 (CALCIUM 600 + D,3,) 600 MG CALCIUM- 200 UNIT CAP    Take 1 capsule by mouth Daily. 1 Capsule Oral Every day    DESONIDE (DESOWEN) 0.05 % CREAM    APPLY TO EYELID SKIN NIGHTLY PRF ITCHY LIDS    DULOXETINE (CYMBALTA) 30 MG CAPSULE    TAKE 1 CAPSULE ONCE DAILY    DULOXETINE (CYMBALTA) 60 MG CAPSULE    TAKE 1 CAPSULE ONCE DAILY    FAMOTIDINE (PEPCID) 40 MG TABLET    Take 1 tablet (40 mg total) by mouth once daily.    KETOTIFEN (ZADITOR) 0.025 % OPHTHALMIC SOLUTION    1 drop 2 (two) times daily.    M-VIT,TX,IRON,MINS/CALC/FOLIC (MULTIVITAMIN) TAB        MELOXICAM (MOBIC) 15 MG TABLET    TK 1 T PO QD    MIRTAZAPINE (REMERON) 15 MG TABLET    TAKE 1 TABLET EVERY EVENING    MOMETASONE (NASONEX) 50 MCG/ACTUATION NASAL SPRAY    2 sprays by Nasal route once daily.    NAPHAZOLINE-PHENIRAMINE 0.09016-4.315% (NAPHCON-A) 0.03431-1.315 % OPHTHALMIC SOLUTION    ONCE DAILY    NEBIVOLOL (BYSTOLIC) 5 MG TAB    Take 1 tablet (5 mg total) by mouth once daily.    RABEPRAZOLE (ACIPHEX) 20 MG TABLET    TAKE 1 TABLET ONCE DAILY    RANITIDINE (ZANTAC) 300 MG TABLET    TAKE 1 TABLET EVERY EVENING    SYNTHROID 100 MCG TABLET    TAKE 1 TABLET DAILY    VITAMIN D (VITAMIN D3) 1000 UNITS TAB    Take 5,000 Units by mouth once daily.   Discontinued Medications    LISINOPRIL (PRINIVIL,ZESTRIL) 2.5 MG TABLET

## 2020-01-23 ENCOUNTER — OFFICE VISIT (OUTPATIENT)
Dept: PSYCHIATRY | Facility: CLINIC | Age: 70
End: 2020-01-23
Payer: MEDICARE

## 2020-01-23 VITALS
BODY MASS INDEX: 26.55 KG/M2 | WEIGHT: 169.19 LBS | HEIGHT: 67 IN | DIASTOLIC BLOOD PRESSURE: 75 MMHG | SYSTOLIC BLOOD PRESSURE: 113 MMHG | HEART RATE: 69 BPM

## 2020-01-23 DIAGNOSIS — F33.41 MDD (MAJOR DEPRESSIVE DISORDER), RECURRENT, IN PARTIAL REMISSION: Primary | ICD-10-CM

## 2020-01-23 DIAGNOSIS — F41.1 GAD (GENERALIZED ANXIETY DISORDER): ICD-10-CM

## 2020-01-23 PROCEDURE — 90833 PSYTX W PT W E/M 30 MIN: CPT | Mod: ,,, | Performed by: PSYCHIATRY & NEUROLOGY

## 2020-01-23 PROCEDURE — 99999 PR PBB SHADOW E&M-EST. PATIENT-LVL II: ICD-10-PCS | Mod: PBBFAC,,, | Performed by: PSYCHIATRY & NEUROLOGY

## 2020-01-23 PROCEDURE — 99214 OFFICE O/P EST MOD 30 MIN: CPT | Mod: S$PBB,,, | Performed by: PSYCHIATRY & NEUROLOGY

## 2020-01-23 PROCEDURE — 99999 PR PBB SHADOW E&M-EST. PATIENT-LVL II: CPT | Mod: PBBFAC,,, | Performed by: PSYCHIATRY & NEUROLOGY

## 2020-01-23 PROCEDURE — 1159F MED LIST DOCD IN RCRD: CPT | Mod: ,,, | Performed by: PSYCHIATRY & NEUROLOGY

## 2020-01-23 PROCEDURE — 99212 OFFICE O/P EST SF 10 MIN: CPT | Mod: PBBFAC,PO | Performed by: PSYCHIATRY & NEUROLOGY

## 2020-01-23 PROCEDURE — 1126F PR PAIN SEVERITY QUANTIFIED, NO PAIN PRESENT: ICD-10-PCS | Mod: ,,, | Performed by: PSYCHIATRY & NEUROLOGY

## 2020-01-23 PROCEDURE — 90833 PR PSYCHOTHERAPY W/PATIENT W/E&M, 30 MIN (ADD ON): ICD-10-PCS | Mod: ,,, | Performed by: PSYCHIATRY & NEUROLOGY

## 2020-01-23 PROCEDURE — 1126F AMNT PAIN NOTED NONE PRSNT: CPT | Mod: ,,, | Performed by: PSYCHIATRY & NEUROLOGY

## 2020-01-23 PROCEDURE — 99214 PR OFFICE/OUTPT VISIT, EST, LEVL IV, 30-39 MIN: ICD-10-PCS | Mod: S$PBB,,, | Performed by: PSYCHIATRY & NEUROLOGY

## 2020-01-23 PROCEDURE — 1159F PR MEDICATION LIST DOCUMENTED IN MEDICAL RECORD: ICD-10-PCS | Mod: ,,, | Performed by: PSYCHIATRY & NEUROLOGY

## 2020-01-23 NOTE — PROGRESS NOTES
"ID: 64yo WF with a previous diag of depression. Referred by PCP, , for eval of depressive sxs. Currently on cymbalta, recently added remeron, and valium. Pt has cont'd to remain stable on current meds. No longer using valium.     CC: "depression"    Interim hx: presents on time, chart reviewed, pt seen. Appears well.     "natalya really been doing well emotionally. I think that wellbutrin has helped. Physically i'm having trouble with my toe on the left foot." had an ulcer on her left foot/toe on the interior surface touching next toe- it allowed it to be "perfectly out of sight" so that she was unaware of until it had gotten too painful to ignore. In a sandal today but hoping that the nerve pain will improve. Gabapentin has been rec'd but pt has been hesitant given number of other meds taken. "it's just so weird and even the doctor thinks it's odd and so now my cardiologist wants to do some tests because it was there at all and then took a long time to heal." (it was ultimately drained and she has completed antbx)     Had cardiac exams following last appt and the results were all within normal range. Did start lisinopril but it led to the cough side effect and is now on micardis. She may have to start adjunct med for cholesterol per pt, but not yet decided.     Daughter and grandson still here- "making some decisions that seem sort of permanent so i'm hopeful"- daughter having her car brought here from [place] and father of child looking for parttime work in the weeks he's not away at his primary job. Pt pleased and sees her grandsonrobert mult times/week.     On Psychiatric ROS:    Endorses better sleep on the remeron, denies anhedonia, denies feeling helpless/hopeless, improved energy, riding bike regularly, denies dec concentration, denies change in appetite (despite the remeron), denies dec PMA "I push myself. I know keeping busy helps me." Denies thoughts of SI/intent/plan.     Denies recently " "feeling more easily overwhelmed, denies recent ruminative thinking "the obsessive thinking is MUCH better on the meds." denies feeling tense/"on edge"     PSYCHOTHERAPY ADD-ON   30 (16-37*) minutes    Time: 20 minutes  Participants: Met with patient    Therapeutic Intervention Type: insight oriented psychotherapy, behavior modifying psychotherapy, supportive psychotherapy  Why chosen therapy is appropriate versus another modality: relevant to diagnosis, patient responds to this modality, evidence based practice    Target symptoms: health and mental wellness  Primary focus: diet, exercise, riding her bike as a form of mood mgmt  Psychotherapeutic techniques: support, education, validation, reframing, motivational interviewing    Outcome monitoring methods: self-report, observation    Patient's response to intervention:  The patient's response to intervention is accepting, motivated.    Progress toward goals:  The patient's progress toward goals is good.    PPHx: Denies h/o self injury, inpt psych hospitalization, denies h/o suicide attempt     Current Psych Meds: cymbalta 90mg po qam, remeron 15mg po qhs  Past Psych Meds: Lexapro and Wellbutrin (effective but led to h/a's), celexa (nausea), valium    PMHx: hypothyroid, GERD    SubstHx:   T- none  E- very rare  D- none    FamPHx: brother- suicide 1990, M-anxiety, F-anxiety, dtr- seizure while on wellbutrin    MSE: appears stated age, well groomed, appropriate dress, engages well with examiner. Good e/c. Speech reg rate and vol, nonpressured. Mood is "i'm fine. It's good." Affect congruent with some mild anxiety. dec'd verbosity. Sensorium fully intact. Oriented to date/day/location, current events. Narrative memory intact. Intellectual function is avg based on vocab and basic fund of knowledge. Thought is c/l/gd. No tangentiality or circumstantiality. No FOI/LEBRON. Denies SI/HI. Denies A/VH. No evidence of delusions. Insight and Judgment intact.     Blood pressure " "113/75, pulse 69, height 5' 7" (1.702 m), weight 76.8 kg (169 lb 3.3 oz).    Suicide Risk Assessment:   Protective- age, gender, no prior attempts, no prior hospitalizations, no ongoing substance abuse, no psychosis, , has children, denies SI/intent/plan, seeking treatment, access to treatment, future oriented, good primary support, Spiritism  Risk- race, ongoing Axis I sxs, family h/o suicide (brother-1990)    **Pt is at LOW imminent and long term risk of suicide given current risk factors.     Assessment:  67yo WF with a previous diag of depression. Referred by PCP, , for eval of depressive sxs. Currently on cymbalta, recently added remeron, and valium per chart review. On eval the pt is reporting an improvement in all mood and anxiety sxs in the 3 wks leading to initial eval which correlates with the time Remeron was added. She had done well for many years on lexapro and wellbutrin but was having chronic headaches and self initiated a taper off meds and the headaches did remit, but mood/anxiety suffered until the recent combination of cymbalta and remeron which is currently managing sxs quite well. Prior to meds becoming effective the pt endorses sxs c/w diagnoses of MDD and YASMANI. No acute safety concerns but the pt has cont'd to feel mood is "a little down. Other than the headaches, I feel that I was really better on the lexapro/wellbutrin combination". Pt is active, finds good support through kerry, family and friends. Will try an inc in cymbalta to 90mg and if ineffective may try an addition of wellbutrin xl 150mg- in the past pt headaches were on the 300mg dose when in combo with lexapro. On f/u the inc in cymbalta was effective- pt other ongoing issues are therapy related. Pt continues to decline therapy as she doesn't see the need at this time- majority of her appts are spent in therapy, today struggling due to lack of progress with knee following knee replacement- will re start PT and is " hopeful. Added wellbutrin a few months ago and today she continues to report it's been helpful.     Axis I: MDD, recurrent, in partial remission; YASMANI  Axis II: none at this time   Axis III: hypothyroid, GERD, HTN  Axis IV:  from , chronic mental illness  Axis V: GAF 70    Plan:   1. Cont cymbalta 90mg po qam  2. Cont remeron 15mg po qhs  3. Cont wellbutrin xl 150mg po qam  4. Cont exercise, encouraged mindfulness of dietary choices in an effort to dec inflammation  5. rec therapy but pt declines at this time  6. RTC 3mos    -Spent 30min face to face with the pt; >50% time spent in counseling   -Supportive therapy and psychoeducation provided  -R/B/SE's of medications discussed with the pt who expresses understanding and chooses to take medications as prescribed.   -Pt instructed to call clinic, 911 or go to nearest emergency room if sxs worsen or pt is in   crisis. The pt expresses understanding.

## 2020-01-31 DIAGNOSIS — I10 HYPERTENSION, UNSPECIFIED TYPE: Primary | ICD-10-CM

## 2020-02-03 NOTE — PROGRESS NOTES
Refill Authorization Note     is requesting a refill authorization.    Brief assessment and rationale for refill: REVIEW: new start          Medication Therapy Plan: FOVS(07/20); New start, review; Last filled 01/14/20 for 30 days    Medication reconciliation completed: No   Pharmacist Review Requested: Yes                     Comments:   Requested Prescriptions   Pending Prescriptions Disp Refills    telmisartan (MICARDIS) 20 MG Tab 30 tablet 0     Sig: Take 1 tablet (20 mg total) by mouth once daily.       Cardiovascular:  Angiotensin Receptor Blockers Failed - 2/3/2020  2:55 PM        Failed - Cr is 1.4 or below and within 360 days     Creatinine   Date Value Ref Range Status   12/27/2018 0.9 0.5 - 1.4 mg/dL Final   10/13/2018 0.8 0.5 - 1.4 mg/dL Final   09/12/2018 0.8 0.5 - 1.4 mg/dL Final              Failed - K in normal range and within 360 days     Potassium   Date Value Ref Range Status   12/27/2018 4.3 3.5 - 5.1 mmol/L Final   10/13/2018 4.2 3.5 - 5.1 mmol/L Final   09/09/2017 4.3 3.5 - 5.1 mmol/L Final              Failed - eGFR within 360 days     eGFR if non    Date Value Ref Range Status   12/27/2018 >60.0 >60 mL/min/1.73 m^2 Final     Comment:     Calculation used to obtain the estimated glomerular filtration  rate (eGFR) is the CKD-EPI equation.      10/13/2018 >60.0 >60 mL/min/1.73 m^2 Final     Comment:     Calculation used to obtain the estimated glomerular filtration  rate (eGFR) is the CKD-EPI equation.      09/12/2018 >60.0 >60 mL/min/1.73 m^2 Final     Comment:     Calculation used to obtain the estimated glomerular filtration  rate (eGFR) is the CKD-EPI equation.        eGFR if    Date Value Ref Range Status   12/27/2018 >60.0 >60 mL/min/1.73 m^2 Final   10/13/2018 >60.0 >60 mL/min/1.73 m^2 Final   09/12/2018 >60.0 >60 mL/min/1.73 m^2 Final              Passed - Patient is at least 18 years old        Passed - Last BP in normal range within 360  days     BP Readings from Last 3 Encounters:   01/14/20 124/84   07/29/19 124/72   01/15/19 98/66              Passed - Office visit in past 12 months or future 90 days     Recent Outpatient Visits            1 week ago MDD (major depressive disorder), recurrent, in partial remission    [place] [facility] Samreen Coe MD    2 weeks ago Cough    [facility] Ebenezer Souza MD    3 months ago MDD (major depressive disorder), recurrent, in partial remission    [place] [facility] Samreen Coe MD    5 months ago MDD (major depressive disorder), recurrent, in partial remission    [place] [facility] Samreen Coe MD    6 months ago Hypertension, unspecified type    [facility] Ebenezer Souza MD          Future Appointments              In 5 months Ebenezer Souza MD [facility]

## 2020-02-04 RX ORDER — TELMISARTAN 20 MG/1
20 TABLET ORAL DAILY
Qty: 90 TABLET | Refills: 0 | Status: SHIPPED | OUTPATIENT
Start: 2020-02-04 | End: 2020-04-21

## 2020-02-04 NOTE — TELEPHONE ENCOUNTER
Please see the following assessment. This refill request still requires some action on your part. Telmisartan 20 mg was recently started by you on 1/14/20. Pended 90 day supply. Defer to you. Will follow up with your staff to schedule labs after your decision.   Thank you.

## 2020-02-05 DIAGNOSIS — E03.9 HYPOTHYROIDISM, UNSPECIFIED TYPE: ICD-10-CM

## 2020-02-05 RX ORDER — DULOXETIN HYDROCHLORIDE 30 MG/1
CAPSULE, DELAYED RELEASE ORAL
Qty: 90 CAPSULE | Refills: 1 | Status: SHIPPED | OUTPATIENT
Start: 2020-02-05 | End: 2020-05-29 | Stop reason: CLARIF

## 2020-02-05 RX ORDER — BUPROPION HYDROCHLORIDE 150 MG/1
TABLET ORAL
Qty: 90 TABLET | Refills: 0 | Status: SHIPPED | OUTPATIENT
Start: 2020-02-05 | End: 2020-04-23 | Stop reason: SDUPTHER

## 2020-02-05 RX ORDER — DULOXETIN HYDROCHLORIDE 60 MG/1
CAPSULE, DELAYED RELEASE ORAL
Qty: 90 CAPSULE | Refills: 1 | Status: SHIPPED | OUTPATIENT
Start: 2020-02-05 | End: 2020-07-23 | Stop reason: SDUPTHER

## 2020-02-07 ENCOUNTER — TELEPHONE (OUTPATIENT)
Dept: FAMILY MEDICINE | Facility: CLINIC | Age: 70
End: 2020-02-07

## 2020-02-07 RX ORDER — LEVOTHYROXINE SODIUM 100 UG/1
TABLET ORAL
Qty: 90 TABLET | Refills: 0 | Status: SHIPPED | OUTPATIENT
Start: 2020-02-07 | End: 2020-05-19

## 2020-02-07 RX ORDER — RABEPRAZOLE SODIUM 20 MG/1
TABLET, DELAYED RELEASE ORAL
Qty: 90 TABLET | Refills: 1 | Status: SHIPPED | OUTPATIENT
Start: 2020-02-07 | End: 2020-05-18 | Stop reason: SDUPTHER

## 2020-02-07 NOTE — TELEPHONE ENCOUNTER
Please schedule patient for Labs (TSH, LIPID, CMP)    Please also check with your provider if any further labs need to be added and scheduled together.    Thanks !

## 2020-02-07 NOTE — PROGRESS NOTES
Refill Authorization Note   Brief assessment and rationale for refill authorization:    APPROVE: needs labs    Medication reconciliation completed?  no   Requesting pharmacist review? no   Medication related problems identified: Requires labs   Medication therapy plan: labs outdated; NTBS(TSH, LIPID, CMP), per wog; approve 3 more    Requested Prescriptions   Pending Prescriptions Disp Refills    SYNTHROID 100 mcg tablet [Pharmacy Med Name: SYNTHROID TAB 0.1MG] 90 tablet 0     Sig: TAKE 1 TABLET DAILY       Endocrinology:  Hypothyroid Agents Failed - 2/5/2020  5:31 AM        Failed - Manual Review: FT4 is not required if last TSH is WNL.        Failed - TSH in normal range and within 360 days     TSH   Date Value Ref Range Status   09/12/2018 1.685 0.400 - 4.000 uIU/mL Final   03/16/2017 0.760 0.400 - 4.000 uIU/mL Final   01/09/2016 0.907 0.400 - 4.000 uIU/mL Final              Failed - T4 free within 1080 days     Free T4   Date Value Ref Range Status   04/28/2012 0.92 0.71 - 1.51 ng/dl Final   12/24/2011 0.83 0.71 - 1.51 ng/dl Final   09/13/2010 0.83 0.71 - 1.51 ng/dl Final              Passed - Patient is at least 18 years old        Passed - Office visit in past 12 months or future 90 days.     Recent Outpatient Visits            2 weeks ago MDD (major depressive disorder), recurrent, in partial remission    [place] [facility] Samreen Coe MD    3 weeks ago Cough    [facility] Ebenezer Souza MD    3 months ago MDD (major depressive disorder), recurrent, in partial remission    [place] [facility] Samreen Coe MD    5 months ago MDD (major depressive disorder), recurrent, in partial remission    [place] [facility] Samreen Coe MD    6 months ago Hypertension, unspecified type    [facility] Ebenezer Souza MD          Future Appointments              In 5 months Ebenezer Souza MD [facility]                  Appointments  past 12m or future 3m with PCP    Date Provider   Last Visit   1/14/2020 Ebenezer Souza MD   Next Visit   7/16/2020 Ebenezer Souza MD   .  ED visits in past 90 days: 0        Note composed: 02/07/2020

## 2020-02-07 NOTE — TELEPHONE ENCOUNTER
----- Message from Reynaldo Nevarez sent at 2/7/2020  3:20 PM CST -----  Contact: pt  Type:  Patient Returning Call    Who Called:  pt  Who Left Message for Patient:  maudek  Does the patient know what this is regarding?:  no  Best Call Back Number:   [phone]  Additional Information:

## 2020-02-07 NOTE — PROGRESS NOTES
Refill Authorization Note     is requesting a refill authorization.    Brief assessment and rationale for refill: APPROVE: prr               Medication reconciliation completed: No                         Comments:   Requested Prescriptions   Pending Prescriptions Disp Refills    RABEprazole (ACIPHEX) 20 mg tablet [Pharmacy Med Name: RABEPRAZOLE  TAB 20MG DR] 90 tablet 1     Sig: TAKE 1 TABLET ONCE DAILY       Gastroenterology: Proton Pump Inhibitors Passed - 2/7/2020  6:53 AM        Passed - Patient is at least 18 years old        Passed - Osteoporosis is not on problem list        Passed - Plavix is not on active medication list        Passed - Office visit in past 6 months or future 90 days.     Recent Outpatient Visits            2 weeks ago MDD (major depressive disorder), recurrent, in partial remission    [facility] Samreen Coe MD    3 weeks ago Cough    [facility] Ebenezer Souza MD    3 months ago MDD (major depressive disorder), recurrent, in partial remission    [place]  [facility] Samreen Coe MD    5 months ago MDD (major depressive disorder), recurrent, in partial remission    [place] - Psych[facility] Samreen Coe MD    6 months ago Hypertension, unspecified type    [facility] Ebenezer Souza MD          Future Appointments              In 5 months Ebenezer Souza MD [facility]                Passed - An appropriate indication is on the problem list

## 2020-02-10 ENCOUNTER — PATIENT MESSAGE (OUTPATIENT)
Dept: FAMILY MEDICINE | Facility: CLINIC | Age: 70
End: 2020-02-10

## 2020-02-13 ENCOUNTER — TELEPHONE (OUTPATIENT)
Dept: FAMILY MEDICINE | Facility: CLINIC | Age: 70
End: 2020-02-13

## 2020-02-13 DIAGNOSIS — E56.9 VITAMIN DEFICIENCY: Primary | ICD-10-CM

## 2020-02-13 DIAGNOSIS — E55.9 VITAMIN D DEFICIENCY, UNSPECIFIED: ICD-10-CM

## 2020-02-13 DIAGNOSIS — E78.5 HYPERLIPIDEMIA, UNSPECIFIED HYPERLIPIDEMIA TYPE: ICD-10-CM

## 2020-02-13 NOTE — TELEPHONE ENCOUNTER
----- Message from Ny Sanhcez sent at 2/13/2020  4:01 PM CST -----  Contact: Patient  Type: Needs Medical Advice    Who Called:  Patient  Best Call Back Number:   Additional Information: Calling to speak with the nurse to verify that her upcoming labs will check for vitamin D and cholesterol.

## 2020-02-15 ENCOUNTER — LAB VISIT (OUTPATIENT)
Dept: LAB | Facility: HOSPITAL | Age: 70
End: 2020-02-15
Attending: FAMILY MEDICINE
Payer: MEDICARE

## 2020-02-15 DIAGNOSIS — I10 HYPERTENSION, UNSPECIFIED TYPE: ICD-10-CM

## 2020-02-15 LAB
ALBUMIN SERPL BCP-MCNC: 3.9 G/DL (ref 3.5–5.2)
ALP SERPL-CCNC: 72 U/L (ref 55–135)
ALT SERPL W/O P-5'-P-CCNC: 27 U/L (ref 10–44)
ANION GAP SERPL CALC-SCNC: 9 MMOL/L (ref 8–16)
AST SERPL-CCNC: 27 U/L (ref 10–40)
BILIRUB SERPL-MCNC: 0.6 MG/DL (ref 0.1–1)
BUN SERPL-MCNC: 24 MG/DL (ref 8–23)
CALCIUM SERPL-MCNC: 9.4 MG/DL (ref 8.7–10.5)
CHLORIDE SERPL-SCNC: 105 MMOL/L (ref 95–110)
CO2 SERPL-SCNC: 26 MMOL/L (ref 23–29)
CREAT SERPL-MCNC: 1.1 MG/DL (ref 0.5–1.4)
EST. GFR  (AFRICAN AMERICAN): 59.2 ML/MIN/1.73 M^2
EST. GFR  (NON AFRICAN AMERICAN): 51.3 ML/MIN/1.73 M^2
GLUCOSE SERPL-MCNC: 96 MG/DL (ref 70–110)
POTASSIUM SERPL-SCNC: 4.7 MMOL/L (ref 3.5–5.1)
PROT SERPL-MCNC: 7.1 G/DL (ref 6–8.4)
SODIUM SERPL-SCNC: 140 MMOL/L (ref 136–145)

## 2020-02-15 PROCEDURE — 80053 COMPREHEN METABOLIC PANEL: CPT

## 2020-02-15 PROCEDURE — 36415 COLL VENOUS BLD VENIPUNCTURE: CPT | Mod: PO

## 2020-02-17 ENCOUNTER — PATIENT MESSAGE (OUTPATIENT)
Dept: FAMILY MEDICINE | Facility: CLINIC | Age: 70
End: 2020-02-17

## 2020-02-22 ENCOUNTER — LAB VISIT (OUTPATIENT)
Dept: LAB | Facility: HOSPITAL | Age: 70
End: 2020-02-22
Attending: FAMILY MEDICINE
Payer: MEDICARE

## 2020-02-22 DIAGNOSIS — E78.5 HYPERLIPIDEMIA, UNSPECIFIED HYPERLIPIDEMIA TYPE: ICD-10-CM

## 2020-02-22 DIAGNOSIS — E55.9 VITAMIN D DEFICIENCY, UNSPECIFIED: ICD-10-CM

## 2020-02-22 DIAGNOSIS — E56.9 VITAMIN DEFICIENCY: ICD-10-CM

## 2020-02-22 LAB
25(OH)D3+25(OH)D2 SERPL-MCNC: 86 NG/ML (ref 30–96)
CHOLEST SERPL-MCNC: 198 MG/DL (ref 120–199)
CHOLEST/HDLC SERPL: 4.7 {RATIO} (ref 2–5)
HDLC SERPL-MCNC: 42 MG/DL (ref 40–75)
HDLC SERPL: 21.2 % (ref 20–50)
LDLC SERPL CALC-MCNC: 136.8 MG/DL (ref 63–159)
NONHDLC SERPL-MCNC: 156 MG/DL
TRIGL SERPL-MCNC: 96 MG/DL (ref 30–150)

## 2020-02-22 PROCEDURE — 80061 LIPID PANEL: CPT

## 2020-02-22 PROCEDURE — 36415 COLL VENOUS BLD VENIPUNCTURE: CPT | Mod: PO

## 2020-02-22 PROCEDURE — 82306 VITAMIN D 25 HYDROXY: CPT

## 2020-02-27 ENCOUNTER — PATIENT MESSAGE (OUTPATIENT)
Dept: FAMILY MEDICINE | Facility: CLINIC | Age: 70
End: 2020-02-27

## 2020-03-01 ENCOUNTER — PATIENT MESSAGE (OUTPATIENT)
Dept: FAMILY MEDICINE | Facility: CLINIC | Age: 70
End: 2020-03-01

## 2020-03-02 ENCOUNTER — PATIENT MESSAGE (OUTPATIENT)
Dept: FAMILY MEDICINE | Facility: CLINIC | Age: 70
End: 2020-03-02

## 2020-03-20 ENCOUNTER — PATIENT MESSAGE (OUTPATIENT)
Dept: ADMINISTRATIVE | Facility: OTHER | Age: 70
End: 2020-03-20

## 2020-03-22 NOTE — PROGRESS NOTES
Refill Authorization Note     is requesting a refill authorization.    Brief assessment and rationale for refill: REVIEW: last commented as no longer taking           Medication Therapy Plan: dc'd ranitidine for a therapy change; Patient now taking rabeprazole(2/20); ranitidine is not on current med list                              Comments:   Refill Center Care Gap Closure protocols temporarily suspended.   Requested Prescriptions   Pending Prescriptions Disp Refills    ranitidine (ZANTAC) 300 MG tablet [Pharmacy Med Name: RANITIDINE TAB 300MG] 90 tablet 1     Sig: TAKE 1 TABLET EVERY EVENING       Gastroenterology:  H2 Antagonists Passed - 3/17/2020  7:51 AM        Passed - Patient is at least 18 years old        Passed - Office visit in past 12 months or future 90 days.     Recent Outpatient Visits            1 month ago MDD (major depressive disorder), recurrent, in partial remission    [facility] Psychiat[facility] Samreen Coe MD    2 months ago Cough    [facility] Ebenezer Souza MD    5 months ago MDD (major depressive disorder), recurrent, in partial remission    [place] - Psychiat[facility] Samreen Coe MD    7 months ago MDD (major depressive disorder), recurrent, in partial remission    [facility] Samreen Coe MD    7 months ago Hypertension, unspecified type    [facility] Ebenezer Souza MD          Future Appointments              In 3 months Ebenezer Souza MD [facility]                Passed - Cr is 1.3 or below and within 360 days     Creatinine   Date Value Ref Range Status   02/15/2020 1.1 0.5 - 1.4 mg/dL Final   12/27/2018 0.9 0.5 - 1.4 mg/dL Final   10/13/2018 0.8 0.5 - 1.4 mg/dL Final              Passed - eGFR is 50 or above and within 360 days     eGFR if non    Date Value Ref Range Status   02/15/2020 51.3 (A) >60 mL/min/1.73 m^2 Final     Comment:     Calculation used to  obtain the estimated glomerular filtration  rate (eGFR) is the CKD-EPI equation.      12/27/2018 >60.0 >60 mL/min/1.73 m^2 Final     Comment:     Calculation used to obtain the estimated glomerular filtration  rate (eGFR) is the CKD-EPI equation.      10/13/2018 >60.0 >60 mL/min/1.73 m^2 Final     Comment:     Calculation used to obtain the estimated glomerular filtration  rate (eGFR) is the CKD-EPI equation.        eGFR if    Date Value Ref Range Status   02/15/2020 59.2 (A) >60 mL/min/1.73 m^2 Final   12/27/2018 >60.0 >60 mL/min/1.73 m^2 Final   10/13/2018 >60.0 >60 mL/min/1.73 m^2 Final               Appointments  past 12m or future 3m with PCP    Date Provider   Last Visit   1/14/2020 Ebenezer Souza MD   Next Visit   7/16/2020 Ebenezer Souza MD   .  ED visits in past 90 days: 0       Note composed:3:12 AM 03/22/2020

## 2020-03-23 NOTE — TELEPHONE ENCOUNTER
I have reviewed the assessment below. Pt transitioned to famotidine (View Encounter). QDC ranitidine request.

## 2020-04-21 RX ORDER — TELMISARTAN 20 MG/1
TABLET ORAL
Qty: 90 TABLET | Refills: 2 | Status: SHIPPED | OUTPATIENT
Start: 2020-04-21 | End: 2020-11-27 | Stop reason: SDUPTHER

## 2020-04-22 NOTE — PROGRESS NOTES
Refill Authorization Note     is requesting a refill authorization.    Brief assessment and rationale for refill: APPROVE: prr                Medication reconciliation completed: No                         Comments:     Requested Prescriptions   Pending Prescriptions Disp Refills    telmisartan (MICARDIS) 20 MG Tab [Pharmacy Med Name: TELMISARTAN  TAB 20MG] 90 tablet 2     Sig: TAKE 1 TABLET ONCE DAILY       Cardiovascular:  Angiotensin Receptor Blockers Passed - 4/21/2020  9:04 PM        Passed - Patient is at least 18 years old        Passed - Last BP in normal range within 360 days.     BP Readings from Last 3 Encounters:   01/14/20 124/84   07/29/19 124/72   01/15/19 98/66              Passed - Office visit in past 12 months or future 90 days.     Recent Outpatient Visits            2 months ago MDD (major depressive disorder), recurrent, in partial remission    [facility] Psychiat[facility] Samreen Coe MD    3 months ago Cough    [facility] Ebenezer Souza MD    6 months ago MDD (major depressive disorder), recurrent, in partial remission    [facility] Samreen Coe MD    8 months ago MDD (major depressive disorder), recurrent, in partial remission    [place] - [facility]iat[facility] Samreen Coe MD    8 months ago Hypertension, unspecified type    [facility] Ebenezer Souza MD          Future Appointments              In 2 months bEenezer Souza MD [facility]                Passed - Cr is 1.3 or below and within 360 days     Creatinine   Date Value Ref Range Status   02/15/2020 1.1 0.5 - 1.4 mg/dL Final   12/27/2018 0.9 0.5 - 1.4 mg/dL Final   10/13/2018 0.8 0.5 - 1.4 mg/dL Final              Passed - K in normal range and within 360 days     Potassium   Date Value Ref Range Status   02/15/2020 4.7 3.5 - 5.1 mmol/L Final   12/27/2018 4.3 3.5 - 5.1 mmol/L Final   10/13/2018 4.2 3.5 - 5.1 mmol/L Final               Passed - eGFR within 360 days     eGFR if non    Date Value Ref Range Status   02/15/2020 51.3 (A) >60 mL/min/1.73 m^2 Final     Comment:     Calculation used to obtain the estimated glomerular filtration  rate (eGFR) is the CKD-EPI equation.      12/27/2018 >60.0 >60 mL/min/1.73 m^2 Final     Comment:     Calculation used to obtain the estimated glomerular filtration  rate (eGFR) is the CKD-EPI equation.      10/13/2018 >60.0 >60 mL/min/1.73 m^2 Final     Comment:     Calculation used to obtain the estimated glomerular filtration  rate (eGFR) is the CKD-EPI equation.        eGFR if    Date Value Ref Range Status   02/15/2020 59.2 (A) >60 mL/min/1.73 m^2 Final   12/27/2018 >60.0 >60 mL/min/1.73 m^2 Final   10/13/2018 >60.0 >60 mL/min/1.73 m^2 Final               Appointments  past 12m or future 3m with PCP    Date Provider   Last Visit   1/14/2020 Ebenezer Souza MD   Next Visit   7/16/2020 Ebenezer Souza MD   .  ED visits in past 90 days: 0       Note composed:9:04 PM 04/21/2020

## 2020-04-23 ENCOUNTER — OFFICE VISIT (OUTPATIENT)
Dept: PSYCHIATRY | Facility: CLINIC | Age: 70
End: 2020-04-23
Payer: MEDICARE

## 2020-04-23 DIAGNOSIS — F41.1 GAD (GENERALIZED ANXIETY DISORDER): ICD-10-CM

## 2020-04-23 DIAGNOSIS — F33.41 MDD (MAJOR DEPRESSIVE DISORDER), RECURRENT, IN PARTIAL REMISSION: Primary | ICD-10-CM

## 2020-04-23 PROCEDURE — 99214 PR OFFICE/OUTPT VISIT, EST, LEVL IV, 30-39 MIN: ICD-10-PCS | Mod: 95,,, | Performed by: PSYCHIATRY & NEUROLOGY

## 2020-04-23 PROCEDURE — 90833 PSYTX W PT W E/M 30 MIN: CPT | Mod: 95,,, | Performed by: PSYCHIATRY & NEUROLOGY

## 2020-04-23 PROCEDURE — 99214 OFFICE O/P EST MOD 30 MIN: CPT | Mod: 95,,, | Performed by: PSYCHIATRY & NEUROLOGY

## 2020-04-23 PROCEDURE — 90833 PR PSYCHOTHERAPY W/PATIENT W/E&M, 30 MIN (ADD ON): ICD-10-PCS | Mod: 95,,, | Performed by: PSYCHIATRY & NEUROLOGY

## 2020-04-23 RX ORDER — BUPROPION HYDROCHLORIDE 150 MG/1
150 TABLET ORAL DAILY
Qty: 90 TABLET | Refills: 0 | Status: SHIPPED | OUTPATIENT
Start: 2020-04-23 | End: 2020-05-18

## 2020-04-23 NOTE — PROGRESS NOTES
"Pt being seen via telepsych to limit exposure to covid 19.    The patient location is: [facility] [place], [place] [address]  The chief complaint leading to consultation is: anxiety f/u  Visit type: audiovisual  Total time spent with patient: 20min  Each patient to whom he or she provides medical services by telemedicine is:  (1) informed of the relationship between the physician and patient and the respective role of any other health care provider with respect to management of the patient; and (2) notified that he or she may decline to receive medical services by telemedicine and may withdraw from such care at any time.      ID: [id number] WF with a previous diag of depression. Referred by PCP, , for eval of depressive sxs. Currently on cymbalta, recently added remeron, and valium. Pt has cont'd to remain stable on current meds. No longer using valium.     CC: "depression"    Interim hx: presents on time, chart reviewed, pt seen. Appears well.     "honestly i've been doing great. I had a great schedule. Streaming Mandaen each morning, doing some gardening, talk to some friends, i've lost 6 lbs by focusing on my cholesterol and my labwork shows the results! So I've really done great things, but I do need to tell you that my brother in law got the virus and  on Good Friday. I talk to my sister 2-3 x/day and the hardest thing for everyone is that he was alone. My sister hadn't seen him since Mar 11 and he  on Apr 10. He was in a phys rehab place after surgery. Like a Skilled nursing facility and they went in lockdown and then he went to the hospital. It took him down so fast. He really didn't suffer. He was texting his son feeling fine and within an hour he was on a ventilator. It's just really rough. I have a lot of kerry and I just feel God has spared him or someone else with this." reports that her sister has had a lot of guilt about the decision that he was in the snf but it was "a family decision" " "and that if he had been home she would have had workers/aids in and out and that would have increased her sister's exposure, too.     "So anyway, i've been in this house without seeing anyone for days and days and days. So considering all that, I feel i'm doing really well."     Does facetime with her grandson robert and is grateful for that. "really there's a lot to be grateful for." enjoying her home/space, but is eager to get back to socialization, "and i'm a hugger so I hope this doesn't change that in our personalities."     On Psychiatric ROS:    Endorses better sleep on the remeron, denies anhedonia, denies feeling helpless/hopeless, improved energy, riding bike regularly, denies dec concentration, denies change in appetite (despite the remeron), denies dec PMA "I push myself. I know keeping busy helps me." Denies thoughts of SI/intent/plan.     Denies recently feeling more easily overwhelmed, denies recent ruminative thinking "the obsessive thinking is MUCH better on the meds." denies feeling tense/"on edge"     PSYCHOTHERAPY ADD-ON   30 (16-37*) minutes    Time: 20 minutes  Participants: Met with patient    Therapeutic Intervention Type: insight oriented psychotherapy, behavior modifying psychotherapy, supportive psychotherapy  Why chosen therapy is appropriate versus another modality: relevant to diagnosis, patient responds to this modality, evidence based practice    Target symptoms: health and mental wellness, processing this time of so much unknown, grief  Primary focus: diet, exercise, riding her bike as a form of mood mgmt, processing loss of brother in law  Psychotherapeutic techniques: support, education, validation, reframing, motivational interviewing    Outcome monitoring methods: self-report, observation    Patient's response to intervention:  The patient's response to intervention is accepting, motivated.    Progress toward goals:  The patient's progress toward goals is good.    PPHx: Denies " "h/o self injury, inpt psych hospitalization, denies h/o suicide attempt     Current Psych Meds: cymbalta 90mg po qam, remeron 15mg po qhs  Past Psych Meds: Lexapro and Wellbutrin (effective but led to h/a's), celexa (nausea), valium    PMHx: hypothyroid, GERD    SubstHx:   T- none  E- very rare  D- none    FamPHx: brother- suicide 1990, M-anxiety, F-anxiety, dtr- seizure while on wellbutrin    MSE: appears stated age, well groomed, appropriate dress, engages well with examiner. Good e/c. Speech reg rate and vol, nonpressured. Mood is "i'm fine. It's good." Affect congruent with some mild anxiety. dec'd verbosity. Sensorium fully intact. Oriented to date/day/location, current events. Narrative memory intact. Intellectual function is avg based on vocab and basic fund of knowledge. Thought is c/l/gd. No tangentiality or circumstantiality. No FOI/LEBRON. Denies SI/HI. Denies A/VH. No evidence of delusions. Insight and Judgment intact.     There were no vitals taken for this visit.    Suicide Risk Assessment:   Protective- age, gender, no prior attempts, no prior hospitalizations, no ongoing substance abuse, no psychosis, , has children, denies SI/intent/plan, seeking treatment, access to treatment, future oriented, good primary support, Jain  Risk- race, ongoing Axis I sxs, family h/o suicide (brother-1990)    **Pt is at LOW imminent and long term risk of suicide given current risk factors.     Assessment:  67yo WF with a previous diag of depression. Referred by PCP, , for eval of depressive sxs. Currently on cymbalta, recently added remeron, and valium per chart review. On eval the pt is reporting an improvement in all mood and anxiety sxs in the 3 wks leading to initial eval which correlates with the time Remeron was added. She had done well for many years on lexapro and wellbutrin but was having chronic headaches and self initiated a taper off meds and the headaches did remit, but mood/anxiety " "suffered until the recent combination of cymbalta and remeron which is currently managing sxs quite well. Prior to meds becoming effective the pt endorses sxs c/w diagnoses of MDD and YASMANI. No acute safety concerns but the pt has cont'd to feel mood is "a little down. Other than the headaches, I feel that I was really better on the lexapro/wellbutrin combination". Pt is active, finds good support through kerry, family and friends. Will try an inc in cymbalta to 90mg and if ineffective may try an addition of wellbutrin xl 150mg- in the past pt headaches were on the 300mg dose when in combo with lexapro. On f/u the inc in cymbalta was effective- pt other ongoing issues are therapy related. Pt continues to decline therapy as she doesn't see the need at this time- majority of her appts are spent in therapy, today struggling due to lack of progress with knee following knee replacement- will re start PT and is hopeful. Added wellbutrin a few months ago and today she continues to report it's been helpful. Pt continues to do well despite loss of brother in law to covid. Is eager for socialization but understands on a close level the need to continue distancing. Will f/u in 3mos in clinic.     Axis I: MDD, recurrent, in partial remission; YASMANI  Axis II: none at this time   Axis III: hypothyroid, GERD, HTN  Axis IV:  from , chronic mental illness  Axis V: GAF 70    Plan:   1. Cont cymbalta 90mg po qam  2. Cont remeron 15mg po qhs  3. Cont wellbutrin xl 150mg po qam  4. Cont exercise, encouraged mindfulness of dietary choices in an effort to dec inflammation  5. rec therapy but pt declines at this time  6. RTC 3mos in clinic    -Spent 30min face to face with the pt; >50% time spent in counseling   -Supportive therapy and psychoeducation provided  -R/B/SE's of medications discussed with the pt who expresses understanding and chooses to take medications as prescribed.   -Pt instructed to call clinic, 911 or go to " nearest emergency room if sxs worsen or pt is in   crisis. The pt expresses understanding.

## 2020-04-26 ENCOUNTER — NURSE TRIAGE (OUTPATIENT)
Dept: ADMINISTRATIVE | Facility: CLINIC | Age: 70
End: 2020-04-26

## 2020-04-26 ENCOUNTER — OFFICE VISIT (OUTPATIENT)
Dept: INTERNAL MEDICINE | Facility: CLINIC | Age: 70
End: 2020-04-26
Payer: MEDICARE

## 2020-04-26 ENCOUNTER — CLINICAL SUPPORT (OUTPATIENT)
Dept: URGENT CARE | Facility: CLINIC | Age: 70
End: 2020-04-26
Payer: MEDICARE

## 2020-04-26 DIAGNOSIS — R50.9 FEVER, UNSPECIFIED FEVER CAUSE: Primary | ICD-10-CM

## 2020-04-26 DIAGNOSIS — U07.1 COVID-19: Primary | ICD-10-CM

## 2020-04-26 DIAGNOSIS — M79.10 MYALGIA: ICD-10-CM

## 2020-04-26 PROCEDURE — U0002 COVID-19 LAB TEST NON-CDC: HCPCS

## 2020-04-26 PROCEDURE — 99214 PR OFFICE/OUTPT VISIT, EST, LEVL IV, 30-39 MIN: ICD-10-PCS | Mod: 95,,, | Performed by: INTERNAL MEDICINE

## 2020-04-26 PROCEDURE — 99214 OFFICE O/P EST MOD 30 MIN: CPT | Mod: 95,,, | Performed by: INTERNAL MEDICINE

## 2020-04-26 NOTE — TELEPHONE ENCOUNTER
Spoke with pt: has been isolating. Does  on grocery, no Zoroastrianism, bike rides alone. No one visits her.      Thursday had headache- that worsened over the day, and then body aches started. No urinary changes. No dizziness or lightheadedness.   Fever started thursday; this am:  Temp: 103F.  Last tylenol use 0915; previous was 1130 last night. (taking 2 xstrength tylenol 3 times a day)  Poor appetite is hydrating.     Pt verbalizes understanding protocol instructions and s/s to call 911 or go to ED for.       Reason for Disposition   [1] Fever > 101 F (38.3 C) AND [2] age > 60    Additional Information   Negative: Shock suspected (e.g., cold/pale/clammy skin, too weak to stand, low BP, rapid pulse)   Negative: Difficult to awaken or acting confused (e.g., disoriented, slurred speech)   Negative: [1] Difficulty breathing AND [2] bluish lips, tongue or face   Negative: New onset rash with multiple purple (or blood-colored) spots or dots     Has old rash to hand   Negative: Sounds like a life-threatening emergency to the triager   Negative: [1] Headache AND [2] stiff neck (can't touch chin to chest)   Negative: Difficulty breathing   Negative: IV drug abuse   Negative: [1] Drinking very little AND [2] dehydration suspected (e.g., no urine > 12 hours, very dry mouth, very lightheaded)   Negative: Fever > 104 F (40 C)   Negative: Patient sounds very sick or weak to the triager  (Exception: mild weakness and hasn't taken fever medicine)    Protocols used: FEVER-A-AH    1042: spoke with daughter re: next steps: pt's Internet went out while on tele visit with MD> was told to get tested, even w with no resp s/s.   instructed daughter on isolating, s/s to call 911 or go to ED for and that I would place pt in COVID monitoring program. veralizes understanding. instrucetd daughter would message primary MD re COVID test needed if not ordered.

## 2020-04-26 NOTE — PROGRESS NOTES
Subjective:       Patient ID: Coral Pandya is a 69 y.o. female.    Chief Complaint: No chief complaint on file.  Dictation #1  MRN:[id number]  CSN:[id number]  Dict   HPI  Review of Systems   Constitutional: Positive for fatigue and fever.   HENT: Negative for congestion, hearing loss, sinus pressure, sneezing, sore throat and voice change.    Eyes: Negative for visual disturbance.   Respiratory: Negative for cough, chest tightness and shortness of breath.    Cardiovascular: Negative.  Negative for chest pain, palpitations and leg swelling.   Gastrointestinal: Negative.    Genitourinary: Negative for difficulty urinating, dysuria, flank pain, frequency, hematuria, menstrual problem, pelvic pain, urgency, vaginal bleeding and vaginal discharge.   Musculoskeletal: Positive for back pain and myalgias. Negative for neck pain and neck stiffness.   Skin: Negative.    Neurological: Positive for headaches. Negative for dizziness, seizures, light-headedness and numbness.   Psychiatric/Behavioral: Negative for agitation, behavioral problems, confusion and sleep disturbance. The patient is not nervous/anxious.      The patient location is: home  The chief complaint leading to consultation is: fever  Visit type: audiovisual  Total time spent with patient: 25 min  Each patient to whom he or she provides medical services by telemedicine is:  (1) informed of the relationship between the physician and patient and the respective role of any other health care provider with respect to management of the patient; and (2) notified that he or she may decline to receive medical services by telemedicine and may withdraw from such care at any time.    Notes:  69-year-old female patient who is been staying at home since the middle of March.  She has had very little contact with the outside world has been getting groceries delivered to her carport and lives alone.  Patient yesterday had onset of temperature as high as 103° associated with  muscle aches, back ache, fever fever, headache.  She has had no cough for shortness of breath.  She has had no other other markers of COVID infection.  We are currently in the height of the epidemic of COVID.  Patient does not have any urinary complaints or problems with her bowels.  She is on no new medications.    Physical exam:  Not done since this is a virtual visit    Impression:  1.  F he will  2.  General achiness including headache  3.  No exposure to COVID but middle of the epidemic    Plan:  1.  I have arranged for her to get a COVID test she is taking Tylenol for fever 3.  If her COVID test is negative home going to discuss with her about getting on Tamiflu since this was also bed year for flu 4.  If she develops other symptoms suggestive another avenue to go or she continues be ill and needs to be seen those things will be arranged.    Objective:        Assessment:       1. Fever, unspecified fever cause    2. Myalgia        Plan:

## 2020-04-27 ENCOUNTER — PATIENT MESSAGE (OUTPATIENT)
Dept: FAMILY MEDICINE | Facility: CLINIC | Age: 70
End: 2020-04-27

## 2020-04-27 ENCOUNTER — TELEPHONE (OUTPATIENT)
Dept: URGENT CARE | Facility: CLINIC | Age: 70
End: 2020-04-27

## 2020-04-27 LAB — SARS-COV-2 RNA RESP QL NAA+PROBE: NOT DETECTED

## 2020-04-27 NOTE — TELEPHONE ENCOUNTER
Called patient to discuss NEGATIVE COVID-19 (coronavirus) results. Enrolled patient into Home Monitoring Program because patient reports that she is still having symptoms. Advised patient to continue treating symptoms with rest, fluids, and over-the-counter medications. Also advised patient to continue staying home and practice proper handwashing. Strict ER precautions if trouble breathing- call ER prior to entry. Patient aware, verbalized understanding and agreed with plan of care.

## 2020-04-28 ENCOUNTER — OFFICE VISIT (OUTPATIENT)
Dept: HOME HEALTH SERVICES | Facility: CLINIC | Age: 70
End: 2020-04-28
Payer: MEDICARE

## 2020-04-28 ENCOUNTER — NURSE TRIAGE (OUTPATIENT)
Dept: ADMINISTRATIVE | Facility: CLINIC | Age: 70
End: 2020-04-28

## 2020-04-28 DIAGNOSIS — R50.9 FEVER, UNSPECIFIED FEVER CAUSE: Primary | ICD-10-CM

## 2020-04-28 DIAGNOSIS — G44.52 NEW DAILY PERSISTENT HEADACHE: ICD-10-CM

## 2020-04-28 DIAGNOSIS — R05.9 COUGH: ICD-10-CM

## 2020-04-28 PROCEDURE — 99442 PR PHYSICIAN TELEPHONE EVALUATION 11-20 MIN: ICD-10-PCS | Mod: 95,,, | Performed by: NURSE PRACTITIONER

## 2020-04-28 PROCEDURE — 99442 PR PHYSICIAN TELEPHONE EVALUATION 11-20 MIN: CPT | Mod: 95,,, | Performed by: NURSE PRACTITIONER

## 2020-04-28 NOTE — TELEPHONE ENCOUNTER
"covid tracking.   Pt c/o of current temp 102 and as high as 104 yesterday. After tylenol temp goes down to "about 101"  Temp since "Thursday night". No body aches. New onset cough today.  Per pt feels like "cant eat or drink" although pt trying to push fluids. No N/V.     Per protocol pt to have virtual visit today. Pt agreeable and verbalized understanding. Pt info given to PES to schedule visit.     Reason for Disposition   [1] Fever > 101 F (38.3 C) AND [2] age > 60    Additional Information   Negative: SEVERE difficulty breathing (e.g., struggling for each breath, speaks in single words)   Negative: Difficult to awaken or acting confused (e.g., disoriented, slurred speech)   Negative: Bluish (or gray) lips or face now   Negative: Shock suspected (e.g., cold/pale/clammy skin, too weak to stand, low BP, rapid pulse)   Negative: Sounds like a life-threatening emergency to the triager   Negative: SEVERE or constant chest pain (Exception: mild central chest pain, present only when coughing)   Negative: MODERATE difficulty breathing (e.g., speaks in phrases, SOB even at rest, pulse 100-120)   Negative: MILD difficulty breathing (e.g., minimal/no SOB at rest, SOB with walking, pulse <100)   Negative: Chest pain   Negative: Patient sounds very sick or weak to the triager   Negative: Fever > 103 F (39.4 C)    Protocols used: CORONAVIRUS (COVID-19) DIAGNOSED OR [id number]      "

## 2020-04-28 NOTE — PROGRESS NOTES
Established Patient - Audio Only Telehealth Visit     The patient location is: [place], [place] (home)  The chief complaint leading to consultation is: Fever, cough  Visit type: Virtual visit with audio only (telephone)  Time spent on the phone with patient: 16 minutes     The reason for the audio only service rather than synchronous audio and video virtual visit was related to technical difficulties or patient preference/necessity.     Each patient to whom I provide medical services by telemedicine is:  (1) informed of the relationship between the physician and patient and the respective role of any other health care provider with respect to management of the patient; and (2) notified that they may decline to receive medical services by telemedicine and may withdraw from such care at any time. Patient verbally consented to receive this service via voice-only telephone call.    HPI: Coral Nelson, a 70 y/o female, reports onset of fever on 4/25/20 with associated body aches, back pain and headaches.  She was seen via virtual visit on 4/26/20 and a Covid 19 test was ordered and negative.  The patient reports she has been following stay at home orders since mid March as has had very little contact with people.  She does report riding her bicycle alone and having necessities delivered.  She has continued to have fevers, max temperature reported was 103.  She is only taking Tylenol which she reports brings her fever down and she is taking Tylenol around the clock.  She reports body aches and back pain have resolved, headaches remain.  She reports a new onset of cough (more like a tickle in her throat since yesterday).  Advised patient that there have been instances of false negative tests, thus we can't fully rule it out along with other viral processes such as flu.  Patient reassured that they is no treatment/cure for Covid 19, and treatment is aimed at symptom management with OTC medications.  Advised patient that  ibuprofen is not contraindicated as previously reported and she may take it to help with symptoms management (fever, headaches).  Patient also encouraged to use adjunct therapy such as tepid baths, cool towels to forehead and neck, and hydrating with cold water to help reduce fevers.  Advised patient to discontinue tylenol around the clock, as this may be contributing to her headaches and to use when fever reaches >102, and to alternate with ibuprofen.  She denies any other symptoms.  Warning signs discussed and instructed to go the nearest ED if dyspnea, SOB, chest pain or weakness/confusion develops.            Coral Pandya was seen today for fever and new onset cough.    Diagnosis and all order for this visit:    Fever, unspecified fever cause  -Suspect viral etiology.    -Discussed normal course of influenza not treated with tamiflu (2-3 weeks) and of COVID 19 (up to 6 weeks for most symptomatic people).   -Continue Tylenol and/or Ibuprofen prn for fever >102.  (Alternate)  -Tepid baths, hydration with cold water and use of cool towel to head and neck as needed.    Cough  -OTC cough suppressant as needed.    New daily persistent headache  - Discontinue Tylenol around the clock, use prn for fever >102 (Alternate with Ibuprofen)  - Ibuprofen 400mg PO Q4-6 hours for headache and/or fever >102.      This service was not originating from a related E/M service provided within the previous 7 days nor will  to an E/M service or procedure within the next 24 hours or my soonest available appointment.  Prevailing standard of care was able to be met in this audio-only visit.

## 2020-05-01 ENCOUNTER — NURSE TRIAGE (OUTPATIENT)
Dept: ADMINISTRATIVE | Facility: CLINIC | Age: 70
End: 2020-05-01

## 2020-05-01 NOTE — TELEPHONE ENCOUNTER
COVID HOME MONITORING FOLLOW UP  Patient called to report she is still experiencing a mild headache with some fatigue managed with ibuprofen  Pt denies SOB chest pain or difficulty breathing at this time. Pt has question how long symptoms last although her covid test was negative. Also how long would she need to be isolated advised per care advice    STOPPING HOME ISOLATION - MUST MEET ALL 3 REQUIREMENTS (CDC):  * Fever gone for at least 72 hours (3 full days) off fever-reducing medicines AND  * Cough and other symptoms must be improved AND  * Symptoms started more than 7 days ago.  * If unsure if it is safe for you to leave isolation, check the CDC website or call your healthcare provider.    Pt encouraged to continue otc meds to manage symptoms  Care Advice  -Stressed importance of proper handwashing and strict quarantine.   -Encouraged patient to drink plenty fluids  -Eat as tolerated.  -Advised patient to avoid close contact with family members/friends to prevent the spread of the virus.  -Advised against leaving the home and to quarantine in a room in the house.  -Warning signs discussed and instructed to go the nearest ED if dyspnea, SOB, chest pain or weakness/confusion develops.  -Notified patient that an automated text message will be sent daily for the next 2 weeks to check on symptoms.      Reason for Disposition   [1] COVID-19 infection diagnosed or suspected AND [2] mild symptoms (fever, cough) AND [3] no trouble breathing or other complications   MILD-MODERATE headache    Additional Information   Negative: SEVERE difficulty breathing (e.g., struggling for each breath, speaks in single words)   Negative: Difficult to awaken or acting confused (e.g., disoriented, slurred speech)   Negative: Bluish (or gray) lips or face now   Negative: Shock suspected (e.g., cold/pale/clammy skin, too weak to stand, low BP, rapid pulse)   Negative: Sounds like a life-threatening emergency to the  triager    Protocols used: CORONAVIRUS (COVID-19) DIAGNOSED OR [id number], HEADACHE-A-OH

## 2020-05-05 ENCOUNTER — PATIENT MESSAGE (OUTPATIENT)
Dept: ADMINISTRATIVE | Facility: HOSPITAL | Age: 70
End: 2020-05-05

## 2020-05-07 ENCOUNTER — NURSE TRIAGE (OUTPATIENT)
Dept: ADMINISTRATIVE | Facility: CLINIC | Age: 70
End: 2020-05-07

## 2020-05-07 DIAGNOSIS — Z20.822 CLOSE EXPOSURE TO COVID-19 VIRUS: Primary | ICD-10-CM

## 2020-05-10 ENCOUNTER — PATIENT MESSAGE (OUTPATIENT)
Dept: FAMILY MEDICINE | Facility: CLINIC | Age: 70
End: 2020-05-10

## 2020-05-11 ENCOUNTER — PATIENT MESSAGE (OUTPATIENT)
Dept: FAMILY MEDICINE | Facility: CLINIC | Age: 70
End: 2020-05-11

## 2020-05-12 ENCOUNTER — OFFICE VISIT (OUTPATIENT)
Dept: FAMILY MEDICINE | Facility: CLINIC | Age: 70
End: 2020-05-12
Payer: MEDICARE

## 2020-05-12 ENCOUNTER — TELEPHONE (OUTPATIENT)
Dept: FAMILY MEDICINE | Facility: CLINIC | Age: 70
End: 2020-05-12

## 2020-05-12 DIAGNOSIS — Z20.9 EXPOSURE TO POTENTIAL INFECTION: Primary | ICD-10-CM

## 2020-05-12 DIAGNOSIS — R31.9 HEMATURIA, UNSPECIFIED TYPE: Primary | ICD-10-CM

## 2020-05-12 PROCEDURE — 99442 PR PHYSICIAN TELEPHONE EVALUATION 11-20 MIN: CPT | Mod: 95,,, | Performed by: NURSE PRACTITIONER

## 2020-05-12 PROCEDURE — 99442 PR PHYSICIAN TELEPHONE EVALUATION 11-20 MIN: ICD-10-PCS | Mod: 95,,, | Performed by: NURSE PRACTITIONER

## 2020-05-12 NOTE — TELEPHONE ENCOUNTER
----- Message from Sepideh Barrientos sent at 5/12/2020  9:49 AM CDT -----  Contact: Coral pt  Type: Needs Medical Advice  Who Called:  Coral  Symptoms (please be specific):  Stomach pains, blood in her urine,   How long has patient had these symptoms: last night  Pharmacy name and phone #:      [id number] [place] [address] & [address] [address]  Phone: [phone] Fax: [phone]      Best Call Back Number: [phone]  Additional Information: Pls call pt regarding her symptoms. She previously had cov-id symptoms and went to be tested. Her test was negative but was told to consider herself as having the virus. She was tested at [facility] dr cardoza.

## 2020-05-12 NOTE — PROGRESS NOTES
Subjective:       Patient ID: Coral Pandya is a 69 y.o. female.    Chief Complaint: No chief complaint on file.     The patient location is: [place], [place]  The chief complaint leading to consultation is: blood in urine  Visit type: audio only  Total time spent with patient: 12 minutes  Each patient to whom he or she provides medical services by telemedicine is:  (1) informed of the relationship between the physician and patient and the respective role of any other health care provider with respect to management of the patient; and (2) notified that he or she may decline to receive medical services by telemedicine and may withdraw from such care at any time.    Notes:     Last night had lower abdominal pain. Improved, saw blood in urine. No pain today, blood in urine this am. None now. No history of kidney stones. No gastric symtpoms.     Past Medical History:  No date: Anxiety  No date: Arthralgia of knee  No date: Cervical spondylosis  No date: Depression  No date: Fibromyalgia  No date: GERD (gastroesophageal reflux disease)  No date: HTN (hypertension)  No date: Hypothyroid  No date: DANYELL (obstructive sleep apnea)    Past Surgical History:  No date: CARPAL TUNNEL RELEASE; Bilateral  6/14/2018: COLONOSCOPY; N/A      Comment:  Procedure: COLONOSCOPY;  Surgeon: Amaury Chambers MD;  Location: [facility];  Service: Endoscopy;                 Laterality: N/A;  No date: EYE SURGERY; Bilateral      Comment:  cataract surgery  No date: FOOT SURGERY; Bilateral      Comment:  bilat joints removed second toe  No date: TONSILLECTOMY, ADENOIDECTOMY  1/14/2019: TOTAL KNEE ARTHROPLASTY; Right      Comment:  Procedure: ARTHROPLASTY, KNEE, TOTAL SITE ASSISTED;                 Surgeon: Angelo Rodas MD;  Location: [facility];                 Service: Orthopedics;  Laterality: Right;  OFIRMEV  No date: TUBAL LIGATION    Review of patient's family history indicates:  Problem: Cancer      Relation: Paternal  Aunt          Age of Onset: (Not Specified)          Comment: COLON CANCER  Problem: Cancer      Relation: Paternal Uncle          Age of Onset: (Not Specified)          Comment: COLON CANCER      Social History    Socioeconomic History      Marital status:       Spouse name: Not on file      Number of children: Not on file      Years of education: Not on file      Highest education level: Not on file    Occupational History      Not on file    Social Needs      Financial resource strain: Not hard at all      Food insecurity:        Worry: Never true        Inability: Never true      Transportation needs:        Medical: No        Non-medical: No    Tobacco Use      Smoking status: Never Smoker      Smokeless tobacco: Never Used    Substance and Sexual Activity      Alcohol use: No        Frequency: Monthly or less        Drinks per session: 1 or 2        Binge frequency: Never      Drug use: No      Sexual activity: Not on file    Lifestyle      Physical activity:        Days per week: Not on file        Minutes per session: Not on file      Stress: Not on file    Relationships      Social connections:        Talks on phone: More than three times a week        Gets together: More than three times a week        Attends [employer] service: More than 4 times per year        Active member of club or organization: Yes        Attends meetings of clubs or organizations: More than 4 times per year        Relationship status:     Other Topics      Concerns:        Not on file    Social History Narrative      Not on file      Current Outpatient Medications:  buPROPion (WELLBUTRIN XL) 150 MG TB24 tablet, Take 1 tablet (150 mg total) by mouth once daily., Disp: 90 tablet, Rfl: 0  calcium carbonate-vitamin D3 (CALCIUM 600 + D,3,) 600 mg calcium- 200 unit Cap, Take 1 capsule by mouth Daily. 1 Capsule Oral Every day, Disp: , Rfl:   desonide (DESOWEN) 0.05 % cream, APPLY TO EYELID SKIN NIGHTLY PRF ITCHY LIDS, Disp:  60 g, Rfl: 11  DULoxetine (CYMBALTA) 30 MG capsule, TAKE 1 CAPSULE ONCE DAILY, Disp: 90 capsule, Rfl: 1  DULoxetine (CYMBALTA) 60 MG capsule, TAKE 1 CAPSULE ONCE DAILY, Disp: 90 capsule, Rfl: 1  famotidine (PEPCID) 40 MG tablet, Take 1 tablet (40 mg total) by mouth once daily., Disp: 30 tablet, Rfl: 11  ketotifen (ZADITOR) 0.025 % ophthalmic solution, 1 drop 2 (two) times daily., Disp: , Rfl:   m-vit,tx,iron,mins/calc/folic (MULTIVITAMIN) Tab, , Disp: , Rfl:   meloxicam (MOBIC) 15 MG tablet, TK 1 T PO QD, Disp: , Rfl: 4  mirtazapine (REMERON) 15 MG tablet, TAKE 1 TABLET EVERY EVENING, Disp: 90 tablet, Rfl: 1  mometasone (NASONEX) 50 mcg/actuation nasal spray, 2 sprays by Nasal route once daily., Disp: 17 g, Rfl: 5  naphazoline-pheniramine 0.89170-6.315% (NAPHCON-A) 0.62560-2.315 % ophthalmic solution, ONCE DAILY, Disp: , Rfl:   nebivolol (BYSTOLIC) 5 MG Tab, Take 1 tablet (5 mg total) by mouth once daily., Disp: 90 tablet, Rfl: 3  RABEprazole (ACIPHEX) 20 mg tablet, TAKE 1 TABLET ONCE DAILY, Disp: 90 tablet, Rfl: 1  SYNTHROID 100 mcg tablet, TAKE 1 TABLET DAILY, Disp: 90 tablet, Rfl: 0  telmisartan (MICARDIS) 20 MG Tab, TAKE 1 TABLET ONCE DAILY, Disp: 90 tablet, Rfl: 2  vitamin D (VITAMIN D3) 1000 units Tab, Take 5,000 Units by mouth once daily., Disp: , Rfl:     No current facility-administered medications for this visit.       Review of patient's allergies indicates:   -- Codeine     --  Other reaction(s): Nausea   -- Lisinopril -- Other (See Comments)    --  cough   -- Wellbutrin (bupropion hcl) -- Other (See Comments)    --  headache    Review of Systems   Constitutional: Negative for chills, diaphoresis, fatigue and fever.   Respiratory: Negative.  Negative for shortness of breath.    Cardiovascular: Negative.  Negative for chest pain.   Gastrointestinal: Negative.  Negative for abdominal distention, abdominal pain, blood in stool, constipation, diarrhea, nausea and vomiting.   Genitourinary: Positive for  hematuria. Negative for difficulty urinating, dyspareunia, frequency and urgency.   Neurological: Negative.        Objective:      Physical Exam   HENT:   telephone       Assessment:       1. Hematuria, unspecified type        Plan:       1. Hematuria, unspecified type  Needs in person follow up asap for any reoccurrence of hematuria or pain. Symptoms completely resolved currently, may have passed a stone. UA and culture ordered, follow up for any new or worsening symptoms.   - URINALYSIS; Future  - Urine culture; Future

## 2020-05-13 ENCOUNTER — LAB VISIT (OUTPATIENT)
Dept: LAB | Facility: HOSPITAL | Age: 70
End: 2020-05-13
Attending: NURSE PRACTITIONER
Payer: MEDICARE

## 2020-05-13 DIAGNOSIS — R31.9 HEMATURIA, UNSPECIFIED TYPE: ICD-10-CM

## 2020-05-13 LAB
BACTERIA #/AREA URNS HPF: NORMAL /HPF
BILIRUB UR QL STRIP: NEGATIVE
CLARITY UR: ABNORMAL
COLOR UR: YELLOW
GLUCOSE UR QL STRIP: NEGATIVE
HGB UR QL STRIP: ABNORMAL
KETONES UR QL STRIP: NEGATIVE
LEUKOCYTE ESTERASE UR QL STRIP: NEGATIVE
MICROSCOPIC COMMENT: NORMAL
NITRITE UR QL STRIP: NEGATIVE
PH UR STRIP: 6 [PH] (ref 5–8)
PROT UR QL STRIP: NEGATIVE
RBC #/AREA URNS HPF: NORMAL /HPF (ref 0–4)
SP GR UR STRIP: 1.02 (ref 1–1.03)
URN SPEC COLLECT METH UR: ABNORMAL

## 2020-05-13 PROCEDURE — 81000 URINALYSIS NONAUTO W/SCOPE: CPT | Mod: PO

## 2020-05-13 PROCEDURE — 87086 URINE CULTURE/COLONY COUNT: CPT

## 2020-05-13 RX ORDER — CIPROFLOXACIN 250 MG/1
250 TABLET, FILM COATED ORAL 2 TIMES DAILY
Qty: 14 TABLET | Refills: 0 | Status: SHIPPED | OUTPATIENT
Start: 2020-05-13 | End: 2020-05-20

## 2020-05-13 NOTE — TELEPHONE ENCOUNTER
----- Message from Mia Howard sent at 5/13/2020 10:00 AM CDT -----  Contact: pt  Type: Needs Medical Advice  Who Called:  pt  Symptoms (please be specific):    How long has patient had these symptoms:    Pharmacy name and phone #:    Best Call Back Number: [phone] (home)     Additional Information: pt stated that she request covid 19 testing, test not in the system for pt to schedule labs. pls call to advise

## 2020-05-14 ENCOUNTER — TELEPHONE (OUTPATIENT)
Dept: FAMILY MEDICINE | Facility: CLINIC | Age: 70
End: 2020-05-14

## 2020-05-14 NOTE — TELEPHONE ENCOUNTER
Pt was in pain this morning. She took 800mg of Advil this morning and has not experienced any pain since then. She wants to know if it is okay for her to continuing taking Advil when she's in pain.

## 2020-05-14 NOTE — TELEPHONE ENCOUNTER
Spoke to pt informing her that it it ok to take Aleve for her pain. Pt states that since she took the Aleve this morning she has been feeling much better. Pt states that she thinks the abx is working and will call for an appt if the pain comes back.

## 2020-05-14 NOTE — TELEPHONE ENCOUNTER
----- Message from Jewell Torres sent at 5/14/2020 11:02 AM CDT -----  Type: Needs Medical Advice  Who Called:  Patient   Symptoms (please be specific):  Pain from UTI  How long has patient had these symptoms:  6 am this morning  Pharmacy name and phone #:    [id number] - [place] [address] & [address] [address]  Phone: [phone] Fax: [phone]  Best Call Back Number: [phone]  Additional Information:  Took 800 mg of Advil, contact to advise if patient should continue

## 2020-05-14 NOTE — TELEPHONE ENCOUNTER
She can take advil, but if she continues to have pain she needs to be seen by someone in clinic tomorrow.

## 2020-05-15 LAB
BACTERIA UR CULT: NORMAL
BACTERIA UR CULT: NORMAL

## 2020-05-18 ENCOUNTER — TELEPHONE (OUTPATIENT)
Dept: FAMILY MEDICINE | Facility: CLINIC | Age: 70
End: 2020-05-18

## 2020-05-18 DIAGNOSIS — E03.9 HYPOTHYROIDISM, UNSPECIFIED TYPE: ICD-10-CM

## 2020-05-18 RX ORDER — MIRTAZAPINE 15 MG/1
TABLET, FILM COATED ORAL
Qty: 90 TABLET | Refills: 1 | Status: SHIPPED | OUTPATIENT
Start: 2020-05-18 | End: 2020-07-23 | Stop reason: SDUPTHER

## 2020-05-18 RX ORDER — BUPROPION HYDROCHLORIDE 150 MG/1
TABLET ORAL
Qty: 90 TABLET | Refills: 0 | Status: SHIPPED | OUTPATIENT
Start: 2020-05-18 | End: 2020-07-23 | Stop reason: SDUPTHER

## 2020-05-18 NOTE — TELEPHONE ENCOUNTER
No new care gaps identified.  Powered by [employer] Reference number: [id number]. 5/18/2020 11:50:49 AM   DONAT

## 2020-05-18 NOTE — TELEPHONE ENCOUNTER
She needs a visit with urology asap. If they can not see her this week then she needs an in clinic visit with her pcp or another available provider.

## 2020-05-18 NOTE — TELEPHONE ENCOUNTER
Pt states urine was clear yesterday but this morning she noticed blood in it. She is not experiencing any pain, she wants to know what she should do now. Please advise.

## 2020-05-18 NOTE — TELEPHONE ENCOUNTER
Care Due:                  Date            Visit Type   Department     Provider  --------------------------------------------------------------------------------                                ESTABLISHED                              PATIENT      [facility]  Last Visit: 01-      SHORT        MEDICINE       Ebenezer MOJICA                              ESTABLISHED   [facility]  Next Visit: 07-      PATIENT      MEDICINE       Ebenezer MOJICA                                                            Last  Test          Frequency    Reason                     Performed    Due Date  --------------------------------------------------------------------------------    Office Visit  6 months...  buPROPion................  Not Found    Overdue    eGFR........  12 months..  famotidine...............  Not Found    Overdue    Powered by [employer]. Reference number: [id number]. 5/18/2020 11:36:31 AM   CDT

## 2020-05-18 NOTE — TELEPHONE ENCOUNTER
----- Message from Dev Leon sent at 5/18/2020  9:20 AM CDT -----  Contact: Patient  Type: Needs Medical Advice    Who Called:  Patient  Best Call Back Number: [phone]  Additional Information: Patient would like to discuss UTI. Please call to advise. Thanks!

## 2020-05-19 ENCOUNTER — LAB VISIT (OUTPATIENT)
Dept: LAB | Facility: HOSPITAL | Age: 70
End: 2020-05-19
Attending: UROLOGY
Payer: MEDICARE

## 2020-05-19 ENCOUNTER — OFFICE VISIT (OUTPATIENT)
Dept: UROLOGY | Facility: CLINIC | Age: 70
End: 2020-05-19
Payer: MEDICARE

## 2020-05-19 VITALS
SYSTOLIC BLOOD PRESSURE: 136 MMHG | WEIGHT: 160.06 LBS | BODY MASS INDEX: 25.12 KG/M2 | HEART RATE: 68 BPM | DIASTOLIC BLOOD PRESSURE: 77 MMHG | HEIGHT: 67 IN

## 2020-05-19 DIAGNOSIS — R31.9 HEMATURIA, UNSPECIFIED TYPE: Primary | ICD-10-CM

## 2020-05-19 DIAGNOSIS — R31.0 GROSS HEMATURIA: ICD-10-CM

## 2020-05-19 LAB
BILIRUB SERPL-MCNC: ABNORMAL MG/DL
BLOOD URINE, POC: ABNORMAL
COLOR, POC UA: YELLOW
GLUCOSE UR QL STRIP: ABNORMAL
KETONES UR QL STRIP: ABNORMAL
LEUKOCYTE ESTERASE URINE, POC: ABNORMAL
NITRITE, POC UA: ABNORMAL
PH, POC UA: 5.5
PROTEIN, POC: 30
SPECIFIC GRAVITY, POC UA: >=1.03
UROBILINOGEN, POC UA: 0.2

## 2020-05-19 PROCEDURE — 82565 ASSAY OF CREATININE: CPT

## 2020-05-19 PROCEDURE — 81002 POCT URINE DIPSTICK WITHOUT MICROSCOPE: ICD-10-PCS | Mod: ,,, | Performed by: UROLOGY

## 2020-05-19 PROCEDURE — 88112 CYTOPATH CELL ENHANCE TECH: CPT | Performed by: PATHOLOGY

## 2020-05-19 PROCEDURE — 99213 OFFICE O/P EST LOW 20 MIN: CPT | Mod: PBBFAC,PO | Performed by: UROLOGY

## 2020-05-19 PROCEDURE — 99204 OFFICE O/P NEW MOD 45 MIN: CPT | Mod: 25,,, | Performed by: UROLOGY

## 2020-05-19 PROCEDURE — 88112 PR  CYTOPATH, CELL ENHANCE TECH: ICD-10-PCS | Mod: 26,,, | Performed by: PATHOLOGY

## 2020-05-19 PROCEDURE — 99204 PR OFFICE/OUTPT VISIT, NEW, LEVL IV, 45-59 MIN: ICD-10-PCS | Mod: 25,,, | Performed by: UROLOGY

## 2020-05-19 PROCEDURE — 36415 COLL VENOUS BLD VENIPUNCTURE: CPT | Mod: PO

## 2020-05-19 PROCEDURE — 81002 URINALYSIS NONAUTO W/O SCOPE: CPT | Mod: ,,, | Performed by: UROLOGY

## 2020-05-19 PROCEDURE — 99999 PR PBB SHADOW E&M-EST. PATIENT-LVL III: ICD-10-PCS | Mod: PBBFAC,,, | Performed by: UROLOGY

## 2020-05-19 PROCEDURE — 88112 CYTOPATH CELL ENHANCE TECH: CPT | Mod: 26,,, | Performed by: PATHOLOGY

## 2020-05-19 PROCEDURE — 99999 PR PBB SHADOW E&M-EST. PATIENT-LVL III: CPT | Mod: PBBFAC,,, | Performed by: UROLOGY

## 2020-05-19 RX ORDER — SULFAMETHOXAZOLE AND TRIMETHOPRIM 800; 160 MG/1; MG/1
TABLET ORAL
COMMUNITY
Start: 2020-02-12 | End: 2020-05-29 | Stop reason: CLARIF

## 2020-05-19 RX ORDER — CLINDAMYCIN HYDROCHLORIDE 300 MG/1
CAPSULE ORAL
COMMUNITY
Start: 2020-03-05 | End: 2020-05-29 | Stop reason: CLARIF

## 2020-05-19 RX ORDER — LISINOPRIL 2.5 MG/1
TABLET ORAL
COMMUNITY
End: 2020-05-29 | Stop reason: CLARIF

## 2020-05-19 RX ORDER — FAMOTIDINE 20 MG/1
20 TABLET, FILM COATED ORAL NIGHTLY
COMMUNITY
End: 2020-05-29 | Stop reason: CLARIF

## 2020-05-19 RX ORDER — NEBIVOLOL HYDROCHLORIDE 5 MG/1
TABLET ORAL
Qty: 90 TABLET | Refills: 3 | Status: SHIPPED | OUTPATIENT
Start: 2020-05-19 | End: 2020-07-16 | Stop reason: SDUPTHER

## 2020-05-19 NOTE — TELEPHONE ENCOUNTER
----- Message from Ami Linda sent at 5/19/2020  8:06 AM CDT -----  Contact: pt  Type: Needs Medical Advice    Who Called:      Best Call Back Number:     Additional Information: Requesting a call back from Nurse, regarding pt was advised to come  for a visit by NP for urine in her blood pt wants to be seen today ,please call asap and maybe need to be seen by a UROLOGY

## 2020-05-19 NOTE — PROGRESS NOTES
UROLOGY [place]  5 19 20    Cc blood in urine    Age 69, noticed the urine discolored about two weeks ago, and the following days it became more clearly bloody. No clots were seen. It has continued intermittently bloody. It happened when she presented tiredness, fever 104, body aches, no appetite. Was thought to have covid, but test was negative.     She was suspected of having a uti and was put on cipro. The urine culture came back multiple organisms. Pt still on the cipro.       PMH    Surgical: has a past surgical history that includes Tubal ligation; TONSILLECTOMY, ADENOIDECTOMY; Carpal tunnel release (Bilateral); Foot surgery (Bilateral); Eye surgery (Bilateral); Colonoscopy (N/A, 6/14/2018); and Total knee arthroplasty (Right, 1/14/2019).    Medical:  has a past medical history of Anxiety, Arthralgia of knee, Cervical spondylosis, Depression, Fibromyalgia, GERD (gastroesophageal reflux disease), HTN (hypertension), Hypothyroid, and DANYELL (obstructive sleep apnea).    Familial: no fh of renal disease    Social: , lives in [place]    Meds:   Current Outpatient Medications on File Prior to Visit   Medication Sig Dispense Refill    buPROPion (WELLBUTRIN XL) 150 MG TB24 tablet TAKE 1 TABLET ONCE DAILY 90 tablet 0    calcium carbonate-vitamin D3 (CALCIUM 600 + D,3,) 600 mg calcium- 200 unit Cap Take 1 capsule by mouth Daily. 1 Capsule      ciprofloxacin HCl (CIPRO) 250 MG tablet Take 1 tablet (250 mg total) by mout 14 tablet 0    desonide (DESOWEN) 0.05 % cream APPLY TO EYELID SKIN NI 60 g 11    DULoxetine (CYMBALTA) 60 MG capsule TAKE 1 CAPSULE ONCE DAILY 90 capsule 1    famotidine (PEPCID) 40 MG tablet Take 1 tablet (40 mg total) by esther 30 tablet 11    ketotifen (ZADITOR) 0.025 % ophthalmic solution 1 drop 2 (two) times daily.      mirtazapine (REMERON) 15 MG tablet TAKE 1 TABLET EVERY EVENING 90 tablet 1    mometasone (NASONEX) 50 mcg/actuation nasal spray 2 sprays by Nasal route once daily. 17  g 5    naphazoline-phenir (NAPHCON-A) 0.79523-5.315 % ophthalmic solution ONCE DAILY      nebivolol (BYSTOLIC) 5 MG Tab Take 1 tablet (5 mg total) by mo 90 tablet 3    RABEprazole (ACIPHEX) 20 mg tablet TAKE 1 TABLET ONCE DAILY 90 tablet 1    SYNTHROID 100 mcg tablet TA 90 tablet 0    telmisartan (MICARDIS) 20 MG Tab TAKE 1 TABLET ONCE DAILY 90 tablet 2    vitamin D (VITAMIN D3) 1000 units Tab Take 5,000 Units by mouth once daily.      DULoxetine (CYMBALTA) 30 MG capsule TAKE 1 CAPSULE ONCE 90 capsule 1    m-vit,tx,iron,mins/calc       meloxicam (MOBIC) 1 TK 1 T PO QD  4         Pt alert, oriented, no distress  HEENT: wnl.  Neck: supple, no JVD, no lymphadenopathy  Chest: CV NSR  Lungs: normal chest expansion, no labored breathing  Abdomen flat, nontender, no organomegaly, no masses.  Extremities: no edema, peripheral pulses nl  Neuro: preserved    IMP    Gross hematuria, intermittent  Recent uti, resolving  Will do routine diagnostic w/u for uti: CT urogram, cystoscopy, urine cytology, bladder scan

## 2020-05-19 NOTE — TELEPHONE ENCOUNTER
Spoke with pt. Made appt. To see Dr. Rodrigues at 11 today 5/19/2020. Pt verbalized understanding.

## 2020-05-19 NOTE — Clinical Note
May 19, 2020      No Recipients           [facility]  [address]  [place] [address]  Phone: [phone]  Fax: [phone]          Patient: Coral Pandya   MR Number: [id number]   YOB: 1950   Date of Visit: 5/19/2020       Dear :    Thank you for referring Coral Pandya to me for evaluation. Attached you will find relevant portions of my assessment and plan of care.    If you have questions, please do not hesitate to call me. I look forward to following Coral Pandya along with you.    Sincerely,    Alcon Rodrigues MD    Enclosure  CC:  No Recipients    If you would like to receive this communication electronically, please contact [email or web address] or [phone] to request more information on [employer] Link access.    For providers and/or their staff who would like to refer a patient to [facility], please contact us through our one-stop-shop provider referral line, [facility], at [phone].    If you feel you have received this communication in error or would no longer like to receive these types of communications, please e-mail [email or web address]

## 2020-05-20 ENCOUNTER — PATIENT MESSAGE (OUTPATIENT)
Dept: UROLOGY | Facility: CLINIC | Age: 70
End: 2020-05-20

## 2020-05-20 LAB
CREAT SERPL-MCNC: 0.9 MG/DL (ref 0.5–1.4)
EST. GFR  (AFRICAN AMERICAN): >60 ML/MIN/1.73 M^2
EST. GFR  (NON AFRICAN AMERICAN): >60 ML/MIN/1.73 M^2
FINAL PATHOLOGIC DIAGNOSIS: NORMAL

## 2020-05-20 RX ORDER — LEVOTHYROXINE SODIUM 100 UG/1
TABLET ORAL
Qty: 90 TABLET | Refills: 0 | Status: SHIPPED | OUTPATIENT
Start: 2020-05-20 | End: 2020-07-16 | Stop reason: SDUPTHER

## 2020-05-20 RX ORDER — RABEPRAZOLE SODIUM 20 MG/1
20 TABLET, DELAYED RELEASE ORAL DAILY
Qty: 90 TABLET | Refills: 2 | Status: SHIPPED | OUTPATIENT
Start: 2020-05-20 | End: 2020-11-27 | Stop reason: SDUPTHER

## 2020-05-20 RX ORDER — LEVOTHYROXINE SODIUM 100 UG/1
100 TABLET ORAL DAILY
Qty: 90 TABLET | Refills: 0 | OUTPATIENT
Start: 2020-05-20

## 2020-05-20 NOTE — PROGRESS NOTES
Refill Authorization Note     is requesting a refill authorization.    Brief assessment and rationale for refill: DEFER: synthroid - needs labs // APPROVE: bystolic -prr     Medication-related problems identified: Requires labs    Medication Therapy Plan: NTBO(TSH); Labs outdated > 6 months; Defer synthroid; Approve bystolic    Medication reconciliation completed: No                         Comments:   Automatic Epic Protocol Generated Data:    Requested Prescriptions   Pending Prescriptions Disp Refills    SYNTHROID 100 mcg tablet [Pharmacy Med Name: SYNTHROID TAB 0.1MG] 90 tablet 0     Sig: TAKE 1 TABLET DAILY       Endocrinology:  Hypothyroid Agents Failed - 5/19/2020  9:25 AM        Failed - Manual Review: FT4 is not required if last TSH is WNL.        Failed - TSH in normal range and within 360 days     TSH   Date Value Ref Range Status   09/12/2018 1.685 0.400 - 4.000 uIU/mL Final   03/16/2017 0.760 0.400 - 4.000 uIU/mL Final   01/09/2016 0.907 0.400 - 4.000 uIU/mL Final              Failed - T4 free within 1080 days     Free T4   Date Value Ref Range Status   04/28/2012 0.92 0.71 - 1.51 ng/dl Final   12/24/2011 0.83 0.71 - 1.51 ng/dl Final   09/13/2010 0.83 0.71 - 1.51 ng/dl Final              Passed - Patient is at least 18 years old        Passed - Office visit in past 12 months or future 90 days.     Recent Outpatient Visits            Today Hematuria, unspecified type    [place] [facility]rology Alcon Rodrigues MD    1 week ago Hematuria, unspecified type    [place] - Family Medicine Yesenia Chin NP    3 weeks ago Fever, unspecified fever cause    [facility] - Home Visits Gricel Matthew NP    3 weeks ago Fever, unspecified fever cause    [place] - Internal Medicine Gurvinder Perea MD    3 weeks ago MDD (major depressive disorder), recurrent, in partial remission    [place] - Psychiatry Samreen Coe MD          Future Appointments              In 2 days [facility] CT1 LIMIT 500 LBS  [facility] [place]    In 3 days [place], [place] [facility] [place]    In 1 month [facility]    In 1 month Ebenezer Souza MD [facility]              Signed Prescriptions Disp Refills    BYSTOLIC 5 mg Tab 90 tablet 3     Sig: TAKE 1 TABLET DAILY       Cardiovascular:  Beta Blockers Passed - 5/19/2020  9:57 PM        Passed - Patient is at least 18 years old        Passed - Last BP in normal range within 360 days.     BP Readings from Last 3 Encounters:   05/19/20 136/77   01/14/20 124/84   07/29/19 124/72              Passed - Last Heart Rate in normal range within 360 days.     Pulse Readings from Last 3 Encounters:   05/19/20 68   01/14/20 66   07/29/19 68             Passed - Office visit in past 12 months or future 90 days.     Recent Outpatient Visits            Today Hematuria, unspecified type    [facility] Urology Alcon Rodrigues MD    1 week ago Hematuria, unspecified type    [facility] Yesenia Chin NP    3 weeks ago Fever, unspecified fever cause    [facility] - Home Visits Gricel Poplus, NP    3 weeks ago Fever, unspecified fever cause    [place] - Internal Medicine Gurvinder Perea MD    3 weeks ago MDD (major depressive disorder), recurrent, in partial remission    [place] - Psychiatry Samreen Coe MD          Future Appointments              In 2 days [facility] CT1 LIMIT 500 LBS [facility] [place]    In 3 days LAB, [place] [facility] [place]    In 1 month [facility]ues [facility]    In 1 month Ebenezer Souza MD [facility]                   Appointments  past 12m or future 3m with PCP    Date Provider   Last Visit   1/14/2020 Ebenezer Souza MD   Next Visit   7/16/2020 Ebenezer Souza MD   ED visits in past 90 days: 0     Note composed:10:03 PM  05/19/2020

## 2020-05-21 ENCOUNTER — HOSPITAL ENCOUNTER (OUTPATIENT)
Dept: RADIOLOGY | Facility: HOSPITAL | Age: 70
Discharge: HOME | End: 2020-05-21
Attending: UROLOGY
Payer: MEDICARE

## 2020-05-21 DIAGNOSIS — R31.0 GROSS HEMATURIA: ICD-10-CM

## 2020-05-21 PROCEDURE — 74178 CT UROGRAM ABD PELVIS W WO: ICD-10-PCS | Mod: 26,,, | Performed by: RADIOLOGY

## 2020-05-21 PROCEDURE — 74178 CT ABD&PLV WO CNTR FLWD CNTR: CPT | Mod: 26,,, | Performed by: RADIOLOGY

## 2020-05-21 PROCEDURE — 74178 CT ABD&PLV WO CNTR FLWD CNTR: CPT | Mod: TC,PO

## 2020-05-21 PROCEDURE — 25500020 PHARM REV CODE 255: Mod: PO | Performed by: UROLOGY

## 2020-05-21 RX ADMIN — IOHEXOL 125 ML: 350 INJECTION, SOLUTION INTRAVENOUS at 02:05

## 2020-05-21 NOTE — PROGRESS NOTES
Refill Authorization Note    is requesting a refill authorization.    Brief assessment and rationale for refill: QUICK DC: duplicate request (levothyroxine) // APPROVE: prr           Medication Therapy Plan: LT4 ordered by PCP 5/20 for 90 day supply; Quick DC// Approve 9 more rabeprazole    Medication reconciliation completed: No                         Comments:      Requested Prescriptions   Signed Prescriptions Disp Refills    RABEprazole (ACIPHEX) 20 mg tablet 90 tablet 2     Sig: Take 1 tablet (20 mg total) by mouth once daily.       Gastroenterology: Proton Pump Inhibitors 2 Passed - 5/18/2020 11:57 AM        Passed - Patient is at least 18 years old        Passed - Osteoporosis is not on problem list        Passed - An appropriate indication is on the problem list        Passed - Office visit in past 6 months or future 90 days.     Recent Outpatient Visits            Yesterday Hematuria, unspecified type    [facility] Urology Alcon Rodrigues MD    1 week ago Hematuria, unspecified type    [facility] Yesenia Chin NP    3 weeks ago Fever, unspecified fever cause    [facility] - Home Visits Gricel Matthew, NP    3 weeks ago Fever, unspecified fever cause    [place] - Internal Medicine Gurvinder Perea MD    3 weeks ago MDD (major depressive disorder), recurrent, in partial remission    [place] - Psychiatry Samreen Coe MD          Future Appointments              Tomorrow [facility] CT1 LIMIT 500 LBS [facility] [place]    In 2 days LAB, [place] [facility] [place]    In 1 month [facility]    In 1 month Ebenezer Souza MD [facility], [place]              Refused Prescriptions Disp Refills    levothyroxine (SYNTHROID) 100 MCG tablet 90 tablet 0     Sig: Take 1 tablet (100 mcg total) by mouth once daily.       Endocrinology:  Hypothyroid Agents Failed - 5/18/2020  11:57 AM        Failed - Manual Review: FT4 is not required if last TSH is WNL.        Failed - TSH in normal range and within 360 days     TSH   Date Value Ref Range Status   09/12/2018 1.685 0.400 - 4.000 uIU/mL Final   03/16/2017 0.760 0.400 - 4.000 uIU/mL Final   01/09/2016 0.907 0.400 - 4.000 uIU/mL Final              Failed - T4 free within 1080 days     Free T4   Date Value Ref Range Status   04/28/2012 0.92 0.71 - 1.51 ng/dl Final   12/24/2011 0.83 0.71 - 1.51 ng/dl Final   09/13/2010 0.83 0.71 - 1.51 ng/dl Final              Passed - Patient is at least 18 years old        Passed - Office visit in past 12 months or future 90 days.     Recent Outpatient Visits            Yesterday Hematuria, unspecified type    [place] - Urology Alcon Rodrigues MD    1 week ago Hematuria, unspecified type    [place] - Family Medicine Yesenia Chin NP    3 weeks ago Fever, unspecified fever cause    [facility] - Home Visits Gricel Matthew, [place]    3 weeks ago Fever, unspecified fever cause    [place] - Internal Medicine Gurvinder Perea MD    3 weeks ago MDD (major depressive disorder), recurrent, in partial remission    [place] - Psychiatry Samreen Coe MD          Future Appointments              Tomorrow [facility] CT1 LIMIT 500 LBS [facility]    In 2 days LAB, [place] [facility]    In 1 month [facility]    In 1 month Ebenezer Souza MD [facility]                Powered by [employer] - 5/18/2020 11:57 AM        The requested medication is not on the active medication list.         Appointments  past 12m or future 3m with PCP    Date Provider   Last Visit   1/14/2020 Ebenezer Souza MD   Next Visit   5/18/2020 Ebenezer Souza MD   ED visits in past 90 days: 0     Note composed:9:32 PM 05/20/2020

## 2020-05-22 ENCOUNTER — LAB VISIT (OUTPATIENT)
Dept: LAB | Facility: HOSPITAL | Age: 70
End: 2020-05-22
Attending: FAMILY MEDICINE
Payer: MEDICARE

## 2020-05-22 DIAGNOSIS — Z20.822 CLOSE EXPOSURE TO COVID-19 VIRUS: ICD-10-CM

## 2020-05-22 PROCEDURE — 86769 SARS-COV-2 COVID-19 ANTIBODY: CPT

## 2020-05-22 PROCEDURE — 36415 COLL VENOUS BLD VENIPUNCTURE: CPT | Mod: PO

## 2020-05-23 ENCOUNTER — NURSE TRIAGE (OUTPATIENT)
Dept: ADMINISTRATIVE | Facility: CLINIC | Age: 70
End: 2020-05-23

## 2020-05-23 LAB — SARS-COV-2 IGG SERPLBLD QL IA.RAPID: NEGATIVE

## 2020-05-24 NOTE — TELEPHONE ENCOUNTER
Reason for Disposition   Side (flank) or back pain present    Additional Information   Negative: Shock suspected (e.g., cold/pale/clammy skin, too weak to stand, low BP, rapid pulse)   Negative: Sounds like a life-threatening emergency to the triager   Negative: Urinary catheter, questions about   Negative: Recent back or abdominal injury   Negative: Recent genital injury   Negative: [1] Unable to urinate (or only a few drops) > 4 hours AND [2] bladder feels very full (e.g., palpable bladder or strong urge to urinate)   Negative: Passing pure blood or large blood clots (i.e., size > a dime) (Exception: carmen or small strands)   Negative: Fever > 100.5 F (38.1 C)   Negative: Patient sounds very sick or weak to the triager   Negative: Known sickle cell disease   Negative: Taking Coumadin (warfarin) or other strong blood thinner, or known bleeding disorder (e.g., thrombocytopenia)    Protocols used:  [facility]    Patient is concerned because she is seeing blood in her urine and has pressure in lower abdominal. She had a kidney scan and urinalysis.Her temp is 97.1 (temporal scanner) /75 HR 72., 149/77 HR 81 and usually 90/60 reading.patient states she has not taken her bp med at night yet. She states she got my chart results that show she has a clot in her right ureter, and this was not discussed with her. Patient unclear as to anatomy of the urinary system so I advised her to look up a diagram of the kidneys and bladder and told her she has 2 ureters, 1 from each kidney going into her bladder. She states the blood she saw in her urine is not more than what she has been seeing since her appt with Dr. Rodrigues. But now she is worried about the clot indicated on her CT urogram. I told the patient that I would reach out to on call Dr. Bustos to be clear that this is something she doesn't have to go to the ED now for. Patient verbalzed understanding.     Could not reach Dr. Bustos, left  message with opertor.     Called  back and asked that she reach out to Dr. Rodrigues. Dr. Rodrigues informed that patient was concerned about the urogram indicating a clot but she is afebrile, still having the same amount of bleeding, bp elevated--but she hasn't taken her bp medicine. Also, indicated she is emptying her bladder and not seeing more blood than before, just has concern over the impression of the urogram. Dr. Rodrigues states that clotting is expected and they break down and this not for her to worry about just as long as her symptoms have not worsened. He wants the patient to drink plenty of water daily.     Called patient back to relay that Dr. Rodrigues is aware of the clot and as long as her symptoms are not worse, there is not a need for her to worry or go to the ED unless symptoms worsen or see gets a fever. Patient verbalized understanding.

## 2020-05-24 NOTE — PROGRESS NOTES
Provider Staff:     Please schedule patient for Labs (TSH)    Please also check with your provider if any further labs need to be added and scheduled together.    Thanks!  [facility]     Appointments  past 12m or future 3m with PCP    Date Provider   Last Visit   1/14/2020 Ebenezer Souza MD   Next Visit   7/16/2020 Ebenezer Souza MD     Note composed:8:02 PM 05/23/2020

## 2020-05-25 ENCOUNTER — TELEPHONE (OUTPATIENT)
Dept: UROLOGY | Facility: CLINIC | Age: 70
End: 2020-05-25

## 2020-05-25 DIAGNOSIS — R31.9 HEMATURIA, UNSPECIFIED TYPE: Primary | ICD-10-CM

## 2020-05-25 DIAGNOSIS — N32.89 BLADDER MASS: Primary | ICD-10-CM

## 2020-05-25 NOTE — TELEPHONE ENCOUNTER
----- Message from Alcon Rodrigues MD sent at 5/25/2020 10:08 AM CDT -----  I called pt and let her know that the CT scan showed a filling defect in the bladder and a filling defect in the R ureter.  We will need cystoscopy under general anesthesia at [facility], possible turbt, bilateral retrograde pyelography, R ureteroscopy, biopsy of ureteral lesion. Says Monday next week (June 1 2020), would suit her fine. Thank you

## 2020-05-26 ENCOUNTER — TELEPHONE (OUTPATIENT)
Dept: UROLOGY | Facility: CLINIC | Age: 70
End: 2020-05-26

## 2020-05-26 NOTE — TELEPHONE ENCOUNTER
----- Message from Cally Holman sent at 5/26/2020  9:22 AM CDT -----  Type: Needs Medical Advice  Who Called:  Patient  Best Call Back Number: [phone]  Additional Information: States she is scheduled for a procedure on 6/1/20 with Dr. Cardenas but she wants to know why it was switched to Dr. Cardenas instead of Dr. Rodrigues. States nobody said anything about this before. Please call to advise.

## 2020-05-26 NOTE — ANESTHESIA PREPROCEDURE EVALUATION
01/03/2019  Coral Pandya is a 68 y.o., female.    Anesthesia Evaluation    I have reviewed the Patient Summary Reports.    I have reviewed the Nursing Notes.   I have reviewed the Medications.     Review of Systems  Anesthesia Hx:  No problems with previous Anesthesia  Denies Family Hx of Anesthesia complications.   Denies Personal Hx of Anesthesia complications.   Social:  Non-Smoker    Hematology/Oncology:  Hematology Normal   Oncology Normal     EENT/Dental:EENT/Dental Normal   Cardiovascular:   Exercise tolerance: good Hypertension    Pulmonary:   Sleep Apnea    Renal/:  Renal/ Normal     Hepatic/GI:   GERD, well controlled    Musculoskeletal:   Arthritis   Spine Disorders: cervical    Neurological:   Neuromuscular Disease,    Endocrine:   Hypothyroidism    Dermatological:  Skin Normal    Psych:   Psychiatric History anxiety depression          Physical Exam  General:  Well nourished    Airway/Jaw/Neck:  Airway Findings: Mouth Opening: Normal Tongue: Normal  General Airway Assessment: Adult  Mallampati: I  TM Distance: Normal, at least 6 cm  Jaw/Neck Findings:  Neck ROM: Normal ROM      Dental:  Dental Findings: In tact             Anesthesia Plan  Type of Anesthesia, risks & benefits discussed:  Anesthesia Type:  spinal  Patient's Preference:   Intra-op Monitoring Plan:   Intra-op Monitoring Plan Comments:   Post Op Pain Control Plan:   Post Op Pain Control Plan Comments:   Induction:   IV  Beta Blocker:         Informed Consent: Patient understands risks and agrees with Anesthesia plan.  Questions answered. Anesthesia consent signed with patient.  ASA Score: 3     Day of Surgery Review of History & Physical:    H&P update referred to the surgeon.         Ready For Surgery From Anesthesia Perspective.        What Type Of Note Output Would You Prefer (Optional)?: Standard Output Hpi Title: Evaluation of Skin Lesions How Severe Are Your Spot(S)?: mild Have Your Spot(S) Been Treated In The Past?: has not been treated

## 2020-05-26 NOTE — TELEPHONE ENCOUNTER
clled patient and advised wrong doctor's name was put on procedure, but the mistake has been corrected. Surgery is with dr church.

## 2020-05-30 ENCOUNTER — LAB VISIT (OUTPATIENT)
Dept: FAMILY MEDICINE | Facility: CLINIC | Age: 70
End: 2020-05-30
Payer: MEDICARE

## 2020-05-30 DIAGNOSIS — R31.9 HEMATURIA, UNSPECIFIED TYPE: ICD-10-CM

## 2020-05-30 PROCEDURE — U0003 INFECTIOUS AGENT DETECTION BY NUCLEIC ACID (DNA OR RNA); SEVERE ACUTE RESPIRATORY SYNDROME CORONAVIRUS 2 (SARS-COV-2) (CORONAVIRUS DISEASE [COVID-19]), AMPLIFIED PROBE TECHNIQUE, MAKING USE OF HIGH THROUGHPUT TECHNOLOGIES AS DESCRIBED BY CMS-2020-01-R: HCPCS

## 2020-05-31 LAB — SARS-COV-2 RNA RESP QL NAA+PROBE: NOT DETECTED

## 2020-06-01 PROBLEM — N32.89 BLADDER MASS: Status: ACTIVE | Noted: 2020-06-01

## 2020-06-08 ENCOUNTER — NURSE TRIAGE (OUTPATIENT)
Dept: ADMINISTRATIVE | Facility: CLINIC | Age: 70
End: 2020-06-08

## 2020-06-09 NOTE — TELEPHONE ENCOUNTER
Patient states she had stent pulled in ED yesterday and is feeling fine, states she will keep appt for Wednesday.

## 2020-06-09 NOTE — TELEPHONE ENCOUNTER
Pt state she pulled her ureter stent out a bit while showering, she now states she is incontinent and it is continuously dripping bright red blood, advised her to go to the ER for assessment, caller agreed     Reason for Disposition   [1] Catheter was accidentally pulled-out AND [2] bright red continuous bleeding    Protocols used: ST URINARY CATHETER SYMPTOMS AND [id number]

## 2020-06-10 ENCOUNTER — OFFICE VISIT (OUTPATIENT)
Dept: UROLOGY | Facility: CLINIC | Age: 70
End: 2020-06-10
Payer: MEDICARE

## 2020-06-10 VITALS
BODY MASS INDEX: 25.4 KG/M2 | HEIGHT: 67 IN | SYSTOLIC BLOOD PRESSURE: 121 MMHG | HEART RATE: 68 BPM | DIASTOLIC BLOOD PRESSURE: 69 MMHG | WEIGHT: 161.81 LBS

## 2020-06-10 DIAGNOSIS — Z09 POSTOP CHECK: ICD-10-CM

## 2020-06-10 DIAGNOSIS — N32.89 BLADDER MASS: Primary | ICD-10-CM

## 2020-06-10 DIAGNOSIS — N20.1 URETERAL STONE: ICD-10-CM

## 2020-06-10 LAB
BILIRUB SERPL-MCNC: NORMAL MG/DL
BLOOD URINE, POC: NORMAL
CLARITY, POC UA: NORMAL
COLOR, POC UA: NORMAL
GLUCOSE UR QL STRIP: NORMAL
KETONES UR QL STRIP: NORMAL
LEUKOCYTE ESTERASE URINE, POC: NORMAL
NITRITE, POC UA: NORMAL
PH, POC UA: 6
PROTEIN, POC: 100
SPECIFIC GRAVITY, POC UA: 1.02
UROBILINOGEN, POC UA: 0.2

## 2020-06-10 PROCEDURE — 81002 URINALYSIS NONAUTO W/O SCOPE: CPT | Mod: PBBFAC,PO | Performed by: UROLOGY

## 2020-06-10 PROCEDURE — 99213 OFFICE O/P EST LOW 20 MIN: CPT | Mod: PBBFAC,PO | Performed by: UROLOGY

## 2020-06-10 PROCEDURE — 99024 POSTOP FOLLOW-UP VISIT: CPT | Mod: POP,,, | Performed by: UROLOGY

## 2020-06-10 PROCEDURE — 99999 PR PBB SHADOW E&M-EST. PATIENT-LVL III: ICD-10-PCS | Mod: PBBFAC,,, | Performed by: UROLOGY

## 2020-06-10 PROCEDURE — 99024 PR POST-OP FOLLOW-UP VISIT: ICD-10-PCS | Mod: POP,,, | Performed by: UROLOGY

## 2020-06-10 PROCEDURE — 99999 PR PBB SHADOW E&M-EST. PATIENT-LVL III: CPT | Mod: PBBFAC,,, | Performed by: UROLOGY

## 2020-06-10 RX ORDER — HYDROCODONE BITARTRATE AND ACETAMINOPHEN 5; 325 MG/1; MG/1
TABLET ORAL
COMMUNITY
Start: 2020-06-01 | End: 2020-06-10

## 2020-06-11 NOTE — PROGRESS NOTES
UROLOGY Juliette  6 10 20    Cc postop visit    Age 69, had procedure at [facility] on 6 1 20, consisting of removal of a R ureteral stone that was not identified by the CT scan as a stone, but instead had been interpreted as a clot or a ureteral tumor. We also did biopsy of irregularity at the base of the bladder that had looked like a possible tumor on the CT scan. This turned out to be benign. A stent was left for a few days and pt was supposed to come here to have it removed, but accidentally she pulled the string in the urethra and the stent started to come out. Then needed to go to the Er and was removed there.     Pt reassured.     RTC prn

## 2020-07-02 ENCOUNTER — PATIENT OUTREACH (OUTPATIENT)
Dept: ADMINISTRATIVE | Facility: HOSPITAL | Age: 70
End: 2020-07-02

## 2020-07-02 NOTE — PROGRESS NOTES
Chart review completed 2020.  Care Everywhere updates requested and reviewed.  Immunizations reconciled. Media reports reviewed.  Duplicate HM overrides and  orders removed.  Overdue HM topic chart audit and/or requested.  Overdue lab testing linked to upcoming lab appointments if applies.      There are no preventive care reminders to display for this patient.

## 2020-07-06 ENCOUNTER — PATIENT MESSAGE (OUTPATIENT)
Dept: UROLOGY | Facility: CLINIC | Age: 70
End: 2020-07-06

## 2020-07-06 NOTE — TELEPHONE ENCOUNTER
Message from pt:      Dr. Rodrigues, thank you so much for taking the time to explain everything to me at my appointment on Wednesday.  However, there were other issues noted on the scan results. I forgot to ask you if there is anything else that needs to be addressed either with you or another doctor.   I understand that you are very busy at this time, but I would appreciate if you could look at the scan results again and reply to my message at your convenience, so that I can have peace of mind. Thank you so much for everything.       Please advise

## 2020-07-07 ENCOUNTER — PATIENT MESSAGE (OUTPATIENT)
Dept: UROLOGY | Facility: CLINIC | Age: 70
End: 2020-07-07

## 2020-07-11 ENCOUNTER — PATIENT MESSAGE (OUTPATIENT)
Dept: FAMILY MEDICINE | Facility: CLINIC | Age: 70
End: 2020-07-11

## 2020-07-16 ENCOUNTER — OFFICE VISIT (OUTPATIENT)
Dept: FAMILY MEDICINE | Facility: CLINIC | Age: 70
End: 2020-07-16
Payer: MEDICARE

## 2020-07-16 VITALS
WEIGHT: 163.38 LBS | HEIGHT: 67 IN | TEMPERATURE: 98 F | BODY MASS INDEX: 25.64 KG/M2 | SYSTOLIC BLOOD PRESSURE: 114 MMHG | OXYGEN SATURATION: 94 % | HEART RATE: 67 BPM | DIASTOLIC BLOOD PRESSURE: 70 MMHG

## 2020-07-16 DIAGNOSIS — K21.9 GASTROESOPHAGEAL REFLUX DISEASE, ESOPHAGITIS PRESENCE NOT SPECIFIED: ICD-10-CM

## 2020-07-16 DIAGNOSIS — E03.9 HYPOTHYROIDISM, UNSPECIFIED TYPE: Primary | ICD-10-CM

## 2020-07-16 DIAGNOSIS — R91.1 LUNG NODULE: ICD-10-CM

## 2020-07-16 DIAGNOSIS — I10 HYPERTENSION, UNSPECIFIED TYPE: ICD-10-CM

## 2020-07-16 PROCEDURE — 99999 PR PBB SHADOW E&M-EST. PATIENT-LVL V: ICD-10-PCS | Mod: PBBFAC,,, | Performed by: FAMILY MEDICINE

## 2020-07-16 PROCEDURE — 99214 OFFICE O/P EST MOD 30 MIN: CPT | Mod: S$PBB,,, | Performed by: FAMILY MEDICINE

## 2020-07-16 PROCEDURE — 99999 PR PBB SHADOW E&M-EST. PATIENT-LVL V: CPT | Mod: PBBFAC,,, | Performed by: FAMILY MEDICINE

## 2020-07-16 PROCEDURE — 99215 OFFICE O/P EST HI 40 MIN: CPT | Mod: PBBFAC,PO | Performed by: FAMILY MEDICINE

## 2020-07-16 PROCEDURE — 99214 PR OFFICE/OUTPT VISIT, EST, LEVL IV, 30-39 MIN: ICD-10-PCS | Mod: S$PBB,,, | Performed by: FAMILY MEDICINE

## 2020-07-16 RX ORDER — DESONIDE 0.5 MG/G
CREAM TOPICAL
Qty: 60 G | Refills: 11 | Status: SHIPPED | OUTPATIENT
Start: 2020-07-16 | End: 2020-07-21 | Stop reason: SDUPTHER

## 2020-07-16 RX ORDER — LEVOTHYROXINE SODIUM 100 UG/1
100 TABLET ORAL DAILY
Qty: 90 TABLET | Refills: 0 | Status: CANCELLED | OUTPATIENT
Start: 2020-07-16

## 2020-07-16 RX ORDER — EZETIMIBE 10 MG/1
10 TABLET ORAL DAILY
COMMUNITY
Start: 2020-06-18 | End: 2021-03-22 | Stop reason: SDUPTHER

## 2020-07-16 RX ORDER — LEVOTHYROXINE SODIUM 100 UG/1
100 TABLET ORAL DAILY
Qty: 90 TABLET | Refills: 3 | Status: SHIPPED | OUTPATIENT
Start: 2020-07-16 | End: 2021-04-10

## 2020-07-16 RX ORDER — NEBIVOLOL 5 MG/1
5 TABLET ORAL DAILY
Qty: 90 TABLET | Refills: 3 | Status: SHIPPED | OUTPATIENT
Start: 2020-07-16 | End: 2020-11-27 | Stop reason: SDUPTHER

## 2020-07-16 NOTE — PROGRESS NOTES
Subjective:       Patient ID: Coral Pandya is a 69 y.o. female.    Chief Complaint: Hypertension    HPI     Here for a f/u     htn controlled.     gerd controlled.      Sees Dr. Coe, psychiatrist, for treatment of depression and anxiety.     Started on zetia 2 months ago.  Sees Dr. Mina Collins, cardiologist, for management of htn and hld.     Recall episode of hematuria recently. W/u by urology. Had ct urogram which showed lung nodules. Needs further clarification. No cough or shortness of breath.       Review of Systems      Review of Systems   Constitutional: Negative for fever and chills.   HENT: Negative for hearing loss and neck stiffness.    Eyes: Negative for redness and itching.   Respiratory: Negative for choking.    Cardiovascular: Negative for chest pain and leg swelling.  Abdomen: Negative for abdominal pain and blood in stool.   Genitourinary: Negative for dysuria and flank pain.   Musculoskeletal: Negative for back pain and gait problem.   Neurological: Negative for light-headedness and headaches.   Hematological: Negative for adenopathy.       Objective:      Physical Exam  Constitutional:       Appearance: She is well-developed.   HENT:      Head: Normocephalic and atraumatic.   Eyes:      Conjunctiva/sclera: Conjunctivae normal.      Pupils: Pupils are equal, round, and reactive to light.   Neck:      Musculoskeletal: Normal range of motion.   Cardiovascular:      Rate and Rhythm: Normal rate and regular rhythm.      Heart sounds: No murmur.   Pulmonary:      Effort: Pulmonary effort is normal.      Breath sounds: Normal breath sounds.   Lymphadenopathy:      Cervical: No cervical adenopathy.         Assessment:       1. Hypothyroidism, unspecified type    2. Lung nodule    3. Hypertension, unspecified type    4. Gastroesophageal reflux disease, esophagitis presence not specified        Plan:       Hypothyroidism, unspecified type  -     SYNTHROID 100 mcg tablet; Take 1 tablet (100 mcg  total) by mouth once daily.  Dispense: 90 tablet; Refill: 3    Lung nodule  -     CT Chest Without Contrast; Future    Hypertension, unspecified type    Gastroesophageal reflux disease, esophagitis presence not specified    Other orders  -     nebivoloL (BYSTOLIC) 5 MG Tab; Take 1 tablet (5 mg total) by mouth once daily.  Dispense: 90 tablet; Refill: 3  -     desonide (DESOWEN) 0.05 % cream; APPLY TO EYELID SKIN NIGHTLY PRF ITCHY LIDS  Dispense: 60 g; Refill: 11                  Plan:  See order  Cont current meds      Medication List with Changes/Refills   Current Medications    BUPROPION (WELLBUTRIN XL) 150 MG TB24 TABLET    TAKE 1 TABLET ONCE DAILY    CALCIUM CARBONATE-VITAMIN D3 (CALCIUM 600 + D,3,) 600 MG CALCIUM- 200 UNIT CAP    Take 1 capsule by mouth Daily. DO NOT TAKE AM OF SURGERY    DULOXETINE (CYMBALTA) 30 MG CAPSULE    Take 30 mg by mouth once daily.     DULOXETINE (CYMBALTA) 60 MG CAPSULE    TAKE 1 CAPSULE ONCE DAILY    EZETIMIBE (ZETIA) 10 MG TABLET        FAMOTIDINE (PEPCID) 40 MG TABLET    Take 1 tablet (40 mg total) by mouth once daily.    KETOTIFEN (ZADITOR) 0.025 % OPHTHALMIC SOLUTION    Place 1 drop into both eyes 2 (two) times daily. USE AS USUAL    MIRTAZAPINE (REMERON) 15 MG TABLET    TAKE 1 TABLET EVERY EVENING    MOMETASONE (NASONEX) 50 MCG/ACTUATION NASAL SPRAY    2 sprays by Nasal route once daily.    MV-MIN-FA-D3-OM3-DHA-EPA-FISH (CARDIAMIN) 200 MCG-500 UNIT-200 MG CAP    Take 1 tablet by mouth once daily.     RABEPRAZOLE (ACIPHEX) 20 MG TABLET    Take 1 tablet (20 mg total) by mouth once daily.    TELMISARTAN (MICARDIS) 20 MG TAB    TAKE 1 TABLET ONCE DAILY    VIT C/E/ZN/COPPR/LUTEIN/ZEAXAN (PRESERVISION AREDS-2 ORAL)    Take 1 tablet by mouth 2 (two) times a day.    VITAMIN D (VITAMIN D3) 1000 UNITS TAB    Take 5,000 Units by mouth once daily. DO NOT TAKE AM OF SURGERY   Changed and/or Refilled Medications    Modified Medication Previous Medication    DESONIDE (DESOWEN) 0.05 % CREAM  desonide (DESOWEN) 0.05 % cream       APPLY TO EYELID SKIN NIGHTLY PRF ITCHY LIDS    APPLY TO EYELID SKIN NIGHTLY PRF ITCHY LIDS    NEBIVOLOL (BYSTOLIC) 5 MG TAB BYSTOLIC 5 mg Tab       Take 1 tablet (5 mg total) by mouth once daily.    TAKE 1 TABLET DAILY    SYNTHROID 100 MCG TABLET SYNTHROID 100 mcg tablet       Take 1 tablet (100 mcg total) by mouth once daily.    TAKE 1 TABLET DAILY

## 2020-07-21 NOTE — TELEPHONE ENCOUNTER
----- Message from Opal Ball sent at 7/21/2020  9:46 AM CDT -----  Contact: Jerome Mills calling needing clarification on the directions of the medication desonide (DESOWEN) 0.05 % cream,please...[phone] ref#[id number]

## 2020-07-21 NOTE — TELEPHONE ENCOUNTER
Care Due:                  Date            Visit Type   Department     Provider  --------------------------------------------------------------------------------                                ESTABLISHED   [facility]  Last Visit: 07-      PATIENT      MEDICINE       Ebenezer MOJICA                              [facility]  Next Visit: 01-      PATIENT      MEDICINE       Ebenezer MOJICA                                                            Last  Test          Frequency    Reason                     Performed    Due Date  --------------------------------------------------------------------------------    TSH.........  12 months..  SYNTHROID................  09- 09-    Powered by [employer]. Reference number: [id number]. 7/21/2020 9:52:10 AM CDT

## 2020-07-21 NOTE — TELEPHONE ENCOUNTER
----- Message from James Moreno sent at 7/21/2020 11:29 AM CDT -----  Contact: Soheila  Type:  Pharmacy Calling to Clarify an RX    Name of Caller:  Soheila  Pharmacy Name:  [facility]  Prescription Name:  desonide (DESOWEN) 0.05 % cream  What do they need to clarify?:  have a clinical question  Best Call Back Number:   ref#[id number]  Additional Information:  requesting a call back to advise

## 2020-07-23 ENCOUNTER — OFFICE VISIT (OUTPATIENT)
Dept: PSYCHIATRY | Facility: CLINIC | Age: 70
End: 2020-07-23
Payer: MEDICARE

## 2020-07-23 VITALS
WEIGHT: 163.13 LBS | HEIGHT: 67 IN | SYSTOLIC BLOOD PRESSURE: 107 MMHG | DIASTOLIC BLOOD PRESSURE: 67 MMHG | BODY MASS INDEX: 25.6 KG/M2 | HEART RATE: 70 BPM

## 2020-07-23 DIAGNOSIS — F41.1 GAD (GENERALIZED ANXIETY DISORDER): ICD-10-CM

## 2020-07-23 DIAGNOSIS — F33.41 MDD (MAJOR DEPRESSIVE DISORDER), RECURRENT, IN PARTIAL REMISSION: Primary | ICD-10-CM

## 2020-07-23 PROCEDURE — 99214 OFFICE O/P EST MOD 30 MIN: CPT | Mod: S$PBB,,, | Performed by: PSYCHIATRY & NEUROLOGY

## 2020-07-23 PROCEDURE — 99999 PR PBB SHADOW E&M-EST. PATIENT-LVL III: ICD-10-PCS | Mod: PBBFAC,,, | Performed by: PSYCHIATRY & NEUROLOGY

## 2020-07-23 PROCEDURE — 99214 PR OFFICE/OUTPT VISIT, EST, LEVL IV, 30-39 MIN: ICD-10-PCS | Mod: S$PBB,,, | Performed by: PSYCHIATRY & NEUROLOGY

## 2020-07-23 PROCEDURE — 99213 OFFICE O/P EST LOW 20 MIN: CPT | Mod: PBBFAC,PO | Performed by: PSYCHIATRY & NEUROLOGY

## 2020-07-23 PROCEDURE — 99999 PR PBB SHADOW E&M-EST. PATIENT-LVL III: CPT | Mod: PBBFAC,,, | Performed by: PSYCHIATRY & NEUROLOGY

## 2020-07-23 RX ORDER — DULOXETIN HYDROCHLORIDE 60 MG/1
60 CAPSULE, DELAYED RELEASE ORAL DAILY
Qty: 90 CAPSULE | Refills: 1 | Status: SHIPPED | OUTPATIENT
Start: 2020-07-23 | End: 2021-04-26 | Stop reason: SDUPTHER

## 2020-07-23 RX ORDER — MIRTAZAPINE 15 MG/1
15 TABLET, FILM COATED ORAL NIGHTLY
Qty: 90 TABLET | Refills: 1 | Status: SHIPPED | OUTPATIENT
Start: 2020-07-23 | End: 2021-03-07 | Stop reason: SDUPTHER

## 2020-07-23 RX ORDER — DULOXETIN HYDROCHLORIDE 30 MG/1
30 CAPSULE, DELAYED RELEASE ORAL DAILY
Qty: 90 CAPSULE | Refills: 0 | Status: SHIPPED | OUTPATIENT
Start: 2020-07-23 | End: 2021-01-17

## 2020-07-23 RX ORDER — BUPROPION HYDROCHLORIDE 150 MG/1
150 TABLET ORAL DAILY
Qty: 90 TABLET | Refills: 0 | Status: SHIPPED | OUTPATIENT
Start: 2020-07-23 | End: 2020-10-11

## 2020-07-23 NOTE — PROGRESS NOTES
"ID: 66yo WF with a previous diag of depression. Referred by PCP, , for eval of depressive sxs. Currently on cymbalta, recently added remeron, and valium. Pt has cont'd to remain stable on current meds. No longer using valium.     CC: "depression"    Interim hx: presents on time, chart reviewed, pt seen. Appears well.     Since last appt the pt has what she believes was covid. She tested negative but had all the sxs including a night where fever was greater than 104.     The following week had bld in her urine- started anbx and there was no improvement- became worse so she then went to see . ultimately they think it was a stone from the dehydration during her previous illness. "but it's just been a lot since we last saw eachother."     Pt's daughter has gotten engaged- this has been a longstanding source of concern for the pt as she has a child with her now fiance and pt was very concerned for grandson Robert about the sense of family, etc.     Other daughter, now 40, has decided to adopt a baby from [place]. "I think it's totally awesome but of course I see the concerns too." we discuss that.     "but ultimately now all my children are here and robinson robert too and now soon another one and they were all just wonderful to me when I was sick and I see them all a lot so it has been a very special and happy time, really."      On Psychiatric ROS:    Endorses better sleep on the remeron, denies anhedonia, denies feeling helpless/hopeless, improved energy, riding bike regularly, denies dec concentration, denies change in appetite (despite the remeron), denies dec PMA "I push myself. I know keeping busy helps me." Denies thoughts of SI/intent/plan.     Denies recently feeling more easily overwhelmed, denies recent ruminative thinking "the obsessive thinking is MUCH better on the meds." denies feeling tense/"on edge"     PPHx: Denies h/o self injury, inpt psych hospitalization, denies h/o suicide " "attempt     Current Psych Meds: cymbalta 90mg po qam, remeron 15mg po qhs  Past Psych Meds: Lexapro and Wellbutrin (effective but led to h/a's), celexa (nausea), valium    PMHx: hypothyroid, GERD    SubstHx:   T- none  E- very rare  D- none    FamPHx: brother- suicide 1990, M-anxiety, F-anxiety, dtr- seizure while on wellbutrin    MSE: appears stated age, well groomed, appropriate dress, engages well with examiner. Good e/c. Speech reg rate and vol, nonpressured. Mood is "i'm fine. It's good." Affect congruent with some mild anxiety. dec'd verbosity. Sensorium fully intact. Oriented to date/day/location, current events. Narrative memory intact. Intellectual function is avg based on vocab and basic fund of knowledge. Thought is c/l/gd. No tangentiality or circumstantiality. No FOI/LEBRON. Denies SI/HI. Denies A/VH. No evidence of delusions. Insight and Judgment intact.     Blood pressure 107/67, pulse 70, height 5' 7" (1.702 m), weight 74 kg (163 lb 2.3 oz).    Suicide Risk Assessment:   Protective- age, gender, no prior attempts, no prior hospitalizations, no ongoing substance abuse, no psychosis, , has children, denies SI/intent/plan, seeking treatment, access to treatment, future oriented, good primary support, Holiness  Risk- race, ongoing Axis I sxs, family h/o suicide (brother-1990)    **Pt is at LOW imminent and long term risk of suicide given current risk factors.     Assessment:  67yo WF with a previous diag of depression. Referred by PCP, , for eval of depressive sxs. Currently on cymbalta, recently added remeron, and valium per chart review. On eval the pt is reporting an improvement in all mood and anxiety sxs in the 3 wks leading to initial eval which correlates with the time Remeron was added. She had done well for many years on lexapro and wellbutrin but was having chronic headaches and self initiated a taper off meds and the headaches did remit, but mood/anxiety suffered until the " "recent combination of cymbalta and remeron which is currently managing sxs quite well. Prior to meds becoming effective the pt endorses sxs c/w diagnoses of MDD and YASMANI. No acute safety concerns but the pt has cont'd to feel mood is "a little down. Other than the headaches, I feel that I was really better on the lexapro/wellbutrin combination". Pt is active, finds good support through kerry, family and friends. Will try an inc in cymbalta to 90mg and if ineffective may try an addition of wellbutrin xl 150mg- in the past pt headaches were on the 300mg dose when in combo with lexapro. On f/u the inc in cymbalta was effective- pt other ongoing issues are therapy related. Pt continues to decline therapy as she doesn't see the need at this time- majority of her appts are spent in therapy, today struggling due to lack of progress with knee following knee replacement- will re start PT and is hopeful. Added wellbutrin a few months ago and today she continues to report it's been helpful. Pt continues to do well despite loss of brother in law to covid. Is eager for socialization but understands on a close level the need to continue distancing. Became ill herself and though she tested negative does believe she had a strain of covid. It maintaining strict guidelines and please with her outside family interactions. Will f/u in 3mos in clinic.     Axis I: MDD, recurrent, in partial remission; YASMANI  Axis II: none at this time   Axis III: hypothyroid, GERD, HTN  Axis IV:  from , chronic mental illness  Axis V: GAF 70    Plan:   1. Cont cymbalta 90mg po qam  2. Cont remeron 15mg po qhs  3. Cont wellbutrin xl 150mg po qam  4. Cont exercise, encouraged mindfulness of dietary choices in an effort to dec inflammation  5. rec therapy but pt declines at this time  6. RTC 3mos in clinic    -Spent 30min face to face with the pt; >50% time spent in counseling   -Supportive therapy and psychoeducation provided  -R/B/SE's of " medications discussed with the pt who expresses understanding and chooses to take medications as prescribed.   -Pt instructed to call clinic, 911 or go to nearest emergency room if sxs worsen or pt is in   crisis. The pt expresses understanding.

## 2020-07-26 RX ORDER — DESONIDE 0.5 MG/G
CREAM TOPICAL
Qty: 60 G | Refills: 11 | Status: SHIPPED | OUTPATIENT
Start: 2020-07-26 | End: 2022-07-13 | Stop reason: SDUPTHER

## 2020-08-05 ENCOUNTER — TELEPHONE (OUTPATIENT)
Dept: FAMILY MEDICINE | Facility: CLINIC | Age: 70
End: 2020-08-05

## 2020-08-05 NOTE — TELEPHONE ENCOUNTER
----- Message from Emmy Cantrell sent at 8/5/2020  9:05 AM CDT -----  Regarding: Needing to r/s her Enhanced Annual Wellness  Contact: Coral  Name of Caller: Coral  Reason for Visit/Symptoms: Wellness Visist  Best Contact Number or Confirm if Mychart Preferred: [phone]  Preferred Date/Time of Appointment: First available    Interested in Virtual Visit (yes/no): No  Additional Information:  Requesting a callback to r/s her nurse visit

## 2020-08-05 NOTE — TELEPHONE ENCOUNTER
----- Message from Sukhjinder Pappas sent at 8/5/2020  9:36 AM CDT -----  Contact: pt  Type:  Patient Returning Call    Who Called:  pt  Who Left Message for Patient:  Silvia  Does the patient know what this is regarding?:    Best Call Back Number:  [phone]    Additional Information:

## 2020-08-17 ENCOUNTER — OFFICE VISIT (OUTPATIENT)
Dept: FAMILY MEDICINE | Facility: CLINIC | Age: 70
End: 2020-08-17
Payer: MEDICARE

## 2020-08-17 VITALS
HEIGHT: 67 IN | HEART RATE: 68 BPM | DIASTOLIC BLOOD PRESSURE: 68 MMHG | SYSTOLIC BLOOD PRESSURE: 116 MMHG | BODY MASS INDEX: 25.47 KG/M2 | WEIGHT: 162.25 LBS | OXYGEN SATURATION: 98 %

## 2020-08-17 DIAGNOSIS — I70.0 AORTIC ATHEROSCLEROSIS: ICD-10-CM

## 2020-08-17 DIAGNOSIS — F33.41 MDD (MAJOR DEPRESSIVE DISORDER), RECURRENT, IN PARTIAL REMISSION: ICD-10-CM

## 2020-08-17 DIAGNOSIS — I10 HYPERTENSION, UNSPECIFIED TYPE: ICD-10-CM

## 2020-08-17 DIAGNOSIS — Z00.00 ENCOUNTER FOR PREVENTIVE HEALTH EXAMINATION: Primary | ICD-10-CM

## 2020-08-17 PROCEDURE — 99215 OFFICE O/P EST HI 40 MIN: CPT | Mod: PBBFAC,PO | Performed by: NURSE PRACTITIONER

## 2020-08-17 PROCEDURE — 99999 PR PBB SHADOW E&M-EST. PATIENT-LVL V: ICD-10-PCS | Mod: PBBFAC,,, | Performed by: NURSE PRACTITIONER

## 2020-08-17 PROCEDURE — G0439 PPPS, SUBSEQ VISIT: HCPCS | Mod: S$GLB,,, | Performed by: NURSE PRACTITIONER

## 2020-08-17 PROCEDURE — 99999 PR PBB SHADOW E&M-EST. PATIENT-LVL V: CPT | Mod: PBBFAC,,, | Performed by: NURSE PRACTITIONER

## 2020-08-17 PROCEDURE — G0439 PR MEDICARE ANNUAL WELLNESS SUBSEQUENT VISIT: ICD-10-PCS | Mod: S$GLB,,, | Performed by: NURSE PRACTITIONER

## 2020-08-17 NOTE — PROGRESS NOTES
"  Coral Pandya presented for a  Medicare AWV and comprehensive Health Risk Assessment today. The following components were reviewed and updated:    · Medical history  · Family History  · Social history  · Allergies and Current Medications  · Health Risk Assessment  · Health Maintenance  · Care Team     ** See Completed Assessments for Annual Wellness Visit within the encounter summary.**         The following assessments were completed:  · Living Situation  · CAGE  · Depression Screening  · Timed Get Up and Go  · Whisper Test  · Cognitive Function Screening          · Nutrition Screening  · ADL Screening  · PAQ Screening        Vitals:    08/17/20 1416   BP: 116/68   BP Location: Left arm   Patient Position: Sitting   Pulse: 68   SpO2: 98%   Weight: 73.6 kg (162 lb 4.1 oz)   Height: 5' 7" (1.702 m)     Body mass index is 25.41 kg/m².  Physical Exam  Vitals signs reviewed.   Constitutional:       Appearance: She is well-developed and normal weight.   HENT:      Head: Normocephalic and atraumatic.   Eyes:      Conjunctiva/sclera: Conjunctivae normal.   Cardiovascular:      Rate and Rhythm: Normal rate and regular rhythm.      Heart sounds: No murmur.   Pulmonary:      Effort: Pulmonary effort is normal.      Breath sounds: Normal breath sounds.   Neurological:      Mental Status: She is alert.   Psychiatric:         Mood and Affect: Mood normal.         Thought Content: Thought content normal.               Diagnoses and health risks identified today and associated recommendations/orders:    1. Encounter for preventive health examination  Reviewed health maintenance and provided recommendations   Health maintenance UTD     2. MDD (major depressive disorder), recurrent, in partial remission  Continue to monitor  Followed by Saravanan Solares si/hi  Taking cymbalta    3. Hypertension, unspecified type  Continue to monitor  Followed by Ebenezer Souza MD     4. Aortic atherosclerosis  Continue to monitor  Followed by " Dennis  CT 5/21/20      Provided Coral with a 5-10 year written screening schedule and personal prevention plan. Recommendations were developed using the USPSTF age appropriate recommendations. Education, counseling, and referrals were provided as needed. After Visit Summary printed and given to patient which includes a list of additional screenings\tests needed.    Follow up in one year  Cindy Street NP  I offered to discuss end of life issues, including information on how to make advance directives that the patient could use to name someone who would make medical decisions on their behalf if they became too ill to make themselves.    ___Patient declined  _X_Patient is interested, I provided paper work and offered to discuss.

## 2020-08-17 NOTE — PATIENT INSTRUCTIONS
Counseling and Referral of Other Preventative  (Italic type indicates deductible and co-insurance are waived)    Patient Name: Coral Pandya  Today's Date: 8/17/2020    Health Maintenance       Date Due Completion Date    Influenza Vaccine (1) 09/01/2020 8/26/2019    Mammogram 02/20/2022 2/20/2020    DEXA SCAN 10/08/2022 10/8/2019 (Done)    Override on 10/8/2019: Done    Override on 12/5/2016: Done    Override on 7/12/2012: Done (in media)    Colorectal Cancer Screening 06/14/2023 6/14/2018    Override on 3/19/2008: (N/S)    TETANUS VACCINE 02/06/2025 2/6/2015    Lipid Panel 02/22/2025 2/22/2020        No orders of the defined types were placed in this encounter.    The following information is provided to all patients.  This information is to help you find resources for any of the problems found today that may be affecting your health:                Living healthy guide: www.[facility].[place].gov      Understanding Diabetes: www.diabetes.org      Eating healthy: www.cdc.gov/healthyweight      [facility] home safety checklist: www.cdc.gov/steadi/patient.html      Agency on Aging: www.goea.[place].gov      Alcoholics anonymous (AA): www.aa.org      Physical Activity: www.katherine.nih.gov/za3pril      Tobacco use: www.quitwithusla.org

## 2020-08-24 ENCOUNTER — TELEPHONE (OUTPATIENT)
Dept: GASTROENTEROLOGY | Facility: CLINIC | Age: 70
End: 2020-08-24

## 2020-08-24 PROBLEM — R50.9 FEVER: Status: RESOLVED | Noted: 2020-04-28 | Resolved: 2020-08-24

## 2020-08-24 PROBLEM — I70.0 AORTIC ATHEROSCLEROSIS: Status: ACTIVE | Noted: 2020-08-24

## 2020-08-24 NOTE — TELEPHONE ENCOUNTER
----- Message from Sukhjinder Pappas sent at 8/24/2020 10:40 AM CDT -----  Contact: pt  Type: Needs Medical Advice  Who Called:  pt  Symptoms (please be specific):  dull pain in abdomen  How long has patient had these symptoms:  10 days  Pharmacy name and phone #:      [id number] - [place] [address] & [address] [address]  Phone: [phone] Fax: [phone]    Best Call Back Number: [phone]    Additional Information: Please call to advise

## 2020-08-24 NOTE — TELEPHONE ENCOUNTER
----- Message from Sukhjinder Pappas sent at 8/24/2020 10:40 AM CDT -----  Contact: pt  Type: Needs Medical Advice  Who Called:  pt  Symptoms (please be specific):  dull pain in abdomen  How long has patient had these symptoms:  10 days  Pharmacy name and phone #:      [id number] - [place] [address] & [address] [address]  Phone: [phone] Fax: [phone]    Best Call Back Number: [phone]    Additional Information: Please call to advise

## 2020-08-24 NOTE — TELEPHONE ENCOUNTER
Spoke with pt. Pt states she has been having a dull ache in her stomach for the past 10 days, only gets relief when laying down. Pt denies any changes to BM. Scheduled appointment for Thursday, pt did not want to be seen sooner due to work. Pt verbalized understanding to appointment date & time.

## 2020-08-26 ENCOUNTER — OFFICE VISIT (OUTPATIENT)
Dept: FAMILY MEDICINE | Facility: CLINIC | Age: 70
End: 2020-08-26
Payer: MEDICARE

## 2020-08-26 ENCOUNTER — HOSPITAL ENCOUNTER (OUTPATIENT)
Dept: RADIOLOGY | Facility: HOSPITAL | Age: 70
Discharge: HOME | End: 2020-08-26
Attending: NURSE PRACTITIONER
Payer: MEDICARE

## 2020-08-26 VITALS
OXYGEN SATURATION: 98 % | BODY MASS INDEX: 25.6 KG/M2 | HEIGHT: 67 IN | DIASTOLIC BLOOD PRESSURE: 72 MMHG | HEART RATE: 70 BPM | TEMPERATURE: 99 F | WEIGHT: 163.13 LBS | SYSTOLIC BLOOD PRESSURE: 114 MMHG

## 2020-08-26 DIAGNOSIS — R10.84 GENERALIZED ABDOMINAL PAIN: ICD-10-CM

## 2020-08-26 DIAGNOSIS — R10.84 GENERALIZED ABDOMINAL PAIN: Primary | ICD-10-CM

## 2020-08-26 LAB
BILIRUB SERPL-MCNC: NORMAL MG/DL
BLOOD URINE, POC: NORMAL
CLARITY, POC UA: CLEAR
COLOR, POC UA: NORMAL
GLUCOSE UR QL STRIP: NORMAL
KETONES UR QL STRIP: NORMAL
LEUKOCYTE ESTERASE URINE, POC: NORMAL
NITRITE, POC UA: NORMAL
PH, POC UA: 7
PROTEIN, POC: NORMAL
SPECIFIC GRAVITY, POC UA: 1.02
UROBILINOGEN, POC UA: NORMAL

## 2020-08-26 PROCEDURE — 74177 CT ABD & PELVIS W/CONTRAST: CPT | Mod: TC,PO

## 2020-08-26 PROCEDURE — 99214 OFFICE O/P EST MOD 30 MIN: CPT | Mod: PBBFAC,PO,25 | Performed by: NURSE PRACTITIONER

## 2020-08-26 PROCEDURE — 74177 CT ABD & PELVIS W/CONTRAST: CPT | Mod: 26,,, | Performed by: RADIOLOGY

## 2020-08-26 PROCEDURE — 99999 PR PBB SHADOW E&M-EST. PATIENT-LVL IV: CPT | Mod: PBBFAC,,, | Performed by: NURSE PRACTITIONER

## 2020-08-26 PROCEDURE — A9698 NON-RAD CONTRAST MATERIALNOC: HCPCS | Mod: PO | Performed by: NURSE PRACTITIONER

## 2020-08-26 PROCEDURE — 81002 URINALYSIS NONAUTO W/O SCOPE: CPT | Mod: PBBFAC,PO | Performed by: NURSE PRACTITIONER

## 2020-08-26 PROCEDURE — 74177 CT ABDOMEN PELVIS WITH CONTRAST: ICD-10-PCS | Mod: 26,,, | Performed by: RADIOLOGY

## 2020-08-26 PROCEDURE — 99214 OFFICE O/P EST MOD 30 MIN: CPT | Mod: S$PBB,,, | Performed by: NURSE PRACTITIONER

## 2020-08-26 PROCEDURE — 99214 PR OFFICE/OUTPT VISIT, EST, LEVL IV, 30-39 MIN: ICD-10-PCS | Mod: S$PBB,,, | Performed by: NURSE PRACTITIONER

## 2020-08-26 PROCEDURE — 25500020 PHARM REV CODE 255: Mod: PO | Performed by: NURSE PRACTITIONER

## 2020-08-26 PROCEDURE — 99999 PR PBB SHADOW E&M-EST. PATIENT-LVL IV: ICD-10-PCS | Mod: PBBFAC,,, | Performed by: NURSE PRACTITIONER

## 2020-08-26 RX ADMIN — IOHEXOL 1000 ML: 9 SOLUTION ORAL at 01:08

## 2020-08-26 RX ADMIN — IOHEXOL 75 ML: 350 INJECTION, SOLUTION INTRAVENOUS at 01:08

## 2020-08-26 NOTE — PROGRESS NOTES
Subjective:       Patient ID: Coral Pandya is a 69 y.o. female.    Chief Complaint: Abdominal Pain (2 weeks, bloating, dual pain; thinks it may be from zetia medication )    HPI   Patients reports dull generalized abdominal pain and cramping--onset 1-2 weeks ago. +bloating. symptoms worse with eating and following bowel movement. She reports normal bowel pattern--no diarrhea or constipation, dark or bloody stools. Sleeping well. Thinks pain may be 2/2 Zetia--although she started Zetia 3 months ago, pain only started 2 weeks ago. She notes darker urine since being on Zetia. Denies dysuria, urinary frequency. Denies heartburn, regurgitation, fever, chills, sweats, flank pain, SOB or CP    GERD controlled on aciphex     Abdominal surgical history includes tubal ligation, recent bladder cystocyopy   Colonoscopy 06/2018 revealed internal hemorrhoids otherwise normal. Recommended repeat in 5 years (fam hx colon CA 1st degree relative)  CT urogram this May does not note diverticulosis  Vitals:    08/26/20 1137   BP: 114/72   Pulse: 70   Temp: 98.6 °F (37 °C)     Review of Systems   Constitutional: Negative for chills, diaphoresis and fever.   HENT: Negative for facial swelling and trouble swallowing.    Eyes: Negative for discharge and redness.   Respiratory: Negative for cough and shortness of breath.    Cardiovascular: Negative for chest pain and palpitations.   Gastrointestinal: Positive for abdominal distention and abdominal pain. Negative for anal bleeding, blood in stool, constipation, diarrhea, nausea, rectal pain and vomiting.   Genitourinary: Negative for difficulty urinating, dysuria and flank pain.        Dark urine   Musculoskeletal: Negative for gait problem.   Skin: Negative for pallor and rash.   Neurological: Negative for facial asymmetry and speech difficulty.   Psychiatric/Behavioral: Negative for confusion. The patient is not nervous/anxious.        Past Medical History:   Diagnosis Date     Anxiety     Arthralgia of knee     Bladder mass     Cervical spondylosis     Depression     Fibromyalgia     GERD (gastroesophageal reflux disease)     Hematuria     HTN (hypertension)     Hypothyroid     DANYELL (obstructive sleep apnea)     no cpap    Thrombosis      Objective:      Physical Exam  Vitals signs and nursing note reviewed.   Constitutional:       General: She is not in acute distress.     Appearance: She is not diaphoretic.   HENT:      Head: Normocephalic.      Right Ear: Hearing normal.      Left Ear: Hearing normal.      Nose: Nose normal.   Eyes:      General: Lids are normal.      Conjunctiva/sclera: Conjunctivae normal.   Neck:      Vascular: No JVD.      Trachea: No tracheal deviation.   Cardiovascular:      Rate and Rhythm: Normal rate.      Heart sounds: Normal heart sounds.   Pulmonary:      Effort: Pulmonary effort is normal.      Breath sounds: Normal breath sounds.   Abdominal:      General: There is distension.      Palpations: Abdomen is soft.      Tenderness: There is no right CVA tenderness, left CVA tenderness, guarding or rebound.      Comments: Mild generalized abdominal tenderness   Musculoskeletal:         General: No deformity.   Skin:     Coloration: Skin is not pale.   Neurological:      Mental Status: She is alert and oriented to person, place, and time.   Psychiatric:         Speech: Speech normal.         Behavior: Behavior normal.         Thought Content: Thought content normal.         Judgment: Judgment normal.         Assessment:       1. Generalized abdominal pain        Plan:       Generalized abdominal pain  -     Comprehensive metabolic panel; Future; Expected date: 08/26/2020  -     CBC auto differential; Future; Expected date: 08/26/2020  -     POCT URINE DIPSTICK WITHOUT MICROSCOPE  -     Amylase; Future; Expected date: 08/26/2020  -     Lipase; Future; Expected date: 08/26/2020  -     CT Abdomen Pelvis With Contrast; Future; Expected date:  08/26/2020      No follow-ups on file.    Medication List with Changes/Refills   Current Medications    BUPROPION (WELLBUTRIN XL) 150 MG TB24 TABLET    Take 1 tablet (150 mg total) by mouth once daily.    CALCIUM CARBONATE-VITAMIN D3 (CALCIUM 600 + D,3,) 600 MG CALCIUM- 200 UNIT CAP    Take 1 capsule by mouth 2 (two) times daily. DO NOT TAKE AM OF SURGERY    DESONIDE (DESOWEN) 0.05 % CREAM    APPLY TO EYELID SKIN NIGHTLY PRN ITCHY LIDS    DULOXETINE (CYMBALTA) 30 MG CAPSULE    Take 1 capsule (30 mg total) by mouth once daily.    DULOXETINE (CYMBALTA) 60 MG CAPSULE    Take 1 capsule (60 mg total) by mouth once daily.    EZETIMIBE (ZETIA) 10 MG TABLET    10 mg once daily.     FAMOTIDINE (PEPCID) 40 MG TABLET    Take 1 tablet (40 mg total) by mouth once daily.    KETOTIFEN (ZADITOR) 0.025 % OPHTHALMIC SOLUTION    Place 1 drop into both eyes 2 (two) times daily. USE AS USUAL    MIRTAZAPINE (REMERON) 15 MG TABLET    Take 1 tablet (15 mg total) by mouth every evening.    MOMETASONE (NASONEX) 50 MCG/ACTUATION NASAL SPRAY    2 sprays by Nasal route once daily.    MV-MIN-FA-D3-OM3-DHA-EPA-FISH (CARDIAMIN) 200 MCG-500 UNIT-200 MG CAP    Take 1 tablet by mouth 2 (two) times daily.     NEBIVOLOL (BYSTOLIC) 5 MG TAB    Take 1 tablet (5 mg total) by mouth once daily.    RABEPRAZOLE (ACIPHEX) 20 MG TABLET    Take 1 tablet (20 mg total) by mouth once daily.    SYNTHROID 100 MCG TABLET    Take 1 tablet (100 mcg total) by mouth once daily.    TELMISARTAN (MICARDIS) 20 MG TAB    TAKE 1 TABLET ONCE DAILY    VIT C/E/ZN/COPPR/LUTEIN/ZEAXAN (PRESERVISION AREDS-2 ORAL)    Take 1 tablet by mouth 2 (two) times a day.    VITAMIN D (VITAMIN D3) 1000 UNITS TAB    Take by mouth once daily. States taking 2,000 units

## 2020-08-27 ENCOUNTER — OFFICE VISIT (OUTPATIENT)
Dept: GASTROENTEROLOGY | Facility: CLINIC | Age: 70
End: 2020-08-27
Payer: MEDICARE

## 2020-08-27 ENCOUNTER — TELEPHONE (OUTPATIENT)
Dept: GASTROENTEROLOGY | Facility: CLINIC | Age: 70
End: 2020-08-27

## 2020-08-27 VITALS — WEIGHT: 161.19 LBS | HEIGHT: 67 IN | BODY MASS INDEX: 25.3 KG/M2

## 2020-08-27 DIAGNOSIS — K52.9 ENTERITIS: ICD-10-CM

## 2020-08-27 DIAGNOSIS — R10.84 GENERALIZED ABDOMINAL PAIN: Primary | ICD-10-CM

## 2020-08-27 DIAGNOSIS — R93.3 ABNORMAL CT SCAN, GASTROINTESTINAL TRACT: ICD-10-CM

## 2020-08-27 PROBLEM — R10.9 ABDOMINAL PAIN: Status: ACTIVE | Noted: 2020-08-27

## 2020-08-27 PROCEDURE — 99214 OFFICE O/P EST MOD 30 MIN: CPT | Mod: S$PBB,,, | Performed by: NURSE PRACTITIONER

## 2020-08-27 PROCEDURE — 99214 PR OFFICE/OUTPT VISIT, EST, LEVL IV, 30-39 MIN: ICD-10-PCS | Mod: S$PBB,,, | Performed by: NURSE PRACTITIONER

## 2020-08-27 PROCEDURE — 99214 OFFICE O/P EST MOD 30 MIN: CPT | Mod: PBBFAC,PO | Performed by: NURSE PRACTITIONER

## 2020-08-27 PROCEDURE — 99999 PR PBB SHADOW E&M-EST. PATIENT-LVL IV: ICD-10-PCS | Mod: PBBFAC,,, | Performed by: NURSE PRACTITIONER

## 2020-08-27 PROCEDURE — 99999 PR PBB SHADOW E&M-EST. PATIENT-LVL IV: CPT | Mod: PBBFAC,,, | Performed by: NURSE PRACTITIONER

## 2020-08-27 RX ORDER — METRONIDAZOLE 500 MG/1
500 TABLET ORAL 3 TIMES DAILY
Qty: 30 TABLET | Refills: 0 | Status: SHIPPED | OUTPATIENT
Start: 2020-08-27 | End: 2020-09-06

## 2020-08-27 RX ORDER — CIPROFLOXACIN 500 MG/1
500 TABLET ORAL 2 TIMES DAILY
Qty: 20 TABLET | Refills: 0 | Status: SHIPPED | OUTPATIENT
Start: 2020-08-27 | End: 2020-09-06

## 2020-08-27 NOTE — Clinical Note
Thank you for referring this patient to us. I reviewed imaging with Dr. Chambers and he said it may be normal peristalsis but it is always good to further evaluate intussusception in adults since sometimes it can be masking a small bowel lesion/mass.  Thanks again,  Evelia

## 2020-08-27 NOTE — PATIENT INSTRUCTIONS
Abdominal Pain    Abdominal pain is pain in the stomach or belly area. Everyone has this pain from time to time. In many cases it goes away on its own. But abdominal pain can sometimes be due to a serious problem, such as appendicitis. So its important to know when to seek help.  Causes of abdominal pain  There are many possible causes of abdominal pain. Common causes in adults include:  · Constipation, diarrhea, or gas  · Stomach acid flowing back up into the esophagus (acid reflux or heartburn)  · Severe acid reflux, called GERD (gastroesophageal reflux disease)  · A sore in the lining of the stomach or small intestine (peptic ulcer)  · Inflammation of the gallbladder, liver, or pancreas  · Gallstones or kidney stones  · Appendicitis   · Intestinal blockage   · An internal organ pushing through a muscle or other tissue (hernia)  · Urinary tract infections  · In women, menstrual cramps, fibroids, or endometriosis  · Inflammation or infection of the intestines  Diagnosing the cause of abdominal pain  Your healthcare provider will do a physical exam help find the cause of your pain. If needed, tests will be ordered. Belly pain has many possible causes. So it can be hard to find the reason for your pain. Giving details about your pain can help. Tell your provider where and when you feel the pain, and what makes it better or worse. Also let your provider know if you have other symptoms such as:  · Fever  · Tiredness  · Upset stomach (nausea)  · Vomiting  · Changes in bathroom habits  Treating abdominal pain  Some causes of pain need emergency medical treatment right away. These include appendicitis or a bowel blockage. Other problems can be treated with rest, fluids, or medicines. Your healthcare provider can give you specific instructions for treatment or self-care based on what is causing your pain.  If you have vomiting or diarrhea, sip water or other clear fluids. When you are ready to eat solid foods again,  start with small amounts of easy-to-digest, low-fat foods. These include apple sauce, toast, or crackers.   When to seek medical care  Call 911 or go to the hospital right away if you:  · Cant pass stool and are vomiting  · Are vomiting blood or have bloody diarrhea or black, tarry diarrhea  · Have chest, neck, or shoulder pain  · Feel like you might pass out  · Have pain in your shoulder blades with nausea  · Have sudden, severe belly pain  · Have new, severe pain unlike any you have felt before  · Have a belly that is rigid, hard, and tender to touch  Call your healthcare provider if you have:  · Pain for more than 5 days  · Bloating for more than 2 days  · Diarrhea for more than 5 days  · A fever of 100.4°F (38.0°C) or higher, or as directed by your provider  · Pain that gets worse  · Weight loss for no reason  · Continued lack of appetite  · Blood in your stool  How to prevent abdominal pain  Here are some tips to help prevent abdominal pain:  · Eat smaller amounts of food at one time.  · Avoid greasy, fried, or other high-fat foods.  · Avoid foods that give you gas.  · Exercise regularly.  · Drink plenty of fluids.  To help prevent GERD symptoms:  · Quit smoking.  · Reduce alcohol and certain foods that increase stomach acid.  · Avoid aspirin and over-the-counter pain and fever medicines (NSAIDS or nonsteroidal anti-inflammatory drugs), if possible  · Lose extra weight.  · Finish eating at least 2 hours before you go to bed or lie down.  · Raise the head of your bed.  Date Last Reviewed: 7/1/2016  © 7369-6840 [employer]. [address] [place] [address], [place] [address]. All rights reserved. This information is not intended as a substitute for professional medical care. Always follow your healthcare professional's instructions.

## 2020-08-27 NOTE — PROGRESS NOTES
Subjective:       Patient ID: Coral Pandya is a 69 y.o. female Body mass index is 25.24 kg/m².    Chief Complaint: Abdominal Pain (Went to ER last night; inflamation of small intestine)    This patient is new to me.  Established patient of Dr. Chambers.     Abdominal Pain  This is a new problem. Episode onset: started 8/14/2020 after eating almonds. Episode frequency: occurs with eating. The problem has been gradually worsening (worse since it started but unchanged over the past few days). The pain is located in the generalized abdominal region. The pain is at a severity of 2/10 (currently). The quality of the pain is aching and a sensation of fullness. The abdominal pain does not radiate. Pertinent negatives include no belching, constipation, diarrhea, dysuria, fever, flatus, hematochezia, melena, nausea, vomiting or weight loss. Associated symptoms comments: Bowel movements once daily of soft stool. The pain is aggravated by eating (after a bowel movement). Treatments tried: bland soft diet. The treatment provided mild relief. Prior diagnostic workup includes CT scan. Her past medical history is significant for GERD (controlled on pepcid 40 mg daily and aciphex 20 mg daily). There is no history of Crohn's disease or ulcerative colitis. Patient's medical history includes kidney stones (seeing urology).     Review of Systems   Constitutional: Positive for activity change (increased activity since 3/2020) and appetite change (decreased & early satiety). Negative for chills, fatigue, fever and weight loss.   HENT: Negative for sore throat and trouble swallowing.    Respiratory: Negative for cough, choking and shortness of breath.    Cardiovascular: Negative for chest pain.   Gastrointestinal: Positive for abdominal pain. Negative for anal bleeding, blood in stool, constipation, diarrhea, flatus, hematochezia, melena, nausea, rectal pain and vomiting.   Genitourinary: Negative for difficulty urinating,  dysuria and flank pain.   Neurological: Negative for weakness.   Psychiatric/Behavioral: The patient is nervous/anxious (anxiety about health and ct findings).        No LMP recorded. Patient is postmenopausal.  Past Medical History:   Diagnosis Date    Anxiety     Arthralgia of knee     Bladder mass     Cervical spondylosis     Depression     Fibromyalgia     GERD (gastroesophageal reflux disease)     Hematuria     HTN (hypertension)     Hypothyroid     DANYELL (obstructive sleep apnea)     no cpap    Thrombosis      Past Surgical History:   Procedure Laterality Date    CARPAL TUNNEL RELEASE Bilateral     CATARACT EXTRACTION EXTRACAPSULAR W/ INTRAOCULAR LENS IMPLANTATION Bilateral     COLONOSCOPY N/A 6/14/2018    Procedure: COLONOSCOPY;  Surgeon: Amaury Chambers MD;  Location: [facility];  Service: Endoscopy;  Laterality: N/A; hemorrhoids, repeat in 5 years for screening due to family history of colon cancer    CYSTOSCOPY W/ RETROGRADES Bilateral 6/1/2020    Procedure: CYSTOSCOPY, WITH RETROGRADE PYELOGRAM;  Surgeon: Alcon Rodrigues MD;  Location: [facility] OR;  Service: Urology;  Laterality: Bilateral;    CYSTOSCOPY WITH BIOPSY OF BLADDER N/A 6/1/2020    Procedure: CYSTOSCOPY, WITH BLADDER BIOPSY;  Surgeon: Alcon Rodrigues MD;  Location: [facility] OR;  Service: Urology;  Laterality: N/A;    EYE SURGERY Bilateral     cataract surgery    FOOT SURGERY Bilateral     bilat joints removed second toe    LASER LITHOTRIPSY Right 6/1/2020    Procedure: LITHOTRIPSY, USING LASER;  Surgeon: Alcon Rodrigues MD;  Location: [facility] OR;  Service: Urology;  Laterality: Right;    TONSILLECTOMY, ADENOIDECTOMY      TOTAL KNEE ARTHROPLASTY Right 1/14/2019    Procedure: ARTHROPLASTY, KNEE, TOTAL SITE ASSISTED;  Surgeon: Angelo Rodas MD;  Location: [facility] OR;  Service: Orthopedics;  Laterality: Right;  OFIRMEV    TUBAL LIGATION      UPPER GASTROINTESTINAL ENDOSCOPY  05/21/2008    Dr. Chambers, in legacy: gastric  polyps removed; biopsy: fundic gland polyps    URETEROSCOPY Right 6/1/2020    Procedure: URETEROSCOPY;  Surgeon: Alcon Rodrigues MD;  Location: [facility] OR;  Service: Urology;  Laterality: Right;     Family History   Problem Relation Age of Onset    Colon cancer Paternal Aunt     Colon cancer Paternal Uncle     Celiac disease Neg Hx     Crohn's disease Neg Hx     Ulcerative colitis Neg Hx      Wt Readings from Last 10 Encounters:   08/27/20 73.1 kg (161 lb 2.5 oz)   08/26/20 74.1 kg (163 lb 5.8 oz)   08/26/20 74 kg (163 lb 2.3 oz)   08/17/20 73.6 kg (162 lb 4.1 oz)   07/16/20 74.1 kg (163 lb 5.8 oz)   06/10/20 73.4 kg (161 lb 13.1 oz)   06/08/20 72.6 kg (160 lb)   06/01/20 72.6 kg (160 lb)   05/29/20 73.5 kg (162 lb 0.6 oz)   05/19/20 72.6 kg (160 lb 0.9 oz)     Lab Results   Component Value Date    WBC 6.60 08/26/2020    HGB 15.2 08/26/2020    HCT 45.4 08/26/2020    MCV 96 08/26/2020     08/26/2020     CMP  Sodium   Date Value Ref Range Status   08/26/2020 138 136 - 145 mmol/L Final     Potassium   Date Value Ref Range Status   08/26/2020 4.3 3.5 - 5.1 mmol/L Final     Chloride   Date Value Ref Range Status   08/26/2020 103 95 - 110 mmol/L Final     CO2   Date Value Ref Range Status   08/26/2020 31 22 - 31 mmol/L Final     Glucose   Date Value Ref Range Status   08/26/2020 103 70 - 110 mg/dL Final     Comment:     The ADA recommends the following guidelines for fasting glucose:  Normal:       less than 100 mg/dL  Prediabetes:  100 mg/dL to 125 mg/dL  Diabetes:     126 mg/dL or higher       BUN, Bld   Date Value Ref Range Status   08/26/2020 22 (H) 7 - 18 mg/dL Final     Creatinine   Date Value Ref Range Status   08/26/2020 1.22 0.50 - 1.40 mg/dL Final     Calcium   Date Value Ref Range Status   08/26/2020 9.7 8.4 - 10.2 mg/dL Final     Total Protein   Date Value Ref Range Status   08/26/2020 7.5 6.0 - 8.4 g/dL Final     Albumin   Date Value Ref Range Status   08/26/2020 4.4 3.5 - 5.2 g/dL Final      Total Bilirubin   Date Value Ref Range Status   08/26/2020 0.3 0.2 - 1.3 mg/dL Final     Alkaline Phosphatase   Date Value Ref Range Status   08/26/2020 80 38 - 145 U/L Final     AST   Date Value Ref Range Status   08/26/2020 35 14 - 36 U/L Final     ALT   Date Value Ref Range Status   08/26/2020 33 0 - 35 U/L Final     Anion Gap   Date Value Ref Range Status   08/26/2020 4 (L) 8 - 16 mmol/L Final     eGFR if    Date Value Ref Range Status   08/26/2020 52 (A) >60 mL/min/1.73 m^2 Final     eGFR if non    Date Value Ref Range Status   08/26/2020 45 (A) >60 mL/min/1.73 m^2 Final     Comment:     Calculation used to obtain the estimated glomerular filtration  rate (eGFR) is the CKD-EPI equation.        Lab Results   Component Value Date    AMYLASE 72 08/26/2020     Lab Results   Component Value Date    LIPASE 35 08/26/2020     Lab Results   Component Value Date    LIPASERES 96 08/26/2020     Lab Results   Component Value Date    TSH 1.685 09/12/2018     Reviewed prior medical records including radiology report of 8/26/2020 CT scans of abdomen and pelvis & ER visit note; & endoscopy history (see surgical history).    Objective:      Physical Exam  Vitals signs and nursing note reviewed.   Constitutional:       General: She is not in acute distress.     Appearance: Normal appearance. She is well-developed. She is not diaphoretic.   HENT:      Mouth/Throat:      Comments: Patient is wearing a face mask, which covers patient's mouth and nose, due to COVID 19 concerns.  Eyes:      General: No scleral icterus.     Conjunctiva/sclera: Conjunctivae normal.      Pupils: Pupils are equal, round, and reactive to light.   Pulmonary:      Effort: Pulmonary effort is normal. No respiratory distress.      Breath sounds: Normal breath sounds. No wheezing.   Abdominal:      General: Bowel sounds are normal. There is no distension or abdominal bruit.      Palpations: Abdomen is soft. Abdomen is not  rigid. There is no mass.      Tenderness: There is generalized abdominal tenderness (mild). There is no guarding or rebound. Negative signs include Hopper's sign and McBurney's sign.   Skin:     General: Skin is warm and dry.      Coloration: Skin is not pale.      Findings: No erythema or rash.      Comments: Non-jaundiced   Neurological:      Mental Status: She is alert and oriented to person, place, and time.   Psychiatric:         Mood and Affect: Mood is anxious.         Behavior: Behavior normal.         Thought Content: Thought content normal.         Judgment: Judgment normal.         Assessment:       1. Generalized abdominal pain    2. Abnormal CT scan, gastrointestinal tract    3. Enteritis        Plan:     discussed case with Dr. Chambers; Dr. Chambers recommended CT enterography to further evaluate small bowel findings to help determine if patient will need small bowel enteroscopy (to be done at [facility]) or possible referral to general surgery for exploratory surgery. Dr. Chambers also recommended a course of Cipro and flagyl. Discussed recommendations with patient; patient verbalized understanding and patient agreed with management plan.    Generalized abdominal pain  -     CT Enterography Abd_Pelvis With Contrast; Future; Expected date: 08/27/2020  -  START   ciprofloxacin HCl (CIPRO) 500 MG tablet; Take 1 tablet (500 mg total) by mouth 2 (two) times daily. for 10 days  Dispense: 20 tablet; Refill: 0  -  START   metroNIDAZOLE (FLAGYL) 500 MG tablet; Take 1 tablet (500 mg total) by mouth 3 (three) times daily. DO NOT drink any alcohol while taking & for 72 hours after taking for 10 days  Dispense: 30 tablet; Refill: 0  - avoid/minimize use of NSAIDs- since they can cause GI upset, bleeding and/or ulcers. If NSAID must be taken, recommend take with food.  - continue soft bland diet at this time    Abnormal CT scan, gastrointestinal tract & Enteritis  -     CT Enterography Abd_Pelvis With  Contrast; Future; Expected date: 08/27/2020  -  START   ciprofloxacin HCl (CIPRO) 500 MG tablet; Take 1 tablet (500 mg total) by mouth 2 (two) times daily. for 10 days  Dispense: 20 tablet; Refill: 0  -  START   metroNIDAZOLE (FLAGYL) 500 MG tablet; Take 1 tablet (500 mg total) by mouth 3 (three) times daily. DO NOT drink any alcohol while taking & for 72 hours after taking for 10 days  Dispense: 30 tablet; Refill: 0    Follow up in about 1 month (around 9/27/2020), or if symptoms worsen or fail to improve.      If no improvement in symptoms or symptoms worsen, call/follow-up at clinic or go to ER.

## 2020-08-27 NOTE — TELEPHONE ENCOUNTER
----- Message from Nayla Avila LPN sent at 8/27/2020  4:53 PM CDT -----  Regarding: FW: appointment  Contact: rt    ----- Message -----  From: Elvira Lang  Sent: 8/27/2020   4:44 PM CDT  To: [facility]  Subject: appointment                                      Type:  Sooner Apoointment Request    Caller is requesting a sooner appointment.  Caller declined first available appointment listed below.  Caller will not accept being placed on the waitlist and is requesting a message be sent to doctor.    Name of Caller:  self  When is the first available appointment?    Symptoms:    Best Call Back Number:  [phone]  Additional Information:  Patient requesting to schedule Ctscan tomorrow.

## 2020-08-31 ENCOUNTER — HOSPITAL ENCOUNTER (OUTPATIENT)
Dept: RADIOLOGY | Facility: HOSPITAL | Age: 70
Discharge: HOME | End: 2020-08-31
Attending: NURSE PRACTITIONER
Payer: MEDICARE

## 2020-08-31 ENCOUNTER — TELEPHONE (OUTPATIENT)
Dept: GASTROENTEROLOGY | Facility: CLINIC | Age: 70
End: 2020-08-31

## 2020-08-31 DIAGNOSIS — R93.3 ABNORMAL CT SCAN, GASTROINTESTINAL TRACT: ICD-10-CM

## 2020-08-31 DIAGNOSIS — K52.9 ENTERITIS: ICD-10-CM

## 2020-08-31 DIAGNOSIS — R10.84 GENERALIZED ABDOMINAL PAIN: ICD-10-CM

## 2020-08-31 PROCEDURE — 25500020 PHARM REV CODE 255: Mod: PO | Performed by: NURSE PRACTITIONER

## 2020-08-31 PROCEDURE — 74177 CT ABD & PELVIS W/CONTRAST: CPT | Mod: TC,PO

## 2020-08-31 PROCEDURE — 74177 CT ABD & PELVIS W/CONTRAST: CPT | Mod: 26,,, | Performed by: RADIOLOGY

## 2020-08-31 PROCEDURE — 74177 CT ENTEROGRAPHY ABD_PELVIS WITH CONTRAST: ICD-10-PCS | Mod: 26,,, | Performed by: RADIOLOGY

## 2020-08-31 PROCEDURE — A9698 NON-RAD CONTRAST MATERIALNOC: HCPCS | Mod: PO | Performed by: NURSE PRACTITIONER

## 2020-08-31 RX ADMIN — IOHEXOL 75 ML: 350 INJECTION, SOLUTION INTRAVENOUS at 02:08

## 2020-08-31 RX ADMIN — BARIUM SULFATE 1350 ML: 1 SUSPENSION ORAL at 02:08

## 2020-08-31 NOTE — TELEPHONE ENCOUNTER
----- Message from ARTEM Vasquez sent at 8/31/2020  2:20 PM CDT -----  Please call to inform & review the results with the patient- Lab results are normal: kidney function levels are back to normal levels.  Continue with previous recommendations.  Thanks,  Evelia MCGILL-C

## 2020-09-01 ENCOUNTER — TELEPHONE (OUTPATIENT)
Dept: GASTROENTEROLOGY | Facility: CLINIC | Age: 70
End: 2020-09-01

## 2020-09-01 NOTE — TELEPHONE ENCOUNTER
----- Message from Colette Young sent at 9/1/2020 12:30 PM CDT -----  Regarding: results of ct  Contact: JOAN ROBLEDO [[id number]]  Patient is calling for ct results. Please call patient at [phone]. Thanks!

## 2020-09-01 NOTE — TELEPHONE ENCOUNTER
The results of the CT scan showed no thickening of the small intestines as seen on prior CT scans. I discussed results with Dr. Chambers. He wants to discuss the results with the radiologist to help determine next step in management. We will notify patient of the full CT results and recommendations after Dr. Chambers lets me know his recommendations.  KONSTANTINP

## 2020-09-02 ENCOUNTER — TELEPHONE (OUTPATIENT)
Dept: GASTROENTEROLOGY | Facility: CLINIC | Age: 70
End: 2020-09-02

## 2020-09-02 ENCOUNTER — PATIENT MESSAGE (OUTPATIENT)
Dept: GASTROENTEROLOGY | Facility: CLINIC | Age: 70
End: 2020-09-02

## 2020-09-02 DIAGNOSIS — R19.7 DIARRHEA, UNSPECIFIED TYPE: Primary | ICD-10-CM

## 2020-09-02 NOTE — TELEPHONE ENCOUNTER
Pt stated the antibiotics are making her very bloated and now she is having diarrhea. Pt asking for advice. Thanks

## 2020-09-02 NOTE — TELEPHONE ENCOUNTER
----- Message from Tamela Li sent at 9/2/2020 11:25 AM CDT -----  Regarding: Ref to scan results from August 31  Contact: [phone]  The results are not on here [facility] and would like those results.  Pt is not feeling well at all.  Please call and advise .  Pt has questions.  Nurse Practioner sent pt a message yesterday but wants to talk to someone.  Pt does not want an appt.  Just advice. Over the phone.

## 2020-09-02 NOTE — TELEPHONE ENCOUNTER
I reviewed CT scans with Radiologist. Current CT enterography negative for inflammation or evidence of intussusception/mass/obstruction. Suspect transient enteritis/mesenteric adenitis. Agree with stool evaluation as ordered. If negative, recommend C-scope (r/o colitis, ileitis).

## 2020-09-02 NOTE — TELEPHONE ENCOUNTER
I released the results of the CT enterography scan to her [facility] account. I placed orders for stool studies since patient is experiencing diarrhea. I recommend patient start taking an OTC probiotic, such as Florastor or Culturelle, taken as directed on packaging. Once I hear back from Dr. Chambers on recommendations, we will notify patient.  Thanks  DONNY

## 2020-09-03 ENCOUNTER — LAB VISIT (OUTPATIENT)
Dept: LAB | Facility: HOSPITAL | Age: 70
End: 2020-09-03
Attending: NURSE PRACTITIONER
Payer: MEDICARE

## 2020-09-03 ENCOUNTER — TELEPHONE (OUTPATIENT)
Dept: GASTROENTEROLOGY | Facility: CLINIC | Age: 70
End: 2020-09-03

## 2020-09-03 DIAGNOSIS — R19.7 DIARRHEA, UNSPECIFIED TYPE: ICD-10-CM

## 2020-09-03 PROCEDURE — 87045 FECES CULTURE AEROBIC BACT: CPT

## 2020-09-03 PROCEDURE — 87425 ROTAVIRUS AG IA: CPT

## 2020-09-03 PROCEDURE — 89055 LEUKOCYTE ASSESSMENT FECAL: CPT

## 2020-09-03 PROCEDURE — 87427 SHIGA-LIKE TOXIN AG IA: CPT

## 2020-09-03 PROCEDURE — 83986 ASSAY PH BODY FLUID NOS: CPT

## 2020-09-03 PROCEDURE — 87329 GIARDIA AG IA: CPT

## 2020-09-03 PROCEDURE — 87324 CLOSTRIDIUM AG IA: CPT

## 2020-09-03 PROCEDURE — 87046 STOOL CULTR AEROBIC BACT EA: CPT | Mod: 59

## 2020-09-03 PROCEDURE — 82272 OCCULT BLD FECES 1-3 TESTS: CPT

## 2020-09-03 PROCEDURE — 87449 NOS EACH ORGANISM AG IA: CPT

## 2020-09-03 NOTE — TELEPHONE ENCOUNTER
----- Message from Lulu Chong sent at 9/3/2020 11:37 AM CDT -----  Regarding: stool kit  Contact: patient  Type: Needs Medical Advice  Who Called:  patient  Symptoms (please be specific):  na  How long has patient had these symptoms:  soumya  Pharmacy name and phone #:  [facility]  Best Call Back Number: [phone]  Additional Information: Patient needs to know how to obtain fecal matter kit, can she pick it up at [facility] preform test and return it at [facility] for testing.  Please call to advise.  Thanks!

## 2020-09-03 NOTE — TELEPHONE ENCOUNTER
Spoke with pt and informed a stool kit can be obtained at any [facility] and dropped off at any [facility]. Pt verbalized understanding.

## 2020-09-04 LAB
C DIFF GDH STL QL: NEGATIVE
C DIFF TOX A+B STL QL IA: NEGATIVE
CRYPTOSP AG STL QL IA: NEGATIVE
G LAMBLIA AG STL QL IA: NEGATIVE
OB PNL STL: NEGATIVE
RV AG STL QL IA.RAPID: NEGATIVE
WBC #/AREA STL HPF: NORMAL /[HPF]

## 2020-09-07 ENCOUNTER — PATIENT MESSAGE (OUTPATIENT)
Dept: GASTROENTEROLOGY | Facility: CLINIC | Age: 70
End: 2020-09-07

## 2020-09-07 LAB
BACTERIA STL CULT: NORMAL

## 2020-09-08 DIAGNOSIS — Z01.818 PREOP TESTING: ICD-10-CM

## 2020-09-08 LAB
HADV DNA SERPL QL NAA+PROBE: NEGATIVE
O+P STL MICRO: NORMAL
PH STL: NORMAL [PH]
SPECIMEN SOURCE: NORMAL

## 2020-09-09 ENCOUNTER — TELEPHONE (OUTPATIENT)
Dept: GASTROENTEROLOGY | Facility: CLINIC | Age: 70
End: 2020-09-09

## 2020-09-09 DIAGNOSIS — Z01.818 PREOP TESTING: ICD-10-CM

## 2020-09-09 NOTE — TELEPHONE ENCOUNTER
----- Message from Ayush Avila LPN sent at 9/9/2020  2:21 PM CDT -----  Regarding: FW: return call  Contact: patient    ----- Message -----  From: Lulu Chong  Sent: 9/9/2020   1:14 PM CDT  To: [facility]  Subject: return call                                      Type:  Patient Returning Call    Who Called:  patient  Who Left Message for Patient:  ayush  Does the patient know what this is regarding?:  yes  Best Call Back Number:  [phone]  Additional Information:  sent message to teams. Thanks!

## 2020-09-09 NOTE — TELEPHONE ENCOUNTER
Spoke with pt regarding procedures. Pt wanted to separate procedures to have sooner. Procedures , pt agreed and verbalized understanding.

## 2020-09-13 ENCOUNTER — LAB VISIT (OUTPATIENT)
Dept: FAMILY MEDICINE | Facility: CLINIC | Age: 70
End: 2020-09-13
Payer: MEDICARE

## 2020-09-13 DIAGNOSIS — Z01.818 PREOP TESTING: ICD-10-CM

## 2020-09-13 PROCEDURE — U0003 INFECTIOUS AGENT DETECTION BY NUCLEIC ACID (DNA OR RNA); SEVERE ACUTE RESPIRATORY SYNDROME CORONAVIRUS 2 (SARS-COV-2) (CORONAVIRUS DISEASE [COVID-19]), AMPLIFIED PROBE TECHNIQUE, MAKING USE OF HIGH THROUGHPUT TECHNOLOGIES AS DESCRIBED BY CMS-2020-01-R: HCPCS

## 2020-09-14 LAB — SARS-COV-2 RNA RESP QL NAA+PROBE: NOT DETECTED

## 2020-09-15 ENCOUNTER — TELEPHONE (OUTPATIENT)
Dept: ENDOSCOPY | Facility: HOSPITAL | Age: 70
End: 2020-09-15

## 2020-09-15 NOTE — TELEPHONE ENCOUNTER
Good Morning,   Ms. Pandya is scheduled for an EGD with  tomorrow 9/16.  stated that she just started taking nystatin for yeast in her stool. I just wanted to verify. Please advise.    Thank you!

## 2020-09-16 ENCOUNTER — ANESTHESIA (OUTPATIENT)
Dept: ENDOSCOPY | Facility: HOSPITAL | Age: 70
End: 2020-09-16
Payer: MEDICARE

## 2020-09-16 ENCOUNTER — ANESTHESIA EVENT (OUTPATIENT)
Dept: ENDOSCOPY | Facility: HOSPITAL | Age: 70
End: 2020-09-16
Payer: MEDICARE

## 2020-09-16 ENCOUNTER — HOSPITAL ENCOUNTER (OUTPATIENT)
Facility: HOSPITAL | Age: 70
Discharge: HOME | End: 2020-09-16
Attending: INTERNAL MEDICINE | Admitting: INTERNAL MEDICINE
Payer: MEDICARE

## 2020-09-16 VITALS
DIASTOLIC BLOOD PRESSURE: 74 MMHG | SYSTOLIC BLOOD PRESSURE: 145 MMHG | TEMPERATURE: 98 F | HEART RATE: 74 BPM | BODY MASS INDEX: 24.33 KG/M2 | WEIGHT: 155 LBS | RESPIRATION RATE: 16 BRPM | OXYGEN SATURATION: 98 % | HEIGHT: 67 IN

## 2020-09-16 DIAGNOSIS — R10.9 ABDOMINAL PAIN: ICD-10-CM

## 2020-09-16 PROCEDURE — 37000008 HC ANESTHESIA 1ST 15 MINUTES: Mod: PO | Performed by: INTERNAL MEDICINE

## 2020-09-16 PROCEDURE — D9220A PRA ANESTHESIA: Mod: CRNA,,, | Performed by: NURSE ANESTHETIST, CERTIFIED REGISTERED

## 2020-09-16 PROCEDURE — 63600175 PHARM REV CODE 636 W HCPCS: Mod: PO | Performed by: NURSE ANESTHETIST, CERTIFIED REGISTERED

## 2020-09-16 PROCEDURE — 88305 TISSUE EXAM BY PATHOLOGIST: ICD-10-PCS | Mod: 26,,, | Performed by: PATHOLOGY

## 2020-09-16 PROCEDURE — 88305 TISSUE EXAM BY PATHOLOGIST: CPT | Mod: 26,,, | Performed by: PATHOLOGY

## 2020-09-16 PROCEDURE — D9220A PRA ANESTHESIA: ICD-10-PCS | Mod: ANES,,, | Performed by: ANESTHESIOLOGY

## 2020-09-16 PROCEDURE — D9220A PRA ANESTHESIA: Mod: ANES,,, | Performed by: ANESTHESIOLOGY

## 2020-09-16 PROCEDURE — 27201012 HC FORCEPS, HOT/COLD, DISP: Mod: PO | Performed by: INTERNAL MEDICINE

## 2020-09-16 PROCEDURE — 25000003 PHARM REV CODE 250: Mod: PO | Performed by: ANESTHESIOLOGY

## 2020-09-16 PROCEDURE — D9220A PRA ANESTHESIA: ICD-10-PCS | Mod: CRNA,,, | Performed by: NURSE ANESTHETIST, CERTIFIED REGISTERED

## 2020-09-16 PROCEDURE — 37000009 HC ANESTHESIA EA ADD 15 MINS: Mod: PO | Performed by: INTERNAL MEDICINE

## 2020-09-16 PROCEDURE — 63600175 PHARM REV CODE 636 W HCPCS: Mod: PO | Performed by: INTERNAL MEDICINE

## 2020-09-16 PROCEDURE — 43239 EGD BIOPSY SINGLE/MULTIPLE: CPT | Mod: ,,, | Performed by: INTERNAL MEDICINE

## 2020-09-16 PROCEDURE — 43239 PR EGD, FLEX, W/BIOPSY, SGL/MULTI: ICD-10-PCS | Mod: ,,, | Performed by: INTERNAL MEDICINE

## 2020-09-16 PROCEDURE — 25000003 PHARM REV CODE 250: Mod: PO | Performed by: NURSE ANESTHETIST, CERTIFIED REGISTERED

## 2020-09-16 PROCEDURE — 43239 EGD BIOPSY SINGLE/MULTIPLE: CPT | Mod: PO | Performed by: INTERNAL MEDICINE

## 2020-09-16 PROCEDURE — 88305 TISSUE EXAM BY PATHOLOGIST: CPT | Mod: 59 | Performed by: PATHOLOGY

## 2020-09-16 RX ORDER — SODIUM CHLORIDE 0.9 % (FLUSH) 0.9 %
10 SYRINGE (ML) INJECTION
Status: DISCONTINUED | OUTPATIENT
Start: 2020-09-16 | End: 2020-09-16 | Stop reason: HOSPADM

## 2020-09-16 RX ORDER — SODIUM CHLORIDE, SODIUM LACTATE, POTASSIUM CHLORIDE, CALCIUM CHLORIDE 600; 310; 30; 20 MG/100ML; MG/100ML; MG/100ML; MG/100ML
INJECTION, SOLUTION INTRAVENOUS CONTINUOUS
Status: DISCONTINUED | OUTPATIENT
Start: 2020-09-16 | End: 2020-09-16 | Stop reason: HOSPADM

## 2020-09-16 RX ORDER — PROPOFOL 10 MG/ML
VIAL (ML) INTRAVENOUS
Status: DISCONTINUED | OUTPATIENT
Start: 2020-09-16 | End: 2020-09-16

## 2020-09-16 RX ORDER — LIDOCAINE HYDROCHLORIDE 10 MG/ML
1 INJECTION INFILTRATION; PERINEURAL ONCE
Status: COMPLETED | OUTPATIENT
Start: 2020-09-16 | End: 2020-09-16

## 2020-09-16 RX ORDER — SUCRALFATE 1 G/1
1 TABLET ORAL 3 TIMES DAILY
Qty: 100 TABLET | Refills: 2 | Status: SHIPPED | OUTPATIENT
Start: 2020-09-16 | End: 2020-09-22 | Stop reason: DRUGHIGH

## 2020-09-16 RX ORDER — LIDOCAINE HCL/PF 100 MG/5ML
SYRINGE (ML) INTRAVENOUS
Status: DISCONTINUED | OUTPATIENT
Start: 2020-09-16 | End: 2020-09-16

## 2020-09-16 RX ADMIN — PROPOFOL 50 MG: 10 INJECTION, EMULSION INTRAVENOUS at 09:09

## 2020-09-16 RX ADMIN — PROPOFOL 150 MG: 10 INJECTION, EMULSION INTRAVENOUS at 09:09

## 2020-09-16 RX ADMIN — LIDOCAINE HYDROCHLORIDE 100 MG: 20 INJECTION, SOLUTION INTRAVENOUS at 09:09

## 2020-09-16 RX ADMIN — LIDOCAINE HYDROCHLORIDE: 10 INJECTION, SOLUTION EPIDURAL; INFILTRATION; INTRACAUDAL; PERINEURAL at 08:09

## 2020-09-16 RX ADMIN — SODIUM CHLORIDE, SODIUM LACTATE, POTASSIUM CHLORIDE, AND CALCIUM CHLORIDE: .6; .31; .03; .02 INJECTION, SOLUTION INTRAVENOUS at 08:09

## 2020-09-16 NOTE — DISCHARGE INSTRUCTIONS
Gastritis (Adult)    Gastritis is inflammation and irritation of the stomach lining. It can be present for a short time (acute) or be long lasting (chronic). Gastritis is often caused by infection with bacteria called H pylori. More than a third of people in the [place] have this bacteria in their bodies. In many cases, H pylori causes no problems or symptoms. In some people, though, the infection irritates the stomach lining and causes gastritis. Other causes of stomach irritation include drinking alcohol or taking pain-relieving medicines called NSAIDs (such as aspirin or ibuprofen).   Symptoms of gastritis can include:  · Abdominal pain or bloating  · Loss of appetite  · Nausea or vomiting  · Vomiting blood or having black stools  · Feeling more tired than usual  An inflamed and irritated stomach lining is more likely to develop a sore called an ulcer. To help prevent this, gastritis should be treated.  Home care  If needed, medicines may be prescribed. If you have H pylori infection, treating it will likely relieve your symptoms. Other changes can help reduce stomach irritation and help it heal.  · If you have been prescribed medicines for H pylori infection, take them as directed. Take all of the medicine until it is finished or your healthcare provider tells you to stop, even if you feel better.  · Your healthcare provider may recommend avoiding NSAIDs. If you take daily aspirin for your heart or other medical reasons, do not stop without talking to your healthcare provider first.  · Avoid drinking alcohol.  · Stop smoking. Smoking can irritate the stomach and delay healing. As much as possible, stay away from second hand smoke.  Follow-up care  Follow up with your healthcare provider, or as advised by our staff. Testing may be needed to check for inflammation or an ulcer.  When to seek medical advice  Call your healthcare provider for any of the following:  · Stomach pain that gets worse or moves to the lower  right abdomen (appendix area)  · Chest pain that appears or gets worse, or spreads to the back, neck, shoulder, or arm  · Frequent vomiting (cant keep down liquids)  · Blood in the stool or vomit (red or black in color)  · Feeling weak or dizzy  · Fever of 100.4ºF (38ºC) or higher, or as directed by your healthcare provider  Date Last Reviewed: 6/22/2015  © 8489-8824 [employer]. [address] [place] [address]. All rights reserved. This information is not intended as a substitute for professional medical care. Always follow your healthcare professional's instructions.

## 2020-09-16 NOTE — ANESTHESIA PREPROCEDURE EVALUATION
09/16/2020  Coral Pandya is a 70 y.o., female.    Anesthesia Evaluation          Review of Systems  Anesthesia Hx:  No problems with previous Anesthesia   Social:  Non-Smoker    Cardiovascular:   Exercise tolerance: good Hypertension    Pulmonary:   Sleep Apnea    Hepatic/GI:   GERD    Musculoskeletal:   Arthritis     Neurological:   Neuromuscular Disease, Headaches    Endocrine:   Hypothyroidism    Psych:   Psychiatric History          Physical Exam  General:  Well nourished    Airway/Jaw/Neck:  Airway Findings: Mouth Opening: Normal Tongue: Normal  General Airway Assessment: Adult  Mallampati: II  TM Distance: Normal, at least 6 cm       Chest/Lungs:  Chest/Lungs Clear    Heart/Vascular:  Heart Findings: Normal            Anesthesia Plan  Type of Anesthesia, risks & benefits discussed:  Anesthesia Type:  general  Patient's Preference:   Intra-op Monitoring Plan: standard ASA monitors  Intra-op Monitoring Plan Comments:   Post Op Pain Control Plan: multimodal analgesia and IV/PO Opioids PRN  Post Op Pain Control Plan Comments:   Induction:   IV  Beta Blocker:  Patient is not currently on a Beta-Blocker (No further documentation required).       Informed Consent: Patient understands risks and agrees with Anesthesia plan.  Questions answered. Anesthesia consent signed with patient.  ASA Score: 2     Day of Surgery Review of History & Physical:    H&P update referred to the surgeon.     Anesthesia Plan Notes: I have personally evaluated the patient and discussed risk/benefits/alternatives of general anesthesia.        Ready For Surgery From Anesthesia Perspective.

## 2020-09-16 NOTE — ANESTHESIA POSTPROCEDURE EVALUATION
Anesthesia Post Evaluation    Patient: Coral Pandya    Procedure(s) Performed: Procedure(s) (LRB):  EGD (ESOPHAGOGASTRODUODENOSCOPY) (N/A)    Final Anesthesia Type: general    Patient location during evaluation: GI PACU  Patient participation: Yes- Able to Participate  Level of consciousness: awake and alert and oriented  Post-procedure vital signs: reviewed and stable  Airway patency: patent    PONV status at discharge: No PONV  Anesthetic complications: no      Cardiovascular status: blood pressure returned to baseline  Respiratory status: unassisted, room air and spontaneous ventilation  Hydration status: euvolemic  Follow-up not needed.          Vitals Value Taken Time   /74 09/16/20 0948   Temp 36.6 °C (97.8 °F) 09/16/20 0919   Pulse 74 09/16/20 0948   Resp 16 09/16/20 0948   SpO2 98 % 09/16/20 0948         Event Time   Out of Recovery 09:51:46         Pain/Lev Score: Lev Score: 10 (9/16/2020  9:48 AM)

## 2020-09-16 NOTE — H&P
History & Physical - Short Stay  Gastroenterology      SUBJECTIVE:     Procedure: EGD    Chief Complaint/Indication for Procedure: Abdominal Pain and Abnormal CT    PTA Medications   Medication Sig    buPROPion (WELLBUTRIN XL) 150 MG TB24 tablet Take 1 tablet (150 mg total) by mouth once daily.    calcium carbonate-vitamin D3 (CALCIUM 600 + D,3,) 600 mg calcium- 200 unit Cap Take 1 capsule by mouth 2 (two) times daily. DO NOT TAKE AM OF SURGERY    DULoxetine (CYMBALTA) 30 MG capsule Take 1 capsule (30 mg total) by mouth once daily.    DULoxetine (CYMBALTA) 60 MG capsule Take 1 capsule (60 mg total) by mouth once daily.    ezetimibe (ZETIA) 10 mg tablet 10 mg once daily.     famotidine (PEPCID) 40 MG tablet Take 1 tablet (40 mg total) by mouth once daily. (Patient taking differently: Take 40 mg by mouth every evening. USE AS USUAL)    ketotifen (ZADITOR) 0.025 % ophthalmic solution Place 1 drop into both eyes 2 (two) times daily. USE AS USUAL    mirtazapine (REMERON) 15 MG tablet Take 1 tablet (15 mg total) by mouth every evening.    mv-min-FA-D3-om3-dha-epa-fish (CARDIAMIN) 200 mcg-500 unit-200 mg Cap Take 1 tablet by mouth 2 (two) times daily.     nebivoloL (BYSTOLIC) 5 MG Tab Take 1 tablet (5 mg total) by mouth once daily.    [] nystatin (MYCOSTATIN) 500,000 unit Tab Take 1 tablet (500,000 Units total) by mouth every 8 (eight) hours. for 7 days    RABEprazole (ACIPHEX) 20 mg tablet Take 1 tablet (20 mg total) by mouth once daily. (Patient taking differently: Take 20 mg by mouth once daily. TAKE AM OF SURGERY)    SYNTHROID 100 mcg tablet Take 1 tablet (100 mcg total) by mouth once daily.    telmisartan (MICARDIS) 20 MG Tab TAKE 1 TABLET ONCE DAILY (Patient taking differently: Take 20 mg by mouth every evening. DO NOT TAKE NIGHT BEFORE SURGERY)    vit C/E/Zn/coppr/lutein/zeaxan (PRESERVISION AREDS-2 ORAL) Take 1 tablet by mouth 2 (two) times a day.    vitamin D (VITAMIN D3) 1000 units Tab Take  by mouth once daily. States taking 2,000 units    desonide (DESOWEN) 0.05 % cream APPLY TO EYELID SKIN NIGHTLY PRN ITCHY LIDS    mometasone (NASONEX) 50 mcg/actuation nasal spray 2 sprays by Nasal route once daily. (Patient taking differently: 2 sprays by Nasal route daily as needed. )       Review of patient's allergies indicates:   Allergen Reactions    Codeine      Other reaction(s): Nausea    Lisinopril Other (See Comments)     cough    Wellbutrin [bupropion hcl] Other (See Comments)     Headache / high doses        Past Medical History:   Diagnosis Date    Anxiety     Arthralgia of knee     Bladder mass     Cervical spondylosis     Depression     Fibromyalgia     GERD (gastroesophageal reflux disease)     Hematuria     HTN (hypertension)     Hypothyroid     DANYELL (obstructive sleep apnea)     no cpap    Thrombosis      Past Surgical History:   Procedure Laterality Date    CARPAL TUNNEL RELEASE Bilateral     CATARACT EXTRACTION EXTRACAPSULAR W/ INTRAOCULAR LENS IMPLANTATION Bilateral     COLONOSCOPY N/A 6/14/2018    Procedure: COLONOSCOPY;  Surgeon: Amaury Chambers MD;  [facility];  Service: Endoscopy;  Laterality: N/A; hemorrhoids, repeat in 5 years for screening due to family history of colon cancer    CYSTOSCOPY W/ RETROGRADES Bilateral 6/1/2020    Procedure: CYSTOSCOPY, WITH RETROGRADE PYELOGRAM;  Surgeon: Alcon Rodrigues MD;  Location: [facility] OR;  Service: Urology;  Laterality: Bilateral;    CYSTOSCOPY WITH BIOPSY OF BLADDER N/A 6/1/2020    Procedure: CYSTOSCOPY, WITH BLADDER BIOPSY;  Surgeon: Alcon Rodrigues MD;  Location: [facility] OR;  Service: Urology;  Laterality: N/A;    EYE SURGERY Bilateral     cataract surgery    FOOT SURGERY Bilateral     bilat joints removed second toe    JOINT REPLACEMENT      R knee    LASER LITHOTRIPSY Right 6/1/2020    Procedure: LITHOTRIPSY, USING LASER;  Surgeon: Alcon Rodrigues MD;  Location: [facility] OR;  Service: Urology;   Laterality: Right;    TONSILLECTOMY, ADENOIDECTOMY      TOTAL KNEE ARTHROPLASTY Right 1/14/2019    Procedure: ARTHROPLASTY, KNEE, TOTAL SITE ASSISTED;  Surgeon: Angelo Rodas MD;  Location: [facility] OR;  Service: Orthopedics;  Laterality: Right;  OFIRMEV    TUBAL LIGATION      UPPER GASTROINTESTINAL ENDOSCOPY  05/21/2008    Dr. Chambers, in legacy: gastric polyps removed; biopsy: fundic gland polyps    URETEROSCOPY Right 6/1/2020    Procedure: URETEROSCOPY;  Surgeon: Alcon Rodrigues MD;  Location: [facility] OR;  Service: Urology;  Laterality: Right;     Family History   Problem Relation Age of Onset    Colon cancer Paternal Aunt     Colon cancer Paternal Uncle     Celiac disease Neg Hx     Crohn's disease Neg Hx     Ulcerative colitis Neg Hx      Social History     Tobacco Use    Smoking status: Never Smoker    Smokeless tobacco: Never Used   Substance Use Topics    Alcohol use: No     Frequency: Monthly or less     Drinks per session: 1 or 2     Binge frequency: Never    Drug use: No         OBJECTIVE:     Vital Signs (Most Recent)  Temp: 97.5 °F (36.4 °C) (09/16/20 0826)  Pulse: 78 (09/16/20 0826)  Resp: 16 (09/16/20 0826)  BP: (!) 156/80 (09/16/20 0826)  SpO2: 96 % (09/16/20 0826)    Physical Exam:                                                       GENERAL:  Comfortable, in no acute distress.                                 HEENT EXAM:  Nonicteric.  No adenopathy.  Oropharynx is clear.               NECK:  Supple.                                                               LUNGS:  Clear.                                                               CARDIAC:  Regular rate and rhythm.  S1, S2.  No murmur.                      ABDOMEN:  Soft, positive bowel sounds, nontender.  No hepatosplenomegaly or masses.  No rebound or guarding.                                             EXTREMITIES:  No edema.     MENTAL STATUS:  Normal, alert and oriented.      ASSESSMENT/PLAN:     Assessment: Abdominal Pain  and Abnormal CT    Plan: EGD    Anesthesia Plan: General    ASA Grade: ASA 2 - Patient with mild systemic disease with no functional limitations    MALLAMPATI SCORE:  I (soft palate, uvula, fauces, and tonsillar pillars visible)     In my medical opinion and judgment, this medical or surgical procedure was not able to be safely postponed in accordance with [place] [facility], [facility] Notice #2020-COVID19-ALL-007.

## 2020-09-16 NOTE — TRANSFER OF CARE
"Anesthesia Transfer of Care Note    Patient: Coral Pandya    Procedure(s) Performed: Procedure(s) (LRB):  EGD (ESOPHAGOGASTRODUODENOSCOPY) (N/A)    Patient location: PACU    Anesthesia Type: general    Transport from OR: Transported from OR on room air with adequate spontaneous ventilation    Post pain: adequate analgesia    Post assessment: no apparent anesthetic complications and tolerated procedure well    Post vital signs: stable    Level of consciousness: sedated and responds to stimulation    Nausea/Vomiting: no nausea/vomiting    Complications: none    Transfer of care protocol was followed      Last vitals:   Visit Vitals  BP (!) 156/80 (BP Location: Right arm, Patient Position: Lying)   Pulse 78   Temp 36.4 °C (97.5 °F) (Skin)   Resp 16   Ht 5' 7" (1.702 m)   Wt 70.3 kg (155 lb)   SpO2 96%   Breastfeeding No   BMI 24.28 kg/m²     "

## 2020-09-16 NOTE — DISCHARGE SUMMARY
Discharge Note  Short Stay      SUMMARY     Admit Date: 9/16/2020    Attending Physician: Amaury Chambers MD     Discharge Physician: Amaury Chambers MD    Discharge Date: 9/16/2020 9:21 AM    Final Diagnosis: Upper abdominal pain [R10.10]  Generalized abdominal pain [R10.84]  Abnormal CT scan [R93.89]    Disposition: HOME OR SELF CARE    Patient Instructions:   Current Discharge Medication List      START taking these medications    Details   sucralfate (CARAFATE) 1 gram tablet Take 1 tablet (1 g total) by mouth 3 (three) times daily.  Qty: 100 tablet, Refills: 2         CONTINUE these medications which have NOT CHANGED    Details   buPROPion (WELLBUTRIN XL) 150 MG TB24 tablet Take 1 tablet (150 mg total) by mouth once daily.  Qty: 90 tablet, Refills: 0      calcium carbonate-vitamin D3 (CALCIUM 600 + D,3,) 600 mg calcium- 200 unit Cap Take 1 capsule by mouth 2 (two) times daily. DO NOT TAKE AM OF SURGERY      !! DULoxetine (CYMBALTA) 30 MG capsule Take 1 capsule (30 mg total) by mouth once daily.  Qty: 90 capsule, Refills: 0      !! DULoxetine (CYMBALTA) 60 MG capsule Take 1 capsule (60 mg total) by mouth once daily.  Qty: 90 capsule, Refills: 1      ezetimibe (ZETIA) 10 mg tablet 10 mg once daily.       famotidine (PEPCID) 40 MG tablet Take 1 tablet (40 mg total) by mouth once daily.  Qty: 30 tablet, Refills: 11      ketotifen (ZADITOR) 0.025 % ophthalmic solution Place 1 drop into both eyes 2 (two) times daily. USE AS USUAL      mirtazapine (REMERON) 15 MG tablet Take 1 tablet (15 mg total) by mouth every evening.  Qty: 90 tablet, Refills: 1      mv-min-FA-D3-om3-dha-epa-fish (CARDIAMIN) 200 mcg-500 unit-200 mg Cap Take 1 tablet by mouth 2 (two) times daily.       nebivoloL (BYSTOLIC) 5 MG Tab Take 1 tablet (5 mg total) by mouth once daily.  Qty: 90 tablet, Refills: 3    Comments: Please inactivate all prior scripts with same name and strength including on holds.      RABEprazole (ACIPHEX) 20 mg tablet  Take 1 tablet (20 mg total) by mouth once daily.  Qty: 90 tablet, Refills: 2    Comments: Please inactivate all prior scripts with same name and strength including on holds.      SYNTHROID 100 mcg tablet Take 1 tablet (100 mcg total) by mouth once daily.  Qty: 90 tablet, Refills: 3    Associated Diagnoses: Hypothyroidism, unspecified type      telmisartan (MICARDIS) 20 MG Tab TAKE 1 TABLET ONCE DAILY  Qty: 90 tablet, Refills: 2    Comments: Please inactivate all prior scripts with same name and strength including on holds.      vit C/E/Zn/coppr/lutein/zeaxan (PRESERVISION AREDS-2 ORAL) Take 1 tablet by mouth 2 (two) times a day.      vitamin D (VITAMIN D3) 1000 units Tab Take by mouth once daily. States taking 2,000 units      desonide (DESOWEN) 0.05 % cream APPLY TO EYELID SKIN NIGHTLY PRN ITCHY LIDS  Qty: 60 g, Refills: 11      mometasone (NASONEX) 50 mcg/actuation nasal spray 2 sprays by Nasal route once daily.  Qty: 17 g, Refills: 5       !! - Potential duplicate medications found. Please discuss with provider.      STOP taking these medications       nystatin (MYCOSTATIN) 500,000 unit Tab Comments:   Reason for Stopping:               Discharge Procedure Orders (must include Diet, Follow-up, Activity)    Follow Up:  Follow up with PCP as previously scheduled  Resume routine diet.  Activity as tolerated.    No driving day of procedure.

## 2020-09-16 NOTE — [employer] PATIENT INSTRUCTIONS
Discharge Summary/Instructions after an Endoscopic Procedure  Patient Name: Coral Pandya  Patient MRN: [id number]  Patient YOB: 1950 Wednesday, September 16, 2020  Amaury Chambers MD  RESTRICTIONS:  During your procedure today, you received medications for sedation.  These   medications may affect your judgment, balance and coordination.  Therefore,   for 24 hours, you have the following restrictions:   - DO NOT drive a car, operate machinery, make legal/financial decisions,   sign important papers or drink alcohol.    ACTIVITY:  Today: no heavy lifting, straining or running due to procedural   sedation/anesthesia.  The following day: return to full activity including work.  DIET:  Eat and drink normally unless instructed otherwise.     TREATMENT FOR COMMON SIDE EFFECTS:  - Mild abdominal pain, nausea, belching, bloating or excessive gas:  rest,   eat lightly and use a heating pad.  - Sore Throat: treat with throat lozenges and/or gargle with warm salt   water.  - Because air was used during the procedure, expelling large amounts of air   from your rectum or belching is normal.  - If a bowel prep was taken, you may not have a bowel movement for 1-3 days.    This is normal.  SYMPTOMS TO WATCH FOR AND REPORT TO YOUR PHYSICIAN:  1. Abdominal pain or bloating, other than gas cramps.  2. Chest pain.  3. Back pain.  4. Signs of infection such as: chills or fever occurring within 24 hours   after the procedure.  5. Rectal bleeding, which would show as bright red, maroon, or black stools.   (A tablespoon of blood from the rectum is not serious, especially if   hemorrhoids are present.)  6. Vomiting.  7. Weakness or dizziness.  GO DIRECTLY TO THE NEAREST EMERGENCY ROOM IF YOU HAVE ANY OF THE FOLLOWING:      Difficulty breathing              Chills and/or fever over 101 F   Persistent vomiting and/or vomiting blood   Severe abdominal pain   Severe chest pain   Black, tarry stools   Bleeding- more than one  tablespoon   Any other symptom or condition that you feel may need urgent attention  Your doctor recommends these additional instructions:  If any biopsies were taken, your doctors clinic will contact you in 1 to 2   weeks with any results.  We are waiting for your pathology results.   Continue your present medications.   You are being discharged to home.  For questions, problems or results please call your physician - Amaury Chambers MD at Work:  [phone].  EMERGENCY PHONE NUMBER: [phone], LAB RESULTS: [phone]  IF A COMPLICATION OR EMERGENCY SITUATION ARISES AND YOU ARE UNABLE TO REACH   YOUR PHYSICIAN - GO DIRECTLY TO THE EMERGENCY ROOM.  ___________________________________________  Nurse Signature  ___________________________________________  Patient/Designated Responsible Party Signature  Amaury Chambers MD  9/16/2020 9:20:21 AM  This report has been verified and signed electronically.  [employer]

## 2020-09-17 ENCOUNTER — PATIENT MESSAGE (OUTPATIENT)
Dept: GASTROENTEROLOGY | Facility: CLINIC | Age: 70
End: 2020-09-17

## 2020-09-18 ENCOUNTER — PATIENT MESSAGE (OUTPATIENT)
Dept: GASTROENTEROLOGY | Facility: CLINIC | Age: 70
End: 2020-09-18

## 2020-09-21 ENCOUNTER — PATIENT MESSAGE (OUTPATIENT)
Dept: GASTROENTEROLOGY | Facility: CLINIC | Age: 70
End: 2020-09-21

## 2020-09-21 DIAGNOSIS — Z87.19 HISTORY OF GASTRITIS: Primary | ICD-10-CM

## 2020-09-22 ENCOUNTER — PATIENT MESSAGE (OUTPATIENT)
Dept: GASTROENTEROLOGY | Facility: CLINIC | Age: 70
End: 2020-09-22

## 2020-09-22 RX ORDER — SUCRALFATE 1 G/1
1 TABLET ORAL
Qty: 120 TABLET | Refills: 0 | Status: SHIPPED | OUTPATIENT
Start: 2020-09-22 | End: 2020-10-14

## 2020-09-24 ENCOUNTER — LAB VISIT (OUTPATIENT)
Dept: LAB | Facility: HOSPITAL | Age: 70
End: 2020-09-24
Attending: FAMILY MEDICINE
Payer: MEDICARE

## 2020-09-24 DIAGNOSIS — Z79.899 ENCOUNTER FOR LONG-TERM (CURRENT) USE OF OTHER MEDICATIONS: ICD-10-CM

## 2020-09-24 DIAGNOSIS — I95.1 ORTHOSTATIC HYPOTENSION: ICD-10-CM

## 2020-09-24 DIAGNOSIS — I65.23 BILATERAL CAROTID ARTERY OCCLUSION: ICD-10-CM

## 2020-09-24 DIAGNOSIS — E78.2 MIXED HYPERLIPIDEMIA: ICD-10-CM

## 2020-09-24 DIAGNOSIS — E03.9 MYXEDEMA HEART DISEASE: ICD-10-CM

## 2020-09-24 DIAGNOSIS — F45.8 ANXIETY HYPERVENTILATION: ICD-10-CM

## 2020-09-24 DIAGNOSIS — I51.9 MYXEDEMA HEART DISEASE: ICD-10-CM

## 2020-09-24 DIAGNOSIS — N20.0 URIC ACID NEPHROLITHIASIS: ICD-10-CM

## 2020-09-24 DIAGNOSIS — R42 DIZZINESS AND GIDDINESS: Primary | ICD-10-CM

## 2020-09-24 DIAGNOSIS — F03.93 PRESENILE DEMENTIA WITH DEPRESSIVE FEATURES: ICD-10-CM

## 2020-09-24 DIAGNOSIS — F41.9 ANXIETY HYPERVENTILATION: ICD-10-CM

## 2020-09-24 LAB
ALBUMIN SERPL BCP-MCNC: 3.5 G/DL (ref 3.5–5.2)
ALP SERPL-CCNC: 73 U/L (ref 55–135)
ALT SERPL W/O P-5'-P-CCNC: 24 U/L (ref 10–44)
ANION GAP SERPL CALC-SCNC: 8 MMOL/L (ref 8–16)
AST SERPL-CCNC: 23 U/L (ref 10–40)
BASOPHILS # BLD AUTO: 0.02 K/UL (ref 0–0.2)
BASOPHILS NFR BLD: 0.6 % (ref 0–1.9)
BILIRUB SERPL-MCNC: 0.3 MG/DL (ref 0.1–1)
BUN SERPL-MCNC: 18 MG/DL (ref 8–23)
CALCIUM SERPL-MCNC: 9 MG/DL (ref 8.7–10.5)
CALCIUM SERPL-MCNC: 9 MG/DL (ref 8.7–10.5)
CHLORIDE SERPL-SCNC: 106 MMOL/L (ref 95–110)
CHOLEST SERPL-MCNC: 159 MG/DL (ref 120–199)
CHOLEST/HDLC SERPL: 4.5 {RATIO} (ref 2–5)
CO2 SERPL-SCNC: 28 MMOL/L (ref 23–29)
CREAT SERPL-MCNC: 0.8 MG/DL (ref 0.5–1.4)
CRP SERPL-MCNC: 1.7 MG/L (ref 0–3.19)
DIFFERENTIAL METHOD: ABNORMAL
EOSINOPHIL # BLD AUTO: 0.1 K/UL (ref 0–0.5)
EOSINOPHIL NFR BLD: 1.9 % (ref 0–8)
ERYTHROCYTE [DISTWIDTH] IN BLOOD BY AUTOMATED COUNT: 12.4 % (ref 11.5–14.5)
EST. GFR  (AFRICAN AMERICAN): >60 ML/MIN/1.73 M^2
EST. GFR  (NON AFRICAN AMERICAN): >60 ML/MIN/1.73 M^2
GLUCOSE SERPL-MCNC: 88 MG/DL (ref 70–110)
HCT VFR BLD AUTO: 42.4 % (ref 37–48.5)
HDLC SERPL-MCNC: 35 MG/DL (ref 40–75)
HDLC SERPL: 22 % (ref 20–50)
HGB BLD-MCNC: 13.8 G/DL (ref 12–16)
IMM GRANULOCYTES # BLD AUTO: 0 K/UL (ref 0–0.04)
IMM GRANULOCYTES NFR BLD AUTO: 0 % (ref 0–0.5)
LDLC SERPL CALC-MCNC: 98.2 MG/DL (ref 63–159)
LYMPHOCYTES # BLD AUTO: 1.5 K/UL (ref 1–4.8)
LYMPHOCYTES NFR BLD: 46.6 % (ref 18–48)
MAGNESIUM SERPL-MCNC: 2 MG/DL (ref 1.6–2.6)
MCH RBC QN AUTO: 31.9 PG (ref 27–31)
MCHC RBC AUTO-ENTMCNC: 32.5 G/DL (ref 32–36)
MCV RBC AUTO: 98 FL (ref 82–98)
MONOCYTES # BLD AUTO: 0.5 K/UL (ref 0.3–1)
MONOCYTES NFR BLD: 14.2 % (ref 4–15)
NEUTROPHILS # BLD AUTO: 1.2 K/UL (ref 1.8–7.7)
NEUTROPHILS NFR BLD: 36.7 % (ref 38–73)
NONHDLC SERPL-MCNC: 124 MG/DL
NRBC BLD-RTO: 0 /100 WBC
PLATELET # BLD AUTO: 149 K/UL (ref 150–350)
PMV BLD AUTO: 12.5 FL (ref 9.2–12.9)
POTASSIUM SERPL-SCNC: 3.8 MMOL/L (ref 3.5–5.1)
PROT SERPL-MCNC: 6.2 G/DL (ref 6–8.4)
RBC # BLD AUTO: 4.33 M/UL (ref 4–5.4)
SODIUM SERPL-SCNC: 142 MMOL/L (ref 136–145)
TRIGL SERPL-MCNC: 129 MG/DL (ref 30–150)
TSH SERPL DL<=0.005 MIU/L-ACNC: 0.88 UIU/ML (ref 0.4–4)
VIT B12 SERPL-MCNC: 547 PG/ML (ref 210–950)
WBC # BLD AUTO: 3.24 K/UL (ref 3.9–12.7)

## 2020-09-24 PROCEDURE — 85025 COMPLETE CBC W/AUTO DIFF WBC: CPT

## 2020-09-24 PROCEDURE — 36415 COLL VENOUS BLD VENIPUNCTURE: CPT | Mod: PN

## 2020-09-24 PROCEDURE — 83970 ASSAY OF PARATHORMONE: CPT

## 2020-09-24 PROCEDURE — 80061 LIPID PANEL: CPT

## 2020-09-24 PROCEDURE — 86141 C-REACTIVE PROTEIN HS: CPT

## 2020-09-24 PROCEDURE — 83735 ASSAY OF MAGNESIUM: CPT

## 2020-09-24 PROCEDURE — 82607 VITAMIN B-12: CPT

## 2020-09-24 PROCEDURE — 84443 ASSAY THYROID STIM HORMONE: CPT

## 2020-09-24 PROCEDURE — 82306 VITAMIN D 25 HYDROXY: CPT

## 2020-09-24 PROCEDURE — 80053 COMPREHEN METABOLIC PANEL: CPT

## 2020-09-25 ENCOUNTER — PATIENT MESSAGE (OUTPATIENT)
Dept: OTHER | Facility: OTHER | Age: 70
End: 2020-09-25

## 2020-09-25 LAB
25(OH)D3+25(OH)D2 SERPL-MCNC: 92 NG/ML (ref 30–96)
FINAL PATHOLOGIC DIAGNOSIS: NORMAL
GROSS: NORMAL
PTH-INTACT SERPL-MCNC: 37 PG/ML (ref 9–77)

## 2020-09-26 ENCOUNTER — PATIENT MESSAGE (OUTPATIENT)
Dept: GASTROENTEROLOGY | Facility: CLINIC | Age: 70
End: 2020-09-26

## 2020-09-28 ENCOUNTER — PATIENT MESSAGE (OUTPATIENT)
Dept: GASTROENTEROLOGY | Facility: CLINIC | Age: 70
End: 2020-09-28

## 2020-09-29 ENCOUNTER — PATIENT MESSAGE (OUTPATIENT)
Dept: OTHER | Facility: OTHER | Age: 70
End: 2020-09-29

## 2020-09-30 ENCOUNTER — PATIENT MESSAGE (OUTPATIENT)
Dept: GASTROENTEROLOGY | Facility: CLINIC | Age: 70
End: 2020-09-30

## 2020-10-01 ENCOUNTER — PATIENT MESSAGE (OUTPATIENT)
Dept: FAMILY MEDICINE | Facility: CLINIC | Age: 70
End: 2020-10-01

## 2020-10-01 ENCOUNTER — TELEPHONE (OUTPATIENT)
Dept: GASTROENTEROLOGY | Facility: CLINIC | Age: 70
End: 2020-10-01

## 2020-10-02 ENCOUNTER — PATIENT MESSAGE (OUTPATIENT)
Dept: FAMILY MEDICINE | Facility: CLINIC | Age: 70
End: 2020-10-02

## 2020-10-03 ENCOUNTER — LAB VISIT (OUTPATIENT)
Dept: FAMILY MEDICINE | Facility: CLINIC | Age: 70
End: 2020-10-03
Payer: MEDICARE

## 2020-10-03 DIAGNOSIS — Z01.818 PREOP TESTING: ICD-10-CM

## 2020-10-03 PROCEDURE — U0003 INFECTIOUS AGENT DETECTION BY NUCLEIC ACID (DNA OR RNA); SEVERE ACUTE RESPIRATORY SYNDROME CORONAVIRUS 2 (SARS-COV-2) (CORONAVIRUS DISEASE [COVID-19]), AMPLIFIED PROBE TECHNIQUE, MAKING USE OF HIGH THROUGHPUT TECHNOLOGIES AS DESCRIBED BY CMS-2020-01-R: HCPCS

## 2020-10-04 LAB — SARS-COV-2 RNA RESP QL NAA+PROBE: NOT DETECTED

## 2020-10-06 ENCOUNTER — ANESTHESIA (OUTPATIENT)
Dept: ENDOSCOPY | Facility: HOSPITAL | Age: 70
End: 2020-10-06
Payer: MEDICARE

## 2020-10-06 ENCOUNTER — HOSPITAL ENCOUNTER (OUTPATIENT)
Facility: HOSPITAL | Age: 70
Discharge: HOME | End: 2020-10-06
Attending: INTERNAL MEDICINE | Admitting: INTERNAL MEDICINE
Payer: MEDICARE

## 2020-10-06 ENCOUNTER — ANESTHESIA EVENT (OUTPATIENT)
Dept: ENDOSCOPY | Facility: HOSPITAL | Age: 70
End: 2020-10-06

## 2020-10-06 DIAGNOSIS — R93.3 ABNORMAL FINDING ON GI TRACT IMAGING: ICD-10-CM

## 2020-10-06 DIAGNOSIS — R10.9 ABDOMINAL PAIN: Primary | ICD-10-CM

## 2020-10-06 PROCEDURE — 45380 COLONOSCOPY AND BIOPSY: CPT | Performed by: INTERNAL MEDICINE

## 2020-10-06 PROCEDURE — D9220A PRA ANESTHESIA: Mod: CRNA,,, | Performed by: NURSE ANESTHETIST, CERTIFIED REGISTERED

## 2020-10-06 PROCEDURE — 45380 COLONOSCOPY AND BIOPSY: CPT | Mod: ,,, | Performed by: INTERNAL MEDICINE

## 2020-10-06 PROCEDURE — 27201012 HC FORCEPS, HOT/COLD, DISP: Performed by: INTERNAL MEDICINE

## 2020-10-06 PROCEDURE — D9220A PRA ANESTHESIA: ICD-10-PCS | Mod: CRNA,,, | Performed by: NURSE ANESTHETIST, CERTIFIED REGISTERED

## 2020-10-06 PROCEDURE — D9220A PRA ANESTHESIA: Mod: ANES,,, | Performed by: ANESTHESIOLOGY

## 2020-10-06 PROCEDURE — 37000008 HC ANESTHESIA 1ST 15 MINUTES: Performed by: INTERNAL MEDICINE

## 2020-10-06 PROCEDURE — 25000003 PHARM REV CODE 250: Performed by: INTERNAL MEDICINE

## 2020-10-06 PROCEDURE — 45380 PR COLONOSCOPY,BIOPSY: ICD-10-PCS | Mod: ,,, | Performed by: INTERNAL MEDICINE

## 2020-10-06 PROCEDURE — 63600175 PHARM REV CODE 636 W HCPCS: Performed by: NURSE ANESTHETIST, CERTIFIED REGISTERED

## 2020-10-06 PROCEDURE — 25000003 PHARM REV CODE 250: Performed by: NURSE ANESTHETIST, CERTIFIED REGISTERED

## 2020-10-06 PROCEDURE — D9220A PRA ANESTHESIA: ICD-10-PCS | Mod: ANES,,, | Performed by: ANESTHESIOLOGY

## 2020-10-06 PROCEDURE — 88305 TISSUE EXAM BY PATHOLOGIST: ICD-10-PCS | Mod: 26,,, | Performed by: PATHOLOGY

## 2020-10-06 PROCEDURE — 37000009 HC ANESTHESIA EA ADD 15 MINS: Performed by: INTERNAL MEDICINE

## 2020-10-06 PROCEDURE — 88305 TISSUE EXAM BY PATHOLOGIST: CPT | Mod: 59 | Performed by: PATHOLOGY

## 2020-10-06 PROCEDURE — 88305 TISSUE EXAM BY PATHOLOGIST: CPT | Mod: 26,,, | Performed by: PATHOLOGY

## 2020-10-06 RX ORDER — LIDOCAINE HCL/PF 100 MG/5ML
SYRINGE (ML) INTRAVENOUS
Status: DISCONTINUED | OUTPATIENT
Start: 2020-10-06 | End: 2020-10-06

## 2020-10-06 RX ORDER — SODIUM CHLORIDE 9 MG/ML
INJECTION, SOLUTION INTRAVENOUS CONTINUOUS
Status: DISCONTINUED | OUTPATIENT
Start: 2020-10-06 | End: 2020-10-06 | Stop reason: HOSPADM

## 2020-10-06 RX ORDER — PROPOFOL 10 MG/ML
VIAL (ML) INTRAVENOUS
Status: DISCONTINUED | OUTPATIENT
Start: 2020-10-06 | End: 2020-10-06

## 2020-10-06 RX ORDER — LIDOCAINE HYDROCHLORIDE 10 MG/ML
1 INJECTION, SOLUTION EPIDURAL; INFILTRATION; INTRACAUDAL; PERINEURAL ONCE
Status: DISCONTINUED | OUTPATIENT
Start: 2020-10-06 | End: 2020-10-06 | Stop reason: HOSPADM

## 2020-10-06 RX ADMIN — PROPOFOL 20 MG: 10 INJECTION, EMULSION INTRAVENOUS at 09:10

## 2020-10-06 RX ADMIN — PROPOFOL 40 MG: 10 INJECTION, EMULSION INTRAVENOUS at 09:10

## 2020-10-06 RX ADMIN — LIDOCAINE HYDROCHLORIDE 50 MG: 20 INJECTION INTRAVENOUS at 09:10

## 2020-10-06 RX ADMIN — SODIUM CHLORIDE: 0.9 INJECTION, SOLUTION INTRAVENOUS at 08:10

## 2020-10-06 RX ADMIN — PROPOFOL 100 MG: 10 INJECTION, EMULSION INTRAVENOUS at 09:10

## 2020-10-06 NOTE — PLAN OF CARE
Patient awake, alert, and oriented.  No complaints of pain or discomfort at present time.  Abdomen soft and nontender;  Ambulates without difficulty;  All instructions given and reviewed with patient and family;  Stable for discharge to home accompanied by + gas passing  Pt has all belongings  Including cell phone

## 2020-10-06 NOTE — DISCHARGE INSTRUCTIONS
4 Steps for Eating Healthier  Changing the way you eat can improve your health. It can lower your cholesterol and blood pressure, and help you stay at a healthy weight. Your diet doesnt have to be bland and boring to be healthy. Just watch your calories and follow these steps:    1. Eat fewer unhealthy fats  · Choose more fish and lean meats instead of fatty cuts of meat.  · Skip butter and lard, and use less margarine.  · Pass on foods that have palm, coconut, or hydrogenated oils.  · Eat fewer high-fat dairy foods like cheese, ice cream, and whole milk.  · Get a heart-healthy cookbook and try some low-fat recipes.  2. Go light on salt  · Keep the saltshaker off the table.  · Limit high-salt ingredients, such as soy sauce, bouillon, and garlic salt.  · Instead of adding salt when cooking, season your food with herbs and flavorings. Try lemon, garlic, and onion.  · Limit convenience foods, such as boxed or canned foods and restaurant food.  · Read food labels and choose lower-sodium options.  3. Limit sugar  · Pause before you add sugars to pancakes, cereal, coffee, or tea. This includes white and brown table sugar, syrup, honey, and molasses. Cut your usual amount by half.  · Use non-sugar sweeteners. Stevia, aspartame, and sucralose can satisfy a sweet tooth without adding calories.  · Swap out sugar-filled soda and other drinks. Buy sugar-free or low-calorie beverages. Remember water is always the best choice.  · Read labels and choose foods with less added sugar. Keep in mind that dairy foods and foods with fruit will have some natural sugar.  · Cut the sugar in recipes by 1/3 to 1/2. Boost the flavor with extracts like almond, vanilla, or orange. Or add spices such as cinnamon or nutmeg.  4. Eat more fiber  · Eat fresh fruits and vegetables every day.  · Boost your diet with whole grains. Go for oats, whole-grain rice, and bran.  · Add beans and lentils to your meals.  · Drink more water to match your fiber  increase. This is to help prevent constipation.  Date Last Reviewed: 5/11/2015  © 2379-2243 [employer]. [address] [place] [address], [place] [address]. All rights reserved. This information is not intended as a substitute for professional medical care. Always follow your healthcare professional's instructions.        General Information:    1.  Do not drink alcoholic beverages including beer for 24 hours or as long as you are on pain medication..  2.  Do not drive a motor vehicle, operate machinery or power tools, or signs legal papers for 24 hours or as long as you are on pain medication.   3.  You may experience light-headedness, dizziness, and sleepiness following surgery. Please do not stay alone. A responsible adult should be with you for this 24 hour period.  4.  Go home and rest.    5. Progress slowly to a normal diet unless instructed.  Otherwise, begin with liquids such as soft drinks, then soup and crackers working up to solid foods. Drink plenty of nonalcoholic fluids.  6.  Certain anesthetics and pain medications produce nausea and vomiting in certain       individuals. If nausea becomes a problem at home, call you doctor.    7. A nurse will be calling you sometime after surgery. Do not be alarmed. This is our way of finding out how you are doing.    8. Several times every hour while you are awake, take 2-3 deep breaths and cough. If you had stomach surgery hold a pillow or rolled towel firmly against your stomach before you cough. This will help with any pain the cough might cause.  9. Several times every hour while you are awake, pump and flex your feet 5-6 times and do foot circles. This will help prevent blood clots.    10.Call your doctor for severe pain, bleeding, fever, or signs or symptoms of infection (pain, swelling, redness, foul odor, drainage).      General Information:    1.  Do not drink alcoholic beverages including beer for 24 hours or as long as you are on pain  medication..  2.  Do not drive a motor vehicle, operate machinery or power tools, or signs legal papers for 24 hours or as long as you are on pain medication.   3.  You may experience light-headedness, dizziness, and sleepiness following surgery. Please do not stay alone. A responsible adult should be with you for this 24 hour period.  4.  Go home and rest.    5. Progress slowly to a normal diet unless instructed.  Otherwise, begin with liquids such as soft drinks, then soup and crackers working up to solid foods. Drink plenty of nonalcoholic fluids.  6.  Certain anesthetics and pain medications produce nausea and vomiting in certain       individuals. If nausea becomes a problem at home, call you doctor.    7. A nurse will be calling you sometime after surgery. Do not be alarmed. This is our way of finding out how you are doing.    8. Several times every hour while you are awake, take 2-3 deep breaths and cough. If you had stomach surgery hold a pillow or rolled towel firmly against your stomach before you cough. This will help with any pain the cough might cause.  9. Several times every hour while you are awake, pump and flex your feet 5-6 times and do foot circles. This will help prevent blood clots.    10.Call your doctor for severe pain, bleeding, fever, or signs or symptoms of infection (pain, swelling, redness, foul odor, drainage).      Hemorrhoids    Hemorrhoids are swollen and inflamed veins inside the rectum and near the anus. The rectum is the last several inches of the colon. The anus is the passage between the rectum and the outside of the body.  Causes  The veins can become swollen due to increased pressure in them. This is most often caused by:  · Chronic constipation or diarrhea  · Straining when having a bowel movement  · Sitting too long on the toilet  · A low-fiber diet  · Pregnancy  Symptoms  · Bleeding from the rectum (this may be noticeable after bowel movements)  · Lump near the  anus  · Itching around the anus  · Pain around the anus  There are different types of hemorrhoids. Depending on the type you have and the severity, you may be able to treat yourself at home. In some cases, a procedure may be the best treatment option. Your healthcare provider can tell you more about this, if needed.  Home care  General care  · To get relief from pain or itching, try:  ¨ Topical products. Your healthcare provider may prescribe or recommend creams, ointments, or pads that can be applied to the hemorrhoid. Use these exactly as directed.  ¨ Medicines. Your healthcare provider may recommend stool softeners, suppositories, or laxatives to help manage constipation. Use these exactly as directed.  ¨ Sitz baths. A sitz bath involves sitting in a few inches of warm bath water. Be careful not to make the water so hot that you burn yourself--test it before sitting in it. Soak for about 10 to 15 minutes a few times a day. This may help relieve pain.  Tips to help prevent hemorrhoids  · Eat more fiber. Fiber adds bulk to stool and absorbs water as it moves through your colon. This makes stool softer and easier to pass.  ¨ Increase the fiber in your diet with more fiber-rich foods. These include fresh fruit, vegetables, and whole grains.  ¨ Take a fiber supplement or bulking agent, if advised to by your provider. These include products such as psyllium or methylcellulose.  · Drink plenty of water, if directed to by your provider. This can help keep stool soft.  · Be more active. Frequent exercise aids digestion and helps prevent constipation. It may also help make bowel movements more regular.  · Dont strain during bowel movements. This can make hemorrhoids more likely. Also, dont sit on the toilet for long periods of time.  Follow-up care  Follow up with your healthcare provider, or as advised. If a culture or imaging tests were done, you will be notified of the results when they are ready. This may take a few  days or longer.  When to seek medical advice  Call your healthcare provider right away if any of these occur:  · Increased bleeding from the rectum  · Increased pain around the rectum or anus  · Weakness or dizziness  Call 911  Call 911 or return to the emergency department right away if any of these occur:  · Trouble breathing or swallowing  · Fainting or loss of consciousness  · Unusually fast heart rate  · Vomiting blood  · Large amounts of blood in stool  Date Last Reviewed: 6/22/2015 © 2000-2017 [employer]. [address] [place] [address]. All rights reserved. This information is not intended as a substitute for professional medical care. Always follow your healthcare professional's instructions.        Eating a High-Fiber Diet  Fiber is what gives strength and structure to plants. Most grains, beans, vegetables, and fruits contain fiber. Foods rich in fiber are often low in calories and fat, and they fill you up more. They may also reduce your risks for certain health problems. To find out the amount of fiber in canned, packaged, or frozen foods, read the Nutrition Facts label. It tells you how much fiber is in a serving.    Types of fiber and their benefits  There are two types of fiber: insoluble and soluble. They both aid digestion and help you maintain a healthy weight.  · Insoluble fiber. This is found in whole grains, cereals, certain fruits and vegetables such as apple skin, corn, and carrots. Insoluble fiber may prevent constipation and reduce the risk for certain types of cancer.  · Soluble fiber. This type of fiber is in oats, beans, and certain fruits and vegetables such as strawberries and peas. Soluble fiber can reduce cholesterol, which may help lower the risk for heart disease. It also helps control blood sugar levels.  Look for high-fiber foods  Try these foods to add fiber to your diet:  · Whole-grain breads and cereals. Try to eat 6 to 8 ounces a day. Include wheat  and oat bran cereals, whole-wheat muffins or toast, and corn tortillas in your meals.  · Fruits. Try to eat 2 cups a day. Apples, oranges, strawberries, pears, and bananas are good sources. (Note: Fruit juice is low in fiber.)  · Vegetables. Try to eat at least 2.5 cups a day. Add asparagus, carrots, broccoli, peas, and corn to your meals.  · Beans. One cup of cooked lentils gives you over 15 grams of fiber. Try navy beans, lentils, and chickpeas.  · Seeds. A small handful of seeds gives you about 3 grams of fiber. Try sunflower seeds.  Keep track of your fiber  Keep track of how much fiber you eat. Start by reading food labels. Then eat a variety of foods high in fiber. As you begin to eat more fiber, ask your healthcare provider how much water you should be drinking to keep your digestive system working smoothly.  You should aim for a certain amount of fiber in your diet each day. If you are a woman, that amount is between 25 and 28 grams per day. Men should aim for 30 to 33 grams per day. After age 50, your daily fiber needs drop to 22 grams for women and 28 grams for men.  Before you reach for the fiber supplements, think about this. Fiber is found naturally in healthy whole foods. It gives you that feeling of fullness after you eat. Taking fiber supplements or eating fiber-enriched foods will not give you this full feeling.  Your fiber intake is a good measure for the quality of your overall diet. If you are missing out on your daily amount of fiber, you may be lacking other important nutrients as well.  Date Last Reviewed: 5/11/2015  © 7069-2656 [employer]. [address] [place] [address], [place] [address]. All rights reserved. This information is not intended as a substitute for professional medical care. Always follow your healthcare professional's instructions.        Understanding Colon and Rectal Polyps    The colon (also called the large intestine) is a muscular tube that forms the last part of  the digestive tract. It absorbs water and stores food waste. The colon is about 4 to 6 feet long. The rectum is the last 6 inches of the colon. The colon and rectum have a smooth lining composed of millions of cells. Changes in these cells can lead to growths in the colon that can become cancerous and should be removed. Multiple tests are available to screen for colon cancer, but the colonoscopy is the most recommended test. During colonoscopy, these polyps can be removed. How often you need this test depends on many things including your condition, your family history, symptoms, and what the findings were at the previous colonoscopy.   When the colon lining changes  Changes that happen in the cells that line the colon or rectum can lead to growths called polyps. Over a period of years, polyps can turn cancerous. Removing polyps early may prevent cancer from ever forming.  Polyps  Polyps are fleshy clumps of tissue that form on the lining of the colon or rectum. Small polyps are usually benign (not cancerous). However, over time, cells in a polyp can change and become cancerous. Certain types of polyps known as adenomatous polyps are premalignant. The risk for invasive cancer increases with the size of the polyp and certain cell and gene features. This means that they can become cancerous if they're not removed. Hyperplastic polyps are benign. They can grow quite large and not turn cancerous.   Cancer  Almost all colorectal cancers start when polyp cells begin growing abnormally. As a cancerous tumor grows, it may involve more and more of the colon or rectum. In time, cancer can also grow beyond the colon or rectum and spread to nearby organs or to glands called lymph nodes. The cells can also travel to other parts of the body. This is known as metastasis. The earlier a cancerous tumor is removed, the better the chance of preventing its spread.    Date Last Reviewed: 8/1/2016  © 6005-7168 [employer]  [address] [place] [address]. All rights reserved. This information is not intended as a substitute for professional medical care. Always follow your healthcare professional's instructions.

## 2020-10-06 NOTE — ANESTHESIA PREPROCEDURE EVALUATION
10/06/2020  Coral Pandya is a 70 y.o., female.    Pre-op Assessment          Review of Systems  Anesthesia Hx:  No problems with previous Anesthesia    Social:  Non-Smoker    Cardiovascular:   Exercise tolerance: good Hypertension    Pulmonary:   Sleep Apnea    Hepatic/GI:   Bowel Prep. GERD    Musculoskeletal:   Arthritis     Neurological:   Neuromuscular Disease, Headaches    Endocrine:   Hypothyroidism    Psych:   Psychiatric History          Physical Exam  General:  Well nourished    Airway/Jaw/Neck:  Airway Findings: Mouth Opening: Normal Tongue: Normal  General Airway Assessment: Adult  Mallampati: II  TM Distance: Normal, at least 6 cm       Chest/Lungs:  Chest/Lungs Clear    Heart/Vascular:  Heart Findings: Normal            Anesthesia Plan  Type of Anesthesia, risks & benefits discussed:  Anesthesia Type:  general  Patient's Preference:   Intra-op Monitoring Plan: standard ASA monitors  Intra-op Monitoring Plan Comments:   Post Op Pain Control Plan: multimodal analgesia and IV/PO Opioids PRN  Post Op Pain Control Plan Comments:   Induction:   IV  Beta Blocker:  Patient is not currently on a Beta-Blocker (No further documentation required).       Informed Consent: Patient understands risks and agrees with Anesthesia plan.  Questions answered. Anesthesia consent signed with patient.  ASA Score: 2     Day of Surgery Review of History & Physical:    H&P update referred to the surgeon.     Anesthesia Plan Notes: I have personally evaluated the patient and discussed risk/benefits/alternatives of general anesthesia.        Ready For Surgery From Anesthesia Perspective.

## 2020-10-06 NOTE — TRANSFER OF CARE
"Anesthesia Transfer of Care Note    Patient: Coral Pandya    Procedure(s) Performed: Procedure(s) (LRB):  COLONOSCOPY requests lidocaine for IV start (N/A)    Patient location: PACU    Anesthesia Type: general    Transport from OR: Transported from OR on 2-3 L/min O2 by NC with adequate spontaneous ventilation    Post pain: adequate analgesia    Post assessment: no apparent anesthetic complications and tolerated procedure well    Post vital signs: stable    Level of consciousness: awake, alert and oriented    Nausea/Vomiting: no nausea/vomiting    Complications: none    Transfer of care protocol was followed      Last vitals:   Visit Vitals  /78   Pulse 65   Temp 36.6 °C (97.9 °F) (Skin)   Resp 20   Ht 5' 7" (1.702 m)   Wt 69.4 kg (153 lb)   SpO2 98%   BMI 23.96 kg/m²     "

## 2020-10-06 NOTE — H&P
History & Physical - Short Stay  Gastroenterology    SUBJECTIVE:     Procedure: Colonoscopy    Chief Complaint/Indication for Procedure: Abnormal imaging of GI tract, abdominal pain    PTA Medications   Medication Sig    buPROPion (WELLBUTRIN XL) 150 MG TB24 tablet Take 1 tablet (150 mg total) by mouth once daily.    calcium carbonate-vitamin D3 (CALCIUM 600 + D,3,) 600 mg calcium- 200 unit Cap Take 1 capsule by mouth 2 (two) times daily. DO NOT TAKE AM OF SURGERY    desonide (DESOWEN) 0.05 % cream APPLY TO EYELID SKIN NIGHTLY PRN ITCHY LIDS    DULoxetine (CYMBALTA) 30 MG capsule Take 1 capsule (30 mg total) by mouth once daily.    DULoxetine (CYMBALTA) 60 MG capsule Take 1 capsule (60 mg total) by mouth once daily.    ezetimibe (ZETIA) 10 mg tablet 10 mg once daily.     famotidine (PEPCID) 40 MG tablet Take 1 tablet (40 mg total) by mouth once daily. (Patient taking differently: Take 40 mg by mouth every evening. USE AS USUAL)    ketotifen (ZADITOR) 0.025 % ophthalmic solution Place 1 drop into both eyes 2 (two) times daily. USE AS USUAL    mirtazapine (REMERON) 15 MG tablet Take 1 tablet (15 mg total) by mouth every evening.    mometasone (NASONEX) 50 mcg/actuation nasal spray 2 sprays by Nasal route once daily. (Patient taking differently: 2 sprays by Nasal route daily as needed. )    mv-min-FA-D3-om3-dha-epa-fish (CARDIAMIN) 200 mcg-500 unit-200 mg Cap Take 1 tablet by mouth 2 (two) times daily.     nebivoloL (BYSTOLIC) 5 MG Tab Take 1 tablet (5 mg total) by mouth once daily.    RABEprazole (ACIPHEX) 20 mg tablet Take 1 tablet (20 mg total) by mouth once daily. (Patient taking differently: Take 20 mg by mouth once daily. TAKE AM OF SURGERY)    sucralfate (CARAFATE) 1 gram tablet Take 1 tablet (1 g total) by mouth 4 (four) times daily before meals and nightly.    SYNTHROID 100 mcg tablet Take 1 tablet (100 mcg total) by mouth once daily.    telmisartan (MICARDIS) 20 MG Tab TAKE 1 TABLET ONCE DAILY  (Patient taking differently: Take 20 mg by mouth every evening. DO NOT TAKE NIGHT BEFORE SURGERY)    vit C/E/Zn/coppr/lutein/zeaxan (PRESERVISION AREDS-2 ORAL) Take 1 tablet by mouth 2 (two) times a day.    vitamin D (VITAMIN D3) 1000 units Tab Take by mouth once daily. States taking 2,000 units     Review of patient's allergies indicates:   Allergen Reactions    Codeine      Other reaction(s): Nausea    Lisinopril Other (See Comments)     cough      Past Medical History:   Diagnosis Date    Anxiety     Arthralgia of knee     Bladder mass     Cervical spondylosis     Depression     Fibromyalgia     GERD (gastroesophageal reflux disease)     Hematuria     HTN (hypertension)     Hypothyroid     DANYELL (obstructive sleep apnea)     no cpap    Thrombosis      Past Surgical History:   Procedure Laterality Date    CARPAL TUNNEL RELEASE Bilateral     CATARACT EXTRACTION EXTRACAPSULAR W/ INTRAOCULAR LENS IMPLANTATION Bilateral     COLONOSCOPY N/A 6/14/2018    Procedure: COLONOSCOPY;  Surgeon: Amaury Chambers MD;  Location: [facility];  Service: Endoscopy;  Laterality: N/A; hemorrhoids, repeat in 5 years for screening due to family history of colon cancer    CYSTOSCOPY W/ RETROGRADES Bilateral 6/1/2020    Procedure: CYSTOSCOPY, WITH RETROGRADE PYELOGRAM;  Surgeon: Alcon Rodrigues MD;  Location: [facility] OR;  Service: Urology;  Laterality: Bilateral;    CYSTOSCOPY WITH BIOPSY OF BLADDER N/A 6/1/2020    Procedure: CYSTOSCOPY, WITH BLADDER BIOPSY;  Surgeon: Alcon Rodrigues MD;  Location: [facility] OR;  Service: Urology;  Laterality: N/A;    ESOPHAGOGASTRODUODENOSCOPY N/A 9/16/2020    Procedure: EGD (ESOPHAGOGASTRODUODENOSCOPY);  Surgeon: Amaury Chambers MD;  Location: [facility];  Service: Endoscopy;  Laterality: N/A;    EYE SURGERY Bilateral     cataract surgery    FOOT SURGERY Bilateral     bilat joints removed second toe    JOINT REPLACEMENT      R knee    LASER LITHOTRIPSY Right 6/1/2020     Procedure: LITHOTRIPSY, USING LASER;  Surgeon: Alcon Rodrigues MD;  Location: [facility] OR;  Service: Urology;  Laterality: Right;    TONSILLECTOMY, ADENOIDECTOMY      TOTAL KNEE ARTHROPLASTY Right 1/14/2019    Procedure: ARTHROPLASTY, KNEE, TOTAL SITE ASSISTED;  Surgeon: Angelo Rodas MD;  Location: [facility] OR;  Service: Orthopedics;  Laterality: Right;  OFIRMEV    TUBAL LIGATION      UPPER GASTROINTESTINAL ENDOSCOPY  05/21/2008    Dr. Chambers, in legacy: gastric polyps removed; biopsy: fundic gland polyps    URETEROSCOPY Right 6/1/2020    Procedure: URETEROSCOPY;  Surgeon: Alcon Rodrigues MD;  Location: [facility] OR;  Service: Urology;  Laterality: Right;     Family History   Problem Relation Age of Onset    Colon cancer Paternal Aunt     Colon cancer Paternal Uncle     Celiac disease Neg Hx     Crohn's disease Neg Hx     Ulcerative colitis Neg Hx      Social History     Tobacco Use    Smoking status: Never Smoker    Smokeless tobacco: Never Used   Substance Use Topics    Alcohol use: No     Frequency: Monthly or less     Drinks per session: 1 or 2     Binge frequency: Never    Drug use: No     OBJECTIVE:     Vital Signs (Most Recent)  Temp: 97.9 °F (36.6 °C) (10/06/20 0844)  Pulse: 68 (10/06/20 0844)  Resp: 20 (10/06/20 0844)  BP: 131/78 (10/06/20 0845)  SpO2: 98 % (10/06/20 0844)    Physical Exam:                                                       GENERAL:  Comfortable, in no acute distress.                   HEENT EXAM:  Nonicteric.  No adenopathy.  Oropharynx is clear.               NECK:  Supple.                                                               LUNGS:  Clear.                                                               CARDIAC:  Regular rate and rhythm.  S1, S2.  No murmur.                      ABDOMEN:  Soft, positive bowel sounds, nontender.  No hepatosplenomegaly or masses.  No rebound or guarding.                                             EXTREMITIES:  No edema.     MENTAL  STATUS:  Normal, alert and oriented.    ASSESSMENT/PLAN:     Assessment: Abnormal imaging of GI tract, abdominal pain    Plan: Colonoscopy    Anesthesia Plan: General    ASA Grade: ASA 2 - Patient with mild systemic disease with no functional limitations    MALLAMPATI SCORE:  II (hard and soft palate, upper portion of tonsils anduvula visible)

## 2020-10-06 NOTE — ANESTHESIA POSTPROCEDURE EVALUATION
Anesthesia Post Evaluation    Patient: Coral Pandya    Procedure(s) Performed: Procedure(s) (LRB):  COLONOSCOPY requests lidocaine for IV start (N/A)    Final Anesthesia Type: general    Patient location during evaluation: PACU  Patient participation: Yes- Able to Participate  Level of consciousness: awake and alert and oriented  Post-procedure vital signs: reviewed and stable  Pain management: adequate  Airway patency: patent    PONV status at discharge: No PONV  Anesthetic complications: no      Cardiovascular status: blood pressure returned to baseline  Respiratory status: unassisted, spontaneous ventilation and room air  Hydration status: euvolemic  Follow-up not needed.          Vitals Value Taken Time   /70 10/06/20 1045   Temp 36.6 °C (97.9 °F) 10/06/20 1015   Pulse 65 10/06/20 1045   Resp 20 10/06/20 1045   SpO2 98 % 10/06/20 1045         Event Time   Out of Recovery 10:55:00         Pain/Lev Score: Lev Score: 10 (10/6/2020 10:30 AM)

## 2020-10-06 NOTE — [employer]OVATION PATIENT INSTRUCTIONS
Discharge Summary/Instructions after an Endoscopic Procedure  Patient Name: Coral Pandya  Patient MRN: [id number]  Patient YOB: 1950 Tuesday, October 6, 2020  Taqueria Olmos MD  RESTRICTIONS:  During your procedure today, you received medications for sedation.  These   medications may affect your judgment, balance and coordination.  Therefore,   for 24 hours, you have the following restrictions:   - DO NOT drive a car, operate machinery, make legal/financial decisions,   sign important papers or drink alcohol.    ACTIVITY:  Today: no heavy lifting, straining or running due to procedural   sedation/anesthesia.  The following day: return to full activity including work.  DIET:  Eat and drink normally unless instructed otherwise.     TREATMENT FOR COMMON SIDE EFFECTS:  - Mild abdominal pain, nausea, belching, bloating or excessive gas:  rest,   eat lightly and use a heating pad.  - Sore Throat: treat with throat lozenges and/or gargle with warm salt   water.  - Because air was used during the procedure, expelling large amounts of air   from your rectum or belching is normal.  - If a bowel prep was taken, you may not have a bowel movement for 1-3 days.    This is normal.  SYMPTOMS TO WATCH FOR AND REPORT TO YOUR PHYSICIAN:  1. Abdominal pain or bloating, other than gas cramps.  2. Chest pain.  3. Back pain.  4. Signs of infection such as: chills or fever occurring within 24 hours   after the procedure.  5. Rectal bleeding, which would show as bright red, maroon, or black stools.   (A tablespoon of blood from the rectum is not serious, especially if   hemorrhoids are present.)  6. Vomiting.  7. Weakness or dizziness.  GO DIRECTLY TO THE NEAREST EMERGENCY ROOM IF YOU HAVE ANY OF THE FOLLOWING:      Difficulty breathing              Chills and/or fever over 101 F   Persistent vomiting and/or vomiting blood   Severe abdominal pain   Severe chest pain   Black, tarry stools   Bleeding- more than one  tablespoon   Any other symptom or condition that you feel may need urgent attention  Your doctor recommends these additional instructions:  If any biopsies were taken, your doctors clinic will contact you in 1 to 2   weeks with any results.  - Repeat colonoscopy in 5 years for surveillance if polyp returns   adenomatous otherwise repeat in 10 years.   - Discharge patient to home.   - Advance diet as tolerated.   - Continue present medications.   - Await pathology results.   - Written discharge instructions were provided to the patient.  For questions, problems or results please call your physician - Taqueria Olmos MD at Work:  [phone].  [facility], EMERGENCY ROOM PHONE NUMBER: [phone]  IF A COMPLICATION OR EMERGENCY SITUATION ARISES AND YOU ARE UNABLE TO REACH   YOUR PHYSICIAN - GO DIRECTLY TO THE EMERGENCY ROOM.  Taqueria Olmos MD  10/6/2020 9:53:37 AM  This report has been verified and signed electronically.  [employer]

## 2020-10-07 VITALS
WEIGHT: 153 LBS | HEIGHT: 67 IN | HEART RATE: 65 BPM | TEMPERATURE: 98 F | RESPIRATION RATE: 20 BRPM | BODY MASS INDEX: 24.01 KG/M2 | OXYGEN SATURATION: 98 % | DIASTOLIC BLOOD PRESSURE: 70 MMHG | SYSTOLIC BLOOD PRESSURE: 136 MMHG

## 2020-10-08 LAB
FINAL PATHOLOGIC DIAGNOSIS: NORMAL
GROSS: NORMAL
Lab: NORMAL

## 2020-10-09 ENCOUNTER — PATIENT MESSAGE (OUTPATIENT)
Dept: FAMILY MEDICINE | Facility: CLINIC | Age: 70
End: 2020-10-09

## 2020-10-22 ENCOUNTER — OFFICE VISIT (OUTPATIENT)
Dept: PSYCHIATRY | Facility: CLINIC | Age: 70
End: 2020-10-22
Payer: MEDICARE

## 2020-10-22 VITALS
HEART RATE: 74 BPM | DIASTOLIC BLOOD PRESSURE: 64 MMHG | WEIGHT: 157.63 LBS | BODY MASS INDEX: 24.74 KG/M2 | SYSTOLIC BLOOD PRESSURE: 104 MMHG | HEIGHT: 67 IN

## 2020-10-22 DIAGNOSIS — E03.9 HYPOTHYROIDISM, UNSPECIFIED TYPE: ICD-10-CM

## 2020-10-22 DIAGNOSIS — F33.41 MDD (MAJOR DEPRESSIVE DISORDER), RECURRENT, IN PARTIAL REMISSION: ICD-10-CM

## 2020-10-22 DIAGNOSIS — F41.1 GAD (GENERALIZED ANXIETY DISORDER): Primary | ICD-10-CM

## 2020-10-22 PROCEDURE — 90833 PR PSYCHOTHERAPY W/PATIENT W/E&M, 30 MIN (ADD ON): ICD-10-PCS | Mod: ,,, | Performed by: PSYCHIATRY & NEUROLOGY

## 2020-10-22 PROCEDURE — 99214 OFFICE O/P EST MOD 30 MIN: CPT | Mod: S$PBB,,, | Performed by: PSYCHIATRY & NEUROLOGY

## 2020-10-22 PROCEDURE — 99999 PR PBB SHADOW E&M-EST. PATIENT-LVL III: CPT | Mod: PBBFAC,,, | Performed by: PSYCHIATRY & NEUROLOGY

## 2020-10-22 PROCEDURE — 99214 PR OFFICE/OUTPT VISIT, EST, LEVL IV, 30-39 MIN: ICD-10-PCS | Mod: S$PBB,,, | Performed by: PSYCHIATRY & NEUROLOGY

## 2020-10-22 PROCEDURE — 90833 PSYTX W PT W E/M 30 MIN: CPT | Mod: ,,, | Performed by: PSYCHIATRY & NEUROLOGY

## 2020-10-22 PROCEDURE — 99213 OFFICE O/P EST LOW 20 MIN: CPT | Mod: PBBFAC,PO | Performed by: PSYCHIATRY & NEUROLOGY

## 2020-10-22 PROCEDURE — 99999 PR PBB SHADOW E&M-EST. PATIENT-LVL III: ICD-10-PCS | Mod: PBBFAC,,, | Performed by: PSYCHIATRY & NEUROLOGY

## 2020-10-22 NOTE — TELEPHONE ENCOUNTER
Encounter details (provider/department) have been updated by [facility] staff.   Of note, HF details may not display.     As of this time CDM: Does not populate or display     Updated: Department    Of note. CDM should display. medication Is delegated and encounter details have been updated    Will resend refill request encounter to P Centralized Refill Staff Pool.     [facility]     Note composed:3:47 PM 10/22/2020

## 2020-10-22 NOTE — TELEPHONE ENCOUNTER
No new care gaps identified.  Powered by OLED-T. Reference number: [id number]. 10/22/2020 3:47:53 PM CDT

## 2020-10-22 NOTE — PROGRESS NOTES
"ID: 66yo WF with a previous diag of depression. Referred by PCP, , for eval of depressive sxs. Currently on cymbalta, recently added remeron, and valium. Pt has cont'd to remain stable on current meds. No longer using valium.     CC: "depression"    Interim hx: presents on time, chart reviewed, pt seen. Appears well.     "well, i've really had a lot happen since I last saw you. I tested negative but then I had a kidney stone, then I had gastritis and it's the worst thing i've ever had. My kids took turns staying with me over night. I was living on pedialyte. I couldn't eat anything. It started out with bloating and pain but i've really never had anything like it. In and out of the er. Tons of tests. Then an endoscopy which found the inflammation of the stomach and on colonoscopy they found a polyp which was benign and had nothing to do with the pain but i'm glad I did that." has been taking sucrolfate since diag of gastritis.  And over the last 4 wks has started feeling better.     "and to be honest I was fine emotionally. I just wanted to feel better. I just got back on my bike yesterday and back to work on Tuesday. I mean I really have not been well. One thing that is sortof bothering me is that I have felt that the doctors or nurse practioners think i'm sortof being a whimp but i'm hoping you know that about me. I'm not a whimp and I am telling you: I would do knee surgery and recovery all over again rather than this gastritis I've had. I have never felt so sick in all my life." felt some validation for the anesthesiologist who did sedation for the scopes- she shared with her that she's seen numerous patients post covid with inflammatory processes seemingly unrelated. "it just made me feel better to know I wasn't really alone."     Children helpful during this time which is always heart warming/comforting.     On Psychiatric ROS:    Endorses better sleep on the remeron, denies anhedonia, denies feeling " "helpless/hopeless, improved energy, riding bike regularly, denies dec concentration, denies change in appetite (despite the remeron), denies dec PMA "I push myself. I know keeping busy helps me." Denies thoughts of SI/intent/plan.     Denies recently feeling more easily overwhelmed, denies recent ruminative thinking "the obsessive thinking is MUCH better on the meds." denies feeling tense/"on edge"     PSYCHOTHERAPY ADD-ON   30 (16-37*) minutes    Time: 20 minutes  Participants: Met with patient    Therapeutic Intervention Type: insight oriented psychotherapy, behavior modifying psychotherapy, supportive psychotherapy  Why chosen therapy is appropriate versus another modality: relevant to diagnosis, patient responds to this modality, evidence based practice    Target symptoms: health and wellness  Primary focus: diet, exercise- getting back to baseline after what has been months of illness  Psychotherapeutic techniques: support, education, validation, reframing, motivational interviewing    Outcome monitoring methods: self-report, observation    Patient's response to intervention:  The patient's response to intervention is accepting, motivated.    Progress toward goals:  The patient's progress toward goals is great.    PPHx: Denies h/o self injury, inpt psych hospitalization, denies h/o suicide attempt     Current Psych Meds: cymbalta 90mg po qam, remeron 15mg po qhs  Past Psych Meds: Lexapro and Wellbutrin (effective but led to h/a's), celexa (nausea), valium    PMHx: hypothyroid, GERD    SubstHx:   T- none  E- very rare  D- none    FamPHx: brother- suicide 1990, M-anxiety, F-anxiety, dtr- seizure while on wellbutrin    MSE: appears stated age, well groomed, appropriate dress, engages well with examiner. Good e/c. Speech reg rate and vol, nonpressured. Mood is "i'm fine. It's good." Affect congruent with some mild anxiety. dec'd verbosity. Sensorium fully intact. Oriented to date/day/location, current events. " "Narrative memory intact. Intellectual function is avg based on vocab and basic fund of knowledge. Thought is c/l/gd. No tangentiality or circumstantiality. No FOI/LEBRON. Denies SI/HI. Denies A/VH. No evidence of delusions. Insight and Judgment intact.     Blood pressure 104/64, pulse 74, height 5' 7" (1.702 m), weight 71.5 kg (157 lb 10.1 oz).    Suicide Risk Assessment:   Protective- age, gender, no prior attempts, no prior hospitalizations, no ongoing substance abuse, no psychosis, , has children, denies SI/intent/plan, seeking treatment, access to treatment, future oriented, good primary support, Christianity  Risk- race, ongoing Axis I sxs, family h/o suicide (brother-1990)    **Pt is at LOW imminent and long term risk of suicide given current risk factors.     Assessment:  67yo WF with a previous diag of depression. Referred by PCP, , for eval of depressive sxs. Currently on cymbalta, recently added remeron, and valium per chart review. On eval the pt is reporting an improvement in all mood and anxiety sxs in the 3 wks leading to initial eval which correlates with the time Remeron was added. She had done well for many years on lexapro and wellbutrin but was having chronic headaches and self initiated a taper off meds and the headaches did remit, but mood/anxiety suffered until the recent combination of cymbalta and remeron which is currently managing sxs quite well. Prior to meds becoming effective the pt endorses sxs c/w diagnoses of MDD and YASMANI. No acute safety concerns but the pt has cont'd to feel mood is "a little down. Other than the headaches, I feel that I was really better on the lexapro/wellbutrin combination". Pt is active, finds good support through kerry, family and friends. Will try an inc in cymbalta to 90mg and if ineffective may try an addition of wellbutrin xl 150mg- in the past pt headaches were on the 300mg dose when in combo with lexapro. On f/u the inc in cymbalta was " effective- pt other ongoing issues are therapy related. Pt continues to decline therapy as she doesn't see the need at this time- majority of her appts are spent in therapy, today struggling due to lack of progress with knee following knee replacement- will re start PT and is hopeful. Added wellbutrin a few months ago and today she continues to report it's been helpful. Pt continues to do well despite loss of brother in law to covid. Is eager for socialization but understands on a close level the need to continue distancing. Became ill herself and though she tested negative does believe she had a strain of covid. It maintaining strict guidelines and please with her outside family interactions. Will f/u in 3mos in clinic.     Axis I: MDD, recurrent, in partial remission; YASMANI  Axis II: none at this time   Axis III: hypothyroid, GERD, HTN  Axis IV:  from , chronic mental illness  Axis V: GAF 70    Plan:   1. Cont cymbalta 90mg po qam  2. Cont remeron 15mg po qhs  3. Cont wellbutrin xl 150mg po qam  4. Cont exercise, encouraged mindfulness of dietary choices in an effort to dec inflammation  5. rec therapy but pt declines at this time  6. RTC 3mos in clinic    -Spent 30min face to face with the pt; >50% time spent in counseling   -Supportive therapy and psychoeducation provided  -R/B/SE's of medications discussed with the pt who expresses understanding and chooses to take medications as prescribed.   -Pt instructed to call clinic, 911 or go to nearest emergency room if sxs worsen or pt is in   crisis. The pt expresses understanding.

## 2020-10-23 RX ORDER — FAMOTIDINE 40 MG/1
TABLET, FILM COATED ORAL
Qty: 90 TABLET | Refills: 2 | Status: SHIPPED | OUTPATIENT
Start: 2020-10-23 | End: 2021-06-15

## 2020-10-23 NOTE — PROGRESS NOTES
Refill Authorization Note   Coral Pandya is requesting a refill authorization.  Brief assessment and rationale for refill: Approve     Medication Therapy Plan: cdmr. approve     Medication reconciliation completed: No   Comments:       Requested Prescriptions   Pending Prescriptions Disp Refills    famotidine (PEPCID) 40 MG tablet [Pharmacy Med Name: FAMOTIDINE TAB 40MG] 90 tablet 2     Sig: TAKE 1 TABLET ONCE DAILY       Gastroenterology: H2 Antagonists - famotidine Passed - 10/23/2020  8:49 AM        Passed - Patient is at least 18 years old        Passed - Office Visit within last 12 months or future 90 days.     Recent Outpatient Visits            Yesterday     [facility] Psychiatry Samreen Coe MD    1 month ago Generalized abdominal pain    [facility] Gastroenterology ARTEM Vasquez    1 month ago Generalized abdominal pain    [facility] Franny Elizondo NP    2 months ago Encounter for preventive health examination    [facility] Cindy Street NP    3 months ago MDD (major depressive disorder), recurrent, in partial remission    [facility] Psychiatry Samreen Coe MD          Future Appointments              In 4 days Amaury Chambers MD [facility]    In 2 months [facility] CT1 LIMIT 500 LBS [facility]    In 2 months Ebenezer Souza MD [facility]                Passed - Cr is 1.3 or below and within 360 days     Creatinine   Date Value Ref Range Status   09/24/2020 0.8 0.5 - 1.4 mg/dL Final   08/31/2020 0.9 0.5 - 1.4 mg/dL Final   08/26/2020 1.22 0.50 - 1.40 mg/dL Final              Passed - eGFR is 60 or above and within 360 days     eGFR if non    Date Value Ref Range Status   09/24/2020 >60.0 >60 mL/min/1.73 m^2 Final     Comment:     Calculation used to obtain the estimated glomerular filtration  rate (eGFR) is the CKD-EPI  equation.      08/31/2020 >60 >60 mL/min/1.73 m^2 Final     Comment:     Calculation used to obtain the estimated glomerular filtration  rate (eGFR) is the CKD-EPI equation.      08/26/2020 45 (A) >60 mL/min/1.73 m^2 Final     Comment:     Calculation used to obtain the estimated glomerular filtration  rate (eGFR) is the CKD-EPI equation.        eGFR if    Date Value Ref Range Status   09/24/2020 >60.0 >60 mL/min/1.73 m^2 Final   08/31/2020 >60 >60 mL/min/1.73 m^2 Final   08/26/2020 52 (A) >60 mL/min/1.73 m^2 Final                  Appointments  past 12m or future 3m with PCP    Date Provider   Last Visit   7/16/2020 Ebenezer Suoza MD   Next Visit   1/18/2021 Ebenezer Souza MD   ED visits in past 90 days: 1     Note composed:8:50 AM 10/23/2020

## 2020-10-24 ENCOUNTER — PATIENT MESSAGE (OUTPATIENT)
Dept: FAMILY MEDICINE | Facility: CLINIC | Age: 70
End: 2020-10-24

## 2020-10-27 ENCOUNTER — OFFICE VISIT (OUTPATIENT)
Dept: GASTROENTEROLOGY | Facility: CLINIC | Age: 70
End: 2020-10-27
Payer: MEDICARE

## 2020-10-27 VITALS — HEIGHT: 67 IN | WEIGHT: 157.63 LBS | BODY MASS INDEX: 24.74 KG/M2 | RESPIRATION RATE: 18 BRPM

## 2020-10-27 DIAGNOSIS — K52.9 ENTERITIS: Primary | ICD-10-CM

## 2020-10-27 PROCEDURE — 99213 PR OFFICE/OUTPT VISIT, EST, LEVL III, 20-29 MIN: ICD-10-PCS | Mod: S$PBB,,, | Performed by: INTERNAL MEDICINE

## 2020-10-27 PROCEDURE — 99213 OFFICE O/P EST LOW 20 MIN: CPT | Mod: S$PBB,,, | Performed by: INTERNAL MEDICINE

## 2020-10-27 PROCEDURE — 99213 OFFICE O/P EST LOW 20 MIN: CPT | Mod: PBBFAC,PO | Performed by: INTERNAL MEDICINE

## 2020-10-27 PROCEDURE — 99999 PR PBB SHADOW E&M-EST. PATIENT-LVL III: ICD-10-PCS | Mod: PBBFAC,,, | Performed by: INTERNAL MEDICINE

## 2020-10-27 PROCEDURE — 99999 PR PBB SHADOW E&M-EST. PATIENT-LVL III: CPT | Mod: PBBFAC,,, | Performed by: INTERNAL MEDICINE

## 2020-10-27 NOTE — PROGRESS NOTES
Pt returns for f/u enteritis. Doing much better. No significant abd pain or N/V. Appetite good, weight stable/increased few pounds. No bleeding. No fever or jaundice. Completed cipro/flagyl course in Sept. Recent C-scope normal terminal ileum and colon except diminutive polyp, biopsies normal/benign (no colitis).  No diarrhea/constipation. Recent CT enterography prior inflammatory changes resolved, no mass, no obstruction. Recent EGD gastritis, gastric polyps, biopsies benign, negative H pylori, duodenal biopsies normal. Prior stool evaluation negative infection, negative c diff, negative occult blood.    REVIEW OF SYSTEMS:   Constitutional: Negative for fever, appetite change and unexpected weight change.  HENT: Negative for sore throat and trouble swallowing.  Eyes: Negative for visual disturbance.  Respiratory: Negative for chest tightness, shortness of breath and wheezing.  Cardiovascular: Negative for chest pain.  Gastrointestinal:  as per HPI  Genitourinary: Negative for dysuria, frequency and hematuria.    PHYSICAL EXAMINATION:                                                        GENERAL:  Comfortable, in no acute distress.      SKIN: Non-jaundiced.                             HEENT EXAM:  Nonicteric.  No adenopathy.  Oropharynx is clear.               NECK:  Supple.                                                               LUNGS:  Clear.                                                               CARDIAC:  Regular rate and rhythm.  S1, S2.  No murmur.                      ABDOMEN:  Soft, positive bowel sounds, nontender.  No hepatosplenomegaly or masses.  No rebound or guarding.                                             EXTREMITIES:  No edema.       IMP: 1. Mesenteric adenitis/enteritis - resolved.    PLAN: 1. Complete 3 month course of carafate, then D/C.             2. Continue Aciphex for GERD.

## 2020-11-28 NOTE — TELEPHONE ENCOUNTER
No new care gaps identified.  Powered by Move[employer]. Reference number: [id number]. 11/27/2020 10:12:56 PM   CST

## 2020-11-30 RX ORDER — TELMISARTAN 20 MG/1
20 TABLET ORAL DAILY
Qty: 90 TABLET | Refills: 2 | Status: SHIPPED | OUTPATIENT
Start: 2020-11-30 | End: 2021-08-03

## 2020-11-30 RX ORDER — RABEPRAZOLE SODIUM 20 MG/1
20 TABLET, DELAYED RELEASE ORAL DAILY
Qty: 90 TABLET | Refills: 2 | Status: SHIPPED | OUTPATIENT
Start: 2020-11-30 | End: 2021-10-14

## 2020-11-30 RX ORDER — NEBIVOLOL 5 MG/1
5 TABLET ORAL DAILY
Qty: 90 TABLET | Refills: 2 | Status: SHIPPED | OUTPATIENT
Start: 2020-11-30 | End: 2021-10-13

## 2020-11-30 NOTE — PROGRESS NOTES
Refill Authorization Note   Coral Pandya is requesting a refill authorization.  Brief assessment and rationale for refill: Approve     Medication Therapy Plan: [facility]    Medication reconciliation completed: No   Comments:   Orders Placed This Encounter    telmisartan (MICARDIS) 20 MG Tab    RABEprazole (ACIPHEX) 20 mg tablet    nebivoloL (BYSTOLIC) 5 MG Tab      Requested Prescriptions   Signed Prescriptions Disp Refills    telmisartan (MICARDIS) 20 MG Tab 90 tablet 2     Sig: Take 1 tablet (20 mg total) by mouth once daily.       Cardiovascular:  Angiotensin Receptor Blockers Passed - 11/30/2020  8:04 AM        Passed - Patient is at least 18 years old        Passed - Last BP in normal range within 360 days.     BP Readings from Last 3 Encounters:   10/06/20 136/70   09/16/20 (!) 145/74   08/26/20 139/82              Passed - Office visit in past 12 months or future 90 days     Recent Outpatient Visits            1 month ago Enteritis    [facility] Gastroenterology Amaury Chambers MD    1 month ago YASMANI (generalized anxiety disorder)    [facility] Samreen Coe MD    3 months ago Generalized abdominal pain    [facility] Gastroenterology ARTEM Vasquez    3 months ago Generalized abdominal pain    [facility] Medicine Franny Elizondo NP    3 months ago Encounter for preventive health examination    [facility] [address]indy [address], [place]          Future Appointments              In 1 month [facility] CT1 LIMIT 500 LBS [facility]    In 1 month Ebenezer Souza MD [facility]                Passed - Cr is 1.4 or below and within 360 days     Creatinine   Date Value Ref Range Status   09/24/2020 0.8 0.5 - 1.4 mg/dL Final   08/31/2020 0.9 0.5 - 1.4 mg/dL Final   08/26/2020 1.22 0.50 - 1.40 mg/dL Final              Passed - K in normal range and within 360 days     Potassium   Date Value Ref Range Status    09/24/2020 3.8 3.5 - 5.1 mmol/L Final   08/26/2020 4.3 3.5 - 5.1 mmol/L Final   08/26/2020 4.7 3.5 - 5.1 mmol/L Final              Passed - eGFR within 360 days     eGFR if non    Date Value Ref Range Status   09/24/2020 >60.0 >60 mL/min/1.73 m^2 Final     Comment:     Calculation used to obtain the estimated glomerular filtration  rate (eGFR) is the CKD-EPI equation.      08/31/2020 >60 >60 mL/min/1.73 m^2 Final     Comment:     Calculation used to obtain the estimated glomerular filtration  rate (eGFR) is the CKD-EPI equation.      08/26/2020 45 (A) >60 mL/min/1.73 m^2 Final     Comment:     Calculation used to obtain the estimated glomerular filtration  rate (eGFR) is the CKD-EPI equation.        eGFR if    Date Value Ref Range Status   09/24/2020 >60.0 >60 mL/min/1.73 m^2 Final   08/31/2020 >60 >60 mL/min/1.73 m^2 Final   08/26/2020 52 (A) >60 mL/min/1.73 m^2 Final                RABEprazole (ACIPHEX) 20 mg tablet 90 tablet 2     Sig: Take 1 tablet (20 mg total) by mouth once daily.       Gastroenterology: Proton Pump Inhibitors 2 Passed - 11/30/2020  8:04 AM        Passed - Patient is at least 18 years old        Passed - Osteoporosis is not on problem list        Passed - Negative Pregnancy Status Check        Passed - An appropriate indication is on the problem list        Passed - Office visit in past 12 months or future 90 days     Recent Outpatient Visits            1 month ago Enteritis    [facility] Gastroenterology Amaury Chambers MD    1 month ago YASMANI (generalized anxiety disorder)    [facility] Psychiatry Samreen Coe MD    3 months ago Generalized abdominal pain    [place] - Gastroenterology ARTEM Vasquez    3 months ago Generalized abdominal pain    [facility] Medicine Franny Elizondo NP    3 months ago Encounter for preventive health examination    [facility] Cindy Street NP          Future  Appointments              In 1 month [facility] CT1 LIMIT 500 LBS [facility] [place]    In 1 month Ebenezer Souza MD [facility]                  nebivoloL (BYSTOLIC) 5 MG Tab 90 tablet 2     Sig: Take 1 tablet (5 mg total) by mouth once daily.       Cardiovascular:  Beta Blockers Passed - 11/30/2020  8:04 AM        Passed - Patient is at least 18 years old        Passed - Last BP in normal range within 360 days.     BP Readings from Last 3 Encounters:   10/06/20 136/70   09/16/20 (!) 145/74   08/26/20 139/82              Passed - Last Heart Rate in normal range within 360 days.     Pulse Readings from Last 3 Encounters:   10/06/20 65   09/16/20 74   08/26/20 80             Passed - Office visit in past 12 months or future 90 days     Recent Outpatient Visits            1 month ago Enteritis    [facility] Gastroenterology Amaury Chambers MD    1 month ago YASMANI (generalized anxiety disorder)    [facility] Psychiatry Samreen Coe MD    3 months ago Generalized abdominal pain    [facility] Gastroenterology ARTEM Vasquez    3 months ago Generalized abdominal pain    [facility] Medicine Franny Elizondo NP    3 months ago Encounter for preventive health examination    [facility] Medicine [address]indy [address], [place]          Future Appointments              In 1 month [facility] CT1 LIMIT 500 LBS [facility] [place]    In 1 month Ebenezer Souza MD [facility]                    Appointments  past 12m or future 3m with PCP    Date Provider   Last Visit   7/16/2020 Ebenezer Souza MD   Next Visit   1/18/2021 Ebenezer Souza MD   ED visits in past 90 days: 0     Note composed:9:08 AM 11/30/2020

## 2020-12-11 ENCOUNTER — PATIENT MESSAGE (OUTPATIENT)
Dept: OTHER | Facility: OTHER | Age: 70
End: 2020-12-11

## 2021-01-04 ENCOUNTER — HOSPITAL ENCOUNTER (OUTPATIENT)
Dept: RADIOLOGY | Facility: HOSPITAL | Age: 71
Discharge: HOME | End: 2021-01-04
Attending: FAMILY MEDICINE
Payer: MEDICARE

## 2021-01-04 DIAGNOSIS — R91.1 LUNG NODULE: ICD-10-CM

## 2021-01-04 PROCEDURE — 71250 CT THORAX DX C-: CPT | Mod: TC,PO

## 2021-01-04 PROCEDURE — 71250 CT THORAX DX C-: CPT | Mod: 26,,, | Performed by: RADIOLOGY

## 2021-01-04 PROCEDURE — 71250 CT CHEST WITHOUT CONTRAST: ICD-10-PCS | Mod: 26,,, | Performed by: RADIOLOGY

## 2021-01-08 ENCOUNTER — PATIENT OUTREACH (OUTPATIENT)
Dept: ADMINISTRATIVE | Facility: HOSPITAL | Age: 71
End: 2021-01-08

## 2021-01-14 ENCOUNTER — OFFICE VISIT (OUTPATIENT)
Dept: PSYCHIATRY | Facility: CLINIC | Age: 71
End: 2021-01-14
Payer: MEDICARE

## 2021-01-14 VITALS
WEIGHT: 164.38 LBS | DIASTOLIC BLOOD PRESSURE: 66 MMHG | HEIGHT: 67 IN | HEART RATE: 71 BPM | BODY MASS INDEX: 25.8 KG/M2 | SYSTOLIC BLOOD PRESSURE: 100 MMHG

## 2021-01-14 DIAGNOSIS — I10 HYPERTENSION, UNSPECIFIED TYPE: ICD-10-CM

## 2021-01-14 DIAGNOSIS — F33.41 MDD (MAJOR DEPRESSIVE DISORDER), RECURRENT, IN PARTIAL REMISSION: Primary | ICD-10-CM

## 2021-01-14 DIAGNOSIS — F41.1 GAD (GENERALIZED ANXIETY DISORDER): ICD-10-CM

## 2021-01-14 PROCEDURE — 99999 PR PBB SHADOW E&M-EST. PATIENT-LVL III: CPT | Mod: PBBFAC,,, | Performed by: PSYCHIATRY & NEUROLOGY

## 2021-01-14 PROCEDURE — 99213 OFFICE O/P EST LOW 20 MIN: CPT | Mod: PBBFAC,PO | Performed by: PSYCHIATRY & NEUROLOGY

## 2021-01-14 PROCEDURE — 90833 PR PSYCHOTHERAPY W/PATIENT W/E&M, 30 MIN (ADD ON): ICD-10-PCS | Mod: ,,, | Performed by: PSYCHIATRY & NEUROLOGY

## 2021-01-14 PROCEDURE — 99214 PR OFFICE/OUTPT VISIT, EST, LEVL IV, 30-39 MIN: ICD-10-PCS | Mod: S$PBB,,, | Performed by: PSYCHIATRY & NEUROLOGY

## 2021-01-14 PROCEDURE — 90833 PSYTX W PT W E/M 30 MIN: CPT | Mod: ,,, | Performed by: PSYCHIATRY & NEUROLOGY

## 2021-01-14 PROCEDURE — 99999 PR PBB SHADOW E&M-EST. PATIENT-LVL III: ICD-10-PCS | Mod: PBBFAC,,, | Performed by: PSYCHIATRY & NEUROLOGY

## 2021-01-14 PROCEDURE — 99214 OFFICE O/P EST MOD 30 MIN: CPT | Mod: S$PBB,,, | Performed by: PSYCHIATRY & NEUROLOGY

## 2021-01-18 ENCOUNTER — TELEPHONE (OUTPATIENT)
Dept: FAMILY MEDICINE | Facility: CLINIC | Age: 71
End: 2021-01-18

## 2021-01-18 DIAGNOSIS — R91.1 LUNG NODULE: Primary | ICD-10-CM

## 2021-01-22 ENCOUNTER — OFFICE VISIT (OUTPATIENT)
Dept: FAMILY MEDICINE | Facility: CLINIC | Age: 71
End: 2021-01-22
Payer: MEDICARE

## 2021-01-22 VITALS
BODY MASS INDEX: 25.92 KG/M2 | SYSTOLIC BLOOD PRESSURE: 118 MMHG | HEART RATE: 67 BPM | WEIGHT: 165.13 LBS | OXYGEN SATURATION: 95 % | HEIGHT: 67 IN | DIASTOLIC BLOOD PRESSURE: 78 MMHG

## 2021-01-22 DIAGNOSIS — F33.41 MDD (MAJOR DEPRESSIVE DISORDER), RECURRENT, IN PARTIAL REMISSION: ICD-10-CM

## 2021-01-22 DIAGNOSIS — M54.12 CERVICAL RADICULITIS: Primary | ICD-10-CM

## 2021-01-22 DIAGNOSIS — E03.9 HYPOTHYROIDISM, UNSPECIFIED TYPE: ICD-10-CM

## 2021-01-22 DIAGNOSIS — I10 HYPERTENSION, UNSPECIFIED TYPE: ICD-10-CM

## 2021-01-22 PROCEDURE — 99214 OFFICE O/P EST MOD 30 MIN: CPT | Mod: S$PBB,,, | Performed by: FAMILY MEDICINE

## 2021-01-22 PROCEDURE — 99999 PR PBB SHADOW E&M-EST. PATIENT-LVL V: CPT | Mod: PBBFAC,,, | Performed by: FAMILY MEDICINE

## 2021-01-22 PROCEDURE — 99999 PR PBB SHADOW E&M-EST. PATIENT-LVL V: ICD-10-PCS | Mod: PBBFAC,,, | Performed by: FAMILY MEDICINE

## 2021-01-22 PROCEDURE — 99215 OFFICE O/P EST HI 40 MIN: CPT | Mod: PBBFAC,PO | Performed by: FAMILY MEDICINE

## 2021-01-22 PROCEDURE — 99214 PR OFFICE/OUTPT VISIT, EST, LEVL IV, 30-39 MIN: ICD-10-PCS | Mod: S$PBB,,, | Performed by: FAMILY MEDICINE

## 2021-01-22 RX ORDER — PREDNISONE 10 MG/1
TABLET ORAL
Qty: 20 TABLET | Refills: 0 | Status: SHIPPED | OUTPATIENT
Start: 2021-01-22 | End: 2021-05-20 | Stop reason: ALTCHOICE

## 2021-01-29 ENCOUNTER — IMMUNIZATION (OUTPATIENT)
Dept: PHARMACY | Facility: CLINIC | Age: 71
End: 2021-01-29
Payer: MEDICARE

## 2021-01-29 DIAGNOSIS — Z23 NEED FOR VACCINATION: Primary | ICD-10-CM

## 2021-01-30 ENCOUNTER — PATIENT MESSAGE (OUTPATIENT)
Dept: FAMILY MEDICINE | Facility: CLINIC | Age: 71
End: 2021-01-30

## 2021-02-03 ENCOUNTER — PATIENT OUTREACH (OUTPATIENT)
Dept: ADMINISTRATIVE | Facility: HOSPITAL | Age: 71
End: 2021-02-03

## 2021-02-26 ENCOUNTER — IMMUNIZATION (OUTPATIENT)
Dept: PHARMACY | Facility: CLINIC | Age: 71
End: 2021-02-26
Payer: MEDICARE

## 2021-02-26 DIAGNOSIS — Z23 NEED FOR VACCINATION: Primary | ICD-10-CM

## 2021-03-22 ENCOUNTER — PATIENT MESSAGE (OUTPATIENT)
Dept: FAMILY MEDICINE | Facility: CLINIC | Age: 71
End: 2021-03-22

## 2021-03-24 RX ORDER — EZETIMIBE 10 MG/1
10 TABLET ORAL DAILY
Qty: 90 TABLET | Refills: 1 | Status: SHIPPED | OUTPATIENT
Start: 2021-03-24 | End: 2021-09-20

## 2021-04-08 DIAGNOSIS — E03.9 HYPOTHYROIDISM, UNSPECIFIED TYPE: ICD-10-CM

## 2021-04-10 RX ORDER — LEVOTHYROXINE SODIUM 100 UG/1
TABLET ORAL
Qty: 90 TABLET | Refills: 1 | Status: SHIPPED | OUTPATIENT
Start: 2021-04-10 | End: 2021-10-13

## 2021-04-22 ENCOUNTER — HOSPITAL ENCOUNTER (OUTPATIENT)
Dept: RADIOLOGY | Facility: HOSPITAL | Age: 71
Discharge: HOME | End: 2021-04-22
Attending: FAMILY MEDICINE
Payer: MEDICARE

## 2021-04-22 DIAGNOSIS — R91.1 LUNG NODULE: ICD-10-CM

## 2021-04-22 PROCEDURE — 71250 CT THORAX DX C-: CPT | Mod: TC,PO

## 2021-04-22 PROCEDURE — 71250 CT CHEST WITHOUT CONTRAST: ICD-10-PCS | Mod: 26,,, | Performed by: RADIOLOGY

## 2021-04-22 PROCEDURE — 71250 CT THORAX DX C-: CPT | Mod: 26,,, | Performed by: RADIOLOGY

## 2021-04-23 ENCOUNTER — PATIENT MESSAGE (OUTPATIENT)
Dept: PSYCHIATRY | Facility: CLINIC | Age: 71
End: 2021-04-23

## 2021-04-26 RX ORDER — DULOXETIN HYDROCHLORIDE 60 MG/1
60 CAPSULE, DELAYED RELEASE ORAL DAILY
Qty: 90 CAPSULE | Refills: 1 | Status: SHIPPED | OUTPATIENT
Start: 2021-04-26 | End: 2021-11-01

## 2021-05-13 ENCOUNTER — PATIENT MESSAGE (OUTPATIENT)
Dept: FAMILY MEDICINE | Facility: CLINIC | Age: 71
End: 2021-05-13

## 2021-05-20 ENCOUNTER — OFFICE VISIT (OUTPATIENT)
Dept: PSYCHIATRY | Facility: CLINIC | Age: 71
End: 2021-05-20
Payer: MEDICARE

## 2021-05-20 VITALS
HEART RATE: 63 BPM | WEIGHT: 169.44 LBS | BODY MASS INDEX: 26.6 KG/M2 | DIASTOLIC BLOOD PRESSURE: 78 MMHG | HEIGHT: 67 IN | SYSTOLIC BLOOD PRESSURE: 117 MMHG

## 2021-05-20 DIAGNOSIS — F33.41 MDD (MAJOR DEPRESSIVE DISORDER), RECURRENT, IN PARTIAL REMISSION: Primary | ICD-10-CM

## 2021-05-20 DIAGNOSIS — F41.1 GAD (GENERALIZED ANXIETY DISORDER): ICD-10-CM

## 2021-05-20 PROCEDURE — 99213 OFFICE O/P EST LOW 20 MIN: CPT | Mod: PBBFAC,PO | Performed by: PSYCHIATRY & NEUROLOGY

## 2021-05-20 PROCEDURE — 99999 PR PBB SHADOW E&M-EST. PATIENT-LVL III: ICD-10-PCS | Mod: PBBFAC,,, | Performed by: PSYCHIATRY & NEUROLOGY

## 2021-05-20 PROCEDURE — 99214 OFFICE O/P EST MOD 30 MIN: CPT | Mod: S$PBB,,, | Performed by: PSYCHIATRY & NEUROLOGY

## 2021-05-20 PROCEDURE — 90833 PSYTX W PT W E/M 30 MIN: CPT | Mod: ,,, | Performed by: PSYCHIATRY & NEUROLOGY

## 2021-05-20 PROCEDURE — 99214 PR OFFICE/OUTPT VISIT, EST, LEVL IV, 30-39 MIN: ICD-10-PCS | Mod: S$PBB,,, | Performed by: PSYCHIATRY & NEUROLOGY

## 2021-05-20 PROCEDURE — 90833 PR PSYCHOTHERAPY W/PATIENT W/E&M, 30 MIN (ADD ON): ICD-10-PCS | Mod: ,,, | Performed by: PSYCHIATRY & NEUROLOGY

## 2021-05-20 PROCEDURE — 99999 PR PBB SHADOW E&M-EST. PATIENT-LVL III: CPT | Mod: PBBFAC,,, | Performed by: PSYCHIATRY & NEUROLOGY

## 2021-05-20 RX ORDER — MULTIVIT-MIN/FA/LYCOPEN/LUTEIN .4-300-25
TABLET ORAL
COMMUNITY
Start: 2021-01-23

## 2021-05-21 ENCOUNTER — TELEPHONE (OUTPATIENT)
Dept: FAMILY MEDICINE | Facility: CLINIC | Age: 71
End: 2021-05-21

## 2021-06-15 RX ORDER — FAMOTIDINE 40 MG/1
TABLET, FILM COATED ORAL
Qty: 90 TABLET | Refills: 2 | Status: SHIPPED | OUTPATIENT
Start: 2021-06-15 | End: 2022-02-13

## 2021-07-22 ENCOUNTER — TELEPHONE (OUTPATIENT)
Dept: FAMILY MEDICINE | Facility: CLINIC | Age: 71
End: 2021-07-22

## 2021-07-22 ENCOUNTER — OFFICE VISIT (OUTPATIENT)
Dept: FAMILY MEDICINE | Facility: CLINIC | Age: 71
End: 2021-07-22
Payer: MEDICARE

## 2021-07-22 VITALS
DIASTOLIC BLOOD PRESSURE: 72 MMHG | WEIGHT: 169.75 LBS | BODY MASS INDEX: 26.64 KG/M2 | SYSTOLIC BLOOD PRESSURE: 118 MMHG | HEART RATE: 67 BPM | OXYGEN SATURATION: 96 % | HEIGHT: 67 IN

## 2021-07-22 DIAGNOSIS — E03.9 HYPOTHYROIDISM, UNSPECIFIED TYPE: ICD-10-CM

## 2021-07-22 DIAGNOSIS — F41.9 ANXIETY: ICD-10-CM

## 2021-07-22 DIAGNOSIS — R91.1 LUNG NODULE: ICD-10-CM

## 2021-07-22 DIAGNOSIS — F32.A DEPRESSION, UNSPECIFIED DEPRESSION TYPE: ICD-10-CM

## 2021-07-22 DIAGNOSIS — I10 HYPERTENSION, UNSPECIFIED TYPE: Primary | ICD-10-CM

## 2021-07-22 PROCEDURE — 99999 PR PBB SHADOW E&M-EST. PATIENT-LVL V: ICD-10-PCS | Mod: PBBFAC,,, | Performed by: FAMILY MEDICINE

## 2021-07-22 PROCEDURE — 99214 PR OFFICE/OUTPT VISIT, EST, LEVL IV, 30-39 MIN: ICD-10-PCS | Mod: S$PBB,,, | Performed by: FAMILY MEDICINE

## 2021-07-22 PROCEDURE — 99999 PR PBB SHADOW E&M-EST. PATIENT-LVL V: CPT | Mod: PBBFAC,,, | Performed by: FAMILY MEDICINE

## 2021-07-22 PROCEDURE — 99215 OFFICE O/P EST HI 40 MIN: CPT | Mod: PBBFAC,PO | Performed by: FAMILY MEDICINE

## 2021-07-22 PROCEDURE — 99214 OFFICE O/P EST MOD 30 MIN: CPT | Mod: S$PBB,,, | Performed by: FAMILY MEDICINE

## 2021-08-03 RX ORDER — TELMISARTAN 20 MG/1
TABLET ORAL
Qty: 90 TABLET | Refills: 0 | Status: SHIPPED | OUTPATIENT
Start: 2021-08-03 | End: 2021-10-13

## 2021-08-19 ENCOUNTER — OFFICE VISIT (OUTPATIENT)
Dept: PSYCHIATRY | Facility: CLINIC | Age: 71
End: 2021-08-19
Payer: MEDICARE

## 2021-08-19 DIAGNOSIS — F41.1 GAD (GENERALIZED ANXIETY DISORDER): ICD-10-CM

## 2021-08-19 DIAGNOSIS — F33.41 MDD (MAJOR DEPRESSIVE DISORDER), RECURRENT, IN PARTIAL REMISSION: Primary | ICD-10-CM

## 2021-08-19 PROCEDURE — 90833 PR PSYCHOTHERAPY W/PATIENT W/E&M, 30 MIN (ADD ON): ICD-10-PCS | Mod: 95,,, | Performed by: PSYCHIATRY & NEUROLOGY

## 2021-08-19 PROCEDURE — 99214 PR OFFICE/OUTPT VISIT, EST, LEVL IV, 30-39 MIN: ICD-10-PCS | Mod: 95,,, | Performed by: PSYCHIATRY & NEUROLOGY

## 2021-08-19 PROCEDURE — 90833 PSYTX W PT W E/M 30 MIN: CPT | Mod: 95,,, | Performed by: PSYCHIATRY & NEUROLOGY

## 2021-08-19 PROCEDURE — 99214 OFFICE O/P EST MOD 30 MIN: CPT | Mod: 95,,, | Performed by: PSYCHIATRY & NEUROLOGY

## 2021-08-20 ENCOUNTER — OFFICE VISIT (OUTPATIENT)
Dept: FAMILY MEDICINE | Facility: CLINIC | Age: 71
End: 2021-08-20
Payer: MEDICARE

## 2021-08-20 VITALS
BODY MASS INDEX: 26.4 KG/M2 | HEIGHT: 67 IN | HEART RATE: 63 BPM | OXYGEN SATURATION: 97 % | DIASTOLIC BLOOD PRESSURE: 72 MMHG | WEIGHT: 168.19 LBS | SYSTOLIC BLOOD PRESSURE: 104 MMHG

## 2021-08-20 DIAGNOSIS — Z00.00 ENCOUNTER FOR PREVENTIVE HEALTH EXAMINATION: Primary | ICD-10-CM

## 2021-08-20 DIAGNOSIS — I70.0 AORTIC ATHEROSCLEROSIS: ICD-10-CM

## 2021-08-20 DIAGNOSIS — F33.41 MDD (MAJOR DEPRESSIVE DISORDER), RECURRENT, IN PARTIAL REMISSION: ICD-10-CM

## 2021-08-20 PROBLEM — G44.52 NEW DAILY PERSISTENT HEADACHE: Status: RESOLVED | Noted: 2020-04-28 | Resolved: 2021-08-20

## 2021-08-20 PROBLEM — R10.9 ABDOMINAL PAIN: Status: RESOLVED | Noted: 2020-08-27 | Resolved: 2021-08-20

## 2021-08-20 PROBLEM — R05.9 COUGH: Status: RESOLVED | Noted: 2020-04-28 | Resolved: 2021-08-20

## 2021-08-20 PROCEDURE — G0439 PPPS, SUBSEQ VISIT: HCPCS | Mod: ,,, | Performed by: NURSE PRACTITIONER

## 2021-08-20 PROCEDURE — 99214 OFFICE O/P EST MOD 30 MIN: CPT | Mod: PBBFAC,PO | Performed by: NURSE PRACTITIONER

## 2021-08-20 PROCEDURE — 99999 PR PBB SHADOW E&M-EST. PATIENT-LVL IV: CPT | Mod: PBBFAC,,, | Performed by: NURSE PRACTITIONER

## 2021-08-20 PROCEDURE — G0439 PR MEDICARE ANNUAL WELLNESS SUBSEQUENT VISIT: ICD-10-PCS | Mod: ,,, | Performed by: NURSE PRACTITIONER

## 2021-08-20 PROCEDURE — 99999 PR PBB SHADOW E&M-EST. PATIENT-LVL IV: ICD-10-PCS | Mod: PBBFAC,,, | Performed by: NURSE PRACTITIONER

## 2021-08-23 ENCOUNTER — LAB VISIT (OUTPATIENT)
Dept: LAB | Facility: HOSPITAL | Age: 71
End: 2021-08-23
Attending: FAMILY MEDICINE
Payer: MEDICARE

## 2021-08-23 DIAGNOSIS — I10 HYPERTENSION, UNSPECIFIED TYPE: ICD-10-CM

## 2021-08-23 DIAGNOSIS — E03.9 HYPOTHYROIDISM, UNSPECIFIED TYPE: ICD-10-CM

## 2021-08-23 LAB
ALBUMIN SERPL BCP-MCNC: 3.7 G/DL (ref 3.5–5.2)
ALP SERPL-CCNC: 69 U/L (ref 55–135)
ALT SERPL W/O P-5'-P-CCNC: 30 U/L (ref 10–44)
ANION GAP SERPL CALC-SCNC: 8 MMOL/L (ref 8–16)
AST SERPL-CCNC: 26 U/L (ref 10–40)
BILIRUB SERPL-MCNC: 0.5 MG/DL (ref 0.1–1)
BUN SERPL-MCNC: 18 MG/DL (ref 8–23)
CALCIUM SERPL-MCNC: 9.3 MG/DL (ref 8.7–10.5)
CHLORIDE SERPL-SCNC: 109 MMOL/L (ref 95–110)
CHOLEST SERPL-MCNC: 150 MG/DL (ref 120–199)
CHOLEST/HDLC SERPL: 3.3 {RATIO} (ref 2–5)
CO2 SERPL-SCNC: 26 MMOL/L (ref 23–29)
CREAT SERPL-MCNC: 0.9 MG/DL (ref 0.5–1.4)
EST. GFR  (AFRICAN AMERICAN): >60 ML/MIN/1.73 M^2
EST. GFR  (NON AFRICAN AMERICAN): >60 ML/MIN/1.73 M^2
GLUCOSE SERPL-MCNC: 89 MG/DL (ref 70–110)
HDLC SERPL-MCNC: 46 MG/DL (ref 40–75)
HDLC SERPL: 30.7 % (ref 20–50)
LDLC SERPL CALC-MCNC: 87.6 MG/DL (ref 63–159)
NONHDLC SERPL-MCNC: 104 MG/DL
POTASSIUM SERPL-SCNC: 4.4 MMOL/L (ref 3.5–5.1)
PROT SERPL-MCNC: 6.2 G/DL (ref 6–8.4)
SODIUM SERPL-SCNC: 143 MMOL/L (ref 136–145)
TRIGL SERPL-MCNC: 82 MG/DL (ref 30–150)
TSH SERPL DL<=0.005 MIU/L-ACNC: 1.65 UIU/ML (ref 0.4–4)

## 2021-08-23 PROCEDURE — 80053 COMPREHEN METABOLIC PANEL: CPT | Performed by: FAMILY MEDICINE

## 2021-08-23 PROCEDURE — 36415 COLL VENOUS BLD VENIPUNCTURE: CPT | Mod: PO | Performed by: FAMILY MEDICINE

## 2021-08-23 PROCEDURE — 80061 LIPID PANEL: CPT | Performed by: FAMILY MEDICINE

## 2021-08-23 PROCEDURE — 84443 ASSAY THYROID STIM HORMONE: CPT | Performed by: FAMILY MEDICINE

## 2021-09-20 RX ORDER — EZETIMIBE 10 MG/1
TABLET ORAL
Qty: 90 TABLET | Refills: 3 | Status: SHIPPED | OUTPATIENT
Start: 2021-09-20 | End: 2022-07-14

## 2021-10-12 DIAGNOSIS — E03.9 HYPOTHYROIDISM, UNSPECIFIED TYPE: ICD-10-CM

## 2021-10-13 RX ORDER — TELMISARTAN 20 MG/1
TABLET ORAL
Qty: 90 TABLET | Refills: 3 | Status: SHIPPED | OUTPATIENT
Start: 2021-10-13 | End: 2022-07-14

## 2021-10-13 RX ORDER — NEBIVOLOL HYDROCHLORIDE 5 MG/1
TABLET ORAL
Qty: 90 TABLET | Refills: 3 | Status: SHIPPED | OUTPATIENT
Start: 2021-10-13 | End: 2022-11-04 | Stop reason: SDUPTHER

## 2021-10-13 RX ORDER — LEVOTHYROXINE SODIUM 100 UG/1
TABLET ORAL
Qty: 90 TABLET | Refills: 3 | Status: SHIPPED | OUTPATIENT
Start: 2021-10-13 | End: 2022-10-25

## 2021-10-14 RX ORDER — RABEPRAZOLE SODIUM 20 MG/1
TABLET, DELAYED RELEASE ORAL
Qty: 90 TABLET | Refills: 3 | Status: SHIPPED | OUTPATIENT
Start: 2021-10-14 | End: 2022-10-25

## 2021-10-19 ENCOUNTER — PATIENT MESSAGE (OUTPATIENT)
Dept: ADMINISTRATIVE | Facility: OTHER | Age: 71
End: 2021-10-19
Payer: MEDICARE

## 2021-10-20 ENCOUNTER — PATIENT MESSAGE (OUTPATIENT)
Dept: ADMINISTRATIVE | Facility: OTHER | Age: 71
End: 2021-10-20
Payer: MEDICARE

## 2021-10-25 ENCOUNTER — PATIENT MESSAGE (OUTPATIENT)
Dept: ADMINISTRATIVE | Facility: OTHER | Age: 71
End: 2021-10-25
Payer: MEDICARE

## 2021-10-28 ENCOUNTER — TELEPHONE (OUTPATIENT)
Dept: FAMILY MEDICINE | Facility: CLINIC | Age: 71
End: 2021-10-28
Payer: MEDICARE

## 2021-10-29 ENCOUNTER — OFFICE VISIT (OUTPATIENT)
Dept: FAMILY MEDICINE | Facility: CLINIC | Age: 71
End: 2021-10-29
Payer: MEDICARE

## 2021-10-29 VITALS
WEIGHT: 170 LBS | BODY MASS INDEX: 26.68 KG/M2 | HEIGHT: 67 IN | OXYGEN SATURATION: 97 % | TEMPERATURE: 98 F | HEART RATE: 62 BPM | SYSTOLIC BLOOD PRESSURE: 120 MMHG | DIASTOLIC BLOOD PRESSURE: 80 MMHG

## 2021-10-29 DIAGNOSIS — R35.0 URINARY FREQUENCY: ICD-10-CM

## 2021-10-29 DIAGNOSIS — N30.00 ACUTE CYSTITIS WITHOUT HEMATURIA: Primary | ICD-10-CM

## 2021-10-29 LAB
BACTERIA #/AREA URNS HPF: ABNORMAL /HPF
BILIRUB UR QL STRIP: NEGATIVE
CLARITY UR: CLEAR
COLOR UR: YELLOW
GLUCOSE UR QL STRIP: NEGATIVE
HGB UR QL STRIP: NEGATIVE
KETONES UR QL STRIP: NEGATIVE
LEUKOCYTE ESTERASE UR QL STRIP: ABNORMAL
MICROSCOPIC COMMENT: ABNORMAL
NITRITE UR QL STRIP: NEGATIVE
PH UR STRIP: 6 [PH] (ref 5–8)
PROT UR QL STRIP: NEGATIVE
SP GR UR STRIP: 1.02 (ref 1–1.03)
SQUAMOUS #/AREA URNS HPF: 1 /HPF
URN SPEC COLLECT METH UR: ABNORMAL
WBC #/AREA URNS HPF: 20 /HPF (ref 0–5)
WBC CLUMPS URNS QL MICRO: ABNORMAL

## 2021-10-29 PROCEDURE — 87186 SC STD MICRODIL/AGAR DIL: CPT | Performed by: PHYSICIAN ASSISTANT

## 2021-10-29 PROCEDURE — 87077 CULTURE AEROBIC IDENTIFY: CPT | Performed by: PHYSICIAN ASSISTANT

## 2021-10-29 PROCEDURE — 99214 OFFICE O/P EST MOD 30 MIN: CPT | Mod: PBBFAC,PO | Performed by: PHYSICIAN ASSISTANT

## 2021-10-29 PROCEDURE — 99213 OFFICE O/P EST LOW 20 MIN: CPT | Mod: S$PBB,,, | Performed by: PHYSICIAN ASSISTANT

## 2021-10-29 PROCEDURE — 99999 PR PBB SHADOW E&M-EST. PATIENT-LVL IV: CPT | Mod: PBBFAC,,, | Performed by: PHYSICIAN ASSISTANT

## 2021-10-29 PROCEDURE — 99999 PR PBB SHADOW E&M-EST. PATIENT-LVL IV: ICD-10-PCS | Mod: PBBFAC,,, | Performed by: PHYSICIAN ASSISTANT

## 2021-10-29 PROCEDURE — 99213 PR OFFICE/OUTPT VISIT, EST, LEVL III, 20-29 MIN: ICD-10-PCS | Mod: S$PBB,,, | Performed by: PHYSICIAN ASSISTANT

## 2021-10-29 PROCEDURE — 87086 URINE CULTURE/COLONY COUNT: CPT | Performed by: PHYSICIAN ASSISTANT

## 2021-10-29 PROCEDURE — 81000 URINALYSIS NONAUTO W/SCOPE: CPT | Mod: PO | Performed by: PHYSICIAN ASSISTANT

## 2021-10-29 PROCEDURE — 87088 URINE BACTERIA CULTURE: CPT | Performed by: PHYSICIAN ASSISTANT

## 2021-10-29 RX ORDER — NITROFURANTOIN 25; 75 MG/1; MG/1
100 CAPSULE ORAL 2 TIMES DAILY
Qty: 10 CAPSULE | Refills: 0 | Status: SHIPPED | OUTPATIENT
Start: 2021-10-29 | End: 2021-11-03

## 2021-11-01 LAB — BACTERIA UR CULT: ABNORMAL

## 2021-12-06 ENCOUNTER — OFFICE VISIT (OUTPATIENT)
Dept: PSYCHIATRY | Facility: CLINIC | Age: 71
End: 2021-12-06
Payer: MEDICARE

## 2021-12-06 VITALS
WEIGHT: 171.44 LBS | HEIGHT: 67 IN | HEART RATE: 63 BPM | SYSTOLIC BLOOD PRESSURE: 103 MMHG | DIASTOLIC BLOOD PRESSURE: 61 MMHG | BODY MASS INDEX: 26.91 KG/M2

## 2021-12-06 DIAGNOSIS — F41.1 GAD (GENERALIZED ANXIETY DISORDER): ICD-10-CM

## 2021-12-06 DIAGNOSIS — F33.41 MDD (MAJOR DEPRESSIVE DISORDER), RECURRENT, IN PARTIAL REMISSION: Primary | ICD-10-CM

## 2021-12-06 PROCEDURE — 99214 OFFICE O/P EST MOD 30 MIN: CPT | Mod: S$PBB,,, | Performed by: PSYCHIATRY & NEUROLOGY

## 2021-12-06 PROCEDURE — 99999 PR PBB SHADOW E&M-EST. PATIENT-LVL III: ICD-10-PCS | Mod: PBBFAC,,, | Performed by: PSYCHIATRY & NEUROLOGY

## 2021-12-06 PROCEDURE — 90833 PR PSYCHOTHERAPY W/PATIENT W/E&M, 30 MIN (ADD ON): ICD-10-PCS | Mod: ,,, | Performed by: PSYCHIATRY & NEUROLOGY

## 2021-12-06 PROCEDURE — 99999 PR PBB SHADOW E&M-EST. PATIENT-LVL III: CPT | Mod: PBBFAC,,, | Performed by: PSYCHIATRY & NEUROLOGY

## 2021-12-06 PROCEDURE — 99214 PR OFFICE/OUTPT VISIT, EST, LEVL IV, 30-39 MIN: ICD-10-PCS | Mod: S$PBB,,, | Performed by: PSYCHIATRY & NEUROLOGY

## 2021-12-06 PROCEDURE — 90833 PSYTX W PT W E/M 30 MIN: CPT | Mod: ,,, | Performed by: PSYCHIATRY & NEUROLOGY

## 2021-12-06 PROCEDURE — 99213 OFFICE O/P EST LOW 20 MIN: CPT | Mod: PBBFAC,PO | Performed by: PSYCHIATRY & NEUROLOGY

## 2021-12-06 RX ORDER — BUPROPION HYDROCHLORIDE 150 MG/1
150 TABLET ORAL DAILY
Qty: 90 TABLET | Refills: 0 | Status: SHIPPED | OUTPATIENT
Start: 2021-12-06 | End: 2021-12-07

## 2021-12-06 RX ORDER — MIRTAZAPINE 15 MG/1
15 TABLET, FILM COATED ORAL NIGHTLY
Qty: 90 TABLET | Refills: 1 | Status: SHIPPED | OUTPATIENT
Start: 2021-12-06 | End: 2022-03-03 | Stop reason: SDUPTHER

## 2021-12-06 RX ORDER — DULOXETIN HYDROCHLORIDE 30 MG/1
30 CAPSULE, DELAYED RELEASE ORAL DAILY
Qty: 90 CAPSULE | Refills: 0 | Status: SHIPPED | OUTPATIENT
Start: 2021-12-06 | End: 2021-12-07

## 2021-12-06 RX ORDER — DULOXETIN HYDROCHLORIDE 60 MG/1
60 CAPSULE, DELAYED RELEASE ORAL DAILY
Qty: 90 CAPSULE | Refills: 0 | Status: SHIPPED | OUTPATIENT
Start: 2021-12-06 | End: 2021-12-07

## 2021-12-08 ENCOUNTER — OFFICE VISIT (OUTPATIENT)
Dept: FAMILY MEDICINE | Facility: CLINIC | Age: 71
End: 2021-12-08
Payer: MEDICARE

## 2021-12-08 DIAGNOSIS — R05.9 COUGH: Primary | ICD-10-CM

## 2021-12-08 LAB
SARS-COV-2 RNA RESP QL NAA+PROBE: NOT DETECTED
SARS-COV-2- CYCLE NUMBER: NORMAL

## 2021-12-08 PROCEDURE — U0005 INFEC AGEN DETEC AMPLI PROBE: HCPCS | Performed by: NURSE PRACTITIONER

## 2021-12-08 PROCEDURE — U0003 INFECTIOUS AGENT DETECTION BY NUCLEIC ACID (DNA OR RNA); SEVERE ACUTE RESPIRATORY SYNDROME CORONAVIRUS 2 (SARS-COV-2) (CORONAVIRUS DISEASE [COVID-19]), AMPLIFIED PROBE TECHNIQUE, MAKING USE OF HIGH THROUGHPUT TECHNOLOGIES AS DESCRIBED BY CMS-2020-01-R: HCPCS | Performed by: NURSE PRACTITIONER

## 2021-12-08 PROCEDURE — 99213 OFFICE O/P EST LOW 20 MIN: CPT | Mod: 95,,, | Performed by: NURSE PRACTITIONER

## 2021-12-08 PROCEDURE — 99213 PR OFFICE/OUTPT VISIT, EST, LEVL III, 20-29 MIN: ICD-10-PCS | Mod: 95,,, | Performed by: NURSE PRACTITIONER

## 2021-12-16 ENCOUNTER — PATIENT MESSAGE (OUTPATIENT)
Dept: FAMILY MEDICINE | Facility: CLINIC | Age: 71
End: 2021-12-16
Payer: MEDICARE

## 2021-12-16 RX ORDER — BENZONATATE 200 MG/1
200 CAPSULE ORAL 3 TIMES DAILY PRN
Qty: 30 CAPSULE | Refills: 0 | Status: SHIPPED | OUTPATIENT
Start: 2021-12-16 | End: 2021-12-26

## 2022-03-03 ENCOUNTER — OFFICE VISIT (OUTPATIENT)
Dept: PSYCHIATRY | Facility: CLINIC | Age: 72
End: 2022-03-03
Payer: MEDICARE

## 2022-03-03 VITALS
SYSTOLIC BLOOD PRESSURE: 116 MMHG | BODY MASS INDEX: 26.76 KG/M2 | HEART RATE: 68 BPM | HEIGHT: 67 IN | DIASTOLIC BLOOD PRESSURE: 78 MMHG | WEIGHT: 170.5 LBS

## 2022-03-03 DIAGNOSIS — F41.1 GAD (GENERALIZED ANXIETY DISORDER): ICD-10-CM

## 2022-03-03 DIAGNOSIS — F33.41 MDD (MAJOR DEPRESSIVE DISORDER), RECURRENT, IN PARTIAL REMISSION: Primary | ICD-10-CM

## 2022-03-03 PROCEDURE — 90833 PR PSYCHOTHERAPY W/PATIENT W/E&M, 30 MIN (ADD ON): ICD-10-PCS | Mod: S$PBB,,, | Performed by: PSYCHIATRY & NEUROLOGY

## 2022-03-03 PROCEDURE — 99214 OFFICE O/P EST MOD 30 MIN: CPT | Mod: S$PBB,,, | Performed by: PSYCHIATRY & NEUROLOGY

## 2022-03-03 PROCEDURE — 99999 PR PBB SHADOW E&M-EST. PATIENT-LVL III: ICD-10-PCS | Mod: PBBFAC,,, | Performed by: PSYCHIATRY & NEUROLOGY

## 2022-03-03 PROCEDURE — 99213 OFFICE O/P EST LOW 20 MIN: CPT | Mod: PBBFAC,PO | Performed by: PSYCHIATRY & NEUROLOGY

## 2022-03-03 PROCEDURE — 99999 PR PBB SHADOW E&M-EST. PATIENT-LVL III: CPT | Mod: PBBFAC,,, | Performed by: PSYCHIATRY & NEUROLOGY

## 2022-03-03 PROCEDURE — 99214 PR OFFICE/OUTPT VISIT, EST, LEVL IV, 30-39 MIN: ICD-10-PCS | Mod: S$PBB,,, | Performed by: PSYCHIATRY & NEUROLOGY

## 2022-03-03 PROCEDURE — 90833 PSYTX W PT W E/M 30 MIN: CPT | Mod: S$PBB,,, | Performed by: PSYCHIATRY & NEUROLOGY

## 2022-03-03 RX ORDER — MIRTAZAPINE 15 MG/1
15 TABLET, FILM COATED ORAL NIGHTLY
Qty: 90 TABLET | Refills: 0 | Status: SHIPPED | OUTPATIENT
Start: 2022-03-03 | End: 2022-06-02 | Stop reason: SDUPTHER

## 2022-03-03 RX ORDER — DULOXETIN HYDROCHLORIDE 60 MG/1
60 CAPSULE, DELAYED RELEASE ORAL DAILY
Qty: 90 CAPSULE | Refills: 0 | Status: SHIPPED | OUTPATIENT
Start: 2022-03-03 | End: 2022-06-02 | Stop reason: SDUPTHER

## 2022-03-03 RX ORDER — BUPROPION HYDROCHLORIDE 150 MG/1
150 TABLET ORAL DAILY
Qty: 90 TABLET | Refills: 0 | Status: SHIPPED | OUTPATIENT
Start: 2022-03-03 | End: 2022-06-02 | Stop reason: SDUPTHER

## 2022-03-03 RX ORDER — DULOXETIN HYDROCHLORIDE 30 MG/1
30 CAPSULE, DELAYED RELEASE ORAL DAILY
Qty: 90 CAPSULE | Refills: 0 | Status: SHIPPED | OUTPATIENT
Start: 2022-03-03 | End: 2022-06-02 | Stop reason: SDUPTHER

## 2022-03-03 NOTE — PROGRESS NOTES
"ID: 66yo WF with a previous diag of depression. Referred by PCP, , for eval of depressive sxs. Currently on cymbalta, recently added remeron, and valium. Pt has cont'd to remain stable on current meds. No longer using valium.     CC: "depression"    Interim hx: presents on time, chart reviewed, pt seen. Appears well.     Her concerns today are largely therapy based. Feels she's handling all she's handling well. Is pleased with that. Son has gone through a difficult breakup- and now  - from whom she is - has broken his femur and is requiring additional care provision and her children are a huge help but she is required to be helping as well and at times staying over night- has been difficult after 14yrs of living apart. "and it's clear he's still in love me. Living in the past sort of thing when i'm there. Refers to my side of the bed, etc. It's just difficult. I think what i'm afraid of is that he'll ask me to come back and I just don't think that's the right thing to do."     Denies need for med adjustment- meds stable, pt doing well, stable.      On Psychiatric ROS:    Endorses better sleep on the remeron, denies anhedonia, denies feeling helpless/hopeless, improved energy, riding bike regularly, denies dec concentration, denies change in appetite (despite the remeron), denies dec PMA "I push myself. I know keeping busy helps me." Denies thoughts of SI/intent/plan.     Denies recently feeling more easily overwhelmed, denies recent ruminative thinking "the obsessive thinking is MUCH better on the meds." denies feeling tense/"on edge"     PSYCHOTHERAPY ADD-ON   30 (16-37*) minutes    Time: 30 minutes  Participants: Met with patient    Therapeutic Intervention Type: insight oriented psychotherapy, behavior modifying psychotherapy, supportive psychotherapy  Why chosen therapy is appropriate versus another modality: relevant to diagnosis, patient responds to this modality, evidence based " "practice    Target symptoms: anxiety, grief  Primary focus: grief for her children and the struggles of their lives  Psychotherapeutic techniques: support, validation, reframing, motivational interviewing    Outcome monitoring methods: self-report, observation    Patient's response to intervention:  The patient's response to intervention is accepting, motivated.    Progress toward goals:  The patient's progress toward goals is fair.    PPHx: Denies h/o self injury, inpt psych hospitalization, denies h/o suicide attempt     Current Psych Meds: cymbalta 90mg po qam, remeron 15mg po qhs  Past Psych Meds: Lexapro and Wellbutrin (effective but led to h/a's), celexa (nausea), valium    PMHx: hypothyroid, GERD    SubstHx:   T- none  E- very rare  D- none    FamPHx: brother- suicide 1990, M-anxiety, F-anxiety, dtr- seizure while on wellbutrin    MSE: appears stated age, well groomed, appropriate dress, engages well with examiner. Good e/c. Speech reg rate and vol, nonpressured. Mood is "there's just been so much happening but I do feel i'm handling it well. i'm ok." Affect congruent with some mild anxiety. dec'd verbosity. Sensorium fully intact. Oriented to date/day/location, current events. Narrative memory intact. Intellectual function is avg based on vocab and basic fund of knowledge. Thought is c/l/gd. No tangentiality or circumstantiality. No FOI/LEBRON. Denies SI/HI. Denies A/VH. No evidence of delusions. Insight and Judgment intact.     Blood pressure 116/78, pulse 68, height 5' 7" (1.702 m), weight 77.3 kg (170 lb 8.4 oz).    Suicide Risk Assessment:   Protective- age, gender, no prior attempts, no prior hospitalizations, no ongoing substance abuse, no psychosis, , has children, denies SI/intent/plan, seeking treatment, access to treatment, future oriented, good primary support, [place]  Risk- race, ongoing Axis I sxs, family h/o suicide (brother-1990)    **Pt is at LOW imminent and long term risk of suicide " "given current risk factors.     Assessment:  67yo WF with a previous diag of depression. Referred by PCP, , for eval of depressive sxs. Currently on cymbalta, recently added remeron, and valium per chart review. On eval the pt is reporting an improvement in all mood and anxiety sxs in the 3 wks leading to initial eval which correlates with the time Remeron was added. She had done well for many years on lexapro and wellbutrin but was having chronic headaches and self initiated a taper off meds and the headaches did remit, but mood/anxiety suffered until the recent combination of cymbalta and remeron which is currently managing sxs quite well. Prior to meds becoming effective the pt endorses sxs c/w diagnoses of MDD and YASMANI. No acute safety concerns but the pt has cont'd to feel mood is "a little down. Other than the headaches, I feel that I was really better on the lexapro/wellbutrin combination". Pt is active, finds good support through kerry, family and friends. Will try an inc in cymbalta to 90mg and if ineffective may try an addition of wellbutrin xl 150mg- in the past pt headaches were on the 300mg dose when in combo with lexapro. On f/u the inc in cymbalta was effective- pt other ongoing issues are therapy related. Pt continues to decline therapy as she doesn't see the need at this time- majority of her appts are spent in therapy, today struggling due to lack of progress with knee following knee replacement- will re start PT and is hopeful. Added wellbutrin a few months ago and today she continues to report it's been helpful. Pt continues to do well despite loss of brother in law to covid. Is eager for socialization but understands on a close level the need to continue distancing. Became ill herself and though she tested negative does believe she had a strain of covid. Is maintaining strict guidelines and pleased with her outside family interactions. Feeling much better physically and is back to her " fitness baseline-  Knowing she's back to baseline has bolstered mood. Will f/u in 3mos in clinic.     Axis I: MDD, recurrent, in partial remission; YASMANI  Axis II: none at this time   Axis III: hypothyroid, GERD, HTN  Axis IV:  from , chronic mental illness  Axis V: GAF 70    Plan:   1. Cont cymbalta 90mg po qam  2. Cont remeron 15mg po qhs  3. Cont wellbutrin xl 150mg po qam  4. Cont exercise, encouraged mindfulness of dietary choices in an effort to dec inflammation  5. rec therapy but pt declines at this time  6. RTC 3mos in clinic    -Supportive therapy and psychoeducation provided  -R/B/SE's of medications discussed with the pt who expresses understanding and chooses to take medications as prescribed.   -Pt instructed to call clinic, 911 or go to nearest emergency room if sxs worsen or pt is in   crisis. The pt expresses understanding.

## 2022-05-09 ENCOUNTER — PATIENT MESSAGE (OUTPATIENT)
Dept: SMOKING CESSATION | Facility: CLINIC | Age: 72
End: 2022-05-09
Payer: MEDICARE

## 2022-06-02 ENCOUNTER — OFFICE VISIT (OUTPATIENT)
Dept: PSYCHIATRY | Facility: CLINIC | Age: 72
End: 2022-06-02
Payer: MEDICARE

## 2022-06-02 VITALS — WEIGHT: 169.19 LBS | BODY MASS INDEX: 26.55 KG/M2 | HEIGHT: 67 IN

## 2022-06-02 DIAGNOSIS — F33.41 MDD (MAJOR DEPRESSIVE DISORDER), RECURRENT, IN PARTIAL REMISSION: Primary | ICD-10-CM

## 2022-06-02 DIAGNOSIS — F41.1 GAD (GENERALIZED ANXIETY DISORDER): ICD-10-CM

## 2022-06-02 PROCEDURE — 99999 PR PBB SHADOW E&M-EST. PATIENT-LVL I: CPT | Mod: PBBFAC,,, | Performed by: PSYCHIATRY & NEUROLOGY

## 2022-06-02 PROCEDURE — 99214 OFFICE O/P EST MOD 30 MIN: CPT | Mod: S$PBB,,, | Performed by: PSYCHIATRY & NEUROLOGY

## 2022-06-02 PROCEDURE — 90833 PSYTX W PT W E/M 30 MIN: CPT | Mod: ,,, | Performed by: PSYCHIATRY & NEUROLOGY

## 2022-06-02 PROCEDURE — 90833 PR PSYCHOTHERAPY W/PATIENT W/E&M, 30 MIN (ADD ON): ICD-10-PCS | Mod: ,,, | Performed by: PSYCHIATRY & NEUROLOGY

## 2022-06-02 PROCEDURE — 99999 PR PBB SHADOW E&M-EST. PATIENT-LVL I: ICD-10-PCS | Mod: PBBFAC,,, | Performed by: PSYCHIATRY & NEUROLOGY

## 2022-06-02 PROCEDURE — 99211 OFF/OP EST MAY X REQ PHY/QHP: CPT | Mod: PBBFAC,PO | Performed by: PSYCHIATRY & NEUROLOGY

## 2022-06-02 PROCEDURE — 99214 PR OFFICE/OUTPT VISIT, EST, LEVL IV, 30-39 MIN: ICD-10-PCS | Mod: S$PBB,,, | Performed by: PSYCHIATRY & NEUROLOGY

## 2022-06-02 RX ORDER — DULOXETIN HYDROCHLORIDE 60 MG/1
60 CAPSULE, DELAYED RELEASE ORAL DAILY
Qty: 90 CAPSULE | Refills: 1 | Status: SHIPPED | OUTPATIENT
Start: 2022-06-02 | End: 2022-09-01 | Stop reason: SDUPTHER

## 2022-06-02 RX ORDER — BUPROPION HYDROCHLORIDE 150 MG/1
150 TABLET ORAL DAILY
Qty: 90 TABLET | Refills: 1 | Status: SHIPPED | OUTPATIENT
Start: 2022-06-02 | End: 2022-09-01 | Stop reason: SDUPTHER

## 2022-06-02 RX ORDER — DULOXETIN HYDROCHLORIDE 30 MG/1
30 CAPSULE, DELAYED RELEASE ORAL DAILY
Qty: 90 CAPSULE | Refills: 1 | Status: SHIPPED | OUTPATIENT
Start: 2022-06-02 | End: 2022-09-01 | Stop reason: SDUPTHER

## 2022-06-02 RX ORDER — MIRTAZAPINE 15 MG/1
15 TABLET, FILM COATED ORAL NIGHTLY
Qty: 90 TABLET | Refills: 1 | Status: SHIPPED | OUTPATIENT
Start: 2022-06-02 | End: 2022-09-01 | Stop reason: SDUPTHER

## 2022-06-02 NOTE — PROGRESS NOTES
"ID: 66yo WF with a previous diag of depression. Referred by PCP, , for eval of depressive sxs. Currently on cymbalta, recently added remeron, and valium. Pt has cont'd to remain stable on current meds. No longer using valium.     CC: "depression"    Interim hx: presents on time, chart reviewed, pt seen. Appears well.     Her concerns today are largely therapy based. Feels she's handling all she's handling well. Is pleased with that.     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 She's been able to be helpful to him following break up which has been rewarding for this pt.     Denies need for med adjustment- meds stable, pt doing well, stable.  She works hard from her end for stability. 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 They enjoy her dependability and sensibilities. Now working 2 days a week 10-4:30. Enjoying time with her only grandson, Shiv, and her 3 grown children. Enjoys her own cmty of friends and her kerry [employer], as well. Rides her bike mult days/wk "that keeps me sane."     On Psychiatric ROS:    Endorses better sleep on the remeron, denies anhedonia, denies feeling helpless/hopeless, improved energy, riding bike regularly, denies dec concentration, denies change in appetite (despite the remeron), denies dec PMA "I push myself. I know keeping busy helps me." Denies thoughts of SI/intent/plan.     Denies recently feeling more easily overwhelmed, denies recent ruminative thinking "the obsessive thinking is MUCH better on the meds." denies feeling tense/"on edge"     PSYCHOTHERAPY ADD-ON   30 (16-37*) minutes    Time: 30 minutes  Participants: Met with patient    Therapeutic Intervention Type: insight oriented psychotherapy, behavior modifying psychotherapy, supportive psychotherapy  Why chosen therapy is appropriate versus another " "modality: relevant to diagnosis, patient responds to this modality, evidence based practice    Target symptoms: anxiety, grief  Primary focus: grief for her children and the struggles of their lives  Psychotherapeutic techniques: support, validation, reframing, motivational interviewing    Outcome monitoring methods: self-report, observation    Patient's response to intervention:  The patient's response to intervention is accepting, motivated.    Progress toward goals:  The patient's progress toward goals is fair.    PPHx: Denies h/o self injury, inpt psych hospitalization, denies h/o suicide attempt     Current Psych Meds: cymbalta 90mg po qam, remeron 15mg po qhs  Past Psych Meds: Lexapro and Wellbutrin (effective but led to h/a's), celexa (nausea), valium    PMHx: hypothyroid, GERD    SubstHx:   T- none  E- very rare  D- none    FamPHx: brother- suicide 1990, M-anxiety, F-anxiety, dtr- seizure while on wellbutrin    MSE: appears stated age, well groomed, appropriate dress, engages well with examiner. Good e/c. Speech reg rate and vol, nonpressured. Mood is "you know I do feel that i'm handling what i'm handling well. i'm ok." Affect congruent with some mild anxiety. dec'd verbosity. Sensorium fully intact. Oriented to date/day/location, current events. Narrative memory intact. Intellectual function is avg based on vocab and basic fund of knowledge. Thought is c/l/gd. No tangentiality or circumstantiality. No FOI/LEBRON. Denies SI/HI. Denies A/VH. No evidence of delusions. Insight and Judgment intact.     Height 5' 7" (1.702 m), weight 76.8 kg (169 lb 3.3 oz).    Suicide Risk Assessment:   Protective- age, gender, no prior attempts, no prior hospitalizations, no ongoing substance abuse, no psychosis, , has children, denies SI/intent/plan, seeking treatment, access to treatment, future oriented, good primary support, [place]  Risk- race, ongoing Axis I sxs, family h/o suicide (brother-1990)    **Pt is at LOW " "imminent and long term risk of suicide given current risk factors.     Assessment:  65yo WF with a previous diag of depression. Referred by PCP, , for eval of depressive sxs. Currently on cymbalta, recently added remeron, and valium per chart review. On eval the pt is reporting an improvement in all mood and anxiety sxs in the 3 wks leading to initial eval which correlates with the time Remeron was added. She had done well for many years on lexapro and wellbutrin but was having chronic headaches and self initiated a taper off meds and the headaches did remit, but mood/anxiety suffered until the recent combination of cymbalta and remeron which is currently managing sxs quite well. Prior to meds becoming effective the pt endorses sxs c/w diagnoses of MDD and YASMANI. No acute safety concerns but the pt has cont'd to feel mood is "a little down. Other than the headaches, I feel that I was really better on the lexapro/wellbutrin combination". Pt is active, finds good support through kerry, family and friends. Will try an inc in cymbalta to 90mg and if ineffective may try an addition of wellbutrin xl 150mg- in the past pt headaches were on the 300mg dose when in combo with lexapro. On f/u the inc in cymbalta was effective- pt other ongoing issues are therapy related. Pt continues to decline therapy as she doesn't see the need at this time- majority of her appts are spent in therapy, today struggling due to lack of progress with knee following knee replacement- will re start PT and is hopeful. Added wellbutrin a few months ago and today she continues to report it's been helpful. Pt continues to do well despite loss of brother in law to covid. Is eager for socialization but understands on a close level the need to continue distancing. Became ill herself and though she tested negative does believe she had a strain of covid. Is maintaining strict guidelines and pleased with her outside family interactions. Feeling much " "better physically and is back to her fitness baseline-  Knowing she's back to baseline has bolstered mood. Continues to be quite stable "handling what i'm handling well"- Will transition to alternate provider in clinic.     [place] I: MDD, recurrent, in partial remission; YASMANI  Axis II: none at this time   Axis III: hypothyroid, GERD, HTN  Axis IV:  from , chronic mental illness  Axis V: GAF 70    Plan:   1. Cont cymbalta 90mg po qam  2. Cont remeron 15mg po qhs  3. Cont wellbutrin xl 150mg po qam  4. Cont exercise, encouraged mindfulness of dietary choices in an effort to dec inflammation  5. rec therapy but pt declines at this time  6. RTC 3mos in clinic- transition to new provider    -Supportive therapy and psychoeducation provided  -R/B/SE's of medications discussed with the pt who expresses understanding and chooses to take medications as prescribed.   -Pt instructed to call clinic, 911 or go to nearest emergency room if sxs worsen or pt is in   crisis. The pt expresses understanding.                "

## 2022-06-15 ENCOUNTER — OFFICE VISIT (OUTPATIENT)
Dept: URGENT CARE | Facility: CLINIC | Age: 72
End: 2022-06-15
Payer: MEDICARE

## 2022-06-15 VITALS
SYSTOLIC BLOOD PRESSURE: 132 MMHG | HEART RATE: 64 BPM | DIASTOLIC BLOOD PRESSURE: 84 MMHG | RESPIRATION RATE: 16 BRPM | TEMPERATURE: 98 F | OXYGEN SATURATION: 97 %

## 2022-06-15 DIAGNOSIS — J30.89 SEASONAL ALLERGIC RHINITIS DUE TO OTHER ALLERGIC TRIGGER: ICD-10-CM

## 2022-06-15 DIAGNOSIS — J02.9 SORE THROAT: Primary | ICD-10-CM

## 2022-06-15 PROBLEM — J30.9 ALLERGIC RHINITIS: Status: ACTIVE | Noted: 2022-06-15

## 2022-06-15 LAB
CTP QC/QA: YES
SARS-COV-2 RDRP RESP QL NAA+PROBE: NEGATIVE

## 2022-06-15 PROCEDURE — 99213 PR OFFICE/OUTPT VISIT, EST, LEVL III, 20-29 MIN: ICD-10-PCS | Mod: S$GLB,,, | Performed by: INTERNAL MEDICINE

## 2022-06-15 PROCEDURE — U0002: ICD-10-PCS | Mod: QW,CR,S$GLB, | Performed by: INTERNAL MEDICINE

## 2022-06-15 PROCEDURE — U0002 COVID-19 LAB TEST NON-CDC: HCPCS | Mod: QW,CR,S$GLB, | Performed by: INTERNAL MEDICINE

## 2022-06-15 PROCEDURE — 99213 OFFICE O/P EST LOW 20 MIN: CPT | Mod: S$GLB,,, | Performed by: INTERNAL MEDICINE

## 2022-06-15 NOTE — PROGRESS NOTES
Subjective:       Patient ID: Coral Pandya is a 71 y.o. female.    Vitals:  temperature is 98.4 °F (36.9 °C). Her blood pressure is 132/84 and her pulse is 64. Her respiration is 16 and oxygen saturation is 97%.     Chief Complaint: Sore Throat    Pt presents with scratchy throat, hoarse, slight cough x yest.  No HA, cough, fever or ana.  Pt has had covid vaccines    Sore Throat   This is a new problem. The current episode started yesterday. The problem has been unchanged. Neither side of throat is experiencing more pain than the other. There has been no fever. The pain is at a severity of 2/10. The pain is mild. Associated symptoms include a hoarse voice. Pertinent negatives include no coughing or headaches. She has tried nothing for the symptoms.       HENT: Positive for sore throat.    Respiratory: Negative for cough.    Neurological: Negative for headaches.       Objective:      Physical Exam   Constitutional: She is oriented to person, place, and time. She appears well-developed. She is cooperative.  Non-toxic appearance. She does not appear ill. No distress.   HENT:   Head: Normocephalic and atraumatic.   Ears:   Right Ear: Hearing, tympanic membrane, external ear and ear canal normal.   Left Ear: Hearing, tympanic membrane, external ear and ear canal normal.   Nose: Rhinorrhea present. No mucosal edema or nasal deformity. No epistaxis. Right sinus exhibits no maxillary sinus tenderness and no frontal sinus tenderness. Left sinus exhibits no maxillary sinus tenderness and no frontal sinus tenderness.   Mouth/Throat: Uvula is midline, oropharynx is clear and moist and mucous membranes are normal. No trismus in the jaw. Normal dentition. No uvula swelling. No posterior oropharyngeal erythema.   Eyes: Conjunctivae and lids are normal. Right eye exhibits no discharge. Left eye exhibits no discharge. No scleral icterus.   Neck: Trachea normal and phonation normal. Neck supple.   Cardiovascular: Normal  rate, regular rhythm, normal heart sounds and normal pulses.   Pulmonary/Chest: Effort normal and breath sounds normal. No respiratory distress.   Abdominal: Normal appearance and bowel sounds are normal. She exhibits no distension and no mass. Soft. There is no abdominal tenderness.   Musculoskeletal: Normal range of motion.         General: No deformity. Normal range of motion.   Neurological: She is alert and oriented to person, place, and time. She exhibits normal muscle tone. Coordination normal.   Skin: Skin is warm, dry, intact, not diaphoretic and not pale.   Psychiatric: Her speech is normal and behavior is normal. Judgment and thought content normal.   Nursing note and vitals reviewed.        Assessment:       1. Sore throat    2. Seasonal allergic rhinitis due to other allergic trigger          Plan:         Sore throat  -     POCT COVID-19 Rapid Screening    Seasonal allergic rhinitis due to other allergic trigger      Patient Instructions   If your condition worsens we recommend that you receive another evaluation at the emergency room immediately or contact your primary medical clinics after hours call service to discuss your concerns. You must understand that you've received an Urgent Care treatment only and that you may be released before all of your medical problems are known or treated. You, the patient, will arrange for follow up care as instructed.  Drink plenty of Fluids  Wash hands frequently using mild antibacterial soap lathering for at least 15 seconds then rinse  Get plenty of Rest  Follow up in 1-2 weeks with Primary Care physician if not significantly better.   If you are not allergic please take Tylenol every 4-6 hours as needed and/or Ibuprofen every 6-8 hours as needed, over the counter for pain or fever.      My notes were dictated with [employer]. Any misspellings or nonsensical grammar should be attributed to its use and allowances made for errors and typographic  syntactical error(s).

## 2022-08-31 ENCOUNTER — PATIENT MESSAGE (OUTPATIENT)
Dept: PSYCHIATRY | Facility: CLINIC | Age: 72
End: 2022-08-31
Payer: MEDICARE

## 2022-09-01 ENCOUNTER — OFFICE VISIT (OUTPATIENT)
Dept: PSYCHIATRY | Facility: CLINIC | Age: 72
End: 2022-09-01
Payer: MEDICARE

## 2022-09-01 VITALS
BODY MASS INDEX: 26.73 KG/M2 | WEIGHT: 170.31 LBS | HEART RATE: 65 BPM | HEIGHT: 67 IN | SYSTOLIC BLOOD PRESSURE: 131 MMHG | DIASTOLIC BLOOD PRESSURE: 80 MMHG

## 2022-09-01 DIAGNOSIS — F33.41 MDD (MAJOR DEPRESSIVE DISORDER), RECURRENT, IN PARTIAL REMISSION: Primary | ICD-10-CM

## 2022-09-01 DIAGNOSIS — F41.1 GAD (GENERALIZED ANXIETY DISORDER): ICD-10-CM

## 2022-09-01 PROCEDURE — 90792 PR PSYCHIATRIC DIAGNOSTIC EVALUATION W/MEDICAL SERVICES: ICD-10-PCS | Mod: ,,,

## 2022-09-01 PROCEDURE — 99999 PR PBB SHADOW E&M-EST. PATIENT-LVL IV: ICD-10-PCS | Mod: PBBFAC,,,

## 2022-09-01 PROCEDURE — 99214 OFFICE O/P EST MOD 30 MIN: CPT | Mod: PBBFAC,PO

## 2022-09-01 PROCEDURE — 99999 PR PBB SHADOW E&M-EST. PATIENT-LVL IV: CPT | Mod: PBBFAC,,,

## 2022-09-01 PROCEDURE — 90792 PSYCH DIAG EVAL W/MED SRVCS: CPT | Mod: ,,,

## 2022-09-01 RX ORDER — DULOXETIN HYDROCHLORIDE 30 MG/1
30 CAPSULE, DELAYED RELEASE ORAL DAILY
Qty: 90 CAPSULE | Refills: 1 | Status: SHIPPED | OUTPATIENT
Start: 2022-09-01 | End: 2022-12-08 | Stop reason: SDUPTHER

## 2022-09-01 RX ORDER — BUPROPION HYDROCHLORIDE 150 MG/1
150 TABLET ORAL DAILY
Qty: 90 TABLET | Refills: 1 | Status: SHIPPED | OUTPATIENT
Start: 2022-09-01 | End: 2022-12-08 | Stop reason: SDUPTHER

## 2022-09-01 RX ORDER — DULOXETIN HYDROCHLORIDE 60 MG/1
60 CAPSULE, DELAYED RELEASE ORAL DAILY
Qty: 90 CAPSULE | Refills: 1 | Status: SHIPPED | OUTPATIENT
Start: 2022-09-01 | End: 2022-12-08 | Stop reason: SDUPTHER

## 2022-09-01 RX ORDER — MIRTAZAPINE 15 MG/1
15 TABLET, FILM COATED ORAL NIGHTLY
Qty: 90 TABLET | Refills: 1 | Status: SHIPPED | OUTPATIENT
Start: 2022-09-01 | End: 2022-12-08 | Stop reason: SDUPTHER

## 2022-09-01 NOTE — PROGRESS NOTES
"  Outpatient Psychiatry Initial Visit  09/01/2022    ID: Coral Pandya, a 71 y.o. female, presenting for initial evaluation visit. Met with patient. Informed of confidentiality rights and limitations. Discussed provider role in the treatment team.    Reason for Encounter: Referral from Samreen Coe MD    CC:  Transfer of care after relocation of patient's previous provider, Dr. Samreen Coe    History of Present Illness:   Pt. is a 71 y.o. female, with a past psychiatric hx of MDD, recurrent, in partial remission; YASMANI presenting to the clinic for an initial evaluation and treatment. PMHx outlined below. Pt is currently taking Wellbutrin  mg po q.a.m., Cymbalta 90 mg po daily, and mirtazapine 15 mg po qhs. Pt notes past trials of Lexapro and Wellbutrin (effective but led to h/a's), celexa (nausea), valium.       Patient reports she has struggled with depression and anxiety since childhood.  "I think I was born with it. I think it really is genetic. Years ago nobody wanted to talk about depression." She reports she 1st received treatment through her gynecologist approximately 22 years ago which improved symptoms. PCP, Dr Souza, was managing medications at that time. "I thought I was doing fine and could go off of the meds - it was really, really bad. What I was on before didn't work anymore."  Libby started Cymbalta at that time, and patient notes that's when she established care with Dr Coe. Currently, Pt reports symptoms of depression and anxiety are well controlled, "I feel better than I have ever been." She notes a strong family history of depression and anxiety.    Currently patient described her mood as "normal, I deal with disappointment and I worry about my kids, but overall that's normal." She denies insomnia and notes she sleeps approximately 6 hours per night.  She denies daytime fatigue and notes she is very active.  She frequently rides her bike an average of 25 miles at a time " "and notes this provides both emotional and physical benefits. "There's times I don't feel like it but if I am feeling anxious or down I push myself to do it and I feel so much better."  She also reports she works at [employer] part-time in order to increase community interaction.  Appetite, concentration, and psychomotor activity are baseline.  Patient does consider herself to be a worrier but notes anxiety is well controlled currently.  She denies a history panic attacks.  She does report obsessive-compulsive thinking as a child and notes she frequently wash her hands to the point that a became raw, but she reports this has not been an issue since childhood.    Per Dr Coe's not on 6/2/22:  Interim hx: presents on time, chart reviewed, pt seen. Appears well.   Her concerns today are largely therapy based. Feels she's handling all she's handling well. Is pleased with that.   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 She's been able to be helpful to him following break up which has been rewarding for this pt.   Denies need for med adjustment- meds stable, pt doing well, stable.  She works hard from her end for stability. 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 They enjoy her dependability and sensibilities. Now working 2 days a week 10-4:30. Enjoying time with her only grandson, Shiv, and her 3 grown children. Enjoys her own cmty of friends and her kerry cmty, as well. Rides her bike mult days/wk "that keeps me sane."      On Psychiatric ROS:    Endorses better sleep on the remeron, denies anhedonia, denies feeling helpless/hopeless, improved energy, riding bike regularly, denies dec concentration, denies change in appetite (despite the remeron), denies dec PMA "I push myself. I know keeping busy helps me." Denies thoughts of SI/intent/plan.   Denies " "recently feeling more easily overwhelmed, denies recent ruminative thinking "the obsessive thinking is MUCH better on the meds." denies feeling tense/"on edge"     Pt currently endorses or denies the following symptoms:  Psych ROS:  Depression: denies depressed mood, anhedonia, appetite/weight changes, insomnia/hypersomnia, fatigue, feelings of worthlessness, hopelessness, or guilt, difficulty concentrating, or recurrent thoughts of death or SI  Magali: denies episodes of expansive mood, decreased need for sleep, increased goal directed behaviors, or racing thoughts  Anxiety: denies excessive worry, avoidance, panic attacks, agoraphobia, social anxiety, phobias, or somatic related complaints   OCD: denies obsessions or compulsive behaviors  PTSD: denies flashbacks, nightmares, or avoidance of stimuli  Psychosis: denies A/V hallucinations or delusions   SI/HI: denies suicidal ideation, plan, or thoughts of harm to self or others  Access to guns: denies     Past Psychiatric History:  First psych contact:  2016 with Dr Coe for depression and anxiety  Prior hospitalizations: denies  Prior suicide attempts or self-harm: denies  Prior diagnosis: MDD, recurrent, in partial remission; YASMANI  Prior meds: Lexapro and Wellbutrin (effective but led to h/a's), celexa (nausea), valium  Current meds: Wellbutrin  mg po q.a.m., Cymbalta 90 mg po daily, and mirtazapine 15 mg po qhs.   Prior psychotherapy: Jewish counselor prior to marriage separation, did help    Past Medical Hx:   Past Medical History:   Diagnosis Date    Anxiety     Arthralgia of knee     Bladder mass     Cervical spondylosis     Depression     Fibromyalgia     GERD (gastroesophageal reflux disease)     Hematuria     HTN (hypertension)     Hypothyroid     DANYELL (obstructive sleep apnea)     no cpap    Thrombosis      Hx of TBI: denies  Hx of seizures: denies    Past Surgical Hx:  Past Surgical History:   Procedure Laterality Date    CARPAL TUNNEL RELEASE " Bilateral     CATARACT EXTRACTION EXTRACAPSULAR W/ INTRAOCULAR LENS IMPLANTATION Bilateral     COLONOSCOPY N/A 6/14/2018    Procedure: COLONOSCOPY;  Surgeon: Amaury Chambers MD;  Location: [facility];  Service: Endoscopy;  Laterality: N/A; hemorrhoids, repeat in 5 years for screening due to family history of colon cancer    COLONOSCOPY N/A 10/6/2020    Procedure: COLONOSCOPY requests lidocaine for IV start;  Surgeon: Taqueria Olmos MD;  Location: [facility];  Service: Endoscopy;  Laterality: N/A;    CYSTOSCOPY W/ RETROGRADES Bilateral 6/1/2020    Procedure: CYSTOSCOPY, WITH RETROGRADE PYELOGRAM;  Surgeon: Alcon Rodrigues MD;  Location: [facility] OR;  Service: Urology;  Laterality: Bilateral;    CYSTOSCOPY WITH BIOPSY OF BLADDER N/A 6/1/2020    Procedure: CYSTOSCOPY, WITH BLADDER BIOPSY;  Surgeon: Alcon Rodrigues MD;  Location: [facility];  Service: Urology;  Laterality: N/A;    ESOPHAGOGASTRODUODENOSCOPY N/A 9/16/2020    Procedure: EGD (ESOPHAGOGASTRODUODENOSCOPY);  Surgeon: Amaury Chambers MD;  Location: [facility];  Service: Endoscopy;  Laterality: N/A;    EYE SURGERY Bilateral     cataract surgery    FOOT SURGERY Bilateral     bilat joints removed second toe    JOINT REPLACEMENT      R knee    LASER LITHOTRIPSY Right 6/1/2020    Procedure: LITHOTRIPSY, USING LASER;  Surgeon: Alcon Rodrigues MD;  Location: [facility];  Service: Urology;  Laterality: Right;    TONSILLECTOMY, ADENOIDECTOMY      TOTAL KNEE ARTHROPLASTY Right 1/14/2019    Procedure: ARTHROPLASTY, KNEE, TOTAL SITE ASSISTED;  Surgeon: Angelo Rodas MD;  Location: [facility];  Service: Orthopedics;  Laterality: Right;  OFIRMEV    TUBAL LIGATION      UPPER GASTROINTESTINAL ENDOSCOPY  05/21/2008    Dr. Chambers, in legacy: gastric polyps removed; biopsy: fundic gland polyps    URETEROSCOPY Right 6/1/2020    Procedure: URETEROSCOPY;  Surgeon: Alcon Rodrigues MD;  Location: [facility];  Service: Urology;  Laterality: Right;       Family Hx:    Paternal: F-anxiety; no history of substance abuse or suicide  Maternal: M-anxiety; no history of substance abuse or suicide  Siblings: brother- suicide 1990  dtr- seizure while on wellbutrin    Social Hx:   Childhood: b/r [place], moved to [facility]  in 1993  Marital Status:  but  for 15 years  Children: 3 children  Resides: [place]  Occupation: part time at [employer]  Hobbies: bike [place]  Adventism: [place]  Education level: HS grad  : denies  Legal: denies    Substance Hx:  Caffeine: coffee in AM, but no caffeine after noon  Tobacco: denies  Alcohol: denies  Drug use: denies  Rehab: denies  Prior/current AA: denies    Medications  Outpatient Encounter Medications as of 9/1/2022   Medication Sig Dispense Refill    BYSTOLIC 5 mg Tab TAKE 1 TABLET ONCE DAILY 90 tablet 3    calcium carbonate-vitamin D3 600 mg calcium- 200 unit Cap Take 1 capsule by mouth 2 (two) times daily. DO NOT TAKE AM OF SURGERY      desonide (DESOWEN) 0.05 % cream APPLY TO EYELID SKIN NIGHTLY PRN ITCHY LIDS 180 g 0    ezetimibe (ZETIA) 10 mg tablet TAKE 1 TABLET ONCE DAILY 90 tablet 0    famotidine (PEPCID) 40 MG tablet TAKE 1 TABLET ONCE DAILY 90 tablet 2    ketotifen (ZADITOR) 0.025 % ophthalmic solution Place 1 drop into both eyes 2 (two) times daily. USE AS USUAL      mometasone (NASONEX) 50 mcg/actuation nasal spray 2 sprays by Nasal route once daily. (Patient taking differently: 2 sprays by Nasal route daily as needed.) 17 g 5    multivit-min-FA-lycopen-lutein (CENTRUM SILVER) 0.4-300-250 mg-mcg-mcg Tab       RABEprazole (ACIPHEX) 20 mg tablet TAKE 1 TABLET ONCE DAILY 90 tablet 3    SYNTHROID 100 mcg tablet TAKE 1 TABLET ONCE DAILY 90 tablet 3    telmisartan (MICARDIS) 20 MG Tab TAKE 1 TABLET ONCE DAILY 90 tablet 0    vit C/E/Zn/coppr/lutein/zeaxan (PRESERVISION AREDS-2 ORAL) Take 1 tablet by mouth 2 (two) times a day.      [DISCONTINUED] buPROPion (WELLBUTRIN XL) 150 MG TB24  tablet Take 1 tablet (150 mg total) by mouth once daily. 90 tablet 1    [DISCONTINUED] DULoxetine (CYMBALTA) 30 MG capsule Take 1 capsule (30 mg total) by mouth once daily. 90 capsule 1    [DISCONTINUED] DULoxetine (CYMBALTA) 60 MG capsule Take 1 capsule (60 mg total) by mouth once daily. 90 capsule 1    [DISCONTINUED] mirtazapine (REMERON) 15 MG tablet Take 1 tablet (15 mg total) by mouth every evening. 90 tablet 1    buPROPion (WELLBUTRIN XL) 150 MG TB24 tablet Take 1 tablet (150 mg total) by mouth once daily. 90 tablet 1    DULoxetine (CYMBALTA) 30 MG capsule Take 1 capsule (30 mg total) by mouth once daily. 90 capsule 1    DULoxetine (CYMBALTA) 60 MG capsule Take 1 capsule (60 mg total) by mouth once daily. 90 capsule 1    mirtazapine (REMERON) 15 MG tablet Take 1 tablet (15 mg total) by mouth every evening. 90 tablet 1     No facility-administered encounter medications on file as of 9/1/2022.       Laboratory Data  No visits with results within 1 Month(s) from this visit.   Latest known visit with results is:   Office Visit on 06/15/2022   Component Date Value Ref Range Status    POC Rapid COVID 06/15/2022 Negative  Negative Final     Acceptable 06/15/2022 Yes   Final       Review of Systems:  GENERAL: no weight gain/loss  SKIN: no rashes or lacerations  HEAD: no headaches  EYES: no jaundice, blindness. No exophthalmos  EARS: no dizziness, tinnitus, or hearing loss  NOSE: no changes in smell  Mouth/throat: no dyskinetic movements or obvious goiter  CHEST: no SOB, hyperventilation or cough  CARDIO: no tachycardia, bradycardia, or chest pain  ABDOMEN: no nausea, vomiting, pain, constipation, or diarrhea  URINARY:  no frequency or dysuria  ENDOCRINE: No polydipsia, polyuria, no cold/hot intolerance  MUSCULOSKELETAL: no joint pain/stiffness  NEUROLOGIC: no weakness or sensory changes, no seizures, no confusion, memory loss, or forgetfulness, no tremor or abnormal movements      Current Evaluation:  "    Nutritional Screening: Considering the patient's height and weight, medications, medical history and preferences, should a referral be made to the dietitian? No    Vitals: most recent vital signs, dated less than 90 days prior to this appointment, were reviewed  /80   Pulse 65   Ht 5' 7" (1.702 m)   Wt 77.3 kg (170 lb 4.9 oz)   BMI 26.67 kg/m²   General: age appropriate, well nourished, casually dressed, neatly groomed  MSK: muscle strength/tone: no tremor or abnormal movements.   Gait/Station: no ataxia, steady    Psychiatric:  Speech: Normal rate, rhythm, volume. No latency, no pressured speech  Mood/Affect: euthymic, congruent, and appropriate   Though Process: organized, logical, linear  Thought Content: no suicidal or homicidal ideation, no A/V hallucinations, delusions, or paranoia  Insight: Intact; aware of illness  Judgement: behavior is adequate to circumstances  Orientation: A&O x 4  Memory: Intact for content of interview, 3/3 immediate, 3/3 after 3 mins. Able to recall recent and remote events.  Language: Grossly intact, no aphasias   Concentration: Spells "world" correctly forward & backwards  Knowledge/Intelligence: appropriate to age and level of education.     Suicide Risk Assessment:  Protective factors:  gender, no prior attempts, no prior hospitalizations, no family h/o attempts, no ongoing substance abuse, no psychosis, has children, denies SI/intent/plan, seeking treatment, access to treatment, future oriented, good primary support, no access to firearms  Risks: age,  but , hx MDD and YASMANI  Patient is a low immediate and long-term risk considering risk factors. Patient denied suicidal or homicidal ideation, plan, or intent.  Patient noted agreement to call 911 and/or present to the nearest emergency department if Pt develops suicidal or homicidal ideation, plan, or intent.      Assessment - Diagnosis - Goals:     Impression:   Coral Pandya is a 71 y.o. " female that appears to be a reliable informant and is committed to working towards the goals of the treatment plan. Patient has a history of MDD, recurrent, in partial remission; YASMANI Presents today with symptoms of  MDD, recurrent, in partial remission; YASMANI, see HPI.  Patient reports symptoms are well controlled on current medication regimen.  Through shared decision making pharmacological intervention will be continued this time.    Safe for outpatient tx and no acute safety concerns.      Encounter Diagnoses   Name Primary?    MDD (major depressive disorder), recurrent, in partial remission Yes    YASMANI (generalized anxiety disorder)          Strengths and Liabilities: Strength: Patient accepts guidance/feedback, Strength: Patient is expressive/articulate., Strength: Patient is intelligent., Strength: Patient is physically healthy., Strength: Patient has positive support network.    Treatment Goals:  Specify outcomes written in observable, behavioral terms:     Depression: Identify coping skills to aid in management of presenting symptoms, identify a safety plan if depressive symptoms become unmanageable, a noted decrease in depressive symptoms, and an increased client rating of quality of life. Participate in psychotherapy as indicated. Medication adherence.    Anxiety: Identify coping skills including deep breathing, grounding, time set aside for worry, and other identified skills, decrease in presenting anxiety related symptoms, and increased client rating of quality of life. Participate in psychotherapy as indicted. Medication adherence.    Insomnia: reduction of sleep and waking symptoms, improvement of daytime function, and the reduction of distress related to lack of sleep      Treatment Plan/Recommendations:   Medication Management: Continue current medications. The risks and benefits of medication were discussed with the patient.  The treatment plan and follow up plan were reviewed with the  patient.  Continue Cymbalta 90 mg p.o. q.a.m. for mood anxiety  Continue Wellbutrin  mg p.o. q.a.m.  Continue mirtazapine 15 mg p.o. q.h.s.  Cont exercise, encouraged mindfulness of dietary choices in an effort to dec inflammation  Offered referral for individual psychotherapy  Counseled on regular exercise, maintenance of a healthy weight, balanced diet rich in fruits/vegetables and lean protein, and avoidance of unhealthy habits like smoking and excessive alcohol intake.  Call to report any worsening of symptoms or problems with the medication. Pt instructed to go to ER with thoughts of harming self, others  Labs: no new orders    MDD (major depressive disorder), recurrent, in partial remission  -     buPROPion (WELLBUTRIN XL) 150 MG TB24 tablet; Take 1 tablet (150 mg total) by mouth once daily.  Dispense: 90 tablet; Refill: 1  -     DULoxetine (CYMBALTA) 30 MG capsule; Take 1 capsule (30 mg total) by mouth once daily.  Dispense: 90 capsule; Refill: 1  -     DULoxetine (CYMBALTA) 60 MG capsule; Take 1 capsule (60 mg total) by mouth once daily.  Dispense: 90 capsule; Refill: 1  -     mirtazapine (REMERON) 15 MG tablet; Take 1 tablet (15 mg total) by mouth every evening.  Dispense: 90 tablet; Refill: 1    YASMANI (generalized anxiety disorder)        Medication Management:   Review of patient's allergies indicates:   Allergen Reactions    Codeine      Other reaction(s): Nausea    Lisinopril Other (See Comments)     cough        SSRI/SNRI medications: Discussed possible side effects of GI concerns, activation or kimani, headaches, dizziness, increased suicidal ideations, or sexual side effects. Instructed to not abruptly stop the medication due to withdrawal related side effects. Instructed it takes 4-6 weeks to see full effects from medication.   Excess serotonin may lead to serotonin syndrome. Symptoms include hyperreflexia, clonus, hyperthermia, diaphoresis, tremor, autonomic instability, mental status  changes.  Do not add additional medications targeting serotonin without discussing it with your provider.    Discussed risks, benefits, and side effects of Wellbutrin XL including but not limited to seizures, SJS, induction of kimani, hypertension, insomnia, dizziness, headache      Review records: Reviewed past records regarding symptoms and treatments that have brought the patient to today's visit.     Return to Clinic: 3 months  Counseling time: 35 mins  Total time: 60 mins    - Patient given contact number for psychotherapists at [facility] and also instructed they may check with their health insurance provider for a list of providers.   - Call to report any worsening of symptoms or problems associated with medication.  - Spent 60 minutes face to face with the patient; >50% time spent in counseling   - Supportive therapy and psychoeducation provided.  - Questions were sought and answered to the Pt's stated verbal satisfaction.  - Risks/benefits/side effects of medications discussed with the patient who expresses understanding and chooses to take medications as prescribed.   - Patient instructed to call clinic, 911, or go to nearest emergency room if symptoms worsen or if patient is in crisis. The patient expresses understanding.    DISCLAIMER: This note was prepared with [employer] Direct voice recognition transcription software. Garbled syntax, mangled pronouns, and other bizarre constructions may be attributed to that software system     RODOLFO Freed, PMHNP-BC  Department of Psychiatry - [facility]  [address]  [place] [address]  Office: [phone]  Fax: [phone]

## 2022-09-07 ENCOUNTER — PES CALL (OUTPATIENT)
Dept: ADMINISTRATIVE | Facility: CLINIC | Age: 72
End: 2022-09-07
Payer: MEDICARE

## 2022-09-08 ENCOUNTER — PATIENT MESSAGE (OUTPATIENT)
Dept: FAMILY MEDICINE | Facility: CLINIC | Age: 72
End: 2022-09-08
Payer: MEDICARE

## 2022-09-19 ENCOUNTER — PES CALL (OUTPATIENT)
Dept: ADMINISTRATIVE | Facility: CLINIC | Age: 72
End: 2022-09-19
Payer: MEDICARE

## 2022-10-13 ENCOUNTER — OFFICE VISIT (OUTPATIENT)
Dept: FAMILY MEDICINE | Facility: CLINIC | Age: 72
End: 2022-10-13
Payer: MEDICARE

## 2022-10-13 VITALS
SYSTOLIC BLOOD PRESSURE: 128 MMHG | WEIGHT: 168 LBS | BODY MASS INDEX: 26.37 KG/M2 | OXYGEN SATURATION: 98 % | HEART RATE: 61 BPM | DIASTOLIC BLOOD PRESSURE: 72 MMHG | HEIGHT: 67 IN

## 2022-10-13 DIAGNOSIS — R91.1 PULMONARY NODULE: ICD-10-CM

## 2022-10-13 DIAGNOSIS — I10 HYPERTENSION, UNSPECIFIED TYPE: ICD-10-CM

## 2022-10-13 DIAGNOSIS — I70.0 AORTIC ATHEROSCLEROSIS: ICD-10-CM

## 2022-10-13 DIAGNOSIS — Z00.00 ENCOUNTER FOR PREVENTIVE HEALTH EXAMINATION: Primary | ICD-10-CM

## 2022-10-13 DIAGNOSIS — F33.41 MDD (MAJOR DEPRESSIVE DISORDER), RECURRENT, IN PARTIAL REMISSION: ICD-10-CM

## 2022-10-13 PROCEDURE — 99214 OFFICE O/P EST MOD 30 MIN: CPT | Mod: PBBFAC,PO | Performed by: NURSE PRACTITIONER

## 2022-10-13 PROCEDURE — 99999 PR PBB SHADOW E&M-EST. PATIENT-LVL IV: ICD-10-PCS | Mod: PBBFAC,,, | Performed by: NURSE PRACTITIONER

## 2022-10-13 PROCEDURE — G0439 PPPS, SUBSEQ VISIT: HCPCS | Mod: ,,, | Performed by: NURSE PRACTITIONER

## 2022-10-13 PROCEDURE — 99999 PR PBB SHADOW E&M-EST. PATIENT-LVL IV: CPT | Mod: PBBFAC,,, | Performed by: NURSE PRACTITIONER

## 2022-10-13 PROCEDURE — G0439 PR MEDICARE ANNUAL WELLNESS SUBSEQUENT VISIT: ICD-10-PCS | Mod: ,,, | Performed by: NURSE PRACTITIONER

## 2022-10-13 NOTE — PROGRESS NOTES
"  Coral Pandya presented for a  Medicare AWV and comprehensive Health Risk Assessment today. The following components were reviewed and updated:    Medical history  Family History  Social history  Allergies and Current Medications  Health Risk Assessment  Health Maintenance  Care Team         ** See Completed Assessments for Annual Wellness Visit within the encounter summary.**         The following assessments were completed:  Living Situation  CAGE  Depression Screening  Timed Get Up and Go  Whisper Test  Cognitive Function Screening      Nutrition Screening  ADL Screening  PAQ Screening    Review for Opioid Screening: Patient does not have rx for Opioids.    Review for Substance Use Disorders: Patient does not use substance.      Vitals:    10/13/22 1356   BP: 128/72   BP Location: Left arm   Patient Position: Sitting   BP Method: Medium (Manual)   Pulse: 61   SpO2: 98%   Weight: 76.2 kg (167 lb 15.9 oz)   Height: 5' 7" (1.702 m)     Body mass index is 26.31 kg/m².  Physical Exam  Vitals reviewed.   Constitutional:       General: She is not in acute distress.  HENT:      Head: Normocephalic.   Cardiovascular:      Rate and Rhythm: Normal rate.   Pulmonary:      Effort: Pulmonary effort is normal. No respiratory distress.   Neurological:      General: No focal deficit present.      Mental Status: She is alert.   Psychiatric:         Mood and Affect: Mood normal.             Diagnoses and health risks identified today and associated recommendations/orders:    1. Encounter for preventive health examination  Reviewed health maintenance and provided recommendations    Up to date on all health maintenance    2. Aortic atherosclerosis  Continue to monitor  Followed by Ebenezer Souza MD   CT chest 4/22/21.      3. Hypertension, unspecified type  Continue to monitor  Followed by Ebenezer Souza MD .      4. MDD (major depressive disorder), recurrent, in partial remission  Continue to monitor  Followed by Ebenezer RENTERIA" MD Lib[place]   Taking buproprion  No si/hi.      5.  Pulmonary nodule  Continue to monitor  Followed by Ebenezer Souza MD .    CT chest 4/21/22  Follow up CT is scheduled      Provided Coral with a 5-10 year written screening schedule and personal prevention plan. Recommendations were developed using the USPSTF age appropriate recommendations. Education, counseling, and referrals were provided as needed. After Visit Summary printed and given to patient which includes a list of additional screenings\tests needed.    Follow up in one year    Cindy Street NP  I offered to discuss advanced care planning, including how to pick a person who would make decisions for you if you were unable to make them for yourself, called a health care power of , and what kind of decisions you might make such as use of life sustaining treatments such as ventilators and tube feeding when faced with a life limiting illness recorded on a living will that they will need to know. (How you want to be cared for as you near the end of your natural life)     X Patient is interested in learning more about how to make advanced directives.  I provided them paperwork and offered to discuss this with them.

## 2022-10-20 ENCOUNTER — PATIENT MESSAGE (OUTPATIENT)
Dept: FAMILY MEDICINE | Facility: CLINIC | Age: 72
End: 2022-10-20
Payer: MEDICARE

## 2022-10-21 ENCOUNTER — PATIENT MESSAGE (OUTPATIENT)
Dept: FAMILY MEDICINE | Facility: CLINIC | Age: 72
End: 2022-10-21
Payer: MEDICARE

## 2022-10-21 ENCOUNTER — OFFICE VISIT (OUTPATIENT)
Dept: FAMILY MEDICINE | Facility: CLINIC | Age: 72
End: 2022-10-21
Payer: MEDICARE

## 2022-10-21 VITALS
WEIGHT: 168.88 LBS | HEART RATE: 69 BPM | BODY MASS INDEX: 26.45 KG/M2 | OXYGEN SATURATION: 96 % | RESPIRATION RATE: 12 BRPM | SYSTOLIC BLOOD PRESSURE: 120 MMHG | DIASTOLIC BLOOD PRESSURE: 72 MMHG

## 2022-10-21 DIAGNOSIS — R30.0 DYSURIA: Primary | ICD-10-CM

## 2022-10-21 LAB
BILIRUB SERPL-MCNC: ABNORMAL MG/DL
BLOOD URINE, POC: ABNORMAL
CLARITY, POC UA: CLEAR
COLOR, POC UA: YELLOW
GLUCOSE UR QL STRIP: ABNORMAL
KETONES UR QL STRIP: ABNORMAL
LEUKOCYTE ESTERASE URINE, POC: ABNORMAL
NITRITE, POC UA: ABNORMAL
PH, POC UA: 5.5
PROTEIN, POC: ABNORMAL
SPECIFIC GRAVITY, POC UA: 1.03
UROBILINOGEN, POC UA: 0.2

## 2022-10-21 PROCEDURE — 87077 CULTURE AEROBIC IDENTIFY: CPT | Performed by: NURSE PRACTITIONER

## 2022-10-21 PROCEDURE — 99213 PR OFFICE/OUTPT VISIT, EST, LEVL III, 20-29 MIN: ICD-10-PCS | Mod: S$PBB,,, | Performed by: NURSE PRACTITIONER

## 2022-10-21 PROCEDURE — 87086 URINE CULTURE/COLONY COUNT: CPT | Performed by: NURSE PRACTITIONER

## 2022-10-21 PROCEDURE — 87186 SC STD MICRODIL/AGAR DIL: CPT | Performed by: NURSE PRACTITIONER

## 2022-10-21 PROCEDURE — 99999 PR PBB SHADOW E&M-EST. PATIENT-LVL III: CPT | Mod: PBBFAC,,, | Performed by: NURSE PRACTITIONER

## 2022-10-21 PROCEDURE — 87088 URINE BACTERIA CULTURE: CPT | Performed by: NURSE PRACTITIONER

## 2022-10-21 PROCEDURE — 99213 OFFICE O/P EST LOW 20 MIN: CPT | Mod: S$PBB,,, | Performed by: NURSE PRACTITIONER

## 2022-10-21 PROCEDURE — 99999 PR PBB SHADOW E&M-EST. PATIENT-LVL III: ICD-10-PCS | Mod: PBBFAC,,, | Performed by: NURSE PRACTITIONER

## 2022-10-21 PROCEDURE — 81002 URINALYSIS NONAUTO W/O SCOPE: CPT | Mod: PBBFAC,PO | Performed by: NURSE PRACTITIONER

## 2022-10-21 PROCEDURE — 99213 OFFICE O/P EST LOW 20 MIN: CPT | Mod: PBBFAC,PO | Performed by: NURSE PRACTITIONER

## 2022-10-21 RX ORDER — CEFDINIR 300 MG/1
300 CAPSULE ORAL 2 TIMES DAILY
Qty: 10 CAPSULE | Refills: 0 | Status: SHIPPED | OUTPATIENT
Start: 2022-10-21 | End: 2022-10-26

## 2022-10-21 NOTE — PROGRESS NOTES
Last UTI 10/21--sensitve to all AB tested.  Ceph or cipro  Coral Pandya is a 72 y.o. female patient of Ebenezer Souza MD who presents to the clinic today for   Chief Complaint   Patient presents with    Urinary Frequency    Urinary Tract Infection     Started 5 days ago   .    HPI    Pt, who is not known to me, reports a new problem to me:  frequency, urgency, and end of urination sensation    These symptoms began 5 days ago and status is has worsened slightly     Also reports: abdominal pain, chills, nausea, and vomiting    Pt denies the following symptoms:  back pain, fever, and stomach ache     Aggravating factors include (+) urinating.    Relieving factors include  none , (+) increasing fluids, (--) OTC analgesic  Prescription medications taken for symptoms are above.      Pertinent history:  (+) h/o UTIs/ (--) pyelonephritis.  H/o kidney stone just after Covid.  Post kidney stone she had enteritis.  Ceftin, Cefzil, and Keflex and cipro has been effective in the past.      ROS    Constitutional: none   GI:  Denies: abdominal pain, flank pain, nausea, vomiting    Urinary:  frequency, urgency, and pinching att he end of urination.    HEENT/Heart/Lung/MS/Skin:  No symptoms or no changes.    Past Medical History:   Diagnosis Date    Anxiety     Arthralgia of knee     Bladder mass     Cervical spondylosis     Depression     Fibromyalgia     GERD (gastroesophageal reflux disease)     Hematuria     HTN (hypertension)     Hypothyroid     DANYELL (obstructive sleep apnea)     no cpap    Thrombosis        Current Outpatient Medications:     buPROPion (WELLBUTRIN XL) 150 MG TB24 tablet, Take 1 tablet (150 mg total) by mouth once daily., Disp: 90 tablet, Rfl: 1    BYSTOLIC 5 mg Tab, TAKE 1 TABLET ONCE DAILY, Disp: 90 tablet, Rfl: 3    calcium carbonate-vitamin D3 600 mg calcium- 200 unit Cap, Take 1 capsule by mouth 2 (two) times daily. DO NOT TAKE AM OF SURGERY, Disp: , Rfl:     desonide (DESOWEN) 0.05 % cream,  APPLY TO EYELID SKIN NIGHTLY PRN ITCHY LIDS, Disp: 180 g, Rfl: 0    DULoxetine (CYMBALTA) 30 MG capsule, Take 1 capsule (30 mg total) by mouth once daily., Disp: 90 capsule, Rfl: 1    DULoxetine (CYMBALTA) 60 MG capsule, Take 1 capsule (60 mg total) by mouth once daily., Disp: 90 capsule, Rfl: 1    ezetimibe (ZETIA) 10 mg tablet, TAKE 1 TABLET ONCE DAILY, Disp: 90 tablet, Rfl: 0    famotidine (PEPCID) 40 MG tablet, TAKE 1 TABLET ONCE DAILY, Disp: 90 tablet, Rfl: 2    ketotifen (ZADITOR) 0.025 % ophthalmic solution, Place 1 drop into both eyes 2 (two) times daily. USE AS USUAL, Disp: , Rfl:     mirtazapine (REMERON) 15 MG tablet, Take 1 tablet (15 mg total) by mouth every evening., Disp: 90 tablet, Rfl: 1    mometasone (NASONEX) 50 mcg/actuation nasal spray, 2 sprays by Nasal route once daily. (Patient taking differently: 2 sprays by Nasal route daily as needed.), Disp: 17 g, Rfl: 5    multivit-min-FA-lycopen-lutein (CENTRUM SILVER) 0.4-300-250 mg-mcg-mcg Tab, , Disp: , Rfl:     RABEprazole (ACIPHEX) 20 mg tablet, TAKE 1 TABLET ONCE DAILY, Disp: 90 tablet, Rfl: 3    SYNTHROID 100 mcg tablet, TAKE 1 TABLET ONCE DAILY, Disp: 90 tablet, Rfl: 3    telmisartan (MICARDIS) 20 MG Tab, TAKE 1 TABLET ONCE DAILY, Disp: 90 tablet, Rfl: 0    vit C/E/Zn/coppr/lutein/zeaxan (PRESERVISION AREDS-2 ORAL), Take 1 tablet by mouth 2 (two) times a day., Disp: , Rfl:     Patient has never been a smoker.    ALLERGIES AND MEDICATIONS: updated and reviewed.  Review of patient's allergies indicates:   Allergen Reactions    Codeine      Other reaction(s): Nausea    Lisinopril Other (See Comments)     cough     Current Outpatient Medications   Medication Sig Dispense Refill    buPROPion (WELLBUTRIN XL) 150 MG TB24 tablet Take 1 tablet (150 mg total) by mouth once daily. 90 tablet 1    BYSTOLIC 5 mg Tab TAKE 1 TABLET ONCE DAILY 90 tablet 3    calcium carbonate-vitamin D3 600 mg calcium- 200 unit Cap Take 1 capsule by mouth 2 (two) times daily. DO  NOT TAKE AM OF SURGERY      desonide (DESOWEN) 0.05 % cream APPLY TO EYELID SKIN NIGHTLY PRN ITCHY LIDS 180 g 0    DULoxetine (CYMBALTA) 30 MG capsule Take 1 capsule (30 mg total) by mouth once daily. 90 capsule 1    DULoxetine (CYMBALTA) 60 MG capsule Take 1 capsule (60 mg total) by mouth once daily. 90 capsule 1    ezetimibe (ZETIA) 10 mg tablet TAKE 1 TABLET ONCE DAILY 90 tablet 0    famotidine (PEPCID) 40 MG tablet TAKE 1 TABLET ONCE DAILY 90 tablet 2    ketotifen (ZADITOR) 0.025 % ophthalmic solution Place 1 drop into both eyes 2 (two) times daily. USE AS USUAL      mirtazapine (REMERON) 15 MG tablet Take 1 tablet (15 mg total) by mouth every evening. 90 tablet 1    mometasone (NASONEX) 50 mcg/actuation nasal spray 2 sprays by Nasal route once daily. (Patient taking differently: 2 sprays by Nasal route daily as needed.) 17 g 5    multivit-min-FA-lycopen-lutein (CENTRUM SILVER) 0.4-300-250 mg-mcg-mcg Tab       RABEprazole (ACIPHEX) 20 mg tablet TAKE 1 TABLET ONCE DAILY 90 tablet 3    SYNTHROID 100 mcg tablet TAKE 1 TABLET ONCE DAILY 90 tablet 3    telmisartan (MICARDIS) 20 MG Tab TAKE 1 TABLET ONCE DAILY 90 tablet 0    vit C/E/Zn/coppr/lutein/zeaxan (PRESERVISION AREDS-2 ORAL) Take 1 tablet by mouth 2 (two) times a day.       No current facility-administered medications for this visit.         PHYSICAL EXAM    Alert, coop 72 y.o. female patient in(--) distress, is not ill-appearing.    Vitals:    10/21/22 1132   BP: 120/72   Pulse: 69   Resp: 12     VS reviewed.  stable..    CC, nursing note, medications & PMH all reviewed today.    Head:  Normocephalic, atraumatic.    EENT:  Ext nose/ears normal.               Eye lids normal, no discharge present.                       Resp:  Respirations even, unlabored             Lungs CTA bilat.    Heart:  Heart rate normal.  RRR, no MRG on ausculation.    ABD:  abdomen is soft without significant tenderness, masses, organomegaly or guarding.  no CVA tenderness.    MS:   MS:  Ambulates 3. Normal: Walks 20', No Assistive device, no evidence for imbalance. Normal gait pattern., with no ambulation aid     NEURO:  Alert and oriented x 4.  Responds appropriately during interaction.    Skin:  no rashes or suspicious lesions    Psych:  Responds appropriately throughout the visit.               Mood:  pleasant and appropriate               Appearance: well-groomed, appropriate .               Affect:  congruent and appropriate    Dysuria  -     POCT URINE DIPSTICK WITHOUT MICROSCOPE  -     cefdinir (OMNICEF) 300 MG capsule; Take 1 capsule (300 mg total) by mouth 2 (two) times daily. for 5 days  Dispense: 10 capsule; Refill: 0  -     Urine culture          Pt today presents with report of urinary frequency, urinary urgency, and pinching at the end of urination  Exam shows:  temp afebrile, abd Abdomen soft, nontender. BS normal. No masses, organomegaly or scars.,  (--) CVAT.      This is a new problem to me.  the above  work up is planned.        Known Diagnostic Lab/Radiological/Pulmonary/CardiacTests Results   POCT urine Abnormal - + leuk, + blood, no nitrites      Prescribing Considerations:  I have reviewed the following additional tests today: Renal function: >60, Age > 65 ([facility] list). , and Sensitivity testing shows infection sensitive to cephazolin and cipro .    Medication adjustments are not necessary, based on this.  I have also reviewed past urine cultures.  Effective antibiotics include  cephazolin and cipro      Referred to No referrals made at this visit. .        Education:    Pt advised to perform comfort measures/treatment recommended on patient instruction sheet, which were reviewed at the time of the visit..    Follow Up:    If not better in 3 days, the patient is advised to call here, PCP office or go for an in-person/follow up evaluation.  If worse or concerns, the patient is advised to call for advice to this office or call [facility] or go to the nearest URGENT  CARE or ER.    Explained exam findings, diagnosis and treatment plan to patient alone.  Questions answered and patient states understanding.

## 2022-10-21 NOTE — PATIENT INSTRUCTIONS
Cranberry juice  Antibiotic  Urine culture pending  Will let you know Tuesday about the result.  If not better Monday, as Dr. Souza's staff to have him look at urine culture.    If much worse, don't wait until Monday.    Evenings and weekends [facility] is available if symptoms worsen or fail to improve.  When you call the number, a registered nurse answers and takes your information.  The nurse can look at your medical record and make recommendations or call the on call physician, if warranted.  [phone]    The [facility] below can view your medical record and know all your medical history on the spot:    [facility] Address: [address] [place] [address] [address], [place] [address] Phone: [phone]    [facility] Address:  [address] [place] [address] [place] [address] Phone: [phone]    [facility] Address:  [address] [place] [id number]  Phone: [phone].

## 2022-10-23 LAB — BACTERIA UR CULT: ABNORMAL

## 2022-10-27 ENCOUNTER — LAB VISIT (OUTPATIENT)
Dept: LAB | Facility: HOSPITAL | Age: 72
End: 2022-10-27
Attending: FAMILY MEDICINE
Payer: MEDICARE

## 2022-10-27 ENCOUNTER — PATIENT MESSAGE (OUTPATIENT)
Dept: FAMILY MEDICINE | Facility: CLINIC | Age: 72
End: 2022-10-27
Payer: MEDICARE

## 2022-10-27 DIAGNOSIS — E03.9 HYPOTHYROIDISM, UNSPECIFIED TYPE: ICD-10-CM

## 2022-10-27 DIAGNOSIS — I10 HYPERTENSION, UNSPECIFIED TYPE: ICD-10-CM

## 2022-10-27 PROBLEM — R91.1 PULMONARY NODULE: Status: ACTIVE | Noted: 2022-10-27

## 2022-10-27 LAB
ALBUMIN SERPL BCP-MCNC: 3.4 G/DL (ref 3.5–5.2)
ALP SERPL-CCNC: 71 U/L (ref 55–135)
ALT SERPL W/O P-5'-P-CCNC: 24 U/L (ref 10–44)
ANION GAP SERPL CALC-SCNC: 9 MMOL/L (ref 8–16)
AST SERPL-CCNC: 20 U/L (ref 10–40)
BILIRUB SERPL-MCNC: 0.6 MG/DL (ref 0.1–1)
BUN SERPL-MCNC: 21 MG/DL (ref 8–23)
CALCIUM SERPL-MCNC: 9.2 MG/DL (ref 8.7–10.5)
CHLORIDE SERPL-SCNC: 107 MMOL/L (ref 95–110)
CHOLEST SERPL-MCNC: 180 MG/DL (ref 120–199)
CHOLEST/HDLC SERPL: 3.8 {RATIO} (ref 2–5)
CO2 SERPL-SCNC: 25 MMOL/L (ref 23–29)
CREAT SERPL-MCNC: 0.9 MG/DL (ref 0.5–1.4)
EST. GFR  (NO RACE VARIABLE): >60 ML/MIN/1.73 M^2
GLUCOSE SERPL-MCNC: 82 MG/DL (ref 70–110)
HDLC SERPL-MCNC: 48 MG/DL (ref 40–75)
HDLC SERPL: 26.7 % (ref 20–50)
LDLC SERPL CALC-MCNC: 112 MG/DL (ref 63–159)
NONHDLC SERPL-MCNC: 132 MG/DL
POTASSIUM SERPL-SCNC: 4.1 MMOL/L (ref 3.5–5.1)
PROT SERPL-MCNC: 6.5 G/DL (ref 6–8.4)
SODIUM SERPL-SCNC: 141 MMOL/L (ref 136–145)
TRIGL SERPL-MCNC: 100 MG/DL (ref 30–150)
TSH SERPL DL<=0.005 MIU/L-ACNC: 2.88 UIU/ML (ref 0.4–4)

## 2022-10-27 PROCEDURE — 36415 COLL VENOUS BLD VENIPUNCTURE: CPT | Mod: PN | Performed by: FAMILY MEDICINE

## 2022-10-27 PROCEDURE — 80061 LIPID PANEL: CPT | Performed by: FAMILY MEDICINE

## 2022-10-27 PROCEDURE — 84443 ASSAY THYROID STIM HORMONE: CPT | Performed by: FAMILY MEDICINE

## 2022-10-27 PROCEDURE — 80053 COMPREHEN METABOLIC PANEL: CPT | Performed by: FAMILY MEDICINE

## 2022-10-27 NOTE — TELEPHONE ENCOUNTER
Your urine culture didshow any growth indicating infection.      The antibiotic prescribed at the visit is effective.  How are you feeling?      If you have any questions, please do not hesitate to call.  You can reach us at [phone] Monday through Friday   Or call Dr. Souza/MARCELINO Elizondo    Thank you for using the [facility]!    RODOLFO Montoya, CNP, FNP-BC [facility]    To rate your experience with ARTEM Montoya, click on the link below:      [email or web address]

## 2022-11-01 ENCOUNTER — PATIENT MESSAGE (OUTPATIENT)
Dept: FAMILY MEDICINE | Facility: CLINIC | Age: 72
End: 2022-11-01
Payer: MEDICARE

## 2022-11-01 DIAGNOSIS — R30.0 DYSURIA: Primary | ICD-10-CM

## 2022-11-01 RX ORDER — CIPROFLOXACIN 250 MG/1
250 TABLET, FILM COATED ORAL 2 TIMES DAILY
Qty: 14 TABLET | Refills: 0 | Status: SHIPPED | OUTPATIENT
Start: 2022-11-01 | End: 2022-11-08

## 2022-11-02 ENCOUNTER — PATIENT MESSAGE (OUTPATIENT)
Dept: FAMILY MEDICINE | Facility: CLINIC | Age: 72
End: 2022-11-02
Payer: MEDICARE

## 2022-11-04 ENCOUNTER — OFFICE VISIT (OUTPATIENT)
Dept: FAMILY MEDICINE | Facility: CLINIC | Age: 72
End: 2022-11-04
Payer: MEDICARE

## 2022-11-04 VITALS
OXYGEN SATURATION: 95 % | RESPIRATION RATE: 18 BRPM | DIASTOLIC BLOOD PRESSURE: 70 MMHG | WEIGHT: 171.06 LBS | SYSTOLIC BLOOD PRESSURE: 118 MMHG | HEART RATE: 61 BPM | BODY MASS INDEX: 26.85 KG/M2 | TEMPERATURE: 98 F | HEIGHT: 67 IN

## 2022-11-04 DIAGNOSIS — K21.9 GASTROESOPHAGEAL REFLUX DISEASE, UNSPECIFIED WHETHER ESOPHAGITIS PRESENT: ICD-10-CM

## 2022-11-04 DIAGNOSIS — E03.9 HYPOTHYROIDISM, UNSPECIFIED TYPE: ICD-10-CM

## 2022-11-04 DIAGNOSIS — F41.9 ANXIETY: ICD-10-CM

## 2022-11-04 DIAGNOSIS — I10 HYPERTENSION, UNSPECIFIED TYPE: Primary | ICD-10-CM

## 2022-11-04 DIAGNOSIS — F33.9 RECURRENT MAJOR DEPRESSIVE DISORDER, REMISSION STATUS UNSPECIFIED: ICD-10-CM

## 2022-11-04 PROCEDURE — 99214 OFFICE O/P EST MOD 30 MIN: CPT | Mod: PBBFAC,PO | Performed by: FAMILY MEDICINE

## 2022-11-04 PROCEDURE — 99214 PR OFFICE/OUTPT VISIT, EST, LEVL IV, 30-39 MIN: ICD-10-PCS | Mod: S$PBB,,, | Performed by: FAMILY MEDICINE

## 2022-11-04 PROCEDURE — 99214 OFFICE O/P EST MOD 30 MIN: CPT | Mod: S$PBB,,, | Performed by: FAMILY MEDICINE

## 2022-11-04 PROCEDURE — 99999 PR PBB SHADOW E&M-EST. PATIENT-LVL IV: ICD-10-PCS | Mod: PBBFAC,,, | Performed by: FAMILY MEDICINE

## 2022-11-04 PROCEDURE — 99999 PR PBB SHADOW E&M-EST. PATIENT-LVL IV: CPT | Mod: PBBFAC,,, | Performed by: FAMILY MEDICINE

## 2022-11-04 RX ORDER — NEBIVOLOL 5 MG/1
5 TABLET ORAL DAILY
Qty: 90 TABLET | Refills: 3 | Status: SHIPPED | OUTPATIENT
Start: 2022-11-04 | End: 2023-12-05 | Stop reason: SDUPTHER

## 2022-11-04 RX ORDER — BENZONATATE 200 MG/1
200 CAPSULE ORAL 3 TIMES DAILY PRN
Qty: 30 CAPSULE | Refills: 2 | Status: SHIPPED | OUTPATIENT
Start: 2022-11-04 | End: 2022-11-14

## 2022-12-07 ENCOUNTER — PATIENT MESSAGE (OUTPATIENT)
Dept: PSYCHIATRY | Facility: CLINIC | Age: 72
End: 2022-12-07
Payer: MEDICARE

## 2022-12-08 ENCOUNTER — OFFICE VISIT (OUTPATIENT)
Dept: PSYCHIATRY | Facility: CLINIC | Age: 72
End: 2022-12-08
Payer: MEDICARE

## 2022-12-08 VITALS
DIASTOLIC BLOOD PRESSURE: 71 MMHG | WEIGHT: 169 LBS | SYSTOLIC BLOOD PRESSURE: 128 MMHG | HEART RATE: 64 BPM | BODY MASS INDEX: 26.47 KG/M2

## 2022-12-08 DIAGNOSIS — F33.41 MDD (MAJOR DEPRESSIVE DISORDER), RECURRENT, IN PARTIAL REMISSION: Primary | ICD-10-CM

## 2022-12-08 DIAGNOSIS — F41.1 GAD (GENERALIZED ANXIETY DISORDER): ICD-10-CM

## 2022-12-08 PROCEDURE — 99213 OFFICE O/P EST LOW 20 MIN: CPT | Mod: PBBFAC,PO

## 2022-12-08 PROCEDURE — 99999 PR PBB SHADOW E&M-EST. PATIENT-LVL III: CPT | Mod: PBBFAC,,,

## 2022-12-08 PROCEDURE — 99214 PR OFFICE/OUTPT VISIT, EST, LEVL IV, 30-39 MIN: ICD-10-PCS | Mod: S$PBB,,,

## 2022-12-08 PROCEDURE — 99214 OFFICE O/P EST MOD 30 MIN: CPT | Mod: S$PBB,,,

## 2022-12-08 PROCEDURE — 99999 PR PBB SHADOW E&M-EST. PATIENT-LVL III: ICD-10-PCS | Mod: PBBFAC,,,

## 2022-12-08 RX ORDER — MIRTAZAPINE 15 MG/1
15 TABLET, FILM COATED ORAL NIGHTLY
Qty: 90 TABLET | Refills: 1 | Status: SHIPPED | OUTPATIENT
Start: 2022-12-08 | End: 2023-06-08 | Stop reason: SDUPTHER

## 2022-12-08 RX ORDER — DULOXETIN HYDROCHLORIDE 60 MG/1
60 CAPSULE, DELAYED RELEASE ORAL DAILY
Qty: 90 CAPSULE | Refills: 1 | Status: SHIPPED | OUTPATIENT
Start: 2022-12-08 | End: 2023-06-08 | Stop reason: SDUPTHER

## 2022-12-08 RX ORDER — DULOXETIN HYDROCHLORIDE 30 MG/1
30 CAPSULE, DELAYED RELEASE ORAL DAILY
Qty: 90 CAPSULE | Refills: 1 | Status: SHIPPED | OUTPATIENT
Start: 2022-12-08 | End: 2023-06-08 | Stop reason: SDUPTHER

## 2022-12-08 RX ORDER — BUPROPION HYDROCHLORIDE 150 MG/1
150 TABLET ORAL DAILY
Qty: 90 TABLET | Refills: 1 | Status: SHIPPED | OUTPATIENT
Start: 2022-12-08 | End: 2023-06-08 | Stop reason: SDUPTHER
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
## 2023-02-15 DIAGNOSIS — E03.9 HYPOTHYROIDISM, UNSPECIFIED TYPE: ICD-10-CM

## 2023-02-15 RX ORDER — LEVOTHYROXINE SODIUM 100 UG/1
100 TABLET ORAL DAILY
Qty: 90 TABLET | Refills: 2 | Status: SHIPPED | OUTPATIENT
Start: 2023-02-15 | End: 2023-02-15 | Stop reason: SDUPTHER

## 2023-02-15 NOTE — TELEPHONE ENCOUNTER
Refill Authorization Note   Coral Pandya  is requesting a refill authorization.  Brief Assessment and Rationale for Refill:  Approve     Medication Therapy Plan:  TSH is WNL    Medication Reconciliation Completed: No   Comments:     No Care Gaps Recommended     Note composed:12:02 PM 02/15/2023

## 2023-02-15 NOTE — TELEPHONE ENCOUNTER
No new care gaps identified.  [facility] Embedded Care Gaps. Reference number: [id number]. 2/15/2023   10:53:33 AM CST

## 2023-02-17 DIAGNOSIS — E03.9 HYPOTHYROIDISM, UNSPECIFIED TYPE: ICD-10-CM

## 2023-02-17 NOTE — TELEPHONE ENCOUNTER
----- Message from Evelia Hector sent at 2/17/2023  8:26 AM CST -----  Contact: Self  Patient is calling in regards to her refill request for the SYNTHROID 100 mcg tablet, it was sent to the wrong pharmacy ([facility]) and it needs to be cancelled and sent to the local:    [facility]/pharmacy #[id number] - [place] - [address]  [address]  [place] [address]  Phone: [phone] Fax: [phone]    Pt stated everything has been changed and transferred to the local [facility], since the beginning of the year. And she is also needing to make sure the refill request for the following medications can be sent to the pharmacy as well,  telmisartan (MICARDIS) 20 MG Tab,  RABEprazole (ACIPHEX) 20 mg tablet, ezetimibe (ZETIA) 10 mg tablet. Can we please call pt back to advise at [phone]. Thank You.

## 2023-02-17 NOTE — TELEPHONE ENCOUNTER
No new care gaps identified.  [facility] Embedded Care Gaps. Reference number: [id number]. 2/17/2023   8:49:46 AM CST

## 2023-02-22 RX ORDER — LEVOTHYROXINE SODIUM 100 UG/1
100 TABLET ORAL DAILY
Qty: 90 TABLET | Refills: 2 | OUTPATIENT
Start: 2023-02-22

## 2023-03-08 ENCOUNTER — PATIENT MESSAGE (OUTPATIENT)
Dept: PSYCHIATRY | Facility: CLINIC | Age: 73
End: 2023-03-08
Payer: MEDICARE

## 2023-03-09 ENCOUNTER — OFFICE VISIT (OUTPATIENT)
Dept: PSYCHIATRY | Facility: CLINIC | Age: 73
End: 2023-03-09
Payer: MEDICARE

## 2023-03-09 VITALS
SYSTOLIC BLOOD PRESSURE: 137 MMHG | HEIGHT: 67 IN | HEART RATE: 70 BPM | WEIGHT: 171.5 LBS | DIASTOLIC BLOOD PRESSURE: 86 MMHG | BODY MASS INDEX: 26.92 KG/M2

## 2023-03-09 DIAGNOSIS — F33.41 MDD (MAJOR DEPRESSIVE DISORDER), RECURRENT, IN PARTIAL REMISSION: Primary | ICD-10-CM

## 2023-03-09 DIAGNOSIS — F41.1 GAD (GENERALIZED ANXIETY DISORDER): ICD-10-CM

## 2023-03-09 PROCEDURE — 99214 PR OFFICE/OUTPT VISIT, EST, LEVL IV, 30-39 MIN: ICD-10-PCS | Mod: S$PBB,,,

## 2023-03-09 PROCEDURE — 99214 OFFICE O/P EST MOD 30 MIN: CPT | Mod: S$PBB,,,

## 2023-03-09 PROCEDURE — 99213 OFFICE O/P EST LOW 20 MIN: CPT | Mod: PBBFAC,PO

## 2023-03-09 PROCEDURE — 99999 PR PBB SHADOW E&M-EST. PATIENT-LVL III: CPT | Mod: PBBFAC,,,

## 2023-03-09 PROCEDURE — 99999 PR PBB SHADOW E&M-EST. PATIENT-LVL III: ICD-10-PCS | Mod: PBBFAC,,,
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
## 2023-04-18 NOTE — TELEPHONE ENCOUNTER
Pcxr done at bedside     [facility] XANDER Urrutia, RN  04/18/23 [id number] Tell pt I am not familiar with that product. She could try it if she wants to, and stop it if GI symptoms worsen.

## 2023-04-29 DIAGNOSIS — K21.9 GASTROESOPHAGEAL REFLUX DISEASE, UNSPECIFIED WHETHER ESOPHAGITIS PRESENT: ICD-10-CM

## 2023-04-29 DIAGNOSIS — I10 HYPERTENSION, UNSPECIFIED TYPE: ICD-10-CM

## 2023-04-30 NOTE — TELEPHONE ENCOUNTER
No care due was identified.  [facility] [place] Embedded Care Due Messages. Reference number: [id number].   4/29/2023 8:14:55 PM CDT

## 2023-04-30 NOTE — TELEPHONE ENCOUNTER
No care due was identified.  [facility] Embedded Care Due Messages. Reference number: [id number].   4/29/2023 8:18:28 PM CDT

## 2023-05-01 RX ORDER — TELMISARTAN 20 MG/1
TABLET ORAL
Qty: 90 TABLET | Refills: 1 | Status: SHIPPED | OUTPATIENT
Start: 2023-05-01 | End: 2023-12-05 | Stop reason: SDUPTHER

## 2023-05-01 RX ORDER — RABEPRAZOLE SODIUM 20 MG/1
TABLET, DELAYED RELEASE ORAL
Qty: 90 TABLET | Refills: 1 | Status: SHIPPED | OUTPATIENT
Start: 2023-05-01 | End: 2023-12-05 | Stop reason: SDUPTHER

## 2023-05-01 RX ORDER — EZETIMIBE 10 MG/1
TABLET ORAL
Qty: 90 TABLET | Refills: 1 | Status: SHIPPED | OUTPATIENT
Start: 2023-05-01 | End: 2023-11-20 | Stop reason: SDUPTHER

## 2023-05-01 RX ORDER — FAMOTIDINE 40 MG/1
40 TABLET, FILM COATED ORAL DAILY
Qty: 90 TABLET | Refills: 1 | Status: SHIPPED | OUTPATIENT
Start: 2023-05-01 | End: 2023-12-04

## 2023-05-01 NOTE — TELEPHONE ENCOUNTER
Refill Decision Note   Coral Pandya  is requesting a refill authorization.  Brief Assessment and Rationale for Refill:  Approve     Medication Therapy Plan:         Comments:     Note composed:4:46 AM 05/01/2023             Appointments     Last Visit   11/4/2022 Ebenezer Souza MD   Next Visit   Visit date not found Ebenezer Souza MD

## 2023-05-01 NOTE — TELEPHONE ENCOUNTER
Refill Decision Note   Coral Pandya  is requesting a refill authorization.  Brief Assessment and Rationale for Refill:  Approve     Medication Therapy Plan:       Medication Reconciliation Completed: No   Comments:     No Care Gaps recommended.     Note composed:12:06 PM 05/01/2023

## 2023-06-06 ENCOUNTER — TELEPHONE (OUTPATIENT)
Dept: PSYCHIATRY | Facility: CLINIC | Age: 73
End: 2023-06-06
Payer: MEDICARE

## 2023-06-06 NOTE — TELEPHONE ENCOUNTER
HERMELINDOOV in attempt to confirm pt in person appointment for 6/8/23 at 1 pm with Samreen Vogt. LM for pt to call clinic back whenever she gets a chance.

## 2023-06-08 ENCOUNTER — OFFICE VISIT (OUTPATIENT)
Dept: PSYCHIATRY | Facility: CLINIC | Age: 73
End: 2023-06-08
Payer: MEDICARE

## 2023-06-08 VITALS
WEIGHT: 172.81 LBS | HEART RATE: 66 BPM | DIASTOLIC BLOOD PRESSURE: 80 MMHG | HEIGHT: 67 IN | BODY MASS INDEX: 27.12 KG/M2 | SYSTOLIC BLOOD PRESSURE: 130 MMHG

## 2023-06-08 DIAGNOSIS — F33.42 RECURRENT MAJOR DEPRESSIVE DISORDER, IN FULL REMISSION: Primary | ICD-10-CM

## 2023-06-08 DIAGNOSIS — F41.1 GAD (GENERALIZED ANXIETY DISORDER): ICD-10-CM

## 2023-06-08 PROCEDURE — 99999 PR PBB SHADOW E&M-EST. PATIENT-LVL III: CPT | Mod: PBBFAC,,,

## 2023-06-08 PROCEDURE — 99215 PR OFFICE/OUTPT VISIT, EST, LEVL V, 40-54 MIN: ICD-10-PCS | Mod: S$PBB,,,

## 2023-06-08 PROCEDURE — 99213 OFFICE O/P EST LOW 20 MIN: CPT | Mod: PBBFAC,PO

## 2023-06-08 PROCEDURE — 99215 OFFICE O/P EST HI 40 MIN: CPT | Mod: S$PBB,,,

## 2023-06-08 PROCEDURE — 99999 PR PBB SHADOW E&M-EST. PATIENT-LVL III: ICD-10-PCS | Mod: PBBFAC,,,

## 2023-06-08 RX ORDER — DULOXETIN HYDROCHLORIDE 60 MG/1
60 CAPSULE, DELAYED RELEASE ORAL DAILY
Qty: 90 CAPSULE | Refills: 1 | Status: SHIPPED | OUTPATIENT
Start: 2023-06-08 | End: 2023-09-14 | Stop reason: SDUPTHER

## 2023-06-08 RX ORDER — MIRTAZAPINE 15 MG/1
15 TABLET, FILM COATED ORAL NIGHTLY
Qty: 90 TABLET | Refills: 1 | Status: SHIPPED | OUTPATIENT
Start: 2023-06-08 | End: 2023-09-14 | Stop reason: SDUPTHER

## 2023-06-08 RX ORDER — BUPROPION HYDROCHLORIDE 150 MG/1
150 TABLET ORAL DAILY
Qty: 90 TABLET | Refills: 1 | Status: SHIPPED | OUTPATIENT
Start: 2023-06-08 | End: 2023-09-14 | Stop reason: SDUPTHER

## 2023-06-08 RX ORDER — DULOXETIN HYDROCHLORIDE 30 MG/1
30 CAPSULE, DELAYED RELEASE ORAL DAILY
Qty: 90 CAPSULE | Refills: 1 | Status: SHIPPED | OUTPATIENT
Start: 2023-06-08 | End: 2023-09-14 | Stop reason: SDUPTHER
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
## 2023-08-03 ENCOUNTER — TELEPHONE (OUTPATIENT)
Dept: FAMILY MEDICINE | Facility: CLINIC | Age: 73
End: 2023-08-03
Payer: MEDICARE

## 2023-08-09 ENCOUNTER — PATIENT MESSAGE (OUTPATIENT)
Dept: ADMINISTRATIVE | Facility: HOSPITAL | Age: 73
End: 2023-08-09
Payer: MEDICARE

## 2023-08-17 NOTE — TELEPHONE ENCOUNTER
Pt calling today asking about how to obtain antibody testing for Covid-19. States she has had symptoms which have resolved except for fatigue. States that she tested negative for Covid on 4/25/20. States she still feels she may have had Covid and requests antibody testing. Pt informed that testing requires a physician order. Pt instructed she should contact PCP and that message would be sent to PCP requesting testing.     Reason for Disposition   Information only question and nurse able to answer     Will route encounter to PCP to let know patient is requesting an antibody test for Covid    Additional Information   Negative: Nursing judgment   Negative: Nursing judgment   Negative: Nursing judgment   Negative: Nursing judgment    Protocols used: NO PROTOCOL AVAILABLE - INFORMATION ONLY-A-[place]       severe

## 2023-09-12 ENCOUNTER — TELEPHONE (OUTPATIENT)
Dept: PSYCHIATRY | Facility: CLINIC | Age: 73
End: 2023-09-12
Payer: MEDICARE

## 2023-09-14 ENCOUNTER — OFFICE VISIT (OUTPATIENT)
Dept: PSYCHIATRY | Facility: CLINIC | Age: 73
End: 2023-09-14
Payer: MEDICARE

## 2023-09-14 VITALS
HEART RATE: 60 BPM | BODY MASS INDEX: 26.47 KG/M2 | WEIGHT: 168.63 LBS | SYSTOLIC BLOOD PRESSURE: 151 MMHG | HEIGHT: 67 IN | DIASTOLIC BLOOD PRESSURE: 85 MMHG

## 2023-09-14 DIAGNOSIS — F33.42 RECURRENT MAJOR DEPRESSIVE DISORDER, IN FULL REMISSION: Primary | ICD-10-CM

## 2023-09-14 DIAGNOSIS — F41.1 GAD (GENERALIZED ANXIETY DISORDER): ICD-10-CM

## 2023-09-14 PROCEDURE — 99215 OFFICE O/P EST HI 40 MIN: CPT | Mod: S$PBB,,,

## 2023-09-14 PROCEDURE — 99999 PR PBB SHADOW E&M-EST. PATIENT-LVL III: CPT | Mod: PBBFAC,,,

## 2023-09-14 PROCEDURE — 99999 PR PBB SHADOW E&M-EST. PATIENT-LVL III: ICD-10-PCS | Mod: PBBFAC,,,

## 2023-09-14 PROCEDURE — 99213 OFFICE O/P EST LOW 20 MIN: CPT | Mod: PBBFAC,PO

## 2023-09-14 PROCEDURE — 99215 PR OFFICE/OUTPT VISIT, EST, LEVL V, 40-54 MIN: ICD-10-PCS | Mod: S$PBB,,,

## 2023-09-14 RX ORDER — BUPROPION HYDROCHLORIDE 150 MG/1
150 TABLET ORAL DAILY
Qty: 90 TABLET | Refills: 1 | Status: SHIPPED | OUTPATIENT
Start: 2023-09-14

## 2023-09-14 RX ORDER — MIRTAZAPINE 15 MG/1
15 TABLET, FILM COATED ORAL NIGHTLY
Qty: 90 TABLET | Refills: 1 | Status: SHIPPED | OUTPATIENT
Start: 2023-09-14

## 2023-09-14 RX ORDER — DULOXETIN HYDROCHLORIDE 60 MG/1
60 CAPSULE, DELAYED RELEASE ORAL DAILY
Qty: 90 CAPSULE | Refills: 1 | Status: SHIPPED | OUTPATIENT
Start: 2023-09-14

## 2023-09-14 RX ORDER — DULOXETIN HYDROCHLORIDE 30 MG/1
30 CAPSULE, DELAYED RELEASE ORAL DAILY
Qty: 90 CAPSULE | Refills: 1 | Status: SHIPPED | OUTPATIENT
Start: 2023-09-14
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
## 2023-11-09 ENCOUNTER — OFFICE VISIT (OUTPATIENT)
Dept: FAMILY MEDICINE | Facility: CLINIC | Age: 73
End: 2023-11-09
Payer: MEDICARE

## 2023-11-09 VITALS
WEIGHT: 168.13 LBS | DIASTOLIC BLOOD PRESSURE: 78 MMHG | SYSTOLIC BLOOD PRESSURE: 120 MMHG | OXYGEN SATURATION: 95 % | HEIGHT: 67 IN | BODY MASS INDEX: 26.39 KG/M2 | HEART RATE: 68 BPM

## 2023-11-09 DIAGNOSIS — R23.3 EASY BRUISING: ICD-10-CM

## 2023-11-09 DIAGNOSIS — I10 HYPERTENSION, UNSPECIFIED TYPE: Chronic | ICD-10-CM

## 2023-11-09 DIAGNOSIS — K21.9 GASTROESOPHAGEAL REFLUX DISEASE, UNSPECIFIED WHETHER ESOPHAGITIS PRESENT: Chronic | ICD-10-CM

## 2023-11-09 DIAGNOSIS — Z71.85 VACCINE COUNSELING: ICD-10-CM

## 2023-11-09 DIAGNOSIS — E03.8 OTHER SPECIFIED HYPOTHYROIDISM: Chronic | ICD-10-CM

## 2023-11-09 DIAGNOSIS — I70.0 AORTIC ATHEROSCLEROSIS: ICD-10-CM

## 2023-11-09 DIAGNOSIS — F41.1 GAD (GENERALIZED ANXIETY DISORDER): Chronic | ICD-10-CM

## 2023-11-09 DIAGNOSIS — Z13.1 ENCOUNTER FOR SCREENING FOR DIABETES MELLITUS: ICD-10-CM

## 2023-11-09 DIAGNOSIS — Z91.89 RISK FOR CORONARY ARTERY DISEASE GREATER THAN 20% IN NEXT 10 YEARS PER FRAMINGHAM SCORE: Chronic | ICD-10-CM

## 2023-11-09 DIAGNOSIS — M17.11 PRIMARY OSTEOARTHRITIS OF RIGHT KNEE: Chronic | ICD-10-CM

## 2023-11-09 DIAGNOSIS — Z76.89 ENCOUNTER TO ESTABLISH CARE WITH NEW DOCTOR: Primary | ICD-10-CM

## 2023-11-09 DIAGNOSIS — J30.89 SEASONAL ALLERGIC RHINITIS DUE TO OTHER ALLERGIC TRIGGER: Chronic | ICD-10-CM

## 2023-11-09 DIAGNOSIS — S16.1XXA STRAIN OF NECK MUSCLE, INITIAL ENCOUNTER: ICD-10-CM

## 2023-11-09 DIAGNOSIS — F33.41 MDD (MAJOR DEPRESSIVE DISORDER), RECURRENT, IN PARTIAL REMISSION: Chronic | ICD-10-CM

## 2023-11-09 PROBLEM — H10.503 BLEPHAROCONJUNCTIVITIS OF BOTH EYES: Status: ACTIVE | Noted: 2023-03-20

## 2023-11-09 PROBLEM — N32.89 BLADDER MASS: Status: RESOLVED | Noted: 2020-06-01 | Resolved: 2023-11-09

## 2023-11-09 PROBLEM — Z80.0 FHX: COLON CANCER: Chronic | Status: ACTIVE | Noted: 2018-06-14

## 2023-11-09 PROBLEM — H35.033 HYPERTENSIVE RETINOPATHY, BILATERAL: Status: ACTIVE | Noted: 2023-03-20

## 2023-11-09 PROBLEM — J30.9 ALLERGIC RHINITIS: Chronic | Status: ACTIVE | Noted: 2022-06-15

## 2023-11-09 PROBLEM — H35.3131 NONEXUDATIVE AGE-RELATED MACULAR DEGENERATION, BILATERAL, EARLY DRY STAGE: Status: ACTIVE | Noted: 2023-03-20

## 2023-11-09 PROBLEM — H40.1131 PRIMARY OPEN ANGLE GLAUCOMA OF BOTH EYES, MILD STAGE: Status: ACTIVE | Noted: 2023-03-20

## 2023-11-09 PROBLEM — Z96.1 PSEUDOPHAKIA: Status: ACTIVE | Noted: 2023-03-20

## 2023-11-09 PROCEDURE — 99999 PR PBB SHADOW E&M-EST. PATIENT-LVL IV: ICD-10-PCS | Mod: PBBFAC,,, | Performed by: STUDENT IN AN ORGANIZED HEALTH CARE EDUCATION/TRAINING PROGRAM

## 2023-11-09 PROCEDURE — 99215 OFFICE O/P EST HI 40 MIN: CPT | Mod: S$PBB,,, | Performed by: STUDENT IN AN ORGANIZED HEALTH CARE EDUCATION/TRAINING PROGRAM

## 2023-11-09 PROCEDURE — 99999 PR PBB SHADOW E&M-EST. PATIENT-LVL IV: CPT | Mod: PBBFAC,,, | Performed by: STUDENT IN AN ORGANIZED HEALTH CARE EDUCATION/TRAINING PROGRAM

## 2023-11-09 PROCEDURE — 99214 OFFICE O/P EST MOD 30 MIN: CPT | Mod: PBBFAC,PN | Performed by: STUDENT IN AN ORGANIZED HEALTH CARE EDUCATION/TRAINING PROGRAM

## 2023-11-09 PROCEDURE — 99215 PR OFFICE/OUTPT VISIT, EST, LEVL V, 40-54 MIN: ICD-10-PCS | Mod: S$PBB,,, | Performed by: STUDENT IN AN ORGANIZED HEALTH CARE EDUCATION/TRAINING PROGRAM

## 2023-11-09 RX ORDER — ATORVASTATIN CALCIUM 20 MG/1
20 TABLET, FILM COATED ORAL DAILY
Qty: 30 TABLET | Refills: 11 | Status: SHIPPED | OUTPATIENT
Start: 2023-11-09 | End: 2024-11-08

## 2023-11-09 RX ORDER — METHOCARBAMOL 500 MG/1
500 TABLET, FILM COATED ORAL 3 TIMES DAILY
Qty: 30 TABLET | Refills: 0 | Status: SHIPPED | OUTPATIENT
Start: 2023-11-09 | End: 2023-11-19

## 2023-11-09 RX ORDER — METHYLPREDNISOLONE 4 MG/1
TABLET ORAL
Qty: 1 EACH | Refills: 0 | Status: SHIPPED | OUTPATIENT
Start: 2023-11-09 | End: 2024-01-25 | Stop reason: DRUGHIGH

## 2023-11-09 NOTE — PROGRESS NOTES
Plan:     Coral was seen today for annual exam.    Diagnoses and all orders for this visit:    Encounter to establish care with new doctor    YASMANI (generalized anxiety disorder): Continue Cymbalta 90 mg daily, and Mirtazapine 15 mg qhs; continue following w/ Psychiatry  -     Comprehensive Metabolic Panel; Future    MDD (major depressive disorder), recurrent, in partial remission: Continue Wellbutrin  mg daily, Cymbalta 90 mg daily, and Mirtazapine 15 mg qhs; continue following w/ Psychiatry  -     Comprehensive Metabolic Panel; Future    Seasonal allergic rhinitis due to other allergic trigger: Continue Nasonex daily PRN    Hypertension, unspecified type: Continue Telmisartan 20 mg daily, Bystolic 5 mg daily  -     Comprehensive Metabolic Panel; Future    Aortic atherosclerosis: Continue Zetia 10 mg daily; trial Lipitor as below. Plan for subsequent increase to 40 mg daily if tolerated.  -     Lipid Panel; Future  -     Comprehensive Metabolic Panel; Future  -     atorvastatin (LIPITOR) 20 MG tablet; Take 1 tablet (20 mg total) by mouth once daily.    Risk for coronary artery disease greater than 20% in next 10 years per [place] score: Continue Zetia 10 mg daily; trial Lipitor as below. Plan for subsequent increase to 40 mg daily if tolerated.  -     Lipid Panel; Future  -     Comprehensive Metabolic Panel; Future  -     atorvastatin (LIPITOR) 20 MG tablet; Take 1 tablet (20 mg total) by mouth once daily.    Other specified hypothyroidism: Continue Synthroid 100 mcg daily  -     TSH; Future    Gastroesophageal reflux disease, unspecified whether esophagitis present: Continue Rabeprazole 20 mg daily, Pepcid 40 mg daily   -     CBC Auto Differential; Future    Primary osteoarthritis of right knee: Continue Cymbalta 90 mg daily     Encounter for screening for diabetes mellitus  -     Comprehensive Metabolic Panel; Future    Vaccine counseling: Counseled on COVID boosters, RSV vaccine, and updated pneumonia  vaccine (Prevnar 20)    Strain of neck muscle, initial encounter  -     methylPREDNISolone (MEDROL DOSEPACK) 4 mg tablet; use as directed  -     methocarbamoL (ROBAXIN) 500 MG Tab; Take 1 tablet (500 mg total) by mouth 3 (three) times daily. for 10 days    Easy bruising  -     Protime-INR; Future       Follow up in about 4 weeks (around 12/7/2023), or if symptoms worsen or fail to improve.    Charla Cole MD  11/09/2023    Subjective:      Patient ID: Coral Pandya is a 73 y.o. female    Chief Complaint   Patient presents with    Annual Exam     EST care      HPI  73 y.o. female with a PMHx as documented below presents to clinic today for the following:    S/p ground level fall one week ago onto left shoulder - reports hitting brow on left side but no LOC. Pt states she had a bad headache and mild dizziness the following morning - now resolved. Today, pt reports persistent neck pain (L > R).     Annual exam.    Follows w/ Psychiatry, Ophthalmology, and Ortho.    YASMANI:   - Cymbalta 90 mg daily   - Mirtazapine 15 mg qhs    MDD:   - Wellbutrin  mg daily   - Cymbalta 90 mg daily   - Mirtazapine 15 mg qhs    Allergic rhinitis:  - Nasonex daily PRN    HTN:   - Telmisartan 20 mg daily, Bystolic 5 mg daily    Aortic atherosclerosis:   - Zetia 10 mg daily     The 10-year ASCVD risk score (Matthew HUYNH, et al., 2019) is: 15.1%    Values used to calculate the score:      Age: 73 years      Sex: Female      Is Non- : No      Diabetic: No      Tobacco smoker: No      Systolic Blood Pressure: 120 mmHg      Is BP treated: Yes      HDL Cholesterol: 48 mg/dL      Total Cholesterol: 180 mg/dL    Hypothyroidism:   - Synthroid 100 mcg daily    GERD:    -Rabeprazole 20 mg daily, Pepcid 40 mg daily     Primary osteoarthritis of the right knee:   - Cymbalta 90 mg daily     ROS  Constitutional:  Negative for chills and fever.   Respiratory:  Negative for shortness of breath.    Cardiovascular:   Negative for chest pain.   Gastrointestinal:  Negative for abdominal pain, constipation, diarrhea, nausea and vomiting.     Current Outpatient Medications   Medication Instructions    atorvastatin (LIPITOR) 20 mg, Oral, Daily    buPROPion (WELLBUTRIN XL) 150 mg, Oral, Daily    calcium carbonate-vitamin D3 600 mg calcium- 200 unit Cap 1 capsule, Oral, 2 times daily, DO NOT TAKE AM OF SURGERY    desonide (DESOWEN) 0.05 % cream APPLY TO EYELID SKIN NIGHTLY PRN ITCHY LIDS    DULoxetine (CYMBALTA) 30 mg, Oral, Daily    DULoxetine (CYMBALTA) 60 mg, Oral, Daily    ezetimibe (ZETIA) 10 mg tablet TAKE 1 TABLET BY MOUTH EVERY DAY    famotidine (PEPCID) 40 mg, Oral, Daily    ketotifen (ZADITOR) 0.025 % ophthalmic solution 1 drop, Both Eyes, 2 times daily, USE AS USUAL    methocarbamoL (ROBAXIN) 500 mg, Oral, 3 times daily    methylPREDNISolone (MEDROL DOSEPACK) 4 mg tablet use as directed    mirtazapine (REMERON) 15 mg, Oral, Nightly    mometasone (NASONEX) 50 mcg/actuation nasal spray 2 sprays, Nasal, Daily    multivit-min-FA-lycopen-lutein (CENTRUM SILVER) 0.4-300-250 mg-mcg-mcg Tab No dose, route, or frequency recorded.    nebivoloL (BYSTOLIC) 5 mg, Oral, Daily    RABEprazole (ACIPHEX) 20 mg tablet TAKE 1 TABLET BY MOUTH EVERY DAY    SYNTHROID 100 mcg, Oral, Daily    telmisartan (MICARDIS) 20 MG Tab TAKE 1 TABLET BY MOUTH EVERY DAY    vit C/E/Zn/coppr/lutein/zeaxan (PRESERVISION AREDS-2 ORAL) 1 tablet, Oral, 2 times daily      Past Medical History:   Diagnosis Date    Allergic rhinitis 06/15/2022    Anxiety     Aortic atherosclerosis 08/24/2020    CT uro 5/21/20    Bladder mass     Blepharoconjunctivitis of both eyes 03/20/2023    Cervical spondylosis     Depression     FHx: colon cancer 06/14/2018    Fibromyalgia     GERD (gastroesophageal reflux disease)     Hematuria     HTN (hypertension)     Hypertensive retinopathy, bilateral 03/20/2023    Nonexudative age-related macular degeneration, bilateral, early dry stage  03/20/2023    DANYELL (obstructive sleep apnea)     no cpap    Other specified hypothyroidism 04/01/2014    Primary open angle glaucoma of both eyes, mild stage 03/20/2023    Primary osteoarthritis of right knee 01/14/2019    Pseudophakia 03/20/2023    Risk for coronary artery disease greater than 20% in next 10 years per [place] score 11/09/2023    Thrombosis      Past Surgical History:   Procedure Laterality Date    CARPAL TUNNEL RELEASE Bilateral     CATARACT EXTRACTION EXTRACAPSULAR W/ INTRAOCULAR LENS IMPLANTATION Bilateral     COLONOSCOPY N/A 06/14/2018    Procedure: COLONOSCOPY;  Surgeon: Amaury Chambers MD;  Location: [facility];  Service: Endoscopy;  Laterality: N/A; hemorrhoids, repeat in 5 years for screening due to family history of colon cancer    COLONOSCOPY N/A 10/06/2020    Procedure: COLONOSCOPY requests lidocaine for IV start;  Surgeon: Taqueria Olmos MD;  Location: [facility];  Service: Endoscopy;  Laterality: N/A;    CYSTOSCOPY W/ RETROGRADES Bilateral 06/01/2020    Procedure: CYSTOSCOPY, WITH RETROGRADE PYELOGRAM;  Surgeon: Alcon Rodrigues MD;  Location: [facility] OR;  Service: Urology;  Laterality: Bilateral;    CYSTOSCOPY WITH BIOPSY OF BLADDER N/A 06/01/2020    Procedure: CYSTOSCOPY, WITH BLADDER BIOPSY;  Surgeon: Alcon Rodrigues MD;  Location: [facility] OR;  Service: Urology;  Laterality: N/A;    ESOPHAGOGASTRODUODENOSCOPY N/A 09/16/2020    Procedure: EGD (ESOPHAGOGASTRODUODENOSCOPY);  Surgeon: Amaury Chambers MD;  Location: [facility];  Service: Endoscopy;  Laterality: N/A;    FOOT SURGERY Bilateral     bilat joints removed second toe    LASER LITHOTRIPSY Right 06/01/2020    Procedure: LITHOTRIPSY, USING LASER;  Surgeon: Alcon Rodrigues MD;  Location: [facility] OR;  Service: Urology;  Laterality: Right;    TONSILLECTOMY, ADENOIDECTOMY      TOTAL KNEE ARTHROPLASTY Right 01/14/2019    Procedure: ARTHROPLASTY, KNEE, TOTAL SITE ASSISTED;  Surgeon: Angelo Rodas MD;  Location: [facility] OR;   Service: Orthopedics;  [facility]: [facility]    TOTAL KNEE ARTHROPLASTY Right     TUBAL LIGATION      UPPER GASTROINTESTINAL ENDOSCOPY  05/21/2008    Dr. Chambers, in legacy: gastric polyps removed; biopsy: fundic gland polyps    URETEROSCOPY Right 06/01/2020    Procedure: URETEROSCOPY;  Surgeon: Alcon Rodirgues MD;  Location: [facility];  Service: Urology;  Laterality: Right;     Review of patient's allergies indicates:   Allergen Reactions    Codeine      Other reaction(s): Nausea    Lisinopril Other (See Comments)     cough     Family History   Problem Relation Age of Onset    Arthritis Mother     Depression Mother     Hypertension Mother     Miscarriages / Stillbirths Mother     Colon cancer Paternal Aunt     Colon cancer Paternal Uncle     Arthritis Sister     Cancer Sister     Depression Sister     Miscarriages / Stillbirths Sister     Cancer Maternal Aunt     Cancer Maternal Uncle     Cancer Paternal Uncle     Celiac disease Neg Hx     Crohn's disease Neg Hx     Ulcerative colitis Neg Hx      Social History     Tobacco Use    Smoking status: Never    Smokeless tobacco: Never   Substance Use Topics    Alcohol use: No    Drug use: Never     Currently on File with [facility]:   Most Recent Immunizations   Administered Date(s) Administered    COVID-19, MRNA, LN-S, PF (MODERNA FULL 0.5 ML DOSE) 05/13/2022, 05/13/2022    COVID-19, mRNA, LNP-S, bivalent booster, PF (Moderna Omicron)12 + YEARS 09/29/2022, 09/29/2022    Influenza 09/11/2015    Influenza (FLUAD) - Quadrivalent - Adjuvanted - PF *Preferred* (65+) 09/20/2021    Influenza - High Dose - PF (65 years and older) 08/26/2019    Influenza - Quadrivalent - High Dose - PF (65 years and older) 09/15/2023    Influenza - Quadrivalent - PF *Preferred* (6 months and older) 09/11/2015, 09/11/2015    Influenza - Trivalent (ADULT) 09/03/2014    Influenza - Trivalent - PF (ADULT) 09/11/2015    Influenza A (H1N1) 2009 Monovalent - IM 01/22/2010    Influenza  "Split 10/01/2013    Pneumococcal Conjugate - 13 Valent 09/29/2015    Pneumococcal Polysaccharide - 23 Valent 03/31/2016    Tdap 02/06/2015    Zoster Recombinant 11/15/2019     Objective:      Vitals:    11/09/23 1435   BP: 120/78   Pulse: 68   SpO2: 95%   Weight: 76.2 kg (168 lb 1.6 oz)   Height: 5' 7" (1.702 m)     Body mass index is 26.33 kg/m².    Physical Exam   Constitutional:       General: No acute distress.  HENT:      Head: Normocephalic and atraumatic.   Pulmonary:      Effort: Pulmonary effort is normal. No respiratory distress.   Neurological:      General: No focal deficit present.      Mental Status: Alert and oriented to person, place, and time. Mental status is at baseline.    Assessment:       1. Encounter to establish care with new doctor    2. YASMANI (generalized anxiety disorder)    3. MDD (major depressive disorder), recurrent, in partial remission    4. Seasonal allergic rhinitis due to other allergic trigger    5. Hypertension, unspecified type    6. Aortic atherosclerosis    7. Risk for coronary artery disease greater than 20% in next 10 years per [place] score    8. Other specified hypothyroidism    9. Gastroesophageal reflux disease, unspecified whether esophagitis present    10. Primary osteoarthritis of right knee    11. Encounter for screening for diabetes mellitus    12. Vaccine counseling    13. Strain of neck muscle, initial encounter    14. Easy bruising        Charla Cole MD  [facility]  Office: [phone]   Fax: [phone]  11/09/2023      Disclaimer: This note was partly generated using dictation software which may occasionally result in transcription errors.    Total time spend on encounter: 40-54 minutes. This includes face to face time and non-face to face time preparing to see the patient (eg, review of tests), obtaining and/or reviewing separately obtained history, documenting clinical information in the electronic or other health " record, independently interpreting results and communicating results to the patient/family/caregiver, or care coordinator.

## 2023-11-12 ENCOUNTER — PATIENT MESSAGE (OUTPATIENT)
Dept: FAMILY MEDICINE | Facility: CLINIC | Age: 73
End: 2023-11-12
Payer: MEDICARE

## 2023-11-13 ENCOUNTER — LAB VISIT (OUTPATIENT)
Dept: LAB | Facility: HOSPITAL | Age: 73
End: 2023-11-13
Attending: STUDENT IN AN ORGANIZED HEALTH CARE EDUCATION/TRAINING PROGRAM
Payer: MEDICARE

## 2023-11-13 DIAGNOSIS — K21.9 GASTROESOPHAGEAL REFLUX DISEASE, UNSPECIFIED WHETHER ESOPHAGITIS PRESENT: Chronic | ICD-10-CM

## 2023-11-13 DIAGNOSIS — I10 HYPERTENSION, UNSPECIFIED TYPE: Chronic | ICD-10-CM

## 2023-11-13 DIAGNOSIS — Z91.89 RISK FOR CORONARY ARTERY DISEASE GREATER THAN 20% IN NEXT 10 YEARS PER FRAMINGHAM SCORE: Chronic | ICD-10-CM

## 2023-11-13 DIAGNOSIS — Z13.1 ENCOUNTER FOR SCREENING FOR DIABETES MELLITUS: ICD-10-CM

## 2023-11-13 DIAGNOSIS — F33.41 MDD (MAJOR DEPRESSIVE DISORDER), RECURRENT, IN PARTIAL REMISSION: Chronic | ICD-10-CM

## 2023-11-13 DIAGNOSIS — E03.8 OTHER SPECIFIED HYPOTHYROIDISM: Chronic | ICD-10-CM

## 2023-11-13 DIAGNOSIS — R23.3 EASY BRUISING: ICD-10-CM

## 2023-11-13 DIAGNOSIS — F41.1 GAD (GENERALIZED ANXIETY DISORDER): Chronic | ICD-10-CM

## 2023-11-13 DIAGNOSIS — I70.0 AORTIC ATHEROSCLEROSIS: ICD-10-CM

## 2023-11-13 LAB
ALBUMIN SERPL BCP-MCNC: 3.6 G/DL (ref 3.5–5.2)
ALP SERPL-CCNC: 77 U/L (ref 55–135)
ALT SERPL W/O P-5'-P-CCNC: 27 U/L (ref 10–44)
ANION GAP SERPL CALC-SCNC: 7 MMOL/L (ref 8–16)
AST SERPL-CCNC: 24 U/L (ref 10–40)
BASOPHILS # BLD AUTO: 0.02 K/UL (ref 0–0.2)
BASOPHILS NFR BLD: 0.6 % (ref 0–1.9)
BILIRUB SERPL-MCNC: 0.3 MG/DL (ref 0.1–1)
BUN SERPL-MCNC: 20 MG/DL (ref 8–23)
CALCIUM SERPL-MCNC: 8.8 MG/DL (ref 8.7–10.5)
CHLORIDE SERPL-SCNC: 109 MMOL/L (ref 95–110)
CHOLEST SERPL-MCNC: 155 MG/DL (ref 120–199)
CHOLEST/HDLC SERPL: 3.6 {RATIO} (ref 2–5)
CO2 SERPL-SCNC: 26 MMOL/L (ref 23–29)
CREAT SERPL-MCNC: 0.8 MG/DL (ref 0.5–1.4)
DIFFERENTIAL METHOD: ABNORMAL
EOSINOPHIL # BLD AUTO: 0.1 K/UL (ref 0–0.5)
EOSINOPHIL NFR BLD: 2 % (ref 0–8)
ERYTHROCYTE [DISTWIDTH] IN BLOOD BY AUTOMATED COUNT: 12.8 % (ref 11.5–14.5)
EST. GFR  (NO RACE VARIABLE): >60 ML/MIN/1.73 M^2
GLUCOSE SERPL-MCNC: 97 MG/DL (ref 70–110)
HCT VFR BLD AUTO: 43.6 % (ref 37–48.5)
HDLC SERPL-MCNC: 43 MG/DL (ref 40–75)
HDLC SERPL: 27.7 % (ref 20–50)
HGB BLD-MCNC: 14.7 G/DL (ref 12–16)
IMM GRANULOCYTES # BLD AUTO: 0.02 K/UL (ref 0–0.04)
IMM GRANULOCYTES NFR BLD AUTO: 0.6 % (ref 0–0.5)
INR PPP: 1 (ref 0.8–1.2)
LDLC SERPL CALC-MCNC: 97.8 MG/DL (ref 63–159)
LYMPHOCYTES # BLD AUTO: 1.1 K/UL (ref 1–4.8)
LYMPHOCYTES NFR BLD: 31.5 % (ref 18–48)
MCH RBC QN AUTO: 32.6 PG (ref 27–31)
MCHC RBC AUTO-ENTMCNC: 33.7 G/DL (ref 32–36)
MCV RBC AUTO: 97 FL (ref 82–98)
MONOCYTES # BLD AUTO: 0.5 K/UL (ref 0.3–1)
MONOCYTES NFR BLD: 14.5 % (ref 4–15)
NEUTROPHILS # BLD AUTO: 1.8 K/UL (ref 1.8–7.7)
NEUTROPHILS NFR BLD: 50.8 % (ref 38–73)
NONHDLC SERPL-MCNC: 112 MG/DL
NRBC BLD-RTO: 0 /100 WBC
PLATELET # BLD AUTO: 135 K/UL (ref 150–450)
PMV BLD AUTO: 12.7 FL (ref 9.2–12.9)
POTASSIUM SERPL-SCNC: 4.1 MMOL/L (ref 3.5–5.1)
PROT SERPL-MCNC: 6.3 G/DL (ref 6–8.4)
PROTHROMBIN TIME: 10.4 SEC (ref 9–12.5)
RBC # BLD AUTO: 4.51 M/UL (ref 4–5.4)
SODIUM SERPL-SCNC: 142 MMOL/L (ref 136–145)
TRIGL SERPL-MCNC: 71 MG/DL (ref 30–150)
TSH SERPL DL<=0.005 MIU/L-ACNC: 1.36 UIU/ML (ref 0.4–4)
WBC # BLD AUTO: 3.46 K/UL (ref 3.9–12.7)

## 2023-11-13 PROCEDURE — 80053 COMPREHEN METABOLIC PANEL: CPT | Performed by: STUDENT IN AN ORGANIZED HEALTH CARE EDUCATION/TRAINING PROGRAM

## 2023-11-13 PROCEDURE — 36415 COLL VENOUS BLD VENIPUNCTURE: CPT | Mod: PN | Performed by: STUDENT IN AN ORGANIZED HEALTH CARE EDUCATION/TRAINING PROGRAM

## 2023-11-13 PROCEDURE — 84443 ASSAY THYROID STIM HORMONE: CPT | Performed by: STUDENT IN AN ORGANIZED HEALTH CARE EDUCATION/TRAINING PROGRAM

## 2023-11-13 PROCEDURE — 80061 LIPID PANEL: CPT | Performed by: STUDENT IN AN ORGANIZED HEALTH CARE EDUCATION/TRAINING PROGRAM

## 2023-11-13 PROCEDURE — 85025 COMPLETE CBC W/AUTO DIFF WBC: CPT | Performed by: STUDENT IN AN ORGANIZED HEALTH CARE EDUCATION/TRAINING PROGRAM

## 2023-11-13 PROCEDURE — 85610 PROTHROMBIN TIME: CPT | Performed by: STUDENT IN AN ORGANIZED HEALTH CARE EDUCATION/TRAINING PROGRAM

## 2023-11-20 ENCOUNTER — PATIENT MESSAGE (OUTPATIENT)
Dept: FAMILY MEDICINE | Facility: CLINIC | Age: 73
End: 2023-11-20
Payer: MEDICARE

## 2023-11-21 RX ORDER — EZETIMIBE 10 MG/1
10 TABLET ORAL DAILY
Qty: 90 TABLET | Refills: 0 | Status: SHIPPED | OUTPATIENT
Start: 2023-11-21 | End: 2024-02-17

## 2023-11-21 NOTE — TELEPHONE ENCOUNTER
No care due was identified.  Health [facility] Embedded Care Due Messages. Reference number: [id number].   11/20/2023 8:16:57 PM CST

## 2023-12-03 ENCOUNTER — PATIENT MESSAGE (OUTPATIENT)
Dept: FAMILY MEDICINE | Facility: CLINIC | Age: 73
End: 2023-12-03
Payer: MEDICARE

## 2023-12-03 DIAGNOSIS — E03.9 HYPOTHYROIDISM, UNSPECIFIED TYPE: ICD-10-CM

## 2023-12-03 DIAGNOSIS — K21.9 GASTROESOPHAGEAL REFLUX DISEASE, UNSPECIFIED WHETHER ESOPHAGITIS PRESENT: ICD-10-CM

## 2023-12-03 DIAGNOSIS — I10 HYPERTENSION, UNSPECIFIED TYPE: ICD-10-CM

## 2023-12-03 NOTE — TELEPHONE ENCOUNTER
No care due was identified.  Health [facility] Embedded Care Due Messages. Reference number: [id number].   12/03/2023 12:08:43 PM CST

## 2023-12-04 ENCOUNTER — PATIENT MESSAGE (OUTPATIENT)
Dept: FAMILY MEDICINE | Facility: CLINIC | Age: 73
End: 2023-12-04
Payer: MEDICARE

## 2023-12-04 RX ORDER — FAMOTIDINE 40 MG/1
40 TABLET, FILM COATED ORAL
Qty: 90 TABLET | Refills: 3 | Status: SHIPPED | OUTPATIENT
Start: 2023-12-04

## 2023-12-04 RX ORDER — LEVOTHYROXINE SODIUM 100 UG/1
100 TABLET ORAL
Qty: 90 TABLET | Refills: 3 | Status: SHIPPED | OUTPATIENT
Start: 2023-12-04

## 2023-12-04 NOTE — TELEPHONE ENCOUNTER
Refill Decision Note   Coral Pandya  is requesting a refill authorization.  Brief Assessment and Rationale for Refill:  Approve     Medication Therapy Plan:       Medication Reconciliation Completed: No   Comments:     No Care Gaps recommended.     Note composed:11:45 AM 12/04/2023

## 2023-12-05 RX ORDER — TELMISARTAN 20 MG/1
20 TABLET ORAL DAILY
Qty: 90 TABLET | Refills: 3 | Status: SHIPPED | OUTPATIENT
Start: 2023-12-05 | End: 2024-11-29

## 2023-12-05 RX ORDER — NEBIVOLOL 5 MG/1
5 TABLET ORAL DAILY
Qty: 90 TABLET | Refills: 3 | Status: SHIPPED | OUTPATIENT
Start: 2023-12-05 | End: 2024-11-29

## 2023-12-05 RX ORDER — RABEPRAZOLE SODIUM 20 MG/1
20 TABLET, DELAYED RELEASE ORAL DAILY
Qty: 90 TABLET | Refills: 3 | Status: SHIPPED | OUTPATIENT
Start: 2023-12-05 | End: 2024-11-29

## 2023-12-05 NOTE — TELEPHONE ENCOUNTER
No care due was identified.  [facility] Embedded Care Due Messages. Reference number: [id number].   12/05/2023 3:11:54 PM CST
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
## 2024-01-12 DIAGNOSIS — Z00.00 ENCOUNTER FOR MEDICARE ANNUAL WELLNESS EXAM: ICD-10-CM

## 2024-01-25 ENCOUNTER — OFFICE VISIT (OUTPATIENT)
Dept: FAMILY MEDICINE | Facility: CLINIC | Age: 74
End: 2024-01-25
Payer: MEDICARE

## 2024-01-25 VITALS
SYSTOLIC BLOOD PRESSURE: 122 MMHG | WEIGHT: 176.38 LBS | OXYGEN SATURATION: 95 % | DIASTOLIC BLOOD PRESSURE: 80 MMHG | HEIGHT: 67 IN | BODY MASS INDEX: 27.68 KG/M2 | HEART RATE: 73 BPM

## 2024-01-25 DIAGNOSIS — B96.89 ACUTE BACTERIAL SINUSITIS: Primary | ICD-10-CM

## 2024-01-25 DIAGNOSIS — J01.90 ACUTE BACTERIAL SINUSITIS: Primary | ICD-10-CM

## 2024-01-25 PROCEDURE — 99999 PR PBB SHADOW E&M-EST. PATIENT-LVL II: CPT | Mod: PBBFAC,,, | Performed by: FAMILY MEDICINE

## 2024-01-25 PROCEDURE — 99213 OFFICE O/P EST LOW 20 MIN: CPT | Mod: S$PBB,,, | Performed by: FAMILY MEDICINE

## 2024-01-25 PROCEDURE — 99212 OFFICE O/P EST SF 10 MIN: CPT | Mod: PBBFAC,PN | Performed by: FAMILY MEDICINE

## 2024-01-25 RX ORDER — BENZONATATE 200 MG/1
200 CAPSULE ORAL 3 TIMES DAILY PRN
Qty: 30 CAPSULE | Refills: 1 | Status: SHIPPED | OUTPATIENT
Start: 2024-01-25 | End: 2024-05-02

## 2024-01-25 RX ORDER — AMOXICILLIN AND CLAVULANATE POTASSIUM 875; 125 MG/1; MG/1
1 TABLET, FILM COATED ORAL 2 TIMES DAILY
Qty: 14 TABLET | Refills: 0 | Status: SHIPPED | OUTPATIENT
Start: 2024-01-25 | End: 2024-05-02

## 2024-01-25 RX ORDER — METHYLPREDNISOLONE 4 MG/1
TABLET ORAL
Qty: 1 EACH | Refills: 0 | Status: SHIPPED | OUTPATIENT
Start: 2024-01-25

## 2024-01-25 NOTE — PROGRESS NOTES
Subjective:       Patient ID: Coral Pandya is a 73 y.o. female.    Chief Complaint: Follow-up    Follow-up  Associated symptoms include congestion, coughing (worse when supine), fatigue, headaches, nausea (due to drip) and a sore throat. Pertinent negatives include no chest pain, chills, fever, myalgias (body aches resolved) or rash.     Patient in clinic for f/u, sx review.   +covid 12/28 noted improvement and then recurrence on 1/18 with congestion, HA, scratchy throat.     Review of Systems:  Review of Systems   Constitutional:  Positive for fatigue. Negative for chills and fever.   HENT:  Positive for congestion, postnasal drip and sore throat. Negative for ear pain and rhinorrhea.    Respiratory:  Positive for cough (worse when supine). Negative for shortness of breath and wheezing.    Cardiovascular:  Negative for chest pain.   Gastrointestinal:  Positive for nausea (due to drip). Negative for diarrhea.   Musculoskeletal:  Negative for myalgias (body aches resolved).        Body aches   Skin:  Negative for rash.   Allergic/Immunologic: Negative for environmental allergies.   Neurological:  Positive for headaches.   Psychiatric/Behavioral:  Sleep disturbance: due to cough.        Objective:   There were no vitals filed for this visit.       Physical Exam  Vitals and nursing note reviewed.   Constitutional:       General: She is not in acute distress.     Appearance: She is well-developed.   HENT:      Head: Normocephalic and atraumatic.      Right Ear: External ear normal. A middle ear effusion is present. There is impacted cerumen. Tympanic membrane is not erythematous.      Left Ear: External ear normal. A middle ear effusion is present. There is impacted cerumen. Tympanic membrane is not erythematous.      Nose: Mucosal edema and rhinorrhea present.      Mouth/Throat:      Pharynx: Posterior oropharyngeal erythema present. No oropharyngeal exudate.   Eyes:      General:         Right eye: No  discharge.         Left eye: No discharge.      Conjunctiva/sclera: Conjunctivae normal.      Pupils: Pupils are equal, round, and reactive to light.   Cardiovascular:      Rate and Rhythm: Normal rate and regular rhythm.   Pulmonary:      Effort: Pulmonary effort is normal. No respiratory distress.      Breath sounds: Normal breath sounds. No wheezing.   Abdominal:      General: Bowel sounds are normal. There is no distension.      Palpations: Abdomen is soft.      Tenderness: There is no abdominal tenderness.   Musculoskeletal:      Cervical back: Normal range of motion and neck supple.   Lymphadenopathy:      Cervical: No cervical adenopathy.   Skin:     General: Skin is warm and dry.   Neurological:      General: No focal deficit present.      Mental Status: She is alert and oriented to person, place, and time.           Assessment & Plan:  Acute bacterial sinusitis  -     benzonatate (TESSALON) 200 MG capsule; Take 1 capsule (200 mg total) by mouth 3 (three) times daily as needed for Cough.  Dispense: 30 capsule; Refill: 1  -     amoxicillin-clavulanate 875-125mg (AUGMENTIN) 875-125 mg per tablet; Take 1 tablet by mouth 2 (two) times daily.  Dispense: 14 tablet; Refill: 0  -     methylPREDNISolone (MEDROL DOSEPACK) 4 mg tablet; use as directed  Dispense: 1 each; Refill: 0      Side effects and precautions of medication use reviewed with patient, expressed understanding. No questions or concerns.

## 2024-01-25 NOTE — PATIENT INSTRUCTIONS
Nasal saline spray/ Flonase  Humidifier  Mucinex-DM    Antibiotic  Steroid pack - ok to stop steroid when feeling better  Cough pills     Debrox ear wax kit

## 2024-02-01 ENCOUNTER — OFFICE VISIT (OUTPATIENT)
Dept: PSYCHIATRY | Facility: CLINIC | Age: 74
End: 2024-02-01
Payer: MEDICARE

## 2024-02-01 VITALS
HEART RATE: 61 BPM | WEIGHT: 172.38 LBS | BODY MASS INDEX: 27.06 KG/M2 | DIASTOLIC BLOOD PRESSURE: 82 MMHG | HEIGHT: 67 IN | SYSTOLIC BLOOD PRESSURE: 128 MMHG

## 2024-02-01 DIAGNOSIS — F33.42 RECURRENT MAJOR DEPRESSIVE DISORDER, IN FULL REMISSION: Primary | ICD-10-CM

## 2024-02-01 DIAGNOSIS — F41.1 GAD (GENERALIZED ANXIETY DISORDER): ICD-10-CM

## 2024-02-01 PROCEDURE — 99214 OFFICE O/P EST MOD 30 MIN: CPT | Mod: PBBFAC,PO

## 2024-02-01 PROCEDURE — 99215 OFFICE O/P EST HI 40 MIN: CPT | Mod: S$PBB,,,

## 2024-02-01 PROCEDURE — G2211 COMPLEX E/M VISIT ADD ON: HCPCS | Mod: S$PBB,,,

## 2024-02-01 PROCEDURE — 99999 PR PBB SHADOW E&M-EST. PATIENT-LVL IV: CPT | Mod: PBBFAC,,,

## 2024-02-01 NOTE — PROGRESS NOTES
"OUTPATIENT PSYCHIATRY FOLLOW UP VISIT    Encounter Date: 2/1/2024    Clinical Status of Patient:  Outpatient (Ambulatory)    Chief Complaint:  Coral Pandya is a 73 y.o. female who presents today for follow-up.  Met with patient.      HISTORY OF PRESENTING ILLNESS:  Coral Pandya is a 73 y.o. female with history of MDD, recurrent, in remission and YASMANI who presents for follow up appointment.      9/14/2023: Mood has been "OK". Has had a few days of feeling low, "But I ride my bike and I feel better."  Reports an occasional "Sinking-in feeling" to her abdomen in the morning, "And I don't want to get out of bed." "I keep very busy, working, gardening, riding my bike."  "I'm not in danger of doing anything. I'm nowhere near to that."   Rides 25-35 miles several times per week, has lost 4 lbs recently.   Dry mouth is bothersome. Uses biotene products. Asks about pilocarpine, which her dentist told her about. Gave printed Pt information on pilocarpine.     Plan at last appointment:   Continue Cymbalta 90 mg p.o. q.a.m. for mood anxiety  Continue Wellbutrin  mg p.o. q.a.m.   Continue mirtazapine 15 mg p.o. q.h.s.  Cont exercise, encouraged mindfulness of dietary choices in an effort to dec inflammation  Offered referral for individual psychotherapy.    Psychotropic medication history:    Lexapro and Wellbutrin (effective but led to h/a's), celexa (nausea), valium        INTERVAL HISTORY:    Mood has been stable. Denies anxiety. "I feel like the medicine is working. Normally, if I don't have anything to do it's harder, but I haven't had any issues."  Caught Covid during the holidays, was ill for approximately one month.  Has not been riding her bike since before the holidays d/t being ill.  Has a stationary bike, but finds this boring. Does not belong to a gym.     No interval episodes with symptoms consistent with kimani or hypomania.  Denied interval or current suicidal/homicidal thoughts, intent, " "or plan or NSSI.  Denied other questions and concerns.  Patient reports feeling stable and wishing to continue current management unchanged.    Medication side effects:  continued xerostomia, sees dental hygienist regularly  Medication adherence: yes    PSYCHIATRIC REVIEW OF SYSTEMS:  Is patient experiencing or having changes in:  Trouble with sleep:  sleeping well, usually sleeps 6-7 hours on average   Appetite changes:  no  Weight changes: no  Lack of energy:  no  Anhedonia:  no  Somatic symptoms:  no  Anxiety/panic:  no, "I don't really have much anxiety. I struggled with that years ago. I don't really get that anymore."  Irritability: no  Guilty/hopeless:  no  Concentration: no  Racing thoughts: no  Impulsive behaviors: no  Paranoia/AVH: no  Self-injurious behavior/risky behavior:  no  Any drugs:  no  Alcohol:  no    MEDICAL REVIEW OF SYSTEMS:   Pain: Denies any significant chronic or acute pain.  Constitutional: Denies fever or change in appetite.  Cardiovascular: Denies chest pain or exertional dyspnea.  Respiratory: Nic cough or orthopnea.   GI: Denies abdominal pain, N/V  Neurological: Denies tremor, seizure, or focal weakness.  Psychiatric: See HPI above.    PAST PSYCHIATRIC, MEDICAL, AND SOCIAL HISTORY REVIEWED  The patient's past medical, family and social history have been reviewed and updated as appropriate within the electronic medical record - see encounter notes.    PAST MEDICAL HISTORY:   Past Medical History:   Diagnosis Date    Allergic rhinitis 06/15/2022    Anxiety     Aortic atherosclerosis 08/24/2020    CT uro 5/21/20    Bladder mass     Blepharoconjunctivitis of both eyes 03/20/2023    Cervical spondylosis     Depression     FHx: colon cancer 06/14/2018    Fibromyalgia     GERD (gastroesophageal reflux disease)     Hematuria     HTN (hypertension)     Hypertensive retinopathy, bilateral 03/20/2023    Nonexudative age-related macular degeneration, bilateral, early dry stage 03/20/2023    DANYELL " (obstructive sleep apnea)     no cpap    Other specified hypothyroidism 04/01/2014    Primary open angle glaucoma of both eyes, mild stage 03/20/2023    Primary osteoarthritis of right knee 01/14/2019    Pseudophakia 03/20/2023    Risk for coronary artery disease greater than 20% in next 10 years per Granger score 11/09/2023    Thrombosis    Head trauma/Loss of consciousness: denies  Seizures: denies     PAST PSYCHIATRIC HISTORY:  First psych contact:  2016 with Dr Coe for depression and anxiety  Prior hospitalizations: denies  Prior suicide attempts or self-harm: denies  Prior diagnosis: MDD, recurrent, in partial remission; YASMANI  Prior psychotherapy: [employer] counselor prior to marriage separation, did help    FAMILY HISTORY:   Paternal: F-anxiety; no history of substance abuse or suicide  Maternal: M-anxiety; no history of substance abuse or suicide  Siblings: brother- suicide 1990  dtr- seizure while on wellbutrin    SOCIAL HISTORY:   Childhood: b/r [place], moved to [facility]  in [place]  Marital Status:  but  for 15 years  Children: 3 children  Resides: [place]  Occupation: part time at [employer]  Hobbies: bike [place]  Hindu: Mormonism  Education level: HS grad  : denies  Legal: denies    SUBSTANCE USE HISTORY:  Caffeine: coffee in AM, but no caffeine after noon  Tobacco: denies  Alcohol: denies  Drug use: denies  Rehab: denies  Prior/current AA: denies      MEDICATIONS:    Current Outpatient Medications:     amoxicillin-clavulanate 875-125mg (AUGMENTIN) 875-125 mg per tablet, Take 1 tablet by mouth 2 (two) times daily., Disp: 14 tablet, Rfl: 0    atorvastatin (LIPITOR) 20 MG tablet, Take 1 tablet (20 mg total) by mouth once daily., Disp: 30 tablet, Rfl: 11    benzonatate (TESSALON) 200 MG capsule, Take 1 capsule (200 mg total) by mouth 3 (three) times daily as needed for Cough., Disp: 30 capsule, Rfl: 1    buPROPion (WELLBUTRIN XL) 150 MG TB24  tablet, Take 1 tablet (150 mg total) by mouth once daily., Disp: 90 tablet, Rfl: 1    calcium carbonate-vitamin D3 600 mg calcium- 200 unit Cap, Take 1 capsule by mouth 2 (two) times daily. DO NOT TAKE AM OF SURGERY, Disp: , Rfl:     desonide (DESOWEN) 0.05 % cream, APPLY TO EYELID SKIN NIGHTLY PRN ITCHY LIDS, Disp: 180 g, Rfl: 0    DULoxetine (CYMBALTA) 30 MG capsule, Take 1 capsule (30 mg total) by mouth once daily., Disp: 90 capsule, Rfl: 1    DULoxetine (CYMBALTA) 60 MG capsule, Take 1 capsule (60 mg total) by mouth once daily., Disp: 90 capsule, Rfl: 1    ezetimibe (ZETIA) 10 mg tablet, Take 1 tablet (10 mg total) by mouth once daily., Disp: 90 tablet, Rfl: 0    famotidine (PEPCID) 40 MG tablet, TAKE 1 TABLET BY MOUTH EVERY DAY, Disp: 90 tablet, Rfl: 3    ketotifen (ZADITOR) 0.025 % ophthalmic solution, Place 1 drop into both eyes 2 (two) times daily. USE AS USUAL, Disp: , Rfl:     methylPREDNISolone (MEDROL DOSEPACK) 4 mg tablet, use as directed, Disp: 1 each, Rfl: 0    mirtazapine (REMERON) 15 MG tablet, Take 1 tablet (15 mg total) by mouth every evening., Disp: 90 tablet, Rfl: 1    mometasone (NASONEX) 50 mcg/actuation nasal spray, 2 sprays by Nasal route once daily. (Patient taking differently: 2 sprays by Nasal route daily as needed.), Disp: 17 g, Rfl: 5    multivit-min-FA-lycopen-lutein (CENTRUM SILVER) 0.4-300-250 mg-mcg-mcg Tab, , Disp: , Rfl:     nebivoloL (BYSTOLIC) 5 MG Tab, Take 1 tablet (5 mg total) by mouth once daily., Disp: 90 tablet, Rfl: 3    RABEprazole (ACIPHEX) 20 mg tablet, Take 1 tablet (20 mg total) by mouth once daily., Disp: 90 tablet, Rfl: 3    SYNTHROID 100 mcg tablet, TAKE 1 TABLET BY MOUTH EVERY DAY, Disp: 90 tablet, Rfl: 3    telmisartan (MICARDIS) 20 MG Tab, Take 1 tablet (20 mg total) by mouth once daily., Disp: 90 tablet, Rfl: 3    vit C/E/Zn/coppr/lutein/zeaxan (PRESERVISION AREDS-2 ORAL), Take 1 tablet by mouth 2 (two) times a day., Disp: , Rfl:     ALLERGIES:  Review of  "patient's allergies indicates:   Allergen Reactions    Codeine      Other reaction(s): Nausea    Lisinopril Other (See Comments)     cough       EXAM:  Constitutional  Vitals:  Most recent vital signs were reviewed.   Last 3 sets of VS:      11/9/2023     2:35 PM 1/25/2024     3:05 PM 2/1/2024    10:28 AM   Vitals - 1 value per visit   SYSTOLIC 120 122 128   DIASTOLIC 78 80 82   Pulse 68 73 61   SPO2 95 % 95 %    Weight (lb) 168.1 176.37 172.4   Weight (kg) 76.25 80 78.2   Height 5' 7" (1.702 m) 5' 7" (1.702 m) 5' 7" (1.702 m)   BMI (Calculated) 26.3 27.6 27   Pain Score Two Zero Zero      General:  unremarkable, age appropriate     Musculoskeletal  Muscle Strength/Tone:  No tremor or abnormal movements   Gait & Station:  Steady, non-ataxic     Psychiatric  Speech:  no latency; no press   Mood & Affect:  euthymic  congruent and appropriate   Thought Process:  normal and logical   Associations:  intact   Thought Content:  normal, no suicidality, no homicidality, delusions, or paranoia   Insight:  intact   Judgement: behavior is adequate to circumstances   Orientation:  grossly intact   Memory: intact for content of interview   Language: grossly intact   Attention Span & Concentration:  able to focus   Fund of Knowledge:  intact and appropriate to age and level of education     SUICIDE RISK ASSESSMENT:  Protective factors:  gender, no prior attempts, no prior hospitalizations, no family h/o attempts, no ongoing substance abuse, no psychosis, has children, denies SI/intent/plan, seeking treatment, access to treatment, future oriented, good primary support, no access to firearms  Risks: age,  but , hx MDD and YASMANI  Patient is a low immediate and long-term risk considering risk factors.     RELEVANT LABS/STUDIES:    Lab Results   Component Value Date    WBC 3.46 (L) 11/13/2023    HGB 14.7 11/13/2023    HCT 43.6 11/13/2023    MCV 97 11/13/2023     (L) 11/13/2023     BMP  Lab Results   Component Value " "Date     11/13/2023    K 4.1 11/13/2023     11/13/2023    CO2 26 11/13/2023    BUN 20 11/13/2023    CREATININE 0.8 11/13/2023    CALCIUM 8.8 11/13/2023    ANIONGAP 7 (L) 11/13/2023    ESTGFRAFRICA >60.0 08/23/2021    EGFRNONAA >60.0 08/23/2021     Lab Results   Component Value Date    ALT 27 11/13/2023    AST 24 11/13/2023    ALKPHOS 77 11/13/2023    BILITOT 0.3 11/13/2023     Lab Results   Component Value Date    TSH 1.365 11/13/2023     No results found for: "LABA1C", "HGBA1C"    IMPRESSION:    Coral Pandya is a 73 y.o. female with history of MDD, recurrent, in partial remission; YASMANI who presents for follow up appointment.    Status/Progress: Based on the examination today, the patient's problem(s) is/are well controlled.  New problems have not been presented today.   Co-morbidities are not complicating management of the primary condition.  There are no active rule-out diagnoses for this patient at this time.     Symptoms continue to be well controlled. Will continue current treatment plan.    Risk Parameters:  Patient reports no suicidal ideation  Patient reports no homicidal ideation  Patient reports no self-injurious behavior  Patient reports no violent behavior    DIAGNOSES:    ICD-10-CM ICD-9-CM   1. Recurrent major depressive disorder, in full remission  F33.42 296.36   2. YASMANI (generalized anxiety disorder)  F41.1 300.02       PLAN:  Continue duloxetine 90 mg q.a.m. for mood anxiety  Continue bupropion  mg q.a.m.   Continue mirtazapine 15 mg q.h.s.  Cont exercise, encouraged mindfulness of dietary choices in an effort to dec inflammation  Offered referral for individual psychotherapy.      RETURN TO CLINIC:   3 months      RODOLFO Freed, PMHNP-BC      42 minutes of total time spent on the encounter, which includes face to face time and non-face to face time preparing to see the patient (eg. review of tests), obtaining and/or reviewing separately obtained history, " "documenting clinical information in the electronic health record, independently interpreting results (not separately reported), and communicating results to the patient/family/caregiver, or care coordination (not separately reported).     At this time there are no indications the patient represents an imminent danger to either themselves or others; will continue to manage treatment in the outpatient setting.    I discussed the patient's care with the patient including benefits, alternatives, possible adverse effects of the treatment plan; including the potential for metabolic complications, major organ dysfunction, black box warnings, and contraindications. The opportunity was given for questions/clarification, and after this discussion the above treatment plan was devised through shared decision making. The patient voiced their understanding of the diagnoses and treatments listed above and agreed to the treatment plan. Follow up plan was reviewed with the patient. The patient was advised to call to report any worsening of symptoms or problems with medication.    Supportive therapy and psychoeducation provided. Patient has been given crisis information including Suicide and Crisis Lifeline (call or text: 448). Patient also given instructions to go to the nearest ER or call 911 if unable to remain safe or if the Pt develops thoughts of harming self or others.    Documentation entered by me for this encounter may have been done in part using [employer] voice recognition transcription software. Garbled syntax, mangled pronouns, and other bizarre constructions may be attributed to that software system. Although I have made an effort to ensure accuracy, "sound like" errors may exist and should be interpreted in context.  "

## 2024-02-17 RX ORDER — EZETIMIBE 10 MG/1
10 TABLET ORAL
Qty: 90 TABLET | Refills: 2 | Status: SHIPPED | OUTPATIENT
Start: 2024-02-17

## 2024-02-17 NOTE — TELEPHONE ENCOUNTER
Refill Decision Note   Coral Mumtaz  is requesting a refill authorization.  Brief Assessment and Rationale for Refill:  Approve     Medication Therapy Plan:        Comments:     Note composed:4:06 PM 02/17/2024

## 2024-02-17 NOTE — TELEPHONE ENCOUNTER
No care due was identified.  [facility] Embedded Care Due Messages. Reference number: [id number].   2/17/2024 10:31:05 AM CST

## 2024-04-30 ENCOUNTER — TELEPHONE (OUTPATIENT)
Dept: PSYCHIATRY | Facility: CLINIC | Age: 74
End: 2024-04-30
Payer: MEDICARE

## 2024-05-02 ENCOUNTER — OFFICE VISIT (OUTPATIENT)
Dept: PSYCHIATRY | Facility: CLINIC | Age: 74
End: 2024-05-02
Payer: MEDICARE

## 2024-05-02 VITALS
BODY MASS INDEX: 27.31 KG/M2 | HEART RATE: 67 BPM | HEIGHT: 67 IN | DIASTOLIC BLOOD PRESSURE: 74 MMHG | SYSTOLIC BLOOD PRESSURE: 122 MMHG | WEIGHT: 174 LBS

## 2024-05-02 DIAGNOSIS — F33.42 RECURRENT MAJOR DEPRESSIVE DISORDER, IN FULL REMISSION: Primary | ICD-10-CM

## 2024-05-02 DIAGNOSIS — F41.1 GAD (GENERALIZED ANXIETY DISORDER): ICD-10-CM

## 2024-05-02 PROCEDURE — 99999 PR PBB SHADOW E&M-EST. PATIENT-LVL III: CPT | Mod: PBBFAC,,,

## 2024-05-02 PROCEDURE — 99213 OFFICE O/P EST LOW 20 MIN: CPT | Mod: PBBFAC,PO

## 2024-05-02 PROCEDURE — G2211 COMPLEX E/M VISIT ADD ON: HCPCS | Mod: S$PBB,,,

## 2024-05-02 PROCEDURE — 99214 OFFICE O/P EST MOD 30 MIN: CPT | Mod: S$PBB,,,

## 2024-05-02 NOTE — PROGRESS NOTES
"OUTPATIENT PSYCHIATRY FOLLOW UP VISIT    Encounter Date: 5/2/2024    Clinical Status of Patient:  Outpatient (Ambulatory)    Chief Complaint:  Coral Pandya is a 73 y.o. female who presents today for follow-up.  Met with patient.      HISTORY OF PRESENTING ILLNESS:  Coral Pandya is a 73 y.o. female with history of MDD, recurrent, in remission and YASMANI who presents for follow up appointment.      9/14/2023: Mood has been "OK". Has had a few days of feeling low, "But I ride my bike and I feel better."  Reports an occasional "Sinking-in feeling" to her abdomen in the morning, "And I don't want to get out of bed." "I keep very busy, working, gardening, riding my bike."  "I'm not in danger of doing anything. I'm nowhere near to that."   Rides 25-35 miles several times per week, has lost 4 lbs recently.   Dry mouth is bothersome. Uses biotene products. Asks about pilocarpine, which her dentist told her about. Gave printed Pt information on pilocarpine.     2/1/2024: Mood has been stable. Denies anxiety. "I feel like the medicine is working. Normally, if I don't have anything to do it's harder, but I haven't had any issues."  Caught Covid during the holidays, was ill for approximately one month.  Has not been riding her bike since before the holidays d/t being ill.  Has a stationary bike, but finds this boring. Does not belong to a gym.     Plan at last appointment:   Continue duloxetine 90 mg q.a.m. for mood anxiety  Continue bupropion  mg q.a.m.   Continue mirtazapine 15 mg q.h.s.  Cont exercise, encouraged mindfulness of dietary choices in an effort to dec inflammation  Offered referral for individual psychotherapy.    Psychotropic medication history:    Lexapro and Wellbutrin (effective but led to h/a's), celexa (nausea), valium        INTERVAL HISTORY:    Pt reports her mood has been "good. Occasionally I have a bad day but it doesn't go on forever." Low mood occurs approximately once every 3 " "months and lasts from one to several days.   Has been able to get out and ride her bike as the weather has been mild. She rode 20 miles yesterday.  Continues to struggle with xerostomia. Uses biotene brand products.   No lasting effects from Covid.     No interval episodes with symptoms consistent with kimani or hypomania.  Denied interval or current suicidal/homicidal thoughts, intent, or plan or NSSI.  Denied other questions and concerns.  Patient reports feeling stable and wishing to continue current management unchanged.    Medication side effects:  continued xerostomia, sees dental hygienist regularly  Medication adherence: yes    PSYCHIATRIC REVIEW OF SYSTEMS:  Is patient experiencing or having changes in:  Trouble with sleep:  continues to sleep well, usually sleeps 6-7 hours on average   Appetite changes:  no  Weight changes: no  Lack of energy:  "I have enough energy to get things done but I tend to push myself a lot." Rode bike 20 miles yesterday.  Anhedonia:  no  Somatic symptoms:  no  Anxiety/panic:  no, "I don't really have much anxiety. I struggled with that years ago. I don't really get that anymore."  Irritability: no  Guilty/hopeless:  no  Concentration: no  Racing thoughts: no  Impulsive behaviors: no  Paranoia/AVH: no  Self-injurious behavior/risky behavior:  no  Any drugs:  no  Alcohol:  no    MEDICAL REVIEW OF SYSTEMS:   Pain: Denies any significant chronic or acute pain.  Constitutional: Denies fever or change in appetite.  Cardiovascular: Denies chest pain or exertional dyspnea.  Respiratory: Nic cough or orthopnea.   GI: Denies abdominal pain, N/V  Neurological: Denies tremor, seizure, or focal weakness.  Psychiatric: See HPI above.    PAST PSYCHIATRIC, MEDICAL, AND SOCIAL HISTORY REVIEWED  The patient's past medical, family and social history have been reviewed and updated as appropriate within the electronic medical record - see encounter notes.    PAST MEDICAL HISTORY:   Past Medical " History:   Diagnosis Date    Allergic rhinitis 06/15/2022    Anxiety     Aortic atherosclerosis 08/24/2020    CT uro 5/21/20    Bladder mass     Blepharoconjunctivitis of both eyes 03/20/2023    Cervical spondylosis     Depression     FHx: colon cancer 06/14/2018    Fibromyalgia     GERD (gastroesophageal reflux disease)     Hematuria     HTN (hypertension)     Hypertensive retinopathy, bilateral 03/20/2023    Nonexudative age-related macular degeneration, bilateral, early dry stage 03/20/2023    DANYELL (obstructive sleep apnea)     no cpap    Other specified hypothyroidism 04/01/2014    Primary open angle glaucoma of both eyes, mild stage 03/20/2023    Primary osteoarthritis of right knee 01/14/2019    Pseudophakia 03/20/2023    Risk for coronary artery disease greater than 20% in next 10 years per [place] score 11/09/2023    Thrombosis    Head trauma/Loss of consciousness: denies  Seizures: denies     PAST PSYCHIATRIC HISTORY:  First psych contact:  2016 with Dr Coe for depression and anxiety  Prior hospitalizations: denies  Prior suicide attempts or self-harm: denies  Prior diagnosis: MDD, recurrent, in partial remission; YASMANI  Prior psychotherapy: [employer] counselor prior to marriage separation, did help    FAMILY HISTORY:   Paternal: F-anxiety; no history of substance abuse or suicide  Maternal: M-anxiety; no history of substance abuse or suicide  Siblings: brother- suicide 1990  dtr- seizure while on wellbutrin    SOCIAL HISTORY:   Childhood: b/r [place], moved to [facility]  in [place]  Marital Status:  but  for 15 years  Children: 3 children  Resides: [place]  Occupation: part time at [employer]  Hobbies: bike [place] 25 [place]  Orthodoxy: Orthodoxy  Education level: HS grad  : denies  Legal: denies    SUBSTANCE USE HISTORY:  Caffeine: coffee in AM, but no caffeine after noon  Tobacco: denies  Alcohol: denies  Drug use: denies  Rehab: denies  Prior/current AA:  denies      MEDICATIONS:    Current Outpatient Medications:     atorvastatin (LIPITOR) 20 MG tablet, Take 1 tablet (20 mg total) by mouth once daily., Disp: 30 tablet, Rfl: 11    buPROPion (WELLBUTRIN XL) 150 MG TB24 tablet, Take 1 tablet (150 mg total) by mouth once daily., Disp: 90 tablet, Rfl: 1    calcium carbonate-vitamin D3 600 mg calcium- 200 unit Cap, Take 1 capsule by mouth 2 (two) times daily. DO NOT TAKE AM OF SURGERY, Disp: , Rfl:     desonide (DESOWEN) 0.05 % cream, APPLY TO EYELID SKIN NIGHTLY PRN ITCHY LIDS, Disp: 180 g, Rfl: 0    DULoxetine (CYMBALTA) 30 MG capsule, Take 1 capsule (30 mg total) by mouth once daily., Disp: 90 capsule, Rfl: 1    DULoxetine (CYMBALTA) 60 MG capsule, Take 1 capsule (60 mg total) by mouth once daily., Disp: 90 capsule, Rfl: 1    ezetimibe (ZETIA) 10 mg tablet, TAKE 1 TABLET BY MOUTH EVERY DAY, Disp: 90 tablet, Rfl: 2    famotidine (PEPCID) 40 MG tablet, TAKE 1 TABLET BY MOUTH EVERY DAY, Disp: 90 tablet, Rfl: 3    ketotifen (ZADITOR) 0.025 % ophthalmic solution, Place 1 drop into both eyes 2 (two) times daily. USE AS USUAL, Disp: , Rfl:     methylPREDNISolone (MEDROL DOSEPACK) 4 mg tablet, use as directed, Disp: 1 each, Rfl: 0    mirtazapine (REMERON) 15 MG tablet, Take 1 tablet (15 mg total) by mouth every evening., Disp: 90 tablet, Rfl: 1    mometasone (NASONEX) 50 mcg/actuation nasal spray, 2 sprays by Nasal route once daily. (Patient taking differently: 2 sprays by Nasal route daily as needed.), Disp: 17 g, Rfl: 5    multivit-min-FA-lycopen-lutein (CENTRUM SILVER) 0.4-300-250 mg-mcg-mcg Tab, , Disp: , Rfl:     nebivoloL (BYSTOLIC) 5 MG Tab, Take 1 tablet (5 mg total) by mouth once daily., Disp: 90 tablet, Rfl: 3    RABEprazole (ACIPHEX) 20 mg tablet, Take 1 tablet (20 mg total) by mouth once daily., Disp: 90 tablet, Rfl: 3    SYNTHROID 100 mcg tablet, TAKE 1 TABLET BY MOUTH EVERY DAY, Disp: 90 tablet, Rfl: 3    telmisartan (MICARDIS) 20 MG Tab, Take 1 tablet (20 mg  "total) by mouth once daily., Disp: 90 tablet, Rfl: 3    vit C/E/Zn/coppr/lutein/zeaxan (PRESERVISION AREDS-2 ORAL), Take 1 tablet by mouth 2 (two) times a day., Disp: , Rfl:     ALLERGIES:  Review of patient's allergies indicates:   Allergen Reactions    Codeine      Other reaction(s): Nausea    Lisinopril Other (See Comments)     cough       EXAM:  Constitutional  Vitals:  Most recent vital signs were reviewed.   Last 3 sets of VS:      1/25/2024     3:05 PM 2/1/2024    10:28 AM 5/2/2024    10:42 AM   Vitals - 1 value per visit   SYSTOLIC 122 128 122   DIASTOLIC 80 82 74   Pulse 73 61 67   SPO2 95 %     Weight (lb) 176.37 172.4 174   Weight (kg) 80 78.2 78.926   Height 5' 7" (1.702 m) 5' 7" (1.702 m) 5' 7" (1.702 m)   BMI (Calculated) 27.6 27 27.2   Pain Score Zero Zero Three      General:  unremarkable, age appropriate     Musculoskeletal  Muscle Strength/Tone:  No tremor or abnormal movements   Gait & Station:  Steady, non-ataxic     Psychiatric  Speech:  no latency; no press   Mood & Affect:  euthymic  congruent and appropriate   Thought Process:  normal and logical   Associations:  intact   Thought Content:  normal, no suicidality, no homicidality, delusions, or paranoia   Insight:  intact   Judgement: behavior is adequate to circumstances   Orientation:  grossly intact   Memory: intact for content of interview   Language: grossly intact   Attention Span & Concentration:  Intact to interview   Fund of Knowledge:  Not tested     SUICIDE RISK ASSESSMENT:  Protective factors:  gender, no prior attempts, no prior hospitalizations, no family h/o attempts, no ongoing substance abuse, no psychosis, has children, denies SI/intent/plan, seeking treatment, access to treatment, future oriented, good primary support, no access to firearms  Risks: age,  but , hx MDD and YASMANI  Patient is a low immediate and long-term risk considering risk factors.     RELEVANT LABS/STUDIES:    Lab Results   Component Value " "Date    WBC 3.46 (L) 11/13/2023    HGB 14.7 11/13/2023    HCT 43.6 11/13/2023    MCV 97 11/13/2023     (L) 11/13/2023     BMP  Lab Results   Component Value Date     11/13/2023    K 4.1 11/13/2023     11/13/2023    CO2 26 11/13/2023    BUN 20 11/13/2023    CREATININE 0.8 11/13/2023    CALCIUM 8.8 11/13/2023    ANIONGAP 7 (L) 11/13/2023    ESTGFRAFRICA >60.0 08/23/2021    EGFRNONAA >60.0 08/23/2021     Lab Results   Component Value Date    ALT 27 11/13/2023    AST 24 11/13/2023    ALKPHOS 77 11/13/2023    BILITOT 0.3 11/13/2023     Lab Results   Component Value Date    TSH 1.365 11/13/2023     No results found for: "LABA1C", "HGBA1C"    IMPRESSION:    Coral Pandya is a 73 y.o. female with history of MDD and YASMANI who presents for follow up appointment.    Status/Progress: Based on the examination today, the patient's problem(s) is/are well controlled.  New problems have not been presented today.   Co-morbidities are not complicating management of the primary condition.  There are no active rule-out diagnoses for this patient at this time.     Symptoms continue to be well controlled. Will continue current treatment plan.    Risk Parameters:  Patient reports no suicidal ideation  Patient reports no homicidal ideation  Patient reports no self-injurious behavior  Patient reports no violent behavior    DIAGNOSES:    ICD-10-CM ICD-9-CM   1. Recurrent major depressive disorder, in full remission  F33.42 296.36   2. YASMANI (generalized anxiety disorder)  F41.1 300.02         PLAN:  Continue duloxetine 90 mg q.a.m. for mood anxiety  Continue bupropion  mg q.a.m.   Continue mirtazapine 15 mg q.h.s.  Cont exercise, encouraged mindfulness of dietary choices in an effort to dec inflammation  Offered referral for individual psychotherapy.      RETURN TO CLINIC:   3 months      RODOLFO Freed, PMHNP-BC      30 minutes of total time spent on the encounter, which includes face to face time and " non-face to face time preparing to see the patient (eg. review of tests), obtaining and/or reviewing separately obtained history, documenting clinical information in the electronic health record, independently interpreting results (not separately reported), and communicating results to the patient/family/caregiver, or care coordination (not separately reported).     Visit today included managing the longitudinal care of the patient due to the serious and/or complex managed problem(s) MDD and YASMANI.    At this time there are no indications the patient represents an imminent danger to either themselves or others; will continue to manage treatment in the outpatient setting.    I discussed the patient's care with the patient including benefits, alternatives, possible adverse effects of the treatment plan; including the potential for metabolic complications, major organ dysfunction, black box warnings, and contraindications. The opportunity was given for questions/clarification, and after this discussion the above treatment plan was devised through shared decision making. The patient voiced their understanding of the diagnoses and treatments listed above and agreed to the treatment plan. Follow up plan was reviewed with the patient. The patient was advised to call to report any worsening of symptoms or problems with medication.    Supportive therapy and psychoeducation provided. Patient has been given crisis information including Suicide and Crisis Lifeline (call or text: 283). Patient also given instructions to go to the nearest ER or call 911 if unable to remain safe or if the Pt develops thoughts of harming self or others.    Documentation entered by me for this encounter may have been done in part using Nadanu Direct voice recognition transcription software. Garbled syntax, mangled pronouns, and other bizarre constructions may be attributed to that software system. Although I have made an effort to ensure  "accuracy, "sound like" errors may exist and should be interpreted in context.    "

## 2024-05-03 ENCOUNTER — PATIENT MESSAGE (OUTPATIENT)
Dept: FAMILY MEDICINE | Facility: CLINIC | Age: 74
End: 2024-05-03
Payer: MEDICARE

## 2024-05-09 ENCOUNTER — OFFICE VISIT (OUTPATIENT)
Dept: FAMILY MEDICINE | Facility: CLINIC | Age: 74
End: 2024-05-09
Payer: MEDICARE

## 2024-05-09 VITALS
OXYGEN SATURATION: 97 % | SYSTOLIC BLOOD PRESSURE: 128 MMHG | BODY MASS INDEX: 26.59 KG/M2 | WEIGHT: 169.75 LBS | HEART RATE: 68 BPM | RESPIRATION RATE: 18 BRPM | DIASTOLIC BLOOD PRESSURE: 82 MMHG

## 2024-05-09 DIAGNOSIS — M81.0 POSTMENOPAUSAL BONE LOSS: ICD-10-CM

## 2024-05-09 DIAGNOSIS — K21.9 GASTROESOPHAGEAL REFLUX DISEASE, UNSPECIFIED WHETHER ESOPHAGITIS PRESENT: Chronic | ICD-10-CM

## 2024-05-09 DIAGNOSIS — F41.1 GAD (GENERALIZED ANXIETY DISORDER): Chronic | ICD-10-CM

## 2024-05-09 DIAGNOSIS — N30.00 ACUTE CYSTITIS WITHOUT HEMATURIA: Primary | ICD-10-CM

## 2024-05-09 DIAGNOSIS — Z86.39 HISTORY OF VITAMIN D DEFICIENCY: ICD-10-CM

## 2024-05-09 DIAGNOSIS — F33.41 MDD (MAJOR DEPRESSIVE DISORDER), RECURRENT, IN PARTIAL REMISSION: Chronic | ICD-10-CM

## 2024-05-09 DIAGNOSIS — I70.0 AORTIC ATHEROSCLEROSIS: ICD-10-CM

## 2024-05-09 DIAGNOSIS — R79.9 ABNORMAL FINDING OF BLOOD CHEMISTRY, UNSPECIFIED: ICD-10-CM

## 2024-05-09 DIAGNOSIS — Z13.1 ENCOUNTER FOR SCREENING FOR DIABETES MELLITUS: ICD-10-CM

## 2024-05-09 DIAGNOSIS — I10 PRIMARY HYPERTENSION: Chronic | ICD-10-CM

## 2024-05-09 DIAGNOSIS — E03.8 OTHER SPECIFIED HYPOTHYROIDISM: Chronic | ICD-10-CM

## 2024-05-09 DIAGNOSIS — Z91.89 RISK FOR CORONARY ARTERY DISEASE BETWEEN 10% AND 20% IN NEXT 10 YEARS PER FRAMINGHAM SCORE: ICD-10-CM

## 2024-05-09 LAB
BILIRUBIN, UA POC OHS: NEGATIVE
BLOOD, UA POC OHS: ABNORMAL
CLARITY, UA POC OHS: CLEAR
COLOR, UA POC OHS: YELLOW
GLUCOSE, UA POC OHS: NEGATIVE
KETONES, UA POC OHS: NEGATIVE
LEUKOCYTES, UA POC OHS: ABNORMAL
NITRITE, UA POC OHS: NEGATIVE
PH, UA POC OHS: 6.5
PROTEIN, UA POC OHS: 30
SPECIFIC GRAVITY, UA POC OHS: 1.02
UROBILINOGEN, UA POC OHS: 0.2

## 2024-05-09 PROCEDURE — 99999PBSHW POCT URINALYSIS(INSTRUMENT): Mod: PBBFAC,,,

## 2024-05-09 PROCEDURE — 87186 SC STD MICRODIL/AGAR DIL: CPT | Performed by: STUDENT IN AN ORGANIZED HEALTH CARE EDUCATION/TRAINING PROGRAM

## 2024-05-09 PROCEDURE — 99999 PR PBB SHADOW E&M-EST. PATIENT-LVL IV: CPT | Mod: PBBFAC,,, | Performed by: STUDENT IN AN ORGANIZED HEALTH CARE EDUCATION/TRAINING PROGRAM

## 2024-05-09 PROCEDURE — 87086 URINE CULTURE/COLONY COUNT: CPT | Performed by: STUDENT IN AN ORGANIZED HEALTH CARE EDUCATION/TRAINING PROGRAM

## 2024-05-09 PROCEDURE — 81003 URINALYSIS AUTO W/O SCOPE: CPT | Mod: PBBFAC,PN | Performed by: STUDENT IN AN ORGANIZED HEALTH CARE EDUCATION/TRAINING PROGRAM

## 2024-05-09 PROCEDURE — 99214 OFFICE O/P EST MOD 30 MIN: CPT | Mod: S$PBB,,, | Performed by: STUDENT IN AN ORGANIZED HEALTH CARE EDUCATION/TRAINING PROGRAM

## 2024-05-09 PROCEDURE — 87077 CULTURE AEROBIC IDENTIFY: CPT | Performed by: STUDENT IN AN ORGANIZED HEALTH CARE EDUCATION/TRAINING PROGRAM

## 2024-05-09 PROCEDURE — 87088 URINE BACTERIA CULTURE: CPT | Performed by: STUDENT IN AN ORGANIZED HEALTH CARE EDUCATION/TRAINING PROGRAM

## 2024-05-09 PROCEDURE — 99214 OFFICE O/P EST MOD 30 MIN: CPT | Mod: PBBFAC,PN | Performed by: STUDENT IN AN ORGANIZED HEALTH CARE EDUCATION/TRAINING PROGRAM

## 2024-05-09 RX ORDER — MIRTAZAPINE 15 MG/1
15 TABLET, FILM COATED ORAL NIGHTLY
Qty: 90 TABLET | Refills: 1 | Status: SHIPPED | OUTPATIENT
Start: 2024-05-09

## 2024-05-09 RX ORDER — SULFAMETHOXAZOLE AND TRIMETHOPRIM 800; 160 MG/1; MG/1
1 TABLET ORAL 2 TIMES DAILY
Qty: 6 TABLET | Refills: 0 | Status: SHIPPED | OUTPATIENT
Start: 2024-05-09 | End: 2024-05-12

## 2024-05-09 RX ORDER — ATORVASTATIN CALCIUM 20 MG/1
20 TABLET, FILM COATED ORAL DAILY
Qty: 90 TABLET | Refills: 3 | Status: SHIPPED | OUTPATIENT
Start: 2024-05-09 | End: 2025-05-09

## 2024-05-09 RX ORDER — DULOXETIN HYDROCHLORIDE 30 MG/1
30 CAPSULE, DELAYED RELEASE ORAL DAILY
Qty: 90 CAPSULE | Refills: 1 | Status: SHIPPED | OUTPATIENT
Start: 2024-05-09

## 2024-05-09 RX ORDER — BUPROPION HYDROCHLORIDE 150 MG/1
150 TABLET ORAL DAILY
Qty: 90 TABLET | Refills: 1 | Status: SHIPPED | OUTPATIENT
Start: 2024-05-09

## 2024-05-09 RX ORDER — DULOXETIN HYDROCHLORIDE 60 MG/1
60 CAPSULE, DELAYED RELEASE ORAL DAILY
Qty: 90 CAPSULE | Refills: 1 | Status: SHIPPED | OUTPATIENT
Start: 2024-05-09

## 2024-05-09 NOTE — PROGRESS NOTES
Plan:      Coral was seen today for follow-up.    Diagnoses and all orders for this visit:    Acute cystitis without hematuria  -     Urine culture  -     POCT Urinalysis(Instrument)  -     Comprehensive Metabolic Panel; Future  -     CBC Auto Differential; Future  -     sulfamethoxazole-trimethoprim 800-160mg (BACTRIM DS) 800-160 mg Tab; Take 1 tablet by mouth 2 (two) times daily. for 3 days    YASMANI (generalized anxiety disorder)  -     Comprehensive Metabolic Panel; Future  -     TSH; Future  -     mirtazapine (REMERON) 15 MG tablet; Take 1 tablet (15 mg total) by mouth every evening.  -     DULoxetine (CYMBALTA) 60 MG capsule; Take 1 capsule (60 mg total) by mouth once daily.  -     DULoxetine (CYMBALTA) 30 MG capsule; Take 1 capsule (30 mg total) by mouth once daily.    MDD (major depressive disorder), recurrent, in partial remission  -     Comprehensive Metabolic Panel; Future  -     TSH; Future  -     mirtazapine (REMERON) 15 MG tablet; Take 1 tablet (15 mg total) by mouth every evening.  -     DULoxetine (CYMBALTA) 60 MG capsule; Take 1 capsule (60 mg total) by mouth once daily.  -     DULoxetine (CYMBALTA) 30 MG capsule; Take 1 capsule (30 mg total) by mouth once daily.  -     buPROPion (WELLBUTRIN XL) 150 MG TB24 tablet; Take 1 tablet (150 mg total) by mouth once daily.    Primary hypertension  -     Comprehensive Metabolic Panel; Future  -     TSH; Future    Aortic atherosclerosis  -     Lipid Panel; Future  -     Comprehensive Metabolic Panel; Future  -     atorvastatin (LIPITOR) 20 MG tablet; Take 1 tablet (20 mg total) by mouth once daily.    Risk for coronary artery disease between 10% and 20% in next 10 years per [place] score  -     Lipid Panel; Future  -     Comprehensive Metabolic Panel; Future  -     atorvastatin (LIPITOR) 20 MG tablet; Take 1 tablet (20 mg total) by mouth once daily.    Other specified hypothyroidism  -     TSH; Future    Gastroesophageal reflux disease, unspecified whether  esophagitis present  -     CBC Auto Differential; Future    Encounter for screening for diabetes mellitus  -     Hemoglobin A1C; Future    Abnormal finding of blood chemistry, unspecified  -     Hemoglobin A1C; Future    History of vitamin D deficiency  -     Vitamin D; Future    Postmenopausal bone loss  -     Vitamin D; Future      Follow up in about 6 months (around 11/9/2024), or if symptoms worsen or fail to improve.    Charla Cole MD  05/09/2024    Subjective:      Patient ID: Coral Pandya is a 73 y.o. female    Chief Complaint   Patient presents with    Follow-up     HPI  73 y.o. female with a PMHx as documented below presents to clinic today for the following:    Dysuria, urinary frequency, feeling of incomplete emptying, and pelvic pressure x1 week. No associated fever, nausea, vomiting.     Pt reports Hx of chronic neck pain - reports going to physical therapy about 10 years ago with great relief of symptoms. Currently managing w/ ibuprofen.     Follows w/ Psychiatry, Ophthalmology, and Ortho.     YASMANI:   - Cymbalta 90 mg daily   - Mirtazapine 15 mg qhs     MDD:   - Wellbutrin  mg daily   - Cymbalta 90 mg daily   - Mirtazapine 15 mg qhs     Allergic rhinitis:  - Nasonex daily PRN     HTN:   - Telmisartan 20 mg daily, Bystolic 5 mg daily     Aortic atherosclerosis:   - Lipitor 20 mg daily, Zetia 10 mg daily      The 10-year ASCVD risk score (Matthew DK, et al., 2019) is: 16.7%    Values used to calculate the score:      Age: 73 years      Sex: Female      Is Non- : No      Diabetic: No      Tobacco smoker: No      Systolic Blood Pressure: 128 mmHg      Is BP treated: Yes      HDL Cholesterol: 43 mg/dL      Total Cholesterol: 155 mg/dL     Hypothyroidism:   - Synthroid 100 mcg daily     GERD:    - Rabeprazole 20 mg daily, Pepcid 40 mg daily      Primary osteoarthritis of the right knee:   - Cymbalta 90 mg daily     ROS  Constitutional:  Negative for chills and  fever.   Respiratory:  Negative for shortness of breath.    Cardiovascular:  Negative for chest pain.   Gastrointestinal:  Negative for abdominal pain, constipation, diarrhea, nausea and vomiting.     Current Outpatient Medications   Medication Instructions    atorvastatin (LIPITOR) 20 mg, Oral, Daily    buPROPion (WELLBUTRIN XL) 150 mg, Oral, Daily    calcium carbonate-vitamin D3 600 mg calcium- 200 unit Cap 1 capsule, Oral, 2 times daily, DO NOT TAKE AM OF SURGERY    desonide (DESOWEN) 0.05 % cream APPLY TO EYELID SKIN NIGHTLY PRN ITCHY LIDS    DULoxetine (CYMBALTA) 60 mg, Oral, Daily    DULoxetine (CYMBALTA) 30 mg, Oral, Daily    ezetimibe (ZETIA) 10 mg, Oral    famotidine (PEPCID) 40 mg, Oral    ketotifen (ZADITOR) 0.025 % ophthalmic solution 1 drop, Both Eyes, 2 times daily, USE AS USUAL    methylPREDNISolone (MEDROL DOSEPACK) 4 mg tablet use as directed    mirtazapine (REMERON) 15 mg, Oral, Nightly    mometasone (NASONEX) 50 mcg/actuation nasal spray 2 sprays, Nasal, Daily    multivit-min-FA-lycopen-lutein (CENTRUM SILVER) 0.4-300-250 mg-mcg-mcg Tab No dose, route, or frequency recorded.    nebivoloL (BYSTOLIC) 5 mg, Oral, Daily    RABEprazole (ACIPHEX) 20 mg, Oral, Daily    sulfamethoxazole-trimethoprim 800-160mg (BACTRIM DS) 800-160 mg Tab 1 tablet, Oral, 2 times daily    SYNTHROID 100 mcg, Oral    telmisartan (MICARDIS) 20 mg, Oral, Daily    vit C/E/Zn/coppr/lutein/zeaxan (PRESERVISION AREDS-2 ORAL) 1 tablet, Oral, 2 times daily      Past Medical History:   Diagnosis Date    Allergic rhinitis 06/15/2022    Anxiety     Aortic atherosclerosis 08/24/2020    CT uro 5/21/20    Bladder mass     Blepharoconjunctivitis of both eyes 03/20/2023    Cervical spondylosis     Depression     FHx: colon cancer 06/14/2018    Fibromyalgia     GERD (gastroesophageal reflux disease)     Hematuria     HTN (hypertension)     Hypertensive retinopathy, bilateral 03/20/2023    Nonexudative age-related macular degeneration,  bilateral, early dry stage 03/20/2023    DANYELL (obstructive sleep apnea)     no cpap    Other specified hypothyroidism 04/01/2014    Primary open angle glaucoma of both eyes, mild stage 03/20/2023    Primary osteoarthritis of right knee 01/14/2019    Pseudophakia 03/20/2023    Risk for coronary artery disease greater than 20% in next 10 years per [place] score 11/09/2023    Thrombosis       Objective:      Vitals:    05/09/24 1418   BP: 128/82   BP Location: Right arm   Patient Position: Sitting   Pulse: 68   Resp: 18   SpO2: 97%   Weight: 77 kg (169 lb 12.1 oz)     Body mass index is 26.59 kg/m².    Physical Exam   Constitutional:       General: No acute distress.  HENT:      Head: Normocephalic and atraumatic.   Pulmonary:      Effort: Pulmonary effort is normal. No respiratory distress.   Neurological:      General: No focal deficit present.      Mental Status: Alert and oriented to person, place, and time. Mental status is at baseline.    Assessment:       1. Acute cystitis without hematuria    2. YASMANI (generalized anxiety disorder)    3. MDD (major depressive disorder), recurrent, in partial remission    4. Primary hypertension    5. Aortic atherosclerosis    6. Risk for coronary artery disease between 10% and 20% in next 10 years per [place] score    7. Other specified hypothyroidism    8. Gastroesophageal reflux disease, unspecified whether esophagitis present    9. Encounter for screening for diabetes mellitus    10. Abnormal finding of blood chemistry, unspecified    11. History of vitamin D deficiency    12. Postmenopausal bone loss        Charla Cole MD  [facility]  Office: [phone]   Fax: [phone]  05/09/2024      Disclaimer: This note was partly generated using dictation software which may occasionally result in transcription errors.    Total time spent on this encounter includes face to face time and non-face to face time preparing to see the patient  (eg, review of tests), obtaining and/or reviewing separately obtained history, documenting clinical information in the electronic or other health record, independently interpreting results, and communicating results to the patient/family/caregiver, or care coordinator.

## 2024-05-12 LAB — BACTERIA UR CULT: ABNORMAL

## 2024-05-20 ENCOUNTER — LAB VISIT (OUTPATIENT)
Dept: LAB | Facility: HOSPITAL | Age: 74
End: 2024-05-20
Attending: STUDENT IN AN ORGANIZED HEALTH CARE EDUCATION/TRAINING PROGRAM
Payer: MEDICARE

## 2024-05-20 DIAGNOSIS — E03.8 OTHER SPECIFIED HYPOTHYROIDISM: Chronic | ICD-10-CM

## 2024-05-20 DIAGNOSIS — K21.9 GASTROESOPHAGEAL REFLUX DISEASE, UNSPECIFIED WHETHER ESOPHAGITIS PRESENT: Chronic | ICD-10-CM

## 2024-05-20 DIAGNOSIS — F41.1 GAD (GENERALIZED ANXIETY DISORDER): Chronic | ICD-10-CM

## 2024-05-20 DIAGNOSIS — M81.0 POSTMENOPAUSAL BONE LOSS: ICD-10-CM

## 2024-05-20 DIAGNOSIS — F33.41 MDD (MAJOR DEPRESSIVE DISORDER), RECURRENT, IN PARTIAL REMISSION: Chronic | ICD-10-CM

## 2024-05-20 DIAGNOSIS — I10 PRIMARY HYPERTENSION: Chronic | ICD-10-CM

## 2024-05-20 DIAGNOSIS — Z13.1 ENCOUNTER FOR SCREENING FOR DIABETES MELLITUS: ICD-10-CM

## 2024-05-20 DIAGNOSIS — I70.0 AORTIC ATHEROSCLEROSIS: ICD-10-CM

## 2024-05-20 DIAGNOSIS — Z86.39 HISTORY OF VITAMIN D DEFICIENCY: ICD-10-CM

## 2024-05-20 DIAGNOSIS — R79.9 ABNORMAL FINDING OF BLOOD CHEMISTRY, UNSPECIFIED: ICD-10-CM

## 2024-05-20 DIAGNOSIS — N30.00 ACUTE CYSTITIS WITHOUT HEMATURIA: ICD-10-CM

## 2024-05-20 DIAGNOSIS — Z91.89 RISK FOR CORONARY ARTERY DISEASE BETWEEN 10% AND 20% IN NEXT 10 YEARS PER FRAMINGHAM SCORE: ICD-10-CM

## 2024-05-20 LAB
25(OH)D3+25(OH)D2 SERPL-MCNC: 52 NG/ML (ref 30–96)
ALBUMIN SERPL BCP-MCNC: 3.6 G/DL (ref 3.5–5.2)
ALP SERPL-CCNC: 76 U/L (ref 55–135)
ALT SERPL W/O P-5'-P-CCNC: 25 U/L (ref 10–44)
ANION GAP SERPL CALC-SCNC: 6 MMOL/L (ref 8–16)
AST SERPL-CCNC: 22 U/L (ref 10–40)
BASOPHILS # BLD AUTO: 0.04 K/UL (ref 0–0.2)
BASOPHILS NFR BLD: 1 % (ref 0–1.9)
BILIRUB SERPL-MCNC: 0.4 MG/DL (ref 0.1–1)
BUN SERPL-MCNC: 21 MG/DL (ref 8–23)
CALCIUM SERPL-MCNC: 9.7 MG/DL (ref 8.7–10.5)
CHLORIDE SERPL-SCNC: 110 MMOL/L (ref 95–110)
CHOLEST SERPL-MCNC: 128 MG/DL (ref 120–199)
CHOLEST/HDLC SERPL: 2.8 {RATIO} (ref 2–5)
CO2 SERPL-SCNC: 26 MMOL/L (ref 23–29)
CREAT SERPL-MCNC: 0.9 MG/DL (ref 0.5–1.4)
DIFFERENTIAL METHOD BLD: ABNORMAL
EOSINOPHIL # BLD AUTO: 0.1 K/UL (ref 0–0.5)
EOSINOPHIL NFR BLD: 2.6 % (ref 0–8)
ERYTHROCYTE [DISTWIDTH] IN BLOOD BY AUTOMATED COUNT: 12.7 % (ref 11.5–14.5)
EST. GFR  (NO RACE VARIABLE): >60 ML/MIN/1.73 M^2
ESTIMATED AVG GLUCOSE: 117 MG/DL (ref 68–131)
GLUCOSE SERPL-MCNC: 101 MG/DL (ref 70–110)
HBA1C MFR BLD: 5.7 % (ref 4–5.6)
HCT VFR BLD AUTO: 44 % (ref 37–48.5)
HDLC SERPL-MCNC: 45 MG/DL (ref 40–75)
HDLC SERPL: 35.2 % (ref 20–50)
HGB BLD-MCNC: 14.8 G/DL (ref 12–16)
IMM GRANULOCYTES # BLD AUTO: 0.01 K/UL (ref 0–0.04)
IMM GRANULOCYTES NFR BLD AUTO: 0.3 % (ref 0–0.5)
LDLC SERPL CALC-MCNC: 64.4 MG/DL (ref 63–159)
LYMPHOCYTES # BLD AUTO: 1.4 K/UL (ref 1–4.8)
LYMPHOCYTES NFR BLD: 37.4 % (ref 18–48)
MCH RBC QN AUTO: 32.6 PG (ref 27–31)
MCHC RBC AUTO-ENTMCNC: 33.6 G/DL (ref 32–36)
MCV RBC AUTO: 97 FL (ref 82–98)
MONOCYTES # BLD AUTO: 0.5 K/UL (ref 0.3–1)
MONOCYTES NFR BLD: 13.4 % (ref 4–15)
NEUTROPHILS # BLD AUTO: 1.7 K/UL (ref 1.8–7.7)
NEUTROPHILS NFR BLD: 45.3 % (ref 38–73)
NONHDLC SERPL-MCNC: 83 MG/DL
NRBC BLD-RTO: 0 /100 WBC
PLATELET # BLD AUTO: 148 K/UL (ref 150–450)
PMV BLD AUTO: 12.4 FL (ref 9.2–12.9)
POTASSIUM SERPL-SCNC: 5.1 MMOL/L (ref 3.5–5.1)
PROT SERPL-MCNC: 6.5 G/DL (ref 6–8.4)
RBC # BLD AUTO: 4.54 M/UL (ref 4–5.4)
SODIUM SERPL-SCNC: 142 MMOL/L (ref 136–145)
TRIGL SERPL-MCNC: 93 MG/DL (ref 30–150)
TSH SERPL DL<=0.005 MIU/L-ACNC: 2.23 UIU/ML (ref 0.4–4)
WBC # BLD AUTO: 3.82 K/UL (ref 3.9–12.7)

## 2024-05-20 PROCEDURE — 80061 LIPID PANEL: CPT | Performed by: STUDENT IN AN ORGANIZED HEALTH CARE EDUCATION/TRAINING PROGRAM

## 2024-05-20 PROCEDURE — 83036 HEMOGLOBIN GLYCOSYLATED A1C: CPT | Performed by: STUDENT IN AN ORGANIZED HEALTH CARE EDUCATION/TRAINING PROGRAM

## 2024-05-20 PROCEDURE — 85025 COMPLETE CBC W/AUTO DIFF WBC: CPT | Performed by: STUDENT IN AN ORGANIZED HEALTH CARE EDUCATION/TRAINING PROGRAM

## 2024-05-20 PROCEDURE — 80053 COMPREHEN METABOLIC PANEL: CPT | Performed by: STUDENT IN AN ORGANIZED HEALTH CARE EDUCATION/TRAINING PROGRAM

## 2024-05-20 PROCEDURE — 36415 COLL VENOUS BLD VENIPUNCTURE: CPT | Mod: PN | Performed by: STUDENT IN AN ORGANIZED HEALTH CARE EDUCATION/TRAINING PROGRAM

## 2024-05-20 PROCEDURE — 84443 ASSAY THYROID STIM HORMONE: CPT | Performed by: STUDENT IN AN ORGANIZED HEALTH CARE EDUCATION/TRAINING PROGRAM

## 2024-05-20 PROCEDURE — 82306 VITAMIN D 25 HYDROXY: CPT | Performed by: STUDENT IN AN ORGANIZED HEALTH CARE EDUCATION/TRAINING PROGRAM

## 2024-05-23 ENCOUNTER — PATIENT MESSAGE (OUTPATIENT)
Dept: FAMILY MEDICINE | Facility: CLINIC | Age: 74
End: 2024-05-23
Payer: MEDICARE

## 2024-06-26 ENCOUNTER — TELEPHONE (OUTPATIENT)
Dept: FAMILY MEDICINE | Facility: CLINIC | Age: 74
End: 2024-06-26
Payer: MEDICARE

## 2024-06-26 NOTE — TELEPHONE ENCOUNTER
----- Message from Karin Mohan, Patient Care Assistant sent at 6/26/2024  1:40 PM CDT -----  Regarding: orders  Contact: pt  Type: Needs Medical Advice    Who Called:  pt     Symptoms (please be specific):  bladder infection     How long has patient had these symptoms:  1 day     Best Call Back Number: [phone] (home)     Additional Information: please call to advise. Thanks!

## 2024-07-25 ENCOUNTER — OFFICE VISIT (OUTPATIENT)
Dept: URGENT CARE | Facility: CLINIC | Age: 74
End: 2024-07-25
Payer: MEDICARE

## 2024-07-25 ENCOUNTER — TELEPHONE (OUTPATIENT)
Dept: FAMILY MEDICINE | Facility: CLINIC | Age: 74
End: 2024-07-25
Payer: MEDICARE

## 2024-07-25 VITALS
BODY MASS INDEX: 26.84 KG/M2 | WEIGHT: 171 LBS | RESPIRATION RATE: 18 BRPM | TEMPERATURE: 98 F | DIASTOLIC BLOOD PRESSURE: 85 MMHG | SYSTOLIC BLOOD PRESSURE: 137 MMHG | HEART RATE: 65 BPM | OXYGEN SATURATION: 97 % | HEIGHT: 67 IN

## 2024-07-25 DIAGNOSIS — R31.9 HEMATURIA, UNSPECIFIED TYPE: ICD-10-CM

## 2024-07-25 DIAGNOSIS — N30.01 ACUTE CYSTITIS WITH HEMATURIA: Primary | ICD-10-CM

## 2024-07-25 LAB
BILIRUBIN, UA POC OHS: NEGATIVE
BLOOD, UA POC OHS: ABNORMAL
CLARITY, UA POC OHS: ABNORMAL
COLOR, UA POC OHS: YELLOW
GLUCOSE, UA POC OHS: NEGATIVE
KETONES, UA POC OHS: NEGATIVE
LEUKOCYTES, UA POC OHS: ABNORMAL
NITRITE, UA POC OHS: NEGATIVE
PH, UA POC OHS: 7
PROTEIN, UA POC OHS: 100
SPECIFIC GRAVITY, UA POC OHS: 1.02
UROBILINOGEN, UA POC OHS: 0.2

## 2024-07-25 PROCEDURE — 87086 URINE CULTURE/COLONY COUNT: CPT | Performed by: NURSE PRACTITIONER

## 2024-07-25 PROCEDURE — 81003 URINALYSIS AUTO W/O SCOPE: CPT | Mod: QW,S$GLB,, | Performed by: NURSE PRACTITIONER

## 2024-07-25 PROCEDURE — 99213 OFFICE O/P EST LOW 20 MIN: CPT | Mod: S$GLB,,, | Performed by: NURSE PRACTITIONER

## 2024-07-25 RX ORDER — CEPHALEXIN 500 MG/1
500 CAPSULE ORAL 4 TIMES DAILY
Qty: 28 CAPSULE | Refills: 0 | Status: SHIPPED | OUTPATIENT
Start: 2024-07-25 | End: 2024-08-01

## 2024-07-25 NOTE — PATIENT INSTRUCTIONS

## 2024-07-25 NOTE — PROGRESS NOTES
"Subjective:      Patient ID: Coral Pandya is a 73 y.o. female.    Vitals:  height is 5' 7" (1.702 m) and weight is 77.6 kg (171 lb). Her temperature is 97.5 °F (36.4 °C). Her blood pressure is 137/85 and her pulse is 65. Her respiration is 18 and oxygen saturation is 97%.     Chief Complaint: Dysuria    Patient states she has dysuria for one day. Patient denies fever and has not taken any OTC meds.     Provider note begins below:  Patient has no fever or chills.  Denies any nausea/vomiting, vaginal bleeding/discharge, abdominal pain or CVA tenderness.  Positive urine culture on 06/26/2024.    Dysuria   This is a new problem. The current episode started today. The problem occurs every urination. The problem has been gradually worsening. The quality of the pain is described as burning. The pain is at a severity of 0/10. The patient is experiencing no pain. There has been no fever. The fever has been present for Less than 1 day. She is Sexually active. Associated symptoms include frequency, hesitancy and urgency. Pertinent negatives include no chills, hematuria, nausea, vomiting or rash. She has tried nothing for the symptoms. The treatment provided no relief.       Constitution: Negative. Negative for chills, sweating and fatigue.   HENT: Negative.  Negative for ear pain, facial swelling, congestion and sore throat.    Neck: Negative for painful lymph nodes.   Cardiovascular: Negative.  Negative for chest trauma, chest pain and sob on exertion.   Eyes: Negative.  Negative for eye itching and eye pain.   Respiratory: Negative.  Negative for chest tightness, cough and asthma.    Gastrointestinal: Negative.  Negative for nausea, vomiting and diarrhea.   Endocrine: negative. cold intolerance and excessive thirst.   Genitourinary:  Positive for dysuria, frequency and urgency. Negative for hematuria, vaginal discharge and vaginal bleeding.   Musculoskeletal:  Negative for pain, trauma and joint pain.   Skin: " Negative.  Negative for rash, wound and hives.   Allergic/Immunologic: Negative.  Negative for eczema, asthma, hives and itching.   Neurological: Negative.  Negative for disorientation and altered mental status.   Hematologic/Lymphatic: Negative.  Negative for swollen lymph nodes.   Psychiatric/Behavioral: Negative.  Negative for altered mental status, disorientation and confusion.       Objective:     Physical Exam   Constitutional: She is oriented to person, place, and time. She appears well-developed.  Non-toxic appearance. She does not appear ill. No distress.   HENT:   Head: Normocephalic and atraumatic.   Ears:   Right Ear: External ear normal.   Left Ear: External ear normal.   Nose: Nose normal. No nasal deformity. No epistaxis.   Mouth/Throat: Oropharynx is clear and moist and mucous membranes are normal.   Eyes: Lids are normal.   Neck: Trachea normal and phonation normal. Neck supple.   Cardiovascular: Normal pulses.   Pulmonary/Chest: Effort normal and breath sounds normal. No stridor. No respiratory distress. She has no wheezes. She has no rhonchi. She has no rales. She exhibits no tenderness.   Abdominal: Normal appearance and bowel sounds are normal. She exhibits no distension. Soft. There is no abdominal tenderness. There is no left CVA tenderness and no right CVA tenderness.   Neurological: no focal deficit. She is alert, oriented to person, place, and time and at baseline.   Skin: Skin is warm, dry, intact and not diaphoretic.   Psychiatric: Her speech is normal and behavior is normal. Mood, judgment and thought content normal.   Nursing note and vitals reviewed.    Results for orders placed or performed in visit on 07/25/24   POCT Urinalysis(Instrument)   Result Value Ref Range    Color, POC UA Yellow Yellow, Straw, Colorless    Clarity, POC UA Cloudy (A) Clear    Glucose, POC UA Negative Negative    Bilirubin, POC UA Negative Negative    Ketones, POC UA Negative Negative    Spec Grav POC UA 1.025  1.005 - 1.030    Blood, POC UA Large (A) Negative    pH, POC UA 7.0 5.0 - 8.0    Protein, POC  (A) Negative    Urobilinogen, POC UA 0.2 <=1.0    Nitrite, POC UA Negative Negative    WBC, POC UA Small (A) Negative         Assessment:     1. Acute cystitis with hematuria    2. Hematuria, unspecified type        Plan:   Discussed urine results with patient (+ leuks/blood).  Urine culture sent, will call patient with results.  Will treat patient today with Keflex. Declined Pyridium.  Patient instructed to increase fluid intake.  Encouraged follow-up with PCP ASAP.  Patient acknowledged all and agreed with this plan of care.    FOLLOWUP  Follow up if symptoms worsen or fail to improve, for PLEASE CONTACT PCP OR CONTACT THE EMERGENCY ROOM..     PATIENT INSTRUCTIONS  Patient Instructions   INSTRUCTIONS:  - Rest.  - Drink plenty of fluids.  - Take Tylenol and/or Ibuprofen as directed as needed for fever/pain.  Do not take more than the recommended dose.  - follow up with your PCP within the next 1-2 weeks as needed.  - You must understand that you have received an Urgent Care treatment only and that you may be released before all of your medical problems are known or treated.   - You, the patient, will arrange for follow up care as instructed.   - If your condition worsens or fails to improve we recommend that you receive another evaluation at the ER immediately or contact your PCP to discuss your concerns.   - You can call [phone] or [phone] to help schedule an appointment with the appropriate provider.     -If you smoke cigarettes, it would be beneficial for you to stop.         THANK YOU FOR ALLOWING ME TO PARTICIPATE IN YOUR HEALTHCARE,     Sorin Sheppard NP     Acute cystitis with hematuria  -     Culture, Urine  -     cephALEXin (KEFLEX) 500 MG capsule; Take 1 capsule (500 mg total) by mouth 4 (four) times daily. for 7 days  Dispense: 28 capsule; Refill: 0    Hematuria, unspecified type  -      POCT Urinalysis(Instrument)

## 2024-07-25 NOTE — TELEPHONE ENCOUNTER
----- Message from Chloe Tavares sent at 7/25/2024  8:10 AM CDT -----  Contact: Self  Type: Needs Medical Advice  Who Called:  Patient  Symptoms (please be specific):  burning thinks UTI coming on  How long has patient had these symptoms:  doesn't know if she should be seen or just drop off urine  sample  Pharmacy name and phone #:    [id number] - [place] - [address]  [address]  [place] [address]  Phone: [phone] Fax: [phone]  Best Call Back Number:  [phone]  Additional Information:  Please call the patient back at the phone number listed above to advise. Thank you!

## 2024-07-29 ENCOUNTER — TELEPHONE (OUTPATIENT)
Dept: URGENT CARE | Facility: CLINIC | Age: 74
End: 2024-07-29
Payer: MEDICARE

## 2024-07-29 NOTE — TELEPHONE ENCOUNTER
Patient notified of positive urine culture for Enterococcus faecalis.  Patient is currently on Keflex and she reports complete resolution of her symptoms.  Patient instructed to complete antibiotic and follow-up with PCP.

## 2024-08-06 ENCOUNTER — PATIENT MESSAGE (OUTPATIENT)
Dept: PSYCHIATRY | Facility: CLINIC | Age: 74
End: 2024-08-06
Payer: MEDICARE

## 2024-08-08 ENCOUNTER — OFFICE VISIT (OUTPATIENT)
Dept: PSYCHIATRY | Facility: CLINIC | Age: 74
End: 2024-08-08
Payer: MEDICARE

## 2024-08-08 DIAGNOSIS — F33.40 RECURRENT MAJOR DEPRESSIVE DISORDER, IN REMISSION: Primary | ICD-10-CM

## 2024-08-08 DIAGNOSIS — F41.1 GAD (GENERALIZED ANXIETY DISORDER): ICD-10-CM

## 2024-08-08 PROCEDURE — 99213 OFFICE O/P EST LOW 20 MIN: CPT | Mod: PBBFAC,PO

## 2024-08-08 PROCEDURE — 99999 PR PBB SHADOW E&M-EST. PATIENT-LVL III: CPT | Mod: PBBFAC,,,

## 2024-10-28 DIAGNOSIS — E03.9 HYPOTHYROIDISM, UNSPECIFIED TYPE: ICD-10-CM

## 2024-10-28 DIAGNOSIS — K21.9 GASTROESOPHAGEAL REFLUX DISEASE, UNSPECIFIED WHETHER ESOPHAGITIS PRESENT: ICD-10-CM

## 2024-10-28 DIAGNOSIS — I10 HYPERTENSION, UNSPECIFIED TYPE: ICD-10-CM

## 2024-10-28 DIAGNOSIS — F33.41 MDD (MAJOR DEPRESSIVE DISORDER), RECURRENT, IN PARTIAL REMISSION: Chronic | ICD-10-CM

## 2024-10-28 DIAGNOSIS — F41.1 GAD (GENERALIZED ANXIETY DISORDER): Chronic | ICD-10-CM

## 2024-10-29 RX ORDER — FAMOTIDINE 40 MG/1
40 TABLET, FILM COATED ORAL
Qty: 90 TABLET | Refills: 3 | Status: SHIPPED | OUTPATIENT
Start: 2024-10-29

## 2024-10-29 RX ORDER — TELMISARTAN 20 MG/1
20 TABLET ORAL
Qty: 90 TABLET | Refills: 3 | Status: SHIPPED | OUTPATIENT
Start: 2024-10-29

## 2024-10-29 RX ORDER — RABEPRAZOLE SODIUM 20 MG/1
20 TABLET, DELAYED RELEASE ORAL
Qty: 90 TABLET | Refills: 3 | Status: SHIPPED | OUTPATIENT
Start: 2024-10-29

## 2024-10-29 RX ORDER — DULOXETIN HYDROCHLORIDE 60 MG/1
60 CAPSULE, DELAYED RELEASE ORAL
Qty: 90 CAPSULE | Refills: 1 | Status: SHIPPED | OUTPATIENT
Start: 2024-10-29

## 2024-10-29 RX ORDER — NEBIVOLOL 5 MG/1
5 TABLET ORAL
Qty: 90 TABLET | Refills: 3 | Status: SHIPPED | OUTPATIENT
Start: 2024-10-29

## 2024-10-29 RX ORDER — MIRTAZAPINE 15 MG/1
15 TABLET, FILM COATED ORAL NIGHTLY
Qty: 90 TABLET | Refills: 1 | Status: SHIPPED | OUTPATIENT
Start: 2024-10-29

## 2024-10-29 RX ORDER — BUPROPION HYDROCHLORIDE 150 MG/1
150 TABLET ORAL
Qty: 90 TABLET | Refills: 1 | Status: SHIPPED | OUTPATIENT
Start: 2024-10-29

## 2024-10-29 RX ORDER — EZETIMIBE 10 MG/1
10 TABLET ORAL
Qty: 90 TABLET | Refills: 2 | Status: SHIPPED | OUTPATIENT
Start: 2024-10-29

## 2024-10-29 RX ORDER — LEVOTHYROXINE SODIUM 100 UG/1
100 TABLET ORAL
Qty: 90 TABLET | Refills: 3 | Status: SHIPPED | OUTPATIENT
Start: 2024-10-29

## 2024-11-14 ENCOUNTER — OFFICE VISIT (OUTPATIENT)
Dept: PSYCHIATRY | Facility: CLINIC | Age: 74
End: 2024-11-14
Payer: MEDICARE

## 2024-11-14 ENCOUNTER — TELEPHONE (OUTPATIENT)
Dept: FAMILY MEDICINE | Facility: CLINIC | Age: 74
End: 2024-11-14
Payer: MEDICARE

## 2024-11-14 VITALS
BODY MASS INDEX: 26.99 KG/M2 | HEIGHT: 67 IN | DIASTOLIC BLOOD PRESSURE: 77 MMHG | SYSTOLIC BLOOD PRESSURE: 118 MMHG | WEIGHT: 171.94 LBS | HEART RATE: 72 BPM

## 2024-11-14 DIAGNOSIS — F41.1 GAD (GENERALIZED ANXIETY DISORDER): ICD-10-CM

## 2024-11-14 DIAGNOSIS — F33.40 RECURRENT MAJOR DEPRESSIVE DISORDER, IN REMISSION: Primary | ICD-10-CM

## 2024-11-14 PROCEDURE — 99214 OFFICE O/P EST MOD 30 MIN: CPT | Mod: S$PBB,,,

## 2024-11-14 PROCEDURE — 99999 PR PBB SHADOW E&M-EST. PATIENT-LVL III: CPT | Mod: PBBFAC,,,

## 2024-11-14 PROCEDURE — 99213 OFFICE O/P EST LOW 20 MIN: CPT | Mod: PBBFAC,PO

## 2024-11-14 PROCEDURE — 90833 PSYTX W PT W E/M 30 MIN: CPT | Mod: ,,,

## 2024-11-14 NOTE — PROGRESS NOTES
"OUTPATIENT PSYCHIATRY FOLLOW UP VISIT    Encounter Date: 11/14/2024    Clinical Status of Patient:  Outpatient (Ambulatory)    Chief Complaint:  Coral Pandya is a 74 y.o. female who presents today for follow-up.  Met with patient.      HISTORY OF PRESENTING ILLNESS:  Coral Pandya is a 74 y.o. female with history of MDD, recurrent, in remission and YASMANI who presents for follow up appointment.      Plan at last appointment:   Continue duloxetine 90 mg q.a.m. for mood anxiety  Continue bupropion  mg q.a.m.   Continue mirtazapine 15 mg q.h.s.  Cont exercise, encouraged mindfulness of dietary choices in an effort to dec inflammation  Offered referral for individual psychotherapy.    Psychotropic medication history:    Lexapro and Wellbutrin (effective but led to h/a's), celexa (nausea), valium        INTERVAL HISTORY:    Pt reports her mood improved since our last visit and has been stable. Anxiety is decreased and well controlled.     Pt reports left sided torso and LUE pain for the last 2 months that worsened recently. Was seen at ER for same last night with a negative workup. She reports pain is improved today.     Continues to ride her bike frequently for exercise.     Is patient experiencing or having changes in:  Trouble with sleep:  continues to sleep well, usually sleeps 6-7 hours on average   Appetite changes:  no  Weight changes: no  Lack of energy:  no  Anhedonia:  no  Somatic symptoms:  no  Anxiety/panic:  no, "I don't really have much anxiety. I struggled with that years ago. I don't really get that anymore."  Irritability: no  Guilty/hopeless:  no  Concentration: no  Racing thoughts: no  Impulsive behaviors: no  Paranoia/AVH: no  Self-injurious behavior/risky behavior:  no  Any drugs:  no  Alcohol:  no    No interval episodes with symptoms consistent with kimani or hypomania.  Denied interval or current suicidal/homicidal thoughts, intent, or plan or NSSI.  Denied other questions and " concerns.  Patient reports feeling stable and wishing to continue current management unchanged.    Medication side effects:  continued xerostomia, sees dental hygienist regularly  Medication adherence: yes    Psychotherapy:  Target symptoms: depression  Why chosen therapy is appropriate versus another modality: evidence based practice  Outcome monitoring methods: self-report, observation  Therapeutic intervention type: supportive psychotherapy  Topics discussed/themes: building skills sets for symptom management, symptom recognition  The patient's response to the intervention is accepting. The patient's progress toward treatment goals is fair.   Duration of intervention: 16 minutes.      MEDICAL REVIEW OF SYSTEMS:   Pain: Denies any significant chronic or acute pain.  Constitutional: Denies fever or change in appetite.  Cardiovascular: Denies chest pain or exertional dyspnea.  Respiratory: Nic cough or orthopnea.   GI: Denies abdominal pain, N/V  Neurological: Denies tremor, seizure, or focal weakness.  Psychiatric: See HPI above.    PAST PSYCHIATRIC, MEDICAL, AND SOCIAL HISTORY REVIEWED  The patient's past medical, family and social history have been reviewed and updated as appropriate within the electronic medical record - see encounter notes.    PAST MEDICAL HISTORY:   Past Medical History:   Diagnosis Date    Allergic rhinitis 06/15/2022    Anxiety     Aortic atherosclerosis 08/24/2020    CT uro 5/21/20    Bladder mass     Blepharoconjunctivitis of both eyes 03/20/2023    Cervical spondylosis     Depression     FHx: colon cancer 06/14/2018    Fibromyalgia     GERD (gastroesophageal reflux disease)     Hematuria     HTN (hypertension)     Hypertensive retinopathy, bilateral 03/20/2023    Nonexudative age-related macular degeneration, bilateral, early dry stage 03/20/2023    DANYELL (obstructive sleep apnea)     no cpap    Other specified hypothyroidism 04/01/2014    Primary open angle glaucoma of both eyes, mild  "stage 03/20/2023    Primary osteoarthritis of right knee 01/14/2019    Pseudophakia 03/20/2023    Risk for coronary artery disease greater than 20% in next 10 years per [place] score 11/09/2023    Thrombosis    Head trauma/Loss of consciousness: denies  Seizures: denies     PAST PSYCHIATRIC HISTORY:  First psych contact:  2016 with Dr Coe for depression and anxiety  Prior hospitalizations: denies  Prior suicide attempts or self-harm: denies  Prior diagnosis: MDD, recurrent, in partial remission; YASMANI  Prior psychotherapy: Religion counselor prior to marriage separation, did help    FAMILY HISTORY:   Paternal: F-anxiety; no history of substance abuse or suicide  Maternal: M-anxiety; no history of substance abuse or suicide  Siblings: brother- suicide 1990  dtr- seizure while on wellbutrin    SOCIAL HISTORY:   Childhood: [place], moved to [facility]  in [place]  Marital Status:  but  for 15 years  Children: 3 children  Resides: [place]  Occupation: part time at [employer]  Hobbies: bike riding [place]  Church: Shinto  Education level: HS grad  : denies  Legal: denies    SUBSTANCE USE HISTORY:  Caffeine: coffee in AM, but no caffeine after noon  Tobacco: denies  Alcohol: denies  Drug use: denies  Rehab: denies  Prior/current AA: denies    EXAM:  Constitutional  Vitals:  Most recent vital signs were reviewed.   Last 3 sets of VS:      7/25/2024     3:31 PM 11/13/2024     5:46 PM 11/14/2024     1:35 PM   Vitals - 1 value per visit   SYSTOLIC 137 136 118   DIASTOLIC 85 83 77   Pulse 65 86 72   Temp 97.5 °F (36.4 °C) 98.1 °F (36.7 °C)    Resp 18 16    SPO2 97 % 96 %    Weight (lb) 171 173.28 171.96   Weight (kg) 77.565 78.6 78   Height 5' 7" (1.702 m)  5' 7" (1.702 m)   BMI (Calculated) 26.8  26.9   Pain Score Zero  Five      General:  unremarkable, age appropriate     Musculoskeletal  Muscle Strength/Tone:  No tremor or abnormal movements   Gait & Station:  " Steady, non-ataxic     Psychiatric  Speech:  no latency; no press   Mood & Affect:  euthymic  congruent and appropriate   Thought Process:  normal and logical   Associations:  intact   Thought Content:  normal, no suicidality, no homicidality, delusions, or paranoia   Insight:  intact   Judgement: behavior is adequate to circumstances   Orientation:  grossly intact   Memory: intact for content of interview   Language: grossly intact   Attention Span & Concentration:  Intact to interview   Fund of Knowledge:  Not tested     SUICIDE RISK ASSESSMENT:  Protective factors:  gender, no prior attempts, no prior hospitalizations, no family h/o attempts, no ongoing substance abuse, no psychosis, has children, denies SI/intent/plan, seeking treatment, access to treatment, future oriented, good primary support, no access to firearms  Risks: age,  but , hx MDD and YASMANI  Patient is a low immediate and long-term risk considering risk factors.     RELEVANT LABS/STUDIES:    Lab Results   Component Value Date    WBC 3.82 (L) 05/20/2024    HGB 14.8 05/20/2024    HCT 44.0 05/20/2024    MCV 97 05/20/2024     (L) 05/20/2024     BMP  Lab Results   Component Value Date     05/20/2024    K 5.1 05/20/2024     05/20/2024    CO2 26 05/20/2024    BUN 21 05/20/2024    CREATININE 0.9 05/20/2024    CALCIUM 9.7 05/20/2024    ANIONGAP 6 (L) 05/20/2024    ESTGFRAFRICA >60.0 08/23/2021    EGFRNONAA >60.0 08/23/2021     Lab Results   Component Value Date    ALT 25 05/20/2024    AST 22 05/20/2024    ALKPHOS 76 05/20/2024    BILITOT 0.4 05/20/2024     Lab Results   Component Value Date    TSH 2.226 05/20/2024     Lab Results   Component Value Date    HGBA1C 5.7 (H) 05/20/2024       IMPRESSION:    Coral Pandya is a 74 y.o. female with history of MDD and YASMANI who presents for follow up appointment.    Status/Progress: Based on the examination today, the patient's problem(s) is/are improved and well controlled.   New problems have not been presented today.   Co-morbidities are not complicating management of the primary condition.  There are no active rule-out diagnoses for this patient at this time.     Pt reports several days of low mood in the interim. Discussed treatment options including increasing bupropion XL from 150-300 mg daily.  Patient does not wish to make changes to medications at this time she believes her low mood was situational and related to remembering her brother's suicide.      Risk Parameters:  Patient reports no suicidal ideation  Patient reports no homicidal ideation  Patient reports no self-injurious behavior  Patient reports no violent behavior    DIAGNOSES:    ICD-10-CM ICD-9-CM   1. Recurrent major depressive disorder, in remission  F33.40 296.35   2. YASMANI (generalized anxiety disorder)  F41.1 300.02         PLAN:  Continue duloxetine 90 mg q.a.m. for mood anxiety  Continue bupropion  mg q.a.m.   Continue mirtazapine 15 mg q.h.s.  Cont exercise, encouraged mindfulness of dietary choices in an effort to dec inflammation  Offered referral for individual psychotherapy.      RETURN TO CLINIC:   3 months      RODOLFO Freed, PMHNP-BC      30 minutes of total time spent on the encounter, which includes face to face time and non-face to face time preparing to see the patient (eg. review of tests), obtaining and/or reviewing separately obtained history, documenting clinical information in the electronic health record, independently interpreting results (not separately reported), and communicating results to the patient/family/caregiver, or care coordination (not separately reported).     Visit today included managing the longitudinal care of the patient due to the serious and/or complex managed problem(s) MDD and YASMANI.    At this time there are no indications the patient represents an imminent danger to either themselves or others; will continue to manage treatment in the outpatient  "setting.    I discussed the patient's care with the patient including benefits, alternatives, possible adverse effects of the treatment plan; including the potential for metabolic complications, major organ dysfunction, black box warnings, and contraindications. The opportunity was given for questions/clarification, and after this discussion the above treatment plan was devised through shared decision making. The patient voiced their understanding of the diagnoses and treatments listed above and agreed to the treatment plan. Follow up plan was reviewed with the patient. The patient was advised to call to report any worsening of symptoms or problems with medication.    Supportive therapy and psychoeducation provided. Patient has been given crisis information including Suicide and Crisis Lifeline (call or text: 750). Patient also given instructions to go to the nearest ER or call 911 if unable to remain safe or if the Pt develops thoughts of harming self or others.    Documentation entered by me for this encounter may have been done in part using [employer] Direct voice recognition transcription software. Garbled syntax, mangled pronouns, and other bizarre constructions may be attributed to that software system. Although I have made an effort to ensure accuracy, "sound like" errors may exist and should be interpreted in context.    "

## 2024-11-14 NOTE — TELEPHONE ENCOUNTER
----- Message from Yvette sent at 11/14/2024  8:36 AM CST -----  Contact: [phone] Patient  Caller is requesting an earlier appointment then we can schedule.  Caller is requesting a message be sent to the provider.    If this is for urgent care symptoms, did you offer other providers at this location, providers at other locations, or [facility]? (yes, no, n/a):      If this is for the patients physical, did you offer to schedule next available and put on wait list, or to see NP or PA for their physical?  (yes, no, n/a):      When is the next available appointment with their provider:  12/20/2024    Reason for the appointment:  back pain that has gotten worse    Patient preference of timeframe to be scheduled:  ASAP today    Would the patient like a call back, or a response through their [facility] portal?:   Call Back Please. Thank you    Comments:

## 2024-11-14 NOTE — PROGRESS NOTES
"OUTPATIENT PSYCHIATRY FOLLOW UP VISIT    Encounter Date: 2024    Clinical Status of Patient:  Outpatient (Ambulatory)    Chief Complaint:  Coral Pandya is a 74 y.o. female who presents today for follow-up.  Met with patient.      HISTORY OF PRESENTING ILLNESS:  Coral Pandya is a 74 y.o. female with history of MDD, recurrent, in remission and YASMANI who presents for follow up appointment.      2023: Mood has been "OK". Has had a few days of feeling low, "But I ride my bike and I feel better."  Reports an occasional "Sinking-in feeling" to her abdomen in the morning, "And I don't want to get out of bed." "I keep very busy, working, gardening, riding my bike."  "I'm not in danger of doing anything. I'm nowhere near to that."   Rides 25-35 miles several times per week, has lost 4 lbs recently.   Dry mouth is bothersome. Uses biotene products. Asks about pilocarpine, which her dentist told her about. Gave printed Pt information on pilocarpine.     2024: Mood has been stable. Denies anxiety. "I feel like the medicine is working. Normally, if I don't have anything to do it's harder, but I haven't had any issues."  Caught Covid during the holidays, was ill for approximately one month.  Has not been riding her bike since before the holidays d/t being ill.  Has a stationary bike, but finds this boring. Does not belong to a gym.     2024: Pt reports her mood has been "good. Occasionally I have a bad day but it doesn't go on forever." Low mood occurs approximately once every 3 months and lasts from one to several days.   Has been able to get out and ride her bike as the weather has been mild. She rode 20 miles yesterday.  Continues to struggle with xerostomia. Uses biotene brand products.   No lasting effects from Covid.     2024: Pt reports 2-3 days in the interim where she "felt situationally down. But it doesn't last more than a day or so." She notes her brother  by suicide in the " "summer of 1990 "and I think about that and I think how much worse could he have been feeling? But then I feel guilty about the thought even crossing my mind. I could never do that. I'm very Shinto."  She further states, "I almost didn't say anything about that, but I feel so guilty for thinking it. Confessing it helps me to feel less guilty." She denies active SI, plan, or intent.   She also reports currently suffering with pain to her neck from arthritis.   Worries about her children as well as, "The news on TV, you can't even turn the TV on. And the way the country is right now."   Continues to ride her bike, which helps her mood. Rides 25 miles at a time. States she feels much better after riding her bike.    Plan at last appointment:   Continue duloxetine 90 mg q.a.m. for mood anxiety  Continue bupropion  mg q.a.m.   Continue mirtazapine 15 mg q.h.s.  Cont exercise, encouraged mindfulness of dietary choices in an effort to dec inflammation  Offered referral for individual psychotherapy.    Psychotropic medication history:    Lexapro and Wellbutrin (effective but led to h/a's), celexa (nausea), valium        INTERVAL HISTORY:            Is patient experiencing or having changes in:  Trouble with sleep:  continues to sleep well, usually sleeps 6-7 hours on average   Appetite changes:  no  Weight changes: no  Lack of energy:  no  Anhedonia:  no  Somatic symptoms:  no  Anxiety/panic:  no, "I don't really have much anxiety. I struggled with that years ago. I don't really get that anymore."  Irritability: no  Guilty/hopeless:  no  Concentration: no  Racing thoughts: no  Impulsive behaviors: no  Paranoia/AVH: no  Self-injurious behavior/risky behavior:  no  Any drugs:  no  Alcohol:  no    No interval episodes with symptoms consistent with kimani or hypomania.  Denied interval or current suicidal/homicidal thoughts, intent, or plan or NSSI.  Denied other questions and concerns.  Patient reports feeling stable " and wishing to continue current management unchanged.    Medication side effects:  continued xerostomia, sees dental hygienist regularly  Medication adherence: yes    Psychotherapy:  Target symptoms: depression  Why chosen therapy is appropriate versus another modality: evidence based practice  Outcome monitoring methods: self-report, observation  Therapeutic intervention type: supportive psychotherapy  Topics discussed/themes: building skills sets for symptom management, symptom recognition  The patient's response to the intervention is accepting. The patient's progress toward treatment goals is fair.   Duration of intervention: 16 minutes.      MEDICAL REVIEW OF SYSTEMS:   Pain: Denies any significant chronic or acute pain.  Constitutional: Denies fever or change in appetite.  Cardiovascular: Denies chest pain or exertional dyspnea.  Respiratory: Nic cough or orthopnea.   GI: Denies abdominal pain, N/V  Neurological: Denies tremor, seizure, or focal weakness.  Psychiatric: See HPI above.    PAST PSYCHIATRIC, MEDICAL, AND SOCIAL HISTORY REVIEWED  The patient's past medical, family and social history have been reviewed and updated as appropriate within the electronic medical record - see encounter notes.    PAST MEDICAL HISTORY:   Past Medical History:   Diagnosis Date    Allergic rhinitis 06/15/2022    Anxiety     Aortic atherosclerosis 08/24/2020    CT uro 5/21/20    Bladder mass     Blepharoconjunctivitis of both eyes 03/20/2023    Cervical spondylosis     Depression     FHx: colon cancer 06/14/2018    Fibromyalgia     GERD (gastroesophageal reflux disease)     Hematuria     HTN (hypertension)     Hypertensive retinopathy, bilateral 03/20/2023    Nonexudative age-related macular degeneration, bilateral, early dry stage 03/20/2023    DANYELL (obstructive sleep apnea)     no cpap    Other specified hypothyroidism 04/01/2014    Primary open angle glaucoma of both eyes, mild stage 03/20/2023    Primary osteoarthritis  of right knee 01/14/2019    Pseudophakia 03/20/2023    Risk for coronary artery disease greater than 20% in next 10 years per Wolcott score 11/09/2023    Thrombosis    Head trauma/Loss of consciousness: denies  Seizures: denies     PAST PSYCHIATRIC HISTORY:  First psych contact:  2016 with Dr Coe for depression and anxiety  Prior hospitalizations: denies  Prior suicide attempts or self-harm: denies  Prior diagnosis: MDD, recurrent, in partial remission; YASMANI  Prior psychotherapy: Orthodoxy counselor prior to marriage separation, did help    FAMILY HISTORY:   Paternal: F-anxiety; no history of substance abuse or suicide  Maternal: M-anxiety; no history of substance abuse or suicide  Siblings: brother- suicide 1990  dtr- seizure while on wellbutrin    SOCIAL HISTORY:   Childhood: b/r [place], moved to [facility]  in [place]  Marital Status:  but  for 15 years  Children: 3 children  Resides: [place]  Occupation: part time at [employer]  Hobbies: bike riding 25 [place]  [place]: Shinto  Education level: HS grad  : denies  Legal: denies    SUBSTANCE USE HISTORY:  Caffeine: coffee in AM, but no caffeine after noon  Tobacco: denies  Alcohol: denies  Drug use: denies  Rehab: denies  Prior/current AA: denies      MEDICATIONS:    Current Outpatient Medications:     atorvastatin (LIPITOR) 20 MG tablet, Take 1 tablet (20 mg total) by mouth once daily., Disp: 90 tablet, Rfl: 3    buPROPion (WELLBUTRIN XL) 150 MG TB24 tablet, TAKE 1 TABLET BY MOUTH EVERY DAY, Disp: 90 tablet, Rfl: 1    calcium carbonate-vitamin D3 600 mg calcium- 200 unit Cap, Take 1 capsule by mouth 2 (two) times daily. DO NOT TAKE AM OF SURGERY, Disp: , Rfl:     cyclobenzaprine (FLEXERIL) 10 MG tablet, Take 1 tablet (10 mg total) by mouth 3 (three) times daily as needed., Disp: 15 tablet, Rfl: 0    desonide (DESOWEN) 0.05 % cream, APPLY TO EYELID SKIN NIGHTLY PRN ITCHY LIDS, Disp: 180 g, Rfl: 0    DULoxetine  (CYMBALTA) 30 MG capsule, Take 1 capsule (30 mg total) by mouth once daily., Disp: 90 capsule, Rfl: 1    DULoxetine (CYMBALTA) 60 MG capsule, TAKE 1 CAPSULE BY MOUTH EVERY DAY, Disp: 90 capsule, Rfl: 1    ezetimibe (ZETIA) 10 mg tablet, TAKE 1 TABLET BY MOUTH EVERY DAY, Disp: 90 tablet, Rfl: 2    famotidine (PEPCID) 40 MG tablet, TAKE 1 TABLET BY MOUTH EVERY DAY, Disp: 90 tablet, Rfl: 3    ketotifen (ZADITOR) 0.025 % ophthalmic solution, Place 1 drop into both eyes 2 (two) times daily. USE AS USUAL, Disp: , Rfl:     mirtazapine (REMERON) 15 MG tablet, TAKE 1 TABLET BY MOUTH EVERY EVENING., Disp: 90 tablet, Rfl: 1    mometasone (NASONEX) 50 mcg/actuation nasal spray, 2 sprays by Nasal route once daily. (Patient taking differently: 2 sprays by Nasal route daily as needed.), Disp: 17 g, Rfl: 5    multivit-min-FA-lycopen-lutein (CENTRUM SILVER) 0.4-300-250 mg-mcg-mcg Tab, , Disp: , Rfl:     naproxen sodium (ANAPROX) 550 MG tablet, Take 1 tablet (550 mg total) by mouth 2 (two) times daily with meals., Disp: 20 tablet, Rfl: 0    nebivoloL (BYSTOLIC) 5 MG Tab, TAKE 1 TABLET BY MOUTH EVERY DAY, Disp: 90 tablet, Rfl: 3    RABEprazole (ACIPHEX) 20 mg tablet, TAKE 1 TABLET BY MOUTH EVERY DAY, Disp: 90 tablet, Rfl: 3    SYNTHROID 100 mcg tablet, TAKE 1 TABLET BY MOUTH EVERY DAY, Disp: 90 tablet, Rfl: 3    telmisartan (MICARDIS) 20 MG Tab, TAKE 1 TABLET BY MOUTH EVERY DAY, Disp: 90 tablet, Rfl: 3    vit C/E/Zn/coppr/lutein/zeaxan (PRESERVISION AREDS-2 ORAL), Take 1 tablet by mouth 2 (two) times a day., Disp: , Rfl:   No current facility-administered medications for this visit.    ALLERGIES:  Review of patient's allergies indicates:   Allergen Reactions    Codeine      Other reaction(s): Nausea    Lisinopril Other (See Comments)     cough       EXAM:  Constitutional  Vitals:  Most recent vital signs were reviewed.   Last 3 sets of VS:      6/26/2024     6:02 PM 7/25/2024     3:31 PM 11/13/2024     5:46 PM   Vitals - 1 value per  "visit   SYSTOLIC  137 136   DIASTOLIC  85 83   Pulse  65 86   Temp  97.5 °F (36.4 °C) 98.1 °F (36.7 °C)   Resp 19 18 16   SPO2 99 % 97 % 96 %   Weight (lb)  171 173.28   Weight (kg)  77.565 78.6   Height  5' 7" (1.702 m)    BMI (Calculated)  26.8    Pain Score  Zero       General:  unremarkable, age appropriate     Musculoskeletal  Muscle Strength/Tone:  No tremor or abnormal movements   Gait & Station:  Steady, non-ataxic     Psychiatric  Speech:  no latency; no press   Mood & Affect:  euthymic  congruent and appropriate   Thought Process:  normal and logical   Associations:  intact   Thought Content:  normal, no suicidality, no homicidality, delusions, or paranoia   Insight:  intact   Judgement: behavior is adequate to circumstances   Orientation:  grossly intact   Memory: intact for content of interview   Language: grossly intact   Attention Span & Concentration:  Intact to interview   Fund of Knowledge:  Not tested     SUICIDE RISK ASSESSMENT:  Protective factors:  gender, no prior attempts, no prior hospitalizations, no family h/o attempts, no ongoing substance abuse, no psychosis, has children, denies SI/intent/plan, seeking treatment, access to treatment, future oriented, good primary support, no access to firearms  Risks: age,  but , hx MDD and YASMANI  Patient is a low immediate and long-term risk considering risk factors.     RELEVANT LABS/STUDIES:    Lab Results   Component Value Date    WBC 3.82 (L) 05/20/2024    HGB 14.8 05/20/2024    HCT 44.0 05/20/2024    MCV 97 05/20/2024     (L) 05/20/2024     BMP  Lab Results   Component Value Date     05/20/2024    K 5.1 05/20/2024     05/20/2024    CO2 26 05/20/2024    BUN 21 05/20/2024    CREATININE 0.9 05/20/2024    CALCIUM 9.7 05/20/2024    ANIONGAP 6 (L) 05/20/2024    ESTGFRAFRICA >60.0 08/23/2021    EGFRNONAA >60.0 08/23/2021     Lab Results   Component Value Date    ALT 25 05/20/2024    AST 22 05/20/2024    ALKPHOS 76 " 05/20/2024    BILITOT 0.4 05/20/2024     Lab Results   Component Value Date    TSH 2.226 05/20/2024     Lab Results   Component Value Date    HGBA1C 5.7 (H) 05/20/2024       IMPRESSION:    Coral Pandya is a 74 y.o. female with history of MDD and YASAMNI who presents for follow up appointment.    Status/Progress: Based on the examination today, the patient's problem(s) is/are adequately but not ideally controlled.  New problems have not been presented today.   Co-morbidities are not complicating management of the primary condition.  There are no active rule-out diagnoses for this patient at this time.     Pt reports several days of low mood in the interim. Discussed treatment options including increasing bupropion XL from 150-300 mg daily.  Patient does not wish to make changes to medications at this time she believes her low mood was situational and related to remembering her brother's suicide.      Risk Parameters:  Patient reports no suicidal ideation  Patient reports no homicidal ideation  Patient reports no self-injurious behavior  Patient reports no violent behavior    DIAGNOSES:  No diagnosis found.        PLAN:  Continue duloxetine 90 mg q.a.m. for mood anxiety  Continue bupropion  mg q.a.m.   Continue mirtazapine 15 mg q.h.s.  Cont exercise, encouraged mindfulness of dietary choices in an effort to dec inflammation  Offered referral for individual psychotherapy.      RETURN TO CLINIC:   3 months      RODOLFO Freed, PMHNP-BC      30 minutes of total time spent on the encounter, which includes face to face time and non-face to face time preparing to see the patient (eg. review of tests), obtaining and/or reviewing separately obtained history, documenting clinical information in the electronic health record, independently interpreting results (not separately reported), and communicating results to the patient/family/caregiver, or care coordination (not separately reported).     Visit today  "included managing the longitudinal care of the patient due to the serious and/or complex managed problem(s) MDD and YASMANI.    At this time there are no indications the patient represents an imminent danger to either themselves or others; will continue to manage treatment in the outpatient setting.    I discussed the patient's care with the patient including benefits, alternatives, possible adverse effects of the treatment plan; including the potential for metabolic complications, major organ dysfunction, black box warnings, and contraindications. The opportunity was given for questions/clarification, and after this discussion the above treatment plan was devised through shared decision making. The patient voiced their understanding of the diagnoses and treatments listed above and agreed to the treatment plan. Follow up plan was reviewed with the patient. The patient was advised to call to report any worsening of symptoms or problems with medication.    Supportive therapy and psychoeducation provided. Patient has been given crisis information including Suicide and Crisis Lifeline (call or text: 573). Patient also given instructions to go to the nearest ER or call 911 if unable to remain safe or if the Pt develops thoughts of harming self or others.    Documentation entered by me for this encounter may have been done in part using [employer] voice recognition transcription software. Garbled syntax, mangled pronouns, and other bizarre constructions may be attributed to that software system. Although I have made an effort to ensure accuracy, "sound like" errors may exist and should be interpreted in context.    "

## 2024-11-14 NOTE — TELEPHONE ENCOUNTER
Spoke w/ pt , pt went to the ER last night . Pt was having back pain . She was told it was muscular . Pt states that she needs follow up but it does not need to be this week . Pt had Xrays and lab work up .  Pt states that she is feeling better. Pt is due for her 6 mth check up this month. Appt sched 11/25. Offered pt a ER follow up appt sooner, pt declined .--lp

## 2024-11-25 ENCOUNTER — OFFICE VISIT (OUTPATIENT)
Dept: FAMILY MEDICINE | Facility: CLINIC | Age: 74
End: 2024-11-25
Payer: MEDICARE

## 2024-11-25 VITALS
HEIGHT: 67 IN | OXYGEN SATURATION: 98 % | DIASTOLIC BLOOD PRESSURE: 80 MMHG | HEART RATE: 68 BPM | BODY MASS INDEX: 26.96 KG/M2 | SYSTOLIC BLOOD PRESSURE: 128 MMHG | WEIGHT: 171.75 LBS

## 2024-11-25 DIAGNOSIS — G89.29 CHRONIC LEFT-SIDED THORACIC BACK PAIN: Primary | ICD-10-CM

## 2024-11-25 DIAGNOSIS — F40.240 CLAUSTROPHOBIA: ICD-10-CM

## 2024-11-25 DIAGNOSIS — D69.6 THROMBOCYTOPENIA, UNSPECIFIED: ICD-10-CM

## 2024-11-25 DIAGNOSIS — M79.622 LEFT AXILLARY PAIN: ICD-10-CM

## 2024-11-25 DIAGNOSIS — M54.6 CHRONIC LEFT-SIDED THORACIC BACK PAIN: Primary | ICD-10-CM

## 2024-11-25 PROCEDURE — 99999 PR PBB SHADOW E&M-EST. PATIENT-LVL IV: CPT | Mod: PBBFAC,,, | Performed by: STUDENT IN AN ORGANIZED HEALTH CARE EDUCATION/TRAINING PROGRAM

## 2024-11-25 PROCEDURE — 99214 OFFICE O/P EST MOD 30 MIN: CPT | Mod: PBBFAC,PN | Performed by: STUDENT IN AN ORGANIZED HEALTH CARE EDUCATION/TRAINING PROGRAM

## 2024-11-25 RX ORDER — NAPROXEN SODIUM 550 MG/1
550 TABLET ORAL 2 TIMES DAILY PRN
Qty: 20 TABLET | Refills: 0 | Status: SHIPPED | OUTPATIENT
Start: 2024-11-25

## 2024-11-25 RX ORDER — DIAZEPAM 5 MG/1
TABLET ORAL
Qty: 5 TABLET | Refills: 0 | Status: SHIPPED | OUTPATIENT
Start: 2024-11-25

## 2024-11-25 RX ORDER — NAPROXEN SODIUM 550 MG/1
550 TABLET ORAL 2 TIMES DAILY PRN
Qty: 20 TABLET | Refills: 0 | Status: SHIPPED | OUTPATIENT
Start: 2024-11-25 | End: 2024-11-25

## 2024-11-25 RX ORDER — CYCLOBENZAPRINE HCL 10 MG
10 TABLET ORAL 3 TIMES DAILY PRN
Qty: 30 TABLET | Refills: 0 | Status: SHIPPED | OUTPATIENT
Start: 2024-11-25

## 2024-11-25 NOTE — PROGRESS NOTES
Plan:      Coral was seen today for follow-up.    Diagnoses and all orders for this visit:    Chronic left-sided thoracic back pain  -     cyclobenzaprine (FLEXERIL) 10 MG tablet; Take 1 tablet (10 mg total) by mouth 3 (three) times daily as needed for Muscle spasms.  -     MRI Thoracic Spine Without Contrast; Future  -     Discontinue: naproxen sodium (ANAPROX) 550 MG tablet; Take 1 tablet (550 mg total) by mouth 2 (two) times daily as needed (back pain).  -     naproxen sodium (ANAPROX) 550 MG tablet; Take 1 tablet (550 mg total) by mouth 2 (two) times daily as needed (back pain).    Thrombocytopenia, unspecified: Stable.    Left axillary pain  -     cyclobenzaprine (FLEXERIL) 10 MG tablet; Take 1 tablet (10 mg total) by mouth 3 (three) times daily as needed for Muscle spasms.  -     MRI Thoracic Spine Without Contrast; Future  -     Discontinue: naproxen sodium (ANAPROX) 550 MG tablet; Take 1 tablet (550 mg total) by mouth 2 (two) times daily as needed (back pain).  -     naproxen sodium (ANAPROX) 550 MG tablet; Take 1 tablet (550 mg total) by mouth 2 (two) times daily as needed (back pain).    Claustrophobia  -     diazePAM (VALIUM) 5 MG tablet; Take 1 tab (5 mg) approx 60 min before MRI; okay to repeat dose after 45-60 min if symptoms not controlled.      Follow up in about 4 weeks (around 12/23/2024), or if symptoms worsen or fail to improve.    Charla Cole MD  11/25/2024    Subjective:      Patient ID: Coral Pandya is a 74 y.o. female    Chief Complaint   Patient presents with    Follow-up     Left mid back pain      HPI  74 y.o. female with a PMHx as documented below presents to clinic today for the following:    She reports experiencing back pain for a few months. The pain initially presented in the chest with deep breaths but has since localized to the mid upper back, under the arm. She describes the pain as mostly positional, worsening with deep breaths and movement. During severe episodes,  the pain felt like a knife. Last Tuesday evening, she experienced a sudden increase in pain intensity from a 3 to a 10 on a pain scale, without any apparent cause. The severe pain made it difficult for her to move, particularly when getting up and down. She mentions falling off her bike a few times, but it's unclear if this is related to the onset of pain. Despite the pain, she reports no impact on her cycling ability, stating she can ride 25 miles without feeling bad. She notes recent improvement in her symptoms, though the pain is not completely resolved. She denies experiencing any low back pain.    EMERGENCY ROOM VISIT:  She visited [facility] emergency room due to persistent pain. During the visit, multiple tests were performed, including an EKG and lab (ACS ruled out).    MEDICATIONS:  She has completed a course of muscle relaxers and is not currently taking any medication for her condition.    LAB RESULTS:  Her recent A1C level was 5.7. She also had high glucose when tested in the emergency room, but notes this was not a fasting measurement and occurred after eating.    ROS  Negative unless otherwise mentioned above in HPI.    Current Outpatient Medications   Medication Instructions    atorvastatin (LIPITOR) 20 mg, Oral, Daily    buPROPion (WELLBUTRIN XL) 150 mg, Oral    calcium carbonate-vitamin D3 600 mg calcium- 200 unit Cap 1 capsule, 2 times daily    cyclobenzaprine (FLEXERIL) 10 mg, Oral, 3 times daily PRN    desonide (DESOWEN) 0.05 % cream APPLY TO EYELID SKIN NIGHTLY PRN ITCHY LIDS    diazePAM (VALIUM) 5 MG tablet Take 1 tab (5 mg) approx 60 min before MRI; okay to repeat dose after 45-60 min if symptoms not controlled.    DULoxetine (CYMBALTA) 30 mg, Oral, Daily    DULoxetine (CYMBALTA) 60 mg, Oral    ezetimibe (ZETIA) 10 mg, Oral    famotidine (PEPCID) 40 mg, Oral    ketotifen (ZADITOR) 0.025 % ophthalmic solution 1 drop, 2 times daily    mirtazapine (REMERON) 15 mg, Oral, Nightly    mometasone  "(NASONEX) 50 mcg/actuation nasal spray 2 sprays, Nasal, Daily    multivit-min-FA-lycopen-lutein (CENTRUM SILVER) 0.4-300-250 mg-mcg-mcg Tab No dose, route, or frequency recorded.    naproxen sodium (ANAPROX) 550 mg, Oral, 2 times daily PRN    nebivoloL (BYSTOLIC) 5 mg, Oral    RABEprazole (ACIPHEX) 20 mg, Oral    SYNTHROID 100 mcg, Oral    telmisartan (MICARDIS) 20 mg, Oral    vit C/E/Zn/coppr/lutein/zeaxan (PRESERVISION AREDS-2 ORAL) 1 tablet, 2 times daily      Past Medical History:   Diagnosis Date    Allergic rhinitis 06/15/2022    Anxiety     Aortic atherosclerosis 08/24/2020    CT uro 5/21/20    Bladder mass     Blepharoconjunctivitis of both eyes 03/20/2023    Cervical spondylosis     Depression     FHx: colon cancer 06/14/2018    Fibromyalgia     GERD (gastroesophageal reflux disease)     Hematuria     HTN (hypertension)     Hypertensive retinopathy, bilateral 03/20/2023    Nonexudative age-related macular degeneration, bilateral, early dry stage 03/20/2023    DANYELL (obstructive sleep apnea)     no cpap    Other specified hypothyroidism 04/01/2014    Primary open angle glaucoma of both eyes, mild stage 03/20/2023    Primary osteoarthritis of right knee 01/14/2019    Pseudophakia 03/20/2023    Risk for coronary artery disease greater than 20% in next 10 years per Skipwith score 11/09/2023    Thrombosis       Objective:      Vitals:    11/25/24 1409   BP: 128/80   Patient Position: Sitting   Pulse: 68   SpO2: 98%   Weight: 77.9 kg (171 lb 11.8 oz)   Height: 5' 7" (1.702 m)     Body mass index is 26.9 kg/m².    Physical Exam   Constitutional:       General: No acute distress.  HENT:      Head: Normocephalic and atraumatic.   Pulmonary:      Effort: Pulmonary effort is normal. No respiratory distress.   Neurological:      General: No focal deficit present.      Mental Status: Alert and oriented to person, place, and time. Mental status is at baseline.    Assessment:       1. Chronic left-sided thoracic back pain "    2. Thrombocytopenia, unspecified    3. Left axillary pain    4. Claustrophobia        Charla Cole MD  [facility]  Office: [phone]   Fax: [phone]  11/25/2024      Disclaimer: This note was partly generated using dictation software which may occasionally result in transcription errors.    Total time spent on this encounter includes face to face time and non-face to face time preparing to see the patient (eg, review of tests), obtaining and/or reviewing separately obtained history, documenting clinical information in the electronic or other health record, independently interpreting results, and communicating results to the patient/family/caregiver, or care coordinator.

## 2024-12-06 ENCOUNTER — PATIENT MESSAGE (OUTPATIENT)
Dept: FAMILY MEDICINE | Facility: CLINIC | Age: 74
End: 2024-12-06
Payer: MEDICARE

## 2024-12-06 DIAGNOSIS — M54.2 CHRONIC NECK PAIN: ICD-10-CM

## 2024-12-06 DIAGNOSIS — M54.2 CERVICALGIA: Primary | ICD-10-CM

## 2024-12-06 DIAGNOSIS — G89.29 CHRONIC NECK PAIN: ICD-10-CM

## 2024-12-16 ENCOUNTER — HOSPITAL ENCOUNTER (OUTPATIENT)
Dept: RADIOLOGY | Facility: HOSPITAL | Age: 74
Discharge: HOME | End: 2024-12-16
Attending: STUDENT IN AN ORGANIZED HEALTH CARE EDUCATION/TRAINING PROGRAM
Payer: MEDICARE

## 2024-12-16 DIAGNOSIS — M79.622 LEFT AXILLARY PAIN: ICD-10-CM

## 2024-12-16 DIAGNOSIS — M54.6 CHRONIC LEFT-SIDED THORACIC BACK PAIN: ICD-10-CM

## 2024-12-16 DIAGNOSIS — G89.29 CHRONIC LEFT-SIDED THORACIC BACK PAIN: ICD-10-CM

## 2024-12-16 PROCEDURE — 72146 MRI CHEST SPINE W/O DYE: CPT | Mod: TC,PO

## 2024-12-16 PROCEDURE — 72146 MRI CHEST SPINE W/O DYE: CPT | Mod: 26,,, | Performed by: RADIOLOGY

## 2024-12-18 ENCOUNTER — PATIENT MESSAGE (OUTPATIENT)
Dept: FAMILY MEDICINE | Facility: CLINIC | Age: 74
End: 2024-12-18
Payer: MEDICARE

## 2024-12-18 DIAGNOSIS — M51.34 DDD (DEGENERATIVE DISC DISEASE), THORACIC: Primary | ICD-10-CM

## 2024-12-18 DIAGNOSIS — M48.04 SPINAL STENOSIS OF THORACIC REGION: ICD-10-CM

## 2024-12-18 DIAGNOSIS — M51.24 HERNIATION OF INTERVERTEBRAL DISC BETWEEN T11 AND T12: ICD-10-CM

## 2024-12-18 NOTE — PROGRESS NOTES
Diagnoses and all orders for this visit:    DDD (degenerative disc disease), thoracic  -     Ambulatory referral/consult to [facility]; Future    Herniation of intervertebral disc between T11 and T12  -     Ambulatory referral/consult to [facility]; Future    Spinal stenosis of thoracic region  -     Ambulatory referral/consult to [facility]; Future        Charla Cole MD  [facility]  Office: [phone]   Fax: [phone]  12/18/2024

## 2025-01-08 ENCOUNTER — HOSPITAL ENCOUNTER (OUTPATIENT)
Dept: RADIOLOGY | Facility: HOSPITAL | Age: 75
Discharge: HOME | End: 2025-01-08
Attending: STUDENT IN AN ORGANIZED HEALTH CARE EDUCATION/TRAINING PROGRAM
Payer: MEDICARE

## 2025-01-08 DIAGNOSIS — M54.2 CERVICALGIA: ICD-10-CM

## 2025-01-08 DIAGNOSIS — G89.29 CHRONIC NECK PAIN: ICD-10-CM

## 2025-01-08 DIAGNOSIS — M54.2 CHRONIC NECK PAIN: ICD-10-CM

## 2025-01-08 PROCEDURE — 72141 MRI NECK SPINE W/O DYE: CPT | Mod: TC,PO

## 2025-01-08 PROCEDURE — 72141 MRI NECK SPINE W/O DYE: CPT | Mod: 26,,, | Performed by: RADIOLOGY

## 2025-01-10 DIAGNOSIS — M50.30 DDD (DEGENERATIVE DISC DISEASE), CERVICAL: Primary | ICD-10-CM

## 2025-01-10 NOTE — PROGRESS NOTES
Diagnoses and all orders for this visit:    DDD (degenerative disc disease), cervical  -     Ambulatory referral/consult to [facility]; Future        Charla Cole MD  [facility]  Office: [phone]   Fax: [phone]  01/10/2025

## 2025-01-24 ENCOUNTER — PATIENT MESSAGE (OUTPATIENT)
Dept: FAMILY MEDICINE | Facility: CLINIC | Age: 75
End: 2025-01-24
Payer: MEDICARE

## 2025-01-24 DIAGNOSIS — M79.622 LEFT AXILLARY PAIN: ICD-10-CM

## 2025-01-24 DIAGNOSIS — M54.6 CHRONIC LEFT-SIDED THORACIC BACK PAIN: ICD-10-CM

## 2025-01-24 DIAGNOSIS — G89.29 CHRONIC LEFT-SIDED THORACIC BACK PAIN: ICD-10-CM

## 2025-01-25 NOTE — TELEPHONE ENCOUNTER
No care due was identified.  Health [facility] Embedded Care Due Messages. Reference number: [id number].   1/24/2025 10:58:25 PM CST

## 2025-01-25 NOTE — TELEPHONE ENCOUNTER
Refill Routing Note   Medication(s) are not appropriate for processing by [facility] for the following reason(s):        Outside of protocol    [facility] action(s):  Route             Appointments  past 12m or future 3m with PCP    Date Provider   Last Visit   11/25/2024 Charla Cole MD   Next Visit   Visit date not found Charla Cole MD   ED visits in past 90 days: 1        Note composed:5:47 PM 01/25/2025

## 2025-01-27 RX ORDER — NAPROXEN SODIUM 550 MG/1
TABLET ORAL
Qty: 20 TABLET | Refills: 0 | Status: SHIPPED | OUTPATIENT
Start: 2025-01-27

## 2025-01-29 DIAGNOSIS — F33.41 MDD (MAJOR DEPRESSIVE DISORDER), RECURRENT, IN PARTIAL REMISSION: Chronic | ICD-10-CM

## 2025-01-29 DIAGNOSIS — F41.1 GAD (GENERALIZED ANXIETY DISORDER): Chronic | ICD-10-CM

## 2025-01-29 RX ORDER — DULOXETIN HYDROCHLORIDE 30 MG/1
30 CAPSULE, DELAYED RELEASE ORAL
Qty: 90 CAPSULE | Refills: 1 | Status: SHIPPED | OUTPATIENT
Start: 2025-01-29

## 2025-01-29 NOTE — TELEPHONE ENCOUNTER
No care due was identified.  Health [facility] Embedded Care Due Messages. Reference number: [id number].   1/29/2025 12:14:19 AM CST

## 2025-01-29 NOTE — TELEPHONE ENCOUNTER
Refill Decision Note   Coral Pandya  is requesting a refill authorization.  Brief Assessment and Rationale for Refill:  Approve     Medication Therapy Plan:         Alert overridden per protocol: Yes   Comments:     Note composed:2:34 AM 01/29/2025

## 2025-02-12 ENCOUNTER — TELEPHONE (OUTPATIENT)
Dept: PSYCHIATRY | Facility: CLINIC | Age: 75
End: 2025-02-12
Payer: MEDICARE

## 2025-02-13 ENCOUNTER — OFFICE VISIT (OUTPATIENT)
Dept: PSYCHIATRY | Facility: CLINIC | Age: 75
End: 2025-02-13
Payer: MEDICARE

## 2025-02-13 DIAGNOSIS — F41.1 GAD (GENERALIZED ANXIETY DISORDER): ICD-10-CM

## 2025-02-13 DIAGNOSIS — F33.40 RECURRENT MAJOR DEPRESSIVE DISORDER, IN REMISSION: Primary | ICD-10-CM

## 2025-02-13 PROCEDURE — 99213 OFFICE O/P EST LOW 20 MIN: CPT | Mod: PBBFAC,PO

## 2025-02-13 NOTE — PROGRESS NOTES
"OUTPATIENT PSYCHIATRY FOLLOW UP VISIT    Encounter Date: 2/13/2025    Clinical Status of Patient:  Outpatient (Ambulatory)    Chief Complaint:  Coral Pandya is a 74 y.o. female who presents today for follow-up.  Met with patient.      HISTORY OF PRESENTING ILLNESS:  Coral Pandya is a 74 y.o. female with history of MDD, recurrent, in remission and YASMANI who presents for follow up appointment.      Plan at last appointment:   Continue duloxetine 90 mg q.a.m. for mood anxiety  Continue bupropion  mg q.a.m.   Continue mirtazapine 15 mg q.h.s.  Cont exercise, encouraged mindfulness of dietary choices in an effort to dec inflammation  Offered referral for individual psychotherapy.    Psychotropic medication history:    Lexapro and Wellbutrin (effective but led to h/a's), celexa (nausea), valium        INTERVAL HISTORY:          11/14/2024: Pt reports her mood improved since our last visit and has been stable. Anxiety is decreased and well controlled.     Pt reports left sided torso and LUE pain for the last 2 months that worsened recently. Was seen at ER for same last night with a negative workup. She reports pain is improved today.     Continues to ride her bike frequently for exercise.     Is patient experiencing or having changes in:  Trouble with sleep:  continues to sleep well, usually sleeps 6-7 hours on average   Appetite changes:  no  Weight changes: no  Lack of energy:  no  Anhedonia:  no  Somatic symptoms:  no  Anxiety/panic:  no, "I don't really have much anxiety. I struggled with that years ago. I don't really get that anymore."  Irritability: no  Guilty/hopeless:  no  Concentration: no  Racing thoughts: no  Impulsive behaviors: no  Paranoia/AVH: no  Self-injurious behavior/risky behavior:  no  Any drugs:  no  Alcohol:  no    No interval episodes with symptoms consistent with kimani or hypomania.  Denied interval or current suicidal/homicidal thoughts, intent, or plan or NSSI.  Denied " other questions and concerns.  Patient reports feeling stable and wishing to continue current management unchanged.    Medication side effects:  continued xerostomia, sees dental hygienist regularly  Medication adherence: yes    Psychotherapy:  Target symptoms: depression  Why chosen therapy is appropriate versus another modality: evidence based practice  Outcome monitoring methods: self-report, observation  Therapeutic intervention type: supportive psychotherapy  Topics discussed/themes: building skills sets for symptom management, symptom recognition  The patient's response to the intervention is accepting. The patient's progress toward treatment goals is fair.   Duration of intervention: 16 minutes.      MEDICAL REVIEW OF SYSTEMS:   Pain: Denies any significant chronic or acute pain.  Constitutional: Denies fever or change in appetite.  Cardiovascular: Denies chest pain or exertional dyspnea.  Respiratory: Nic cough or orthopnea.   GI: Denies abdominal pain, N/V  Neurological: Denies tremor, seizure, or focal weakness.  Psychiatric: See HPI above.    PAST PSYCHIATRIC, MEDICAL, AND SOCIAL HISTORY REVIEWED  The patient's past medical, family and social history have been reviewed and updated as appropriate within the electronic medical record - see encounter notes.    PAST MEDICAL HISTORY:   Past Medical History:   Diagnosis Date    Allergic rhinitis 06/15/2022    Anxiety     Aortic atherosclerosis 08/24/2020    CT uro 5/21/20    Bladder mass     Blepharoconjunctivitis of both eyes 03/20/2023    Cervical spondylosis     Depression     FHx: colon cancer 06/14/2018    Fibromyalgia     GERD (gastroesophageal reflux disease)     Hematuria     HTN (hypertension)     Hypertensive retinopathy, bilateral 03/20/2023    Nonexudative age-related macular degeneration, bilateral, early dry stage 03/20/2023    DANYELL (obstructive sleep apnea)     no cpap    Other specified hypothyroidism 04/01/2014    Primary open angle glaucoma  "of both eyes, mild stage 03/20/2023    Primary osteoarthritis of right knee 01/14/2019    Pseudophakia 03/20/2023    Risk for coronary artery disease greater than 20% in next 10 years per [place] score 11/09/2023    Thrombosis    Head trauma/Loss of consciousness: denies  Seizures: denies     PAST PSYCHIATRIC HISTORY:  First psych contact:  2016 with Dr Coe for depression and anxiety  Prior hospitalizations: denies  Prior suicide attempts or self-harm: denies  Prior diagnosis: MDD, recurrent, in partial remission; YASMANI  Prior psychotherapy: Religious counselor prior to marriage separation, did help    FAMILY HISTORY:   Paternal: F-anxiety; no history of substance abuse or suicide  Maternal: M-anxiety; no history of substance abuse or suicide  Siblings: brother- suicide 1990  dtr- seizure while on wellbutrin    SOCIAL HISTORY:   Childhood: b/r [place], moved to [facility]  in [place]  Marital Status:  but  for 15 years  Children: 3 children  Resides: [place]  Occupation: part time at [employer]  Hobbies: bike [place]  [employer]: [employer]  Education level: HS grad  : denies  Legal: denies    SUBSTANCE USE HISTORY:  Caffeine: coffee in AM, but no caffeine after noon  Tobacco: denies  Alcohol: denies  Drug use: denies  Rehab: denies  Prior/current AA: denies    EXAM:  Constitutional  Vitals:  Most recent vital signs were reviewed.   Last 3 sets of VS:      11/13/2024     5:46 PM 11/14/2024     1:35 PM 11/25/2024     2:09 PM   Vitals - 1 value per visit   SYSTOLIC 136 118 128   DIASTOLIC 83 77 80   Pulse 86 72 68   Temp 98.1 °F (36.7 °C)     Resp 16     SPO2 96 %  98 %   Weight (lb) 173.28 171.96 171.74   Weight (kg) 78.6 78 77.9   Height  5' 7" (1.702 m) 5' 7" (1.702 m)   BMI (Calculated)  26.9 26.9   Pain Score  Five Two      General:  unremarkable, age appropriate     Musculoskeletal  Muscle Strength/Tone:  No tremor or abnormal movements   Gait & Station:  " Steady, non-ataxic     Psychiatric  Speech:  no latency; no press   Mood & Affect:  euthymic  congruent and appropriate   Thought Process:  normal and logical   Associations:  intact   Thought Content:  normal, no suicidality, no homicidality, delusions, or paranoia   Insight:  intact   Judgement: behavior is adequate to circumstances   Orientation:  grossly intact   Memory: intact for content of interview   Language: grossly intact   Attention Span & Concentration:  Intact to interview   Fund of Knowledge:  Not tested     SUICIDE RISK ASSESSMENT:  Protective factors:  gender, no prior attempts, no prior hospitalizations, no family h/o attempts, no ongoing substance abuse, no psychosis, has children, denies SI/intent/plan, seeking treatment, access to treatment, future oriented, good primary support, no access to firearms  Risks: age,  but , hx MDD and YASMANI  Patient is a low immediate and long-term risk considering risk factors.     RELEVANT LABS/STUDIES:    Lab Results   Component Value Date    WBC 3.82 (L) 05/20/2024    HGB 14.8 05/20/2024    HCT 44.0 05/20/2024    MCV 97 05/20/2024     (L) 05/20/2024     BMP  Lab Results   Component Value Date     05/20/2024    K 5.1 05/20/2024     05/20/2024    CO2 26 05/20/2024    BUN 21 05/20/2024    CREATININE 0.9 05/20/2024    CALCIUM 9.7 05/20/2024    ANIONGAP 6 (L) 05/20/2024    ESTGFRAFRICA >60.0 08/23/2021    EGFRNONAA >60.0 08/23/2021     Lab Results   Component Value Date    ALT 25 05/20/2024    AST 22 05/20/2024    ALKPHOS 76 05/20/2024    BILITOT 0.4 05/20/2024     Lab Results   Component Value Date    TSH 2.226 05/20/2024     Lab Results   Component Value Date    HGBA1C 5.7 (H) 05/20/2024       IMPRESSION:    Coral Pandya is a 74 y.o. female with history of MDD and YASMANI who presents for follow up appointment.    Status/Progress: Based on the examination today, the patient's problem(s) is/are improved and well controlled.   New problems have not been presented today.   Co-morbidities are not complicating management of the primary condition.  There are no active rule-out diagnoses for this patient at this time.     Pt reports several days of low mood in the interim. Discussed treatment options including increasing bupropion XL from 150-300 mg daily.  Patient does not wish to make changes to medications at this time she believes her low mood was situational and related to remembering her brother's suicide.      Risk Parameters:  Patient reports no suicidal ideation  Patient reports no homicidal ideation  Patient reports no self-injurious behavior  Patient reports no violent behavior    DIAGNOSES:  No diagnosis found.        PLAN:  Continue duloxetine 90 mg q.a.m. for mood anxiety  Continue bupropion  mg q.a.m.   Continue mirtazapine 15 mg q.h.s.  Cont exercise, encouraged mindfulness of dietary choices in an effort to dec inflammation  Offered referral for individual psychotherapy.      RETURN TO CLINIC:   3 months      Samreen Vogt, RODOLFO, PMHNP-BC      30 minutes of total time spent on the encounter, which includes face to face time and non-face to face time preparing to see the patient (eg. review of tests), obtaining and/or reviewing separately obtained history, documenting clinical information in the electronic health record, independently interpreting results (not separately reported), and communicating results to the patient/family/caregiver, or care coordination (not separately reported).     Visit today included managing the longitudinal care of the patient due to the serious and/or complex managed problem(s) MDD and YASMANI.    At this time there are no indications the patient represents an imminent danger to either themselves or others; will continue to manage treatment in the outpatient setting.    I discussed the patient's care with the patient including benefits, alternatives, possible adverse effects of the treatment  "plan; including the potential for metabolic complications, major organ dysfunction, black box warnings, and contraindications. The opportunity was given for questions/clarification, and after this discussion the above treatment plan was devised through shared decision making. The patient voiced their understanding of the diagnoses and treatments listed above and agreed to the treatment plan. Follow up plan was reviewed with the patient. The patient was advised to call to report any worsening of symptoms or problems with medication.    Supportive therapy and psychoeducation provided. Patient has been given crisis information including Suicide and Crisis Lifeline (call or text: 633). Patient also given instructions to go to the nearest ER or call 911 if unable to remain safe or if the Pt develops thoughts of harming self or others.    Documentation entered by me for this encounter may have been done in part using [employer] Direct voice recognition transcription software. Garbled syntax, mangled pronouns, and other bizarre constructions may be attributed to that software system. Although I have made an effort to ensure accuracy, "sound like" errors may exist and should be interpreted in context.      "

## 2025-02-13 NOTE — PROGRESS NOTES
"OUTPATIENT PSYCHIATRY FOLLOW UP VISIT    Encounter Date: 2/13/2025    Clinical Status of Patient:  Outpatient (Ambulatory)    Chief Complaint:  Coral Pandya is a 74 y.o. female who presents today for follow-up.  Met with patient.      HISTORY OF PRESENTING ILLNESS:  Coral Pandya is a 74 y.o. female with history of MDD, recurrent, in remission and YASMANI who presents for follow up appointment.      Plan at last appointment:   Continue duloxetine 90 mg q.a.m. for mood anxiety  Continue bupropion  mg q.a.m.   Continue mirtazapine 15 mg q.h.s.  Cont exercise, encouraged mindfulness of dietary choices in an effort to dec inflammation  Offered referral for individual psychotherapy.    Psychotropic medication history:    Lexapro and Wellbutrin (effective but led to h/a's), celexa (nausea), valium        INTERVAL HISTORY:    Patient reports she is doing well over all stating, "I've been good."    Her daughter is currently in [facility] adopting a teenager. Her son has long-Covid. He was recently scammed and has started having panic attacks, which has been stressful for the patient. "So I had to be the strong one and I did. I made it."    She notes some occasional low mood in the mornings and states Once I get out of bed I can get going but pushing myself to get out of bed is hard."     Doesn't ride her bike in December and January because of the cold and shortened days.      Is patient experiencing or having changes in:  Trouble with sleep:  continues to sleep well, usually sleeps 6-7 hours on average   Appetite changes:  no  Weight changes: no  Lack of energy:  no  Anhedonia:  no  Somatic symptoms:  no  Anxiety/panic:  no, "I don't really have much anxiety. I struggled with that years ago. I don't really get that anymore."  Irritability: no  Guilty/hopeless:  no  Concentration: no  Racing thoughts: no  Impulsive behaviors: no  Paranoia/AVH: no  Self-injurious behavior/risky behavior:  no  Any drugs:  " no  Alcohol:  no    No interval episodes with symptoms consistent with kimani or hypomania.  Denied interval or current suicidal/homicidal thoughts, intent, or plan or NSSI.  Denied other questions and concerns.  Patient reports feeling stable and wishing to continue current management unchanged.    Medication side effects:  continued xerostomia, sees dental hygienist regularly  Medication adherence: yes    Psychotherapy:  Target symptoms: depression  Why chosen therapy is appropriate versus another modality: evidence based practice  Outcome monitoring methods: self-report, observation  Therapeutic intervention type: supportive psychotherapy  Topics discussed/themes: building skills sets for symptom management, symptom recognition  The patient's response to the intervention is accepting. The patient's progress toward treatment goals is fair.   Duration of intervention: 16 minutes.    MEDICAL REVIEW OF SYSTEMS:   Pain: Denies any significant chronic or acute pain.  Constitutional: Denies fever or change in appetite.  Cardiovascular: Denies chest pain or exertional dyspnea.  Respiratory: Nic cough or orthopnea.   GI: Denies abdominal pain, N/V  Neurological: Denies tremor, seizure, or focal weakness.  Psychiatric: See HPI above.    PAST PSYCHIATRIC, MEDICAL, AND SOCIAL HISTORY REVIEWED  The patient's past medical, family and social history have been reviewed and updated as appropriate within the electronic medical record - see encounter notes.    PAST MEDICAL HISTORY:   Past Medical History:   Diagnosis Date    Allergic rhinitis 06/15/2022    Anxiety     Aortic atherosclerosis 08/24/2020    CT uro 5/21/20    Bladder mass     Blepharoconjunctivitis of both eyes 03/20/2023    Cervical spondylosis     Depression     FHx: colon cancer 06/14/2018    Fibromyalgia     GERD (gastroesophageal reflux disease)     Hematuria     HTN (hypertension)     Hypertensive retinopathy, bilateral 03/20/2023    Nonexudative age-related  "macular degeneration, bilateral, early dry stage 03/20/2023    DANYELL (obstructive sleep apnea)     no cpap    Other specified hypothyroidism 04/01/2014    Primary open angle glaucoma of both eyes, mild stage 03/20/2023    Primary osteoarthritis of right knee 01/14/2019    Pseudophakia 03/20/2023    Risk for coronary artery disease greater than 20% in next 10 years per [place] score 11/09/2023    Thrombosis    Head trauma/Loss of consciousness: denies  Seizures: denies     PAST PSYCHIATRIC HISTORY:  First psych contact:  2016 with Dr Coe for depression and anxiety  Prior hospitalizations: denies  Prior suicide attempts or self-harm: denies  Prior diagnosis: MDD, recurrent, in partial remission; YASMANI  Prior psychotherapy: [employer] counselor prior to marriage separation, did help    FAMILY HISTORY:   Paternal: F-anxiety; no history of substance abuse or suicide  Maternal: M-anxiety; no history of substance abuse or suicide  Siblings: brother- suicide 1990  dtr- seizure while on wellbutrin    SOCIAL HISTORY:   Childhood: b/r [place], moved to [facility]  in [place]  Marital Status:  but  for 15 years  Children: 3 children  Resides: [place]  Occupation: part time at [employer]  Hobbies: bike riding [place]  Scientologist: [employer]  Education level: HS grad  : denies  Legal: denies    SUBSTANCE USE HISTORY:  Caffeine: coffee in AM, but no caffeine after noon  Tobacco: denies  Alcohol: denies  Drug use: denies  Rehab: denies  Prior/current AA: denies    EXAM:  Constitutional  Vitals:  Most recent vital signs were reviewed.   Last 3 sets of VS:      11/13/2024     5:46 PM 11/14/2024     1:35 PM 11/25/2024     2:09 PM   Vitals - 1 value per visit   SYSTOLIC 136 118 128   DIASTOLIC 83 77 80   Pulse 86 72 68   Temp 98.1 °F (36.7 °C)     Resp 16     SPO2 96 %  98 %   Weight (lb) 173.28 171.96 171.74   Weight (kg) 78.6 78 77.9   Height  5' 7" (1.702 m) 5' 7" (1.702 m)   BMI " (Calculated)  26.9 26.9   Pain Score  Five Two      General:  unremarkable, age appropriate     Musculoskeletal  Muscle Strength/Tone:  No tremor or abnormal movements   Gait & Station:  Steady, non-ataxic     Psychiatric  Speech:  no latency; no press   Mood & Affect:  euthymic  congruent and appropriate   Thought Process:  normal and logical   Associations:  intact   Thought Content:  normal, no suicidality, no homicidality, delusions, or paranoia   Insight:  intact   Judgement: behavior is adequate to circumstances   Orientation:  grossly intact   Memory: intact for content of interview   Language: grossly intact   Attention Span & Concentration:  Intact to interview   Fund of Knowledge:  Not tested     SUICIDE RISK ASSESSMENT:  Protective factors:  gender, no prior attempts, no prior hospitalizations, no family h/o attempts, no ongoing substance abuse, no psychosis, has children, denies SI/intent/plan, seeking treatment, access to treatment, future oriented, good primary support, no access to firearms  Risks: age,  but , hx MDD and YASMANI  Patient is a low immediate and long-term risk considering risk factors.     RELEVANT LABS/STUDIES:    Lab Results   Component Value Date    WBC 3.82 (L) 05/20/2024    HGB 14.8 05/20/2024    HCT 44.0 05/20/2024    MCV 97 05/20/2024     (L) 05/20/2024     BMP  Lab Results   Component Value Date     05/20/2024    K 5.1 05/20/2024     05/20/2024    CO2 26 05/20/2024    BUN 21 05/20/2024    CREATININE 0.9 05/20/2024    CALCIUM 9.7 05/20/2024    ANIONGAP 6 (L) 05/20/2024    ESTGFRAFRICA >60.0 08/23/2021    EGFRNONAA >60.0 08/23/2021     Lab Results   Component Value Date    ALT 25 05/20/2024    AST 22 05/20/2024    ALKPHOS 76 05/20/2024    BILITOT 0.4 05/20/2024     Lab Results   Component Value Date    TSH 2.226 05/20/2024     Lab Results   Component Value Date    HGBA1C 5.7 (H) 05/20/2024       IMPRESSION:    Coral Pandya is a 74 y.o.  female with history of MDD and YASMANI who presents for follow up appointment.    Status/Progress: Based on the examination today, the patient's problem(s) is/are adequately but not ideally controlled.  New problems have not been presented today.   Co-morbidities are not complicating management of the primary condition.  There are no active rule-out diagnoses for this patient at this time.     Today, patient reports occasional low mood in the mornings.  She reports her mood improves after she gets out of bed and gets going with her day.  She does not wish to make changes to medication at this time and would prefer to reassess at follow-up.    Risk Parameters:  Patient reports no suicidal ideation  Patient reports no homicidal ideation  Patient reports no self-injurious behavior  Patient reports no violent behavior    DIAGNOSES:    ICD-10-CM ICD-9-CM   1. Recurrent major depressive disorder, in remission  F33.40 296.35   2. YASMANI (generalized anxiety disorder)  F41.1 300.02       PLAN:  Continue duloxetine 90 mg q.a.m. for mood anxiety  Continue bupropion  mg q.a.m.   Continue mirtazapine 15 mg q.h.s.  Continue exercise, encouraged mindfulness of dietary choices in an effort to decrease inflammation  Offered referral for individual psychotherapy.      RETURN TO CLINIC:   3 months      RODOLFO Freed, PMHNP-BC      32 minutes of total time spent on the encounter, which includes face to face time and non-face to face time preparing to see the patient (eg. review of tests), obtaining and/or reviewing separately obtained history, documenting clinical information in the electronic health record, independently interpreting results (not separately reported), and communicating results to the patient/family/caregiver, or care coordination (not separately reported).     Visit today included managing the longitudinal care of the patient due to the serious and/or complex managed problem(s) MDD and YASMANI.    At this time  "there are no indications the patient represents an imminent danger to either themselves or others; will continue to manage treatment in the outpatient setting.    I discussed the patient's care with the patient including benefits, alternatives, possible adverse effects of the treatment plan; including the potential for metabolic complications, major organ dysfunction, black box warnings, and contraindications. The opportunity was given for questions/clarification, and after this discussion the above treatment plan was devised through shared decision making. The patient voiced their understanding of the diagnoses and treatments listed above and agreed to the treatment plan. Follow up plan was reviewed with the patient. The patient was advised to call to report any worsening of symptoms or problems with medication.    Supportive therapy and psychoeducation provided. Patient has been given crisis information including Suicide and Crisis Lifeline (call or text: [phone]). Patient also given instructions to go to the nearest ER or call 911 if unable to remain safe or if the Pt develops thoughts of harming self or others.    Documentation entered by me for this encounter may have been done in part using [employer] voice recognition transcription software. Garbled syntax, mangled pronouns, and other bizarre constructions may be attributed to that software system. Although I have made an effort to ensure accuracy, "sound like" errors may exist and should be interpreted in context.  "

## 2025-02-21 DIAGNOSIS — Z00.00 ENCOUNTER FOR MEDICARE ANNUAL WELLNESS EXAM: ICD-10-CM

## 2025-04-14 ENCOUNTER — OFFICE VISIT (OUTPATIENT)
Dept: PAIN MEDICINE | Facility: CLINIC | Age: 75
End: 2025-04-14
Payer: MEDICARE

## 2025-04-14 VITALS
DIASTOLIC BLOOD PRESSURE: 77 MMHG | WEIGHT: 178.38 LBS | BODY MASS INDEX: 28 KG/M2 | HEART RATE: 69 BPM | HEIGHT: 67 IN | SYSTOLIC BLOOD PRESSURE: 124 MMHG

## 2025-04-14 DIAGNOSIS — M54.2 CERVICALGIA: Primary | ICD-10-CM

## 2025-04-14 DIAGNOSIS — M47.812 CERVICAL SPONDYLOSIS: ICD-10-CM

## 2025-04-14 PROCEDURE — 99214 OFFICE O/P EST MOD 30 MIN: CPT | Mod: PBBFAC,PO | Performed by: ANESTHESIOLOGY

## 2025-04-14 PROCEDURE — 99999 PR PBB SHADOW E&M-EST. PATIENT-LVL IV: CPT | Mod: PBBFAC,,, | Performed by: ANESTHESIOLOGY

## 2025-04-14 PROCEDURE — 99204 OFFICE O/P NEW MOD 45 MIN: CPT | Mod: S$PBB,,, | Performed by: ANESTHESIOLOGY

## 2025-04-14 RX ORDER — NAPROXEN SODIUM 550 MG/1
550 TABLET ORAL 2 TIMES DAILY WITH MEALS
Qty: 30 TABLET | Refills: 1 | Status: SHIPPED | OUTPATIENT
Start: 2025-04-14

## 2025-04-14 NOTE — PROGRESS NOTES
[facility] New Patient Evaluation      Referred by: Kiah Staley    PCP:     CC:   Chief Complaint   Patient presents with    Establish Care    Back Pain          4/14/2025     3:22 PM   Last 3 PDI Scores   Pain Disability Index (PDI) 38         HPI:   Coral Pandya is a 74 y.o. female patient who has a past medical history of Allergic rhinitis, Anxiety, Aortic atherosclerosis, Bladder mass, Blepharoconjunctivitis of both eyes, Cervical spondylosis, Depression, FHx: colon cancer, Fibromyalgia, GERD (gastroesophageal reflux disease), Hematuria, HTN (hypertension), Hypertensive retinopathy, bilateral, Nonexudative age-related macular degeneration, bilateral, early dry stage, DANYELL (obstructive sleep apnea), Other specified hypothyroidism, Primary open angle glaucoma of both eyes, mild stage, Primary osteoarthritis of right knee, Pseudophakia, Risk for coronary artery disease greater than 20% in next 10 years per [place] score, and Thrombosis. She presents with primarily neck pain.  She also previously was having some midthoracic back pain which has resolved.  Today her primary complaint is axial neck pain.  Her pain today as 4/10, constant, aching, tight, sharp.  She finds the pain worse in the morning and getting out of bed or a chair.  She can find some relief with anti-inflammatories.  She has also done physical therapy in the past with good relief of similar pain.  This neck pain has been a problem with her for over the past 15 years      Pain Intervention History:      Past Spine Surgical History:      Past and current medications:  Antineuropathics:  NSAIDs: naprosyn   Physical therapy:  Antidepressants: cymbalta   Muscle relaxers: flexeril   Opioids:  Antiplatelets/Anticoagulants:    History:  Current Medications[1]    Past Medical History:   Diagnosis Date    Allergic rhinitis 06/15/2022    Anxiety     Aortic atherosclerosis 08/24/2020    CT uro 5/21/20    Bladder mass      Blepharoconjunctivitis of both eyes 03/20/2023    Cervical spondylosis     Depression     FHx: colon cancer 06/14/2018    Fibromyalgia     GERD (gastroesophageal reflux disease)     Hematuria     HTN (hypertension)     Hypertensive retinopathy, bilateral 03/20/2023    Nonexudative age-related macular degeneration, bilateral, early dry stage 03/20/2023    DANYELL (obstructive sleep apnea)     no cpap    Other specified hypothyroidism 04/01/2014    Primary open angle glaucoma of both eyes, mild stage 03/20/2023    Primary osteoarthritis of right knee 01/14/2019    Pseudophakia 03/20/2023    Risk for coronary artery disease greater than 20% in next 10 years per Faber score 11/09/2023    Thrombosis        Past Surgical History:   Procedure Laterality Date    CARPAL TUNNEL RELEASE Bilateral     CATARACT EXTRACTION EXTRACAPSULAR W/ INTRAOCULAR LENS IMPLANTATION Bilateral     COLONOSCOPY N/A 06/14/2018    Procedure: COLONOSCOPY;  Surgeon: Amaury Chambers MD;  Location: [facility];  Service: Endoscopy;  Laterality: N/A; hemorrhoids, repeat in 5 years for screening due to family history of colon cancer    COLONOSCOPY N/A 10/06/2020    Procedure: COLONOSCOPY requests lidocaine for IV start;  Surgeon: Taqueria Olmos MD;  Location: [facility];  Service: Endoscopy;  Laterality: N/A;    CYSTOSCOPY W/ RETROGRADES Bilateral 06/01/2020    Procedure: CYSTOSCOPY, WITH RETROGRADE PYELOGRAM;  Surgeon: Alcon Rodrigues MD;  Location: [facility];  Service: Urology;  Laterality: Bilateral;    CYSTOSCOPY WITH BIOPSY OF BLADDER N/A 06/01/2020    Procedure: CYSTOSCOPY, WITH BLADDER BIOPSY;  Surgeon: Alcon Rodrigues MD;  Location: [facility] OR;  Service: Urology;  Laterality: N/A;    ESOPHAGOGASTRODUODENOSCOPY N/A 09/16/2020    Procedure: EGD (ESOPHAGOGASTRODUODENOSCOPY);  Surgeon: Amaury Chambers MD;  Location: [facility];  Service: Endoscopy;  Laterality: N/A;    FOOT SURGERY Bilateral     bilat joints removed second toe    LASER  LITHOTRIPSY Right 06/01/2020    Procedure: LITHOTRIPSY, USING LASER;  Surgeon: Alcon Rodrigues MD;  Location: [facility] OR;  Service: Urology;  Laterality: Right;    TONSILLECTOMY, ADENOIDECTOMY      TOTAL KNEE ARTHROPLASTY Right 01/14/2019    Procedure: ARTHROPLASTY, KNEE, TOTAL SITE ASSISTED;  Surgeon: Angelo Rodas MD;  Location: [facility] OR;  Service: Orthopedics;  Laterality: Right;  OFIRMEV    TOTAL KNEE ARTHROPLASTY Right     TUBAL LIGATION      UPPER GASTROINTESTINAL ENDOSCOPY  05/21/2008    Dr. Chambers, in legacy: gastric polyps removed; biopsy: fundic gland polyps    URETEROSCOPY Right 06/01/2020    Procedure: URETEROSCOPY;  Surgeon: Alcon Rodrigues MD;  Location: [facility] OR;  Service: Urology;  Laterality: Right;       Family History   Problem Relation Name Age of Onset    Arthritis Mother Vee     Depression Mother Vee     Hypertension Mother Vee     Miscarriages / Stillbirths Mother Vee     Colon cancer Paternal Aunt      Colon cancer Paternal Uncle      Arthritis Sister Mari     Cancer Sister Mari     Depression Sister Mari     Miscarriages / Stillbirths Sister Mari     Cancer Maternal Aunt Billie     Cancer Maternal Uncle Brody     Cancer Paternal Uncle Al     Celiac disease Neg Hx      Crohn's disease Neg Hx      Ulcerative colitis Neg Hx         Social History     Socioeconomic History    Marital status:    Tobacco Use    Smoking status: Never     Passive exposure: Never    Smokeless tobacco: Never   Substance and Sexual Activity    Alcohol use: No    Drug use: Never    Sexual activity: Not Currently     Partners: Female     Birth control/protection: Abstinence, Post-menopausal, See Surgical Hx     Social Drivers of Health     Financial Resource Strain: Low Risk  (11/8/2023)    Overall Financial Resource Strain (CARDIA)     Difficulty of Paying Living Expenses: Not hard at all   Food Insecurity: No Food Insecurity (11/8/2023)    Hunger Vital Sign     Worried About Running Out of Food in the  "Last Year: Never true     Ran Out of Food in the Last Year: Never true   Transportation Needs: No Transportation Needs (11/8/2023)    PRAPARE - Transportation     Lack of Transportation (Medical): No     Lack of Transportation (Non-Medical): No   Physical Activity: Sufficiently Active (11/8/2023)    Exercise Vital Sign     Days of Exercise per Week: 4 days     Minutes of Exercise per Session: 150+ min   Stress: No Stress Concern Present (11/8/2023)    [employer] [place] [employer] - Occupational Stress Questionnaire     Feeling of Stress : Not at all   Housing Stability: Low Risk  (11/8/2023)    Housing Stability Vital Sign     Unable to Pay for Housing in the Last Year: No     Number of Places Lived in the Last Year: 1     Unstable Housing in the Last Year: No       Review of patient's allergies indicates:   Allergen Reactions    Codeine      Other reaction(s): Nausea    Lisinopril Other (See Comments)     cough       Review of Systems:  12 point review of systems is negative.    Physical Exam:  Vitals:    04/14/25 1522   BP: 124/77   Pulse: 69   Weight: 80.9 kg (178 lb 5.6 oz)   Height: 5' 7" (1.702 m)   PainSc: 0-No pain   PainLoc: Back     Body mass index is 27.93 kg/m².    Gen: NAD  Psych: mood appropriate for given condition  HEENT: eyes anicteric   CV: RRR  HEENT: anicteric   Respiratory: non-labored, no signs of respiratory distress  Abd: non-distended  Skin: warm, dry and intact.  Gait: No antalgic gait.     Not much pain today with cervical axial facet loading  Tenderness over the bilateral trapezius    Sensory:  Intact and symmetrical to light touch in C4-T1 dermatomes bilaterally.     Motor:    Right Left   C4 Shoulder Abduction  5  5   C5 Elbow Flexion    5  5   C6 Wrist Extension  5  5   C7 Elbow Extension   5  5   C8/T1 Hand Intrinsics   5  5      Right Left   Triceps DTR 2+ 2+   Biceps DTR 2+ 2+        Patellar DTR 2+ 2+        Haq Absent  Absent                 Labs:  Lab Results "   Component Value Date    HGBA1C 5.7 (H) 05/20/2024       Lab Results   Component Value Date    WBC 3.82 (L) 05/20/2024    HGB 14.8 05/20/2024    HCT 44.0 05/20/2024    MCV 97 05/20/2024     (L) 05/20/2024           Imaging:  MRI thoracic spine 12/16/24  Impression:  1. Multilevel mild-to-moderate degenerative changes of the thoracic spine, as detailed above, most pronounced at T11-12 and T12-L1 with facet arthropathy and circumferential disc bulges with superimposed disc herniations contributing to mild spinal canal stenosis.  Moderate-size central disc protrusion at T11-12, which mildly flattens the ventral cord surface without focal cord signal abnormality.  2. Left central/subarticular disc extrusion at T12-L1, which partially effaces the left ventral thecal sac and may impinge upon the exiting left T12 nerve root.  3. Mild-to-moderate neural foraminal stenosis at T1-2 and T2-3.  No high-grade neural foraminal stenosis elsewhere.    MRI cervical spine 1/8/25  FINDINGS:  Morphology: Vertebral body heights are preserved.  No fractures or destructive changes.  Small chronic appearing Schmorl's nodes scattered throughout and shallow endplate centered osteophytes.  No concerning marrow edema or other focal lesions identified aside from minimal Modic type 1 endplate centered changes asymmetric to the left C6-C7.     Alignment: Grade 1 anterolisthesis of C3 on C4, C4 on C5, C7 on T1, T1 on T2, and T2 on T3..     Cord: The cervical cord shows normal contour and signal content throughout its length.     Craniocervical Junction: Cerebellar tonsils are normally positioned. The visualized portions of the posterior fossa are unremarkable. The regional osseous anatomy is normal.        Disc levels:  C2-C3: Moderate left and mild right-sided facet arthrosis producing mild-to-moderate left and mild right foraminal narrowing.  The spinal canal is patent..  C3-C4: Mild degenerative disc height loss and shallow disc  osteophyte complex lateralizing to the right flattening the ventral thecal sac without substantial narrowing of the spinal canal.  Moderate right and mild-to-moderate left-sided facet arthrosis with uncovertebral spurring contribute to moderate to severe bilateral foraminal narrowing, left greater than right..  C4-C5: Moderate degenerative disc height loss and shallow disc osteophyte complex flattening the ventral thecal sac without substantial narrowing of the spinal canal.  Uncovertebral spurring and moderate left/mild right-sided facet arthrosis contribute to moderate right and mild left foraminal narrowing..  C5-C6: Moderate degenerative disc height loss and shallow broad-based disc osteophyte complex flattening the ventral thecal sac without substantial narrowing of the spinal canal.  Uncovertebral spurring and mild facet arthrosis contributes to no more than mild left foraminal narrowing.  The right foramen is patent..  C6-C7: Mild degenerative disc height loss and shallow broad-based disc osteophyte complex with ligamentum flavum thickening producing flattening of the ventral and dorsal thecal sac without substantial spinal canal narrowing.  Uncovertebral spurring and moderate bilateral facet arthrosis contributes to severe left and mild to moderate right foraminal narrowing..  C7-T1: Mild degenerative disc height loss and shallow broad-based disc osteophyte complex flattens the ventral thecal sac without substantial narrowing of the spinal canal.  Mild to moderate bilateral facet arthrosis.  The foramina remain patent..     Soft tissues: The visualized paraspinal soft tissues are within normal limits.    Diagnosis:  1. Cervicalgia  -     Ambulatory Referral/Consult to Physical Therapy; Future; Expected date: 04/21/2025  -     naproxen sodium (ANAPROX) 550 MG tablet; Take 1 tablet (550 mg total) by mouth 2 (two) times daily with meals.  Dispense: 30 tablet; Refill: 1    2. Cervical spondylosis  -      Ambulatory Referral/Consult to Physical Therapy; Future; Expected date: 04/21/2025  -     naproxen sodium (ANAPROX) 550 MG tablet; Take 1 tablet (550 mg total) by mouth 2 (two) times daily with meals.  Dispense: 30 tablet; Refill: 1          Coral Pandya is a 74 y.o. female patient who has a past medical history of Allergic rhinitis, Anxiety, Aortic atherosclerosis, Bladder mass, Blepharoconjunctivitis of both eyes, Cervical spondylosis, Depression, FHx: colon cancer, Fibromyalgia, GERD (gastroesophageal reflux disease), Hematuria, HTN (hypertension), Hypertensive retinopathy, bilateral, Nonexudative age-related macular degeneration, bilateral, early dry stage, DANYELL (obstructive sleep apnea), Other specified hypothyroidism, Primary open angle glaucoma of both eyes, mild stage, Primary osteoarthritis of right knee, Pseudophakia, Risk for coronary artery disease greater than 20% in next 10 years per [place] score, and Thrombosis. She presents with primarily neck pain.  She also previously was having some midthoracic back pain which has resolved.  Today her primary complaint is axial neck pain.  Her pain today as 4/10, constant, aching, tight, sharp.  She finds the pain worse in the morning and getting out of bed or a chair.  She can find some relief with anti-inflammatories.  She has also done physical therapy in the past with good relief of similar pain.  This neck pain has been a problem with her for over the past 15 years    - on exam she has full strength of her upper extremities.  Intact sensation to light touch bilateral C4-T1.  2+ bilateral biceps and triceps DTR.  Not much pain with cervical axial facet loading today.  Some tenderness over the bilateral trapezius  - I independently reviewed her cervical MRI and she has degenerative disc disease worse at C4-5 and C5-6.  She has multilevel bilateral facet arthropathy  - I independently reviewed her thoracic MRI and at T1-2 she has a small central  disc extrusion.  At T12-L1 she has a left paracentral disc extrusion  - I do not see anything that is specifically correlates with the pain that she previously was having around the level of her bra however that pain has resolved.  I think it likely was soft tissue and myofascial related  - as for her neck pain I think she likely has pain secondary to cervical spondylosis and her degenerative disc disease.  She reports she has done very well with physical therapy over 10 years ago.  I have placed a referral for her to restart formal physical therapy where she previously went.  She uses NSAIDs on an as needed basis with excellent relief as well.  I have reviewed her renal function which is normal.  I have prescribed her some naproxen 550 mg to use on an as needed basis for inflammatory pain.  She also has muscle relaxants which she uses on an as needed basis for myofascial pain  - I discussed in the future she may be a candidate for injections.  She will start with conservative therapy and reach out to me if she does not find an improvement in her pain      : Not applicable    Kian Saenz M.D.  Interventional Pain Medicine / Anesthesiology    This note was completed with dictation software and grammatical errors may exist.         [1]   Current Outpatient Medications:     atorvastatin (LIPITOR) 20 MG tablet, Take 1 tablet (20 mg total) by mouth once daily., Disp: 90 tablet, Rfl: 3    buPROPion (WELLBUTRIN XL) 150 MG TB24 tablet, TAKE 1 TABLET BY MOUTH EVERY DAY, Disp: 90 tablet, Rfl: 1    calcium carbonate-vitamin D3 600 mg calcium- 200 unit Cap, Take 1 capsule by mouth 2 (two) times daily. DO NOT TAKE AM OF SURGERY, Disp: , Rfl:     cyclobenzaprine (FLEXERIL) 10 MG tablet, Take 1 tablet (10 mg total) by mouth 3 (three) times daily as needed for Muscle spasms., Disp: 30 tablet, Rfl: 0    desonide (DESOWEN) 0.05 % cream, APPLY TO EYELID SKIN NIGHTLY PRN ITCHY LIDS, Disp: 180 g, Rfl: 0    diazePAM (VALIUM) 5 MG  tablet, Take 1 tab (5 mg) approx 60 min before MRI; okay to repeat dose after 45-60 min if symptoms not controlled., Disp: 5 tablet, Rfl: 0    DULoxetine (CYMBALTA) 30 MG capsule, TAKE 1 CAPSULE BY MOUTH EVERY DAY, Disp: 90 capsule, Rfl: 1    DULoxetine (CYMBALTA) 60 MG capsule, TAKE 1 CAPSULE BY MOUTH EVERY DAY, Disp: 90 capsule, Rfl: 1    ezetimibe (ZETIA) 10 mg tablet, TAKE 1 TABLET BY MOUTH EVERY DAY, Disp: 90 tablet, Rfl: 2    famotidine (PEPCID) 40 MG tablet, TAKE 1 TABLET BY MOUTH EVERY DAY, Disp: 90 tablet, Rfl: 3    ketotifen (ZADITOR) 0.025 % ophthalmic solution, Place 1 drop into both eyes 2 (two) times daily. USE AS USUAL, Disp: , Rfl:     mirtazapine (REMERON) 15 MG tablet, TAKE 1 TABLET BY MOUTH EVERY EVENING., Disp: 90 tablet, Rfl: 1    mometasone (NASONEX) 50 mcg/actuation nasal spray, 2 sprays by Nasal route once daily. (Patient not taking: Reported on 11/25/2024), Disp: 17 g, Rfl: 5    multivit-min-FA-lycopen-lutein (CENTRUM SILVER) 0.4-300-250 mg-mcg-mcg Tab, , Disp: , Rfl:     naproxen sodium (ANAPROX) 550 MG tablet, Take 1 tablet (550 mg total) by mouth 2 (two) times daily with meals., Disp: 30 tablet, Rfl: 1    nebivoloL (BYSTOLIC) 5 MG Tab, TAKE 1 TABLET BY MOUTH EVERY DAY, Disp: 90 tablet, Rfl: 3    RABEprazole (ACIPHEX) 20 mg tablet, TAKE 1 TABLET BY MOUTH EVERY DAY, Disp: 90 tablet, Rfl: 3    SYNTHROID 100 mcg tablet, TAKE 1 TABLET BY MOUTH EVERY DAY, Disp: 90 tablet, Rfl: 3    telmisartan (MICARDIS) 20 MG Tab, TAKE 1 TABLET BY MOUTH EVERY DAY, Disp: 90 tablet, Rfl: 3    vit C/E/Zn/coppr/lutein/zeaxan (PRESERVISION AREDS-2 ORAL), Take 1 tablet by mouth 2 (two) times a day., Disp: , Rfl:

## 2025-04-24 DIAGNOSIS — I70.0 AORTIC ATHEROSCLEROSIS: ICD-10-CM

## 2025-04-24 DIAGNOSIS — Z91.89 RISK FOR CORONARY ARTERY DISEASE BETWEEN 10% AND 20% IN NEXT 10 YEARS PER FRAMINGHAM SCORE: ICD-10-CM

## 2025-04-24 RX ORDER — ATORVASTATIN CALCIUM 20 MG/1
20 TABLET, FILM COATED ORAL
Qty: 90 TABLET | Refills: 0 | Status: SHIPPED | OUTPATIENT
Start: 2025-04-24

## 2025-04-24 NOTE — TELEPHONE ENCOUNTER
Care Due:                  Date            Visit Type   Department     Provider  --------------------------------------------------------------------------------                                EP -                              PRIMARY      [facility] FAMILY  Last Visit: 11-      CARE ([facility])   MEDICINE       Charla Cole  Next Visit: None Scheduled  None         None Found                                                            Last  Test          Frequency    Reason                     Performed    Due Date  --------------------------------------------------------------------------------    Lipid Panel.  12 months..  atorvastatin, ezetimibe..  05- 05-    TSH.........  12 months..  SYNTHROID................  05- 05-    [employer] Embedded Care Due Messages. Reference number: [id number].   4/24/2025 12:43:23 AM CDT

## 2025-04-24 NOTE — TELEPHONE ENCOUNTER
Provider Staff:  Action required for this patient    Requires labs      Please see care gap opportunities below in Care Due Message.    Thanks!  [facility]     Appointments      Date Provider   Last Visit   11/25/2024 Charla Cole MD   Next Visit   Visit date not found Charla Cole MD     Refill Decision Note   Coral Pandya  is requesting a refill authorization.  Brief Assessment and Rationale for Refill:  Approve     Medication Therapy Plan:         Comments:     Note composed:7:32 AM 04/24/2025

## 2025-04-28 ENCOUNTER — PATIENT MESSAGE (OUTPATIENT)
Dept: FAMILY MEDICINE | Facility: CLINIC | Age: 75
End: 2025-04-28
Payer: MEDICARE

## 2025-04-28 DIAGNOSIS — G89.29 CHRONIC LEFT-SIDED THORACIC BACK PAIN: ICD-10-CM

## 2025-04-28 DIAGNOSIS — M79.622 LEFT AXILLARY PAIN: ICD-10-CM

## 2025-04-28 DIAGNOSIS — M54.6 CHRONIC LEFT-SIDED THORACIC BACK PAIN: ICD-10-CM

## 2025-04-29 DIAGNOSIS — F41.1 GAD (GENERALIZED ANXIETY DISORDER): Chronic | ICD-10-CM

## 2025-04-29 DIAGNOSIS — F33.41 MDD (MAJOR DEPRESSIVE DISORDER), RECURRENT, IN PARTIAL REMISSION: Chronic | ICD-10-CM

## 2025-04-29 RX ORDER — DULOXETIN HYDROCHLORIDE 60 MG/1
60 CAPSULE, DELAYED RELEASE ORAL
Qty: 90 CAPSULE | Refills: 0 | Status: SHIPPED | OUTPATIENT
Start: 2025-04-29

## 2025-04-29 RX ORDER — BUPROPION HYDROCHLORIDE 150 MG/1
150 TABLET ORAL
Qty: 90 TABLET | Refills: 1 | Status: SHIPPED | OUTPATIENT
Start: 2025-04-29

## 2025-04-29 NOTE — TELEPHONE ENCOUNTER
Refill Decision Note   Coral Pandya  is requesting a refill authorization.  Brief Assessment and Rationale for Refill:  Approve     Medication Therapy Plan:         Alert overridden per protocol: Yes   Comments:     Note composed:4:36 AM 04/29/2025

## 2025-04-29 NOTE — TELEPHONE ENCOUNTER
No care due was identified.  [facility] Embedded Care Due Messages. Reference number: [id number].   4/29/2025 12:14:56 AM CDT

## 2025-04-30 RX ORDER — CYCLOBENZAPRINE HCL 10 MG
10 TABLET ORAL 3 TIMES DAILY PRN
Qty: 30 TABLET | Refills: 0 | Status: SHIPPED | OUTPATIENT
Start: 2025-04-30

## 2025-04-30 NOTE — TELEPHONE ENCOUNTER
No care due was identified.  [facility] Embedded Care Due Messages. Reference number: [id number].   4/30/2025 2:06:15 PM CDT

## 2025-05-15 ENCOUNTER — OFFICE VISIT (OUTPATIENT)
Dept: PSYCHIATRY | Facility: CLINIC | Age: 75
End: 2025-05-15
Payer: MEDICARE

## 2025-05-15 VITALS
SYSTOLIC BLOOD PRESSURE: 128 MMHG | HEIGHT: 67 IN | BODY MASS INDEX: 27.49 KG/M2 | DIASTOLIC BLOOD PRESSURE: 83 MMHG | HEART RATE: 68 BPM | WEIGHT: 175.13 LBS

## 2025-05-15 DIAGNOSIS — F41.1 GAD (GENERALIZED ANXIETY DISORDER): ICD-10-CM

## 2025-05-15 DIAGNOSIS — F33.40 RECURRENT MAJOR DEPRESSIVE DISORDER, IN REMISSION: Primary | ICD-10-CM

## 2025-05-15 PROCEDURE — 99999 PR PBB SHADOW E&M-EST. PATIENT-LVL IV: CPT | Mod: PBBFAC,,,

## 2025-05-15 PROCEDURE — 99214 OFFICE O/P EST MOD 30 MIN: CPT | Mod: PBBFAC,PO

## 2025-05-15 NOTE — PROGRESS NOTES
"OUTPATIENT PSYCHIATRY FOLLOW UP VISIT    Encounter Date: 5/15/2025    Clinical Status of Patient:  Outpatient (Ambulatory)    Chief Complaint:  Coral Pandya is a 74 y.o. female who presents today for follow-up.  Met with patient.      HISTORY OF PRESENTING ILLNESS:  Coral Pandya is a 74 y.o. female with history of MDD, recurrent, in remission and YASMANI who presents for follow up appointment.      Plan at last appointment:   Continue duloxetine 90 mg q.a.m. for mood anxiety  Continue bupropion  mg q.a.m.   Continue mirtazapine 15 mg q.h.s.  Continue exercise, encouraged mindfulness of dietary choices in an effort to decrease inflammation  Offered referral for individual psychotherapy.    Psychotropic medication history:    Lexapro and Wellbutrin (effective but led to h/a's), celexa (nausea), valium        INTERVAL HISTORY:    Today, Pt reports she is doing well overall. Her mood continues to be stable.     Her son was recently ill, "It turned out to be stress. He started Lexapro. It's all genetic." She took care of him and felt stressed herself, "thank god I'm on medicine or I don't know how I would have delt with it.  I did really well through it. I felt like I was the srong one."     Her daughter recently adopted a teenager from [facility], and the Pt is excited to have a new granddaughter.    Is patient experiencing or having changes in:  Trouble with sleep:  continues to sleep well, usually sleeps 6-7 hours on average   Appetite changes:  no  Weight changes: no  Lack of energy:  no  Anhedonia:  no  Somatic symptoms:  no  Anxiety/panic:  no, "I don't really have much anxiety. I struggled with that years ago. I don't really get that anymore."  Irritability: no  Guilty/hopeless:  no  Concentration: no  Racing thoughts: no  Impulsive behaviors: no  Paranoia/AVH: no  Self-injurious behavior/risky behavior:  no  Any drugs:  no  Alcohol:  no    No interval episodes with symptoms consistent with " kimani or hypomania.  Denied interval or current suicidal/homicidal thoughts, intent, or plan or NSSI.  Denied other questions and concerns.  Patient reports feeling stable and wishing to continue current management unchanged.    Medication side effects: continued xerostomia, sees dental hygienist regularly  Medication adherence: yes    Psychotherapy:  Target symptoms: depression  Why chosen therapy is appropriate versus another modality: evidence based practice  Outcome monitoring methods: self-report, observation  Therapeutic intervention type: supportive psychotherapy  Topics discussed/themes: building skills sets for symptom management, symptom recognition  The patient's response to the intervention is accepting. The patient's progress toward treatment goals is fair.   Duration of intervention: 16 minutes.    MEDICAL REVIEW OF SYSTEMS:   Pain: Denies any significant chronic or acute pain.  Constitutional: Denies fever or change in appetite.  Cardiovascular: Denies chest pain or exertional dyspnea.  Respiratory: Nic cough or orthopnea.   GI: Denies abdominal pain, N/V  Neurological: Denies tremor, seizure, or focal weakness.  Psychiatric: See HPI above.    PAST PSYCHIATRIC, MEDICAL, AND SOCIAL HISTORY REVIEWED  The patient's past medical, family and social history have been reviewed and updated as appropriate within the electronic medical record - see encounter notes.    PAST MEDICAL HISTORY:   Past Medical History:   Diagnosis Date    Allergic rhinitis 06/15/2022    Anxiety     Aortic atherosclerosis 08/24/2020    CT uro 5/21/20    Bladder mass     Blepharoconjunctivitis of both eyes 03/20/2023    Cervical spondylosis     Depression     FHx: colon cancer 06/14/2018    Fibromyalgia     GERD (gastroesophageal reflux disease)     Hematuria     HTN (hypertension)     Hypertensive retinopathy, bilateral 03/20/2023    Nonexudative age-related macular degeneration, bilateral, early dry stage 03/20/2023    DANYELL  "(obstructive sleep apnea)     no cpap    Other specified hypothyroidism 04/01/2014    Primary open angle glaucoma of both eyes, mild stage 03/20/2023    Primary osteoarthritis of right knee 01/14/2019    Pseudophakia 03/20/2023    Risk for coronary artery disease greater than 20% in next 10 years per Cyrus score 11/09/2023    Thrombosis    Head trauma/Loss of consciousness: denies  Seizures: denies     PAST PSYCHIATRIC HISTORY:  First psych contact:  2016 with Dr Coe for depression and anxiety  Prior hospitalizations: denies  Prior suicide attempts or self-harm: denies  Prior diagnosis: MDD, recurrent, in partial remission; YASMANI  Prior psychotherapy: Congregational counselor prior to marriage separation, did help    FAMILY HISTORY:   Paternal: F-anxiety; no history of substance abuse or suicide  Maternal: M-anxiety; no history of substance abuse or suicide  Siblings: brother- suicide 1990  dtr- seizure while on wellbutrin    SOCIAL HISTORY:   Childhood: [place], moved to [facility]  in [place]  Marital Status:  but  for 15 years  Children: 3 children  Resides: [place]  Occupation: part time at [employer]  Hobbies: bike [place]  Adventism: Jewish  Education level: HS grad  : denies  Legal: denies    SUBSTANCE USE HISTORY:  Caffeine: coffee in AM, but no caffeine after noon  Tobacco: denies  Alcohol: denies  Drug use: denies  Rehab: denies  Prior/current AA: denies    EXAM:  Constitutional  Vitals:  Most recent vital signs were reviewed.   Last 3 sets of VS:      11/25/2024     2:09 PM 4/14/2025     3:22 PM 5/15/2025     1:27 PM   Vitals - 1 value per visit   SYSTOLIC 128 124 128   DIASTOLIC 80 77 83   Pulse 68 69 68   SPO2 98 %     Weight (lb) 171.74 178.35 175.16   Weight (kg) 77.9 80.9 79.45   Height 5' 7" (1.702 m) 5' 7" (1.702 m) 5' 7" (1.702 m)   BMI (Calculated) 26.9 27.9 27.4   Pain Score Two Zero Seven      General:  unremarkable, age appropriate "     Musculoskeletal  Muscle Strength/Tone:  No tremor or abnormal movements   Gait & Station:  Steady, non-ataxic     Psychiatric  Speech:  no latency; no press   Mood & Affect:  euthymic  congruent and appropriate   Thought Process:  normal and logical   Associations:  intact   Thought Content:  normal, no suicidality, no homicidality, delusions, or paranoia   Insight:  intact   Judgement: behavior is adequate to circumstances   Orientation:  grossly intact   Memory: intact for content of interview   Language: grossly intact   Attention Span & Concentration:  Intact to interview   Fund of Knowledge:  Not tested     SUICIDE RISK ASSESSMENT:  Protective factors:  gender, no prior attempts, no prior hospitalizations, no family h/o attempts, no ongoing substance abuse, no psychosis, has children, denies SI/intent/plan, seeking treatment, access to treatment, future oriented, good primary support, no access to firearms  Risks: age,  but , hx MDD and YASMANI  Patient is a low immediate and long-term risk considering risk factors.     RELEVANT LABS/STUDIES:    Lab Results   Component Value Date    WBC 3.82 (L) 05/20/2024    HGB 14.8 05/20/2024    HCT 44.0 05/20/2024    MCV 97 05/20/2024     (L) 05/20/2024     BMP  Lab Results   Component Value Date     05/20/2024    K 5.1 05/20/2024     05/20/2024    CO2 26 05/20/2024    BUN 21 05/20/2024    CREATININE 0.9 05/20/2024    CALCIUM 9.7 05/20/2024    ANIONGAP 6 (L) 05/20/2024    ESTGFRAFRICA >60.0 08/23/2021    EGFRNONAA >60.0 08/23/2021     Lab Results   Component Value Date    ALT 25 05/20/2024    AST 22 05/20/2024    ALKPHOS 76 05/20/2024    BILITOT 0.4 05/20/2024     Lab Results   Component Value Date    TSH 2.226 05/20/2024     Lab Results   Component Value Date    HGBA1C 5.7 (H) 05/20/2024       IMPRESSION:    Coral Pandya is a 74 y.o. female with history of MDD and YASMANI who presents for follow up appointment.    Status/Progress:  Based on the examination today, the patient's problem(s) is/are well controlled.  New problems have not been presented today.   Co-morbidities are not complicating management of the primary condition.  There are no active rule-out diagnoses for this patient at this time.     Risk Parameters:  Patient reports no suicidal ideation  Patient reports no homicidal ideation  Patient reports no self-injurious behavior  Patient reports no violent behavior    DIAGNOSES:    ICD-10-CM ICD-9-CM   1. Recurrent major depressive disorder, in remission  F33.40 296.35   2. YASMANI (generalized anxiety disorder)  F41.1 300.02       PLAN:  Continue duloxetine 90 mg q.a.m. for mood anxiety  Continue bupropion  mg q.a.m.   Continue mirtazapine 15 mg q.h.s.  Continue exercise, encouraged mindfulness of dietary choices in an effort to decrease inflammation  Offered referral for individual psychotherapy.      RETURN TO CLINIC:   3 months      RODOLFO Freed, PMHNP-BC      32 minutes of total time spent on the encounter, which includes face to face time and non-face to face time preparing to see the patient (eg. review of tests), obtaining and/or reviewing separately obtained history, documenting clinical information in the electronic health record, independently interpreting results (not separately reported), and communicating results to the patient/family/caregiver, or care coordination (not separately reported).     Visit today included managing the longitudinal care of the patient due to the serious and/or complex managed problem(s) MDD and YASMANI.    At this time there are no indications the patient represents an imminent danger to either themselves or others; will continue to manage treatment in the outpatient setting.    I discussed the patient's care with the patient including benefits, alternatives, possible adverse effects of the treatment plan; including the potential for metabolic complications, major organ dysfunction,  "black box warnings, and contraindications. The opportunity was given for questions/clarification, and after this discussion the above treatment plan was devised through shared decision making. The patient voiced their understanding of the diagnoses and treatments listed above and agreed to the treatment plan. Follow up plan was reviewed with the patient. The patient was advised to call to report any worsening of symptoms or problems with medication.    Supportive therapy and psychoeducation provided. Patient has been given crisis information including Suicide and Crisis Lifeline (call or text: 865). Patient also given instructions to go to the nearest ER or call 911 if unable to remain safe or if the Pt develops thoughts of harming self or others.    Documentation entered by me for this encounter may have been done in part using [employer] voice recognition transcription software. Garbled syntax, mangled pronouns, and other bizarre constructions may be attributed to that software system. Although I have made an effort to ensure accuracy, "sound like" errors may exist and should be interpreted in context.    "

## 2025-05-21 DIAGNOSIS — Z78.0 MENOPAUSE: ICD-10-CM

## 2025-05-21 DIAGNOSIS — I10 HTN (HYPERTENSION): ICD-10-CM

## 2025-07-16 ENCOUNTER — TELEPHONE (OUTPATIENT)
Dept: PAIN MEDICINE | Facility: CLINIC | Age: 75
End: 2025-07-16
Payer: MEDICARE

## 2025-07-16 NOTE — TELEPHONE ENCOUNTER
Spoke with patient and her physical therapist is wanted her to get a cervical traction device, but they need an order from her doctor

## 2025-07-16 NOTE — TELEPHONE ENCOUNTER
Copied from CRM #[id number]. Topic: General Inquiry - Patient Advice  >> Jul 16, 2025  2:52 PM Pippa wrote:  Type:  Needs Medical Advice    Who Called: pt  Would the patient rather a call back or a response via [facility]? portal  Best Call Back Number: [phone]   Additional Information: needs script for cervical traction devise for at home pt  Phyllis    # [phone]

## 2025-07-16 NOTE — TELEPHONE ENCOUNTER
I have never ordered 1 of these before and do not see it in the available items to order.  We can reach out to our colleagues that does the DME orders and see if he is familiar with this

## 2025-07-17 ENCOUNTER — TELEPHONE (OUTPATIENT)
Dept: FAMILY MEDICINE | Facility: CLINIC | Age: 75
End: 2025-07-17
Payer: MEDICARE

## 2025-07-17 ENCOUNTER — OFFICE VISIT (OUTPATIENT)
Dept: URGENT CARE | Facility: CLINIC | Age: 75
End: 2025-07-17
Payer: MEDICARE

## 2025-07-17 VITALS
HEART RATE: 66 BPM | DIASTOLIC BLOOD PRESSURE: 86 MMHG | BODY MASS INDEX: 27.31 KG/M2 | HEIGHT: 67 IN | RESPIRATION RATE: 18 BRPM | TEMPERATURE: 98 F | SYSTOLIC BLOOD PRESSURE: 136 MMHG | OXYGEN SATURATION: 98 % | WEIGHT: 174 LBS

## 2025-07-17 DIAGNOSIS — R30.0 DYSURIA: Primary | ICD-10-CM

## 2025-07-17 DIAGNOSIS — N30.01 ACUTE CYSTITIS WITH HEMATURIA: Primary | ICD-10-CM

## 2025-07-17 DIAGNOSIS — R39.15 URINARY URGENCY: ICD-10-CM

## 2025-07-17 DIAGNOSIS — R39.9 UTI SYMPTOMS: ICD-10-CM

## 2025-07-17 LAB
BILIRUBIN, UA POC OHS: NEGATIVE
BLOOD, UA POC OHS: ABNORMAL
CLARITY, UA POC OHS: CLEAR
COLOR, UA POC OHS: YELLOW
GLUCOSE, UA POC OHS: NEGATIVE
KETONES, UA POC OHS: NEGATIVE
LEUKOCYTES, UA POC OHS: ABNORMAL
NITRITE, UA POC OHS: NEGATIVE
PH, UA POC OHS: 7
PROTEIN, UA POC OHS: 30
SPECIFIC GRAVITY, UA POC OHS: 1.02
UROBILINOGEN, UA POC OHS: 0.2

## 2025-07-17 PROCEDURE — 81003 URINALYSIS AUTO W/O SCOPE: CPT | Mod: QW,S$GLB,, | Performed by: NURSE PRACTITIONER

## 2025-07-17 PROCEDURE — 87186 SC STD MICRODIL/AGAR DIL: CPT | Performed by: NURSE PRACTITIONER

## 2025-07-17 PROCEDURE — 99214 OFFICE O/P EST MOD 30 MIN: CPT | Mod: S$GLB,,, | Performed by: NURSE PRACTITIONER

## 2025-07-17 RX ORDER — DOXYCYCLINE 40 MG/1
40 CAPSULE ORAL EVERY MORNING
COMMUNITY
Start: 2025-06-25 | End: 2025-09-23

## 2025-07-17 RX ORDER — NITROFURANTOIN 25; 75 MG/1; MG/1
100 CAPSULE ORAL 2 TIMES DAILY
Qty: 10 CAPSULE | Refills: 0 | Status: SHIPPED | OUTPATIENT
Start: 2025-07-17 | End: 2025-07-22

## 2025-07-17 NOTE — PROGRESS NOTES
"Subjective:      Patient ID: Coral Pandya is a 74 y.o. female.    Vitals:  height is 5' 7" (1.702 m) and weight is 78.9 kg (174 lb). Her oral temperature is 98 °F (36.7 °C). Her blood pressure is 136/86 and her pulse is 66. Her respiration is 18 and oxygen saturation is 98%.     Chief Complaint: Urinary Tract Infection    74/F, symptoms began 7/14/25. A feeling of fullness, an increase in frequency and urgency and a feeling she cannot completely empty her bladder. She is currently taking a round of Doxycycline for rosaca (sp?), but it is not working for the UTI. She is asking for a different antibiotic today. No OTC medicine at this time.       Urinary Tract Infection   This is a new problem. The current episode started in the past 7 days. The problem occurs every urination. The problem has been unchanged. The pain is at a severity of 0/10. The patient is experiencing no pain. There has been no fever. She is Not sexually active. There is No history of pyelonephritis. Associated symptoms include frequency, hematuria, hesitancy and urgency. Pertinent negatives include no chills, nausea, vomiting or rash. She has tried nothing for the symptoms.       Constitution: Negative. Negative for chills, sweating and fatigue.   HENT: Negative.  Negative for ear pain, facial swelling, congestion and sore throat.    Neck: Negative for painful lymph nodes.   Cardiovascular: Negative.  Negative for chest trauma, chest pain and sob on exertion.   Eyes: Negative.  Negative for eye itching and eye pain.   Respiratory: Negative.  Negative for chest tightness, cough and asthma.    Gastrointestinal: Negative.  Negative for nausea, vomiting and diarrhea.   Endocrine: negative. cold intolerance and excessive thirst.   Genitourinary:  Positive for dysuria, frequency, urgency and hematuria.   Musculoskeletal:  Negative for pain, trauma and joint pain.   Skin: Negative.  Negative for rash, wound and hives.   Allergic/Immunologic: " Negative.  Negative for eczema, asthma, hives and itching.   Neurological: Negative.  Negative for disorientation and altered mental status.   Hematologic/Lymphatic: Negative.  Negative for swollen lymph nodes.   Psychiatric/Behavioral: Negative.  Negative for altered mental status, disorientation and confusion.       Objective:     Physical Exam   Constitutional: She is oriented to person, place, and time. She appears well-developed.  Non-toxic appearance. She does not appear ill. No distress.   HENT:   Head: Normocephalic and atraumatic.   Ears:   Right Ear: External ear normal.   Left Ear: External ear normal.   Nose: Nose normal. No nasal deformity. No epistaxis.   Mouth/Throat: Oropharynx is clear and moist and mucous membranes are normal.   Eyes: Lids are normal.   Neck: Trachea normal and phonation normal. Neck supple.   Cardiovascular: Normal pulses.   Pulmonary/Chest: Effort normal and breath sounds normal. No stridor. No respiratory distress. She has no wheezes. She has no rhonchi. She has no rales. She exhibits no tenderness.   Abdominal: Normal appearance and bowel sounds are normal. She exhibits no distension. Soft. There is no abdominal tenderness. There is no guarding, no left CVA tenderness and no right CVA tenderness.   Neurological: no focal deficit. She is alert, oriented to person, place, and time and at baseline.   Skin: Skin is warm, dry, intact and not diaphoretic.   Psychiatric: Her speech is normal and behavior is normal. Mood, judgment and thought content normal.   Nursing note and vitals reviewed.    Results for orders placed or performed in visit on 07/17/25   POCT Urinalysis(Instrument)    Collection Time: 07/17/25  3:37 PM   Result Value Ref Range    Color, POC UA Yellow Yellow, Straw, Colorless    Clarity, POC UA Clear Clear    Glucose, POC UA Negative Negative    Bilirubin, POC UA Negative Negative    Ketones, POC UA Negative Negative    Spec Grav POC UA 1.020 1.005 - 1.030    Blood,  POC UA Trace-intact (A) Negative    pH, POC UA 7.0 5.0 - 8.0    Protein, POC UA 30 (A) Negative    Urobilinogen, POC UA 0.2 <=1.0    Nitrite, POC UA Negative Negative    WBC, POC UA Moderate (A) Negative         Assessment:     1. Acute cystitis with hematuria    2. UTI symptoms        Plan:   Discussed urinalysis results with patient. + leuks/blood Will start antibiotics and send urine culture.  Patient will be called with results and will adjust accordingly.  Patient instructed to increase fluid intake.  PCP and/or specialist follow-up recommended.  Patient verbalized understanding and agreed to plan.    Patient's chart reviewed in detail including all recent visits, pertinent medical history, medications, allergies and recent labs/urine cultures prior to prescribing medications.     FOLLOWUP  Follow up if symptoms worsen or fail to improve, for PLEASE CONTACT PCP OR CONTACT THE EMERGENCY ROOM..     PATIENT INSTRUCTIONS  Patient Instructions   INSTRUCTIONS:  - Rest.  - Drink plenty of fluids.  - Take Tylenol and/or Ibuprofen as directed as needed for fever/pain.  Do not take more than the recommended dose.  - follow up with your PCP within the next 1-2 weeks as needed.  - You must understand that you have received an Urgent Care treatment only and that you may be released before all of your medical problems are known or treated.   - You, the patient, will arrange for follow up care as instructed.   - If your condition worsens or fails to improve we recommend that you receive another evaluation at the ER immediately or contact your PCP to discuss your concerns.   - You can call [phone] or [phone] to help schedule an appointment with the appropriate provider.     -If you smoke cigarettes, it would be beneficial for you to stop.         Thank you for allowing me to participate in your health care,     FNP-C      Acute cystitis with hematuria  -     Urine Culture High Risk ($$)  -     nitrofurantoin,  macrocrystal-monohydrate, (MACROBID) 100 MG capsule; Take 1 capsule (100 mg total) by mouth 2 (two) times daily. for 5 days  Dispense: 10 capsule; Refill: 0    UTI symptoms  -     POCT Urinalysis(Instrument)

## 2025-07-17 NOTE — TELEPHONE ENCOUNTER
Pt has been having UTI symptoms since Monday , frequency , pressure , urgency . Pt now sees blood in her urine . Dr Cole is out for the day. Pt advised to go to urgent care as Dr Cole may not see message until after hours . --lp

## 2025-07-17 NOTE — PATIENT INSTRUCTIONS

## 2025-07-17 NOTE — TELEPHONE ENCOUNTER
Copied from CRM #[id number]. Topic: General Inquiry - Patient Advice  >> Jul 17, 2025 11:44 AM Baljinder wrote:  Type:  Needs Medical Advice    Who Called: Pt   Symptoms (please be specific): UTI symptoms    How long has patient had these symptoms:  3-4 days  Pharmacy name and phone #:    [id number] - [place] - [address]  [address]  [place] [address]  Phone: [phone] Fax: [phone]  Would the patient rather a call back or a response via [facility]? Call  Best Call Back Number: [phone]   Additional Information: Pt is having UTI symptoms and would like to know if she can bring in a specimen. Pt has doxycycline for rosacea, and she tried to take a higher dose of that med, and it didn't help. Pls call back and adv. Thank you.

## 2025-07-23 DIAGNOSIS — Z91.89 RISK FOR CORONARY ARTERY DISEASE BETWEEN 10% AND 20% IN NEXT 10 YEARS PER FRAMINGHAM SCORE: ICD-10-CM

## 2025-07-23 DIAGNOSIS — I70.0 AORTIC ATHEROSCLEROSIS: ICD-10-CM

## 2025-07-23 RX ORDER — ATORVASTATIN CALCIUM 20 MG/1
20 TABLET, FILM COATED ORAL
Qty: 90 TABLET | Refills: 0 | Status: SHIPPED | OUTPATIENT
Start: 2025-07-23

## 2025-07-23 NOTE — TELEPHONE ENCOUNTER
Refill Routing Note   Medication(s) are not appropriate for processing by [facility] for the following reason(s):        Required labs outdated    [facility] action(s):  Defer   Requires labs : Yes               Appointments  past 12m or future 3m with PCP    Date Provider   Last Visit   11/25/2024 Charla Cole MD   Next Visit   Visit date not found Charla Cole MD   ED visits in past 90 days: 0        Note composed:12:36 PM 07/23/2025

## 2025-07-23 NOTE — TELEPHONE ENCOUNTER
Care Due:                  Date            Visit Type   Department     Provider  --------------------------------------------------------------------------------                                EP -                              PRIMARY      [facility] FAMILY  Last Visit: 11-      CARE ([facility])   MEDICINE       Charla Cole  Next Visit: None Scheduled  None         None Found                                                            Last  Test          Frequency    Reason                     Performed    Due Date  --------------------------------------------------------------------------------    Lipid Panel.  12 months..  atorvastatin, ezetimibe..  05- 05-    TSH.........  12 months..  SYNTHROID................  05- 05-    [employer] Embedded Care Due Messages. Reference number: [id number].   7/23/2025 9:41:21 AM CDT

## 2025-07-25 DIAGNOSIS — F41.1 GAD (GENERALIZED ANXIETY DISORDER): Chronic | ICD-10-CM

## 2025-07-25 DIAGNOSIS — F33.41 MDD (MAJOR DEPRESSIVE DISORDER), RECURRENT, IN PARTIAL REMISSION: Chronic | ICD-10-CM

## 2025-07-25 NOTE — TELEPHONE ENCOUNTER
No care due was identified.  [facility] Embedded Care Due Messages. Reference number: [id number].   7/25/2025 12:13:35 AM CDT

## 2025-07-26 DIAGNOSIS — F33.41 MDD (MAJOR DEPRESSIVE DISORDER), RECURRENT, IN PARTIAL REMISSION: Chronic | ICD-10-CM

## 2025-07-26 DIAGNOSIS — F41.1 GAD (GENERALIZED ANXIETY DISORDER): Chronic | ICD-10-CM

## 2025-07-26 NOTE — TELEPHONE ENCOUNTER
No care due was identified.  [facility] Embedded Care Due Messages. Reference number: [id number].   7/26/2025 8:15:47 AM CDT

## 2025-07-26 NOTE — TELEPHONE ENCOUNTER
Refill Routing Note   Medication(s) are not appropriate for processing by [facility] for the following reason(s):        Required labs outdated  Drug-drug interaction    [facility] action(s):  Defer               Appointments  past 12m or future 3m with PCP    Date Provider   Last Visit   11/25/2024 Charla Cole MD   Next Visit   Visit date not found Charla Cole MD   ED visits in past 90 days: 0        Note composed:7:08 PM 07/25/2025

## 2025-07-27 RX ORDER — DULOXETIN HYDROCHLORIDE 60 MG/1
60 CAPSULE, DELAYED RELEASE ORAL
Qty: 90 CAPSULE | Refills: 1 | Status: SHIPPED | OUTPATIENT
Start: 2025-07-27

## 2025-07-27 NOTE — TELEPHONE ENCOUNTER
Refill Decision Note   Coral Mumtaz  is requesting a refill authorization.  Brief Assessment and Rationale for Refill:  Approve     Medication Therapy Plan:        Comments:     Note composed:5:03 PM 07/27/2025

## 2025-07-28 RX ORDER — DULOXETIN HYDROCHLORIDE 30 MG/1
30 CAPSULE, DELAYED RELEASE ORAL
Qty: 90 CAPSULE | Refills: 1 | Status: SHIPPED | OUTPATIENT
Start: 2025-07-28

## 2025-07-28 RX ORDER — EZETIMIBE 10 MG/1
10 TABLET ORAL
Qty: 90 TABLET | Refills: 1 | Status: SHIPPED | OUTPATIENT
Start: 2025-07-28

## 2025-08-20 ENCOUNTER — PATIENT MESSAGE (OUTPATIENT)
Dept: PSYCHIATRY | Facility: CLINIC | Age: 75
End: 2025-08-20
Payer: MEDICARE

## 2025-08-21 ENCOUNTER — OFFICE VISIT (OUTPATIENT)
Dept: PSYCHIATRY | Facility: CLINIC | Age: 75
End: 2025-08-21
Payer: MEDICARE

## 2025-08-21 VITALS
WEIGHT: 174.06 LBS | HEART RATE: 69 BPM | BODY MASS INDEX: 27.32 KG/M2 | DIASTOLIC BLOOD PRESSURE: 75 MMHG | SYSTOLIC BLOOD PRESSURE: 127 MMHG | HEIGHT: 67 IN

## 2025-08-21 DIAGNOSIS — F41.1 GAD (GENERALIZED ANXIETY DISORDER): Chronic | ICD-10-CM

## 2025-08-21 DIAGNOSIS — F33.40 RECURRENT MAJOR DEPRESSIVE DISORDER, IN REMISSION: Primary | ICD-10-CM

## 2025-08-21 PROCEDURE — 99999 PR PBB SHADOW E&M-EST. PATIENT-LVL III: CPT | Mod: PBBFAC,,,

## 2025-08-21 PROCEDURE — 99213 OFFICE O/P EST LOW 20 MIN: CPT | Mod: PBBFAC,PO

## 2025-08-21 PROCEDURE — 99214 OFFICE O/P EST MOD 30 MIN: CPT | Mod: S$PBB,,,

## 2025-08-21 PROCEDURE — 90833 PSYTX W PT W E/M 30 MIN: CPT | Mod: S$PBB,,,

## 2025-08-21 RX ORDER — BUPROPION HYDROCHLORIDE 150 MG/1
150 TABLET ORAL DAILY
Qty: 90 TABLET | Refills: 1 | Status: SHIPPED | OUTPATIENT
Start: 2025-08-21

## 2025-08-21 RX ORDER — MIRTAZAPINE 15 MG/1
15 TABLET, FILM COATED ORAL NIGHTLY
Qty: 90 TABLET | Refills: 1 | Status: SHIPPED | OUTPATIENT
Start: 2025-08-21

## 2025-08-21 RX ORDER — DULOXETIN HYDROCHLORIDE 30 MG/1
30 CAPSULE, DELAYED RELEASE ORAL DAILY
Qty: 90 CAPSULE | Refills: 1 | Status: SHIPPED | OUTPATIENT
Start: 2025-08-21

## 2025-08-21 RX ORDER — DULOXETIN HYDROCHLORIDE 60 MG/1
60 CAPSULE, DELAYED RELEASE ORAL DAILY
Qty: 90 CAPSULE | Refills: 1 | Status: SHIPPED | OUTPATIENT
Start: 2025-08-21

## 2025-08-25 ENCOUNTER — OFFICE VISIT (OUTPATIENT)
Dept: FAMILY MEDICINE | Facility: CLINIC | Age: 75
End: 2025-08-25
Payer: MEDICARE

## 2025-08-25 VITALS
HEART RATE: 68 BPM | SYSTOLIC BLOOD PRESSURE: 124 MMHG | DIASTOLIC BLOOD PRESSURE: 70 MMHG | HEIGHT: 67 IN | WEIGHT: 173.06 LBS | BODY MASS INDEX: 27.16 KG/M2

## 2025-08-25 DIAGNOSIS — N39.0 RECURRENT UTI: Primary | ICD-10-CM

## 2025-08-25 DIAGNOSIS — K21.9 GASTROESOPHAGEAL REFLUX DISEASE, UNSPECIFIED WHETHER ESOPHAGITIS PRESENT: Chronic | ICD-10-CM

## 2025-08-25 DIAGNOSIS — M17.11 PRIMARY OSTEOARTHRITIS OF RIGHT KNEE: Chronic | ICD-10-CM

## 2025-08-25 DIAGNOSIS — Z91.89 RISK FOR CORONARY ARTERY DISEASE GREATER THAN 20% IN NEXT 10 YEARS PER FRAMINGHAM SCORE: Chronic | ICD-10-CM

## 2025-08-25 DIAGNOSIS — E03.8 OTHER SPECIFIED HYPOTHYROIDISM: Chronic | ICD-10-CM

## 2025-08-25 DIAGNOSIS — F33.41 MDD (MAJOR DEPRESSIVE DISORDER), RECURRENT, IN PARTIAL REMISSION: Chronic | ICD-10-CM

## 2025-08-25 DIAGNOSIS — M54.2 CHRONIC NECK PAIN: ICD-10-CM

## 2025-08-25 DIAGNOSIS — F41.1 GAD (GENERALIZED ANXIETY DISORDER): Chronic | ICD-10-CM

## 2025-08-25 DIAGNOSIS — I70.0 AORTIC ATHEROSCLEROSIS: Chronic | ICD-10-CM

## 2025-08-25 DIAGNOSIS — J30.89 SEASONAL ALLERGIC RHINITIS DUE TO OTHER ALLERGIC TRIGGER: Chronic | ICD-10-CM

## 2025-08-25 DIAGNOSIS — I10 PRIMARY HYPERTENSION: Chronic | ICD-10-CM

## 2025-08-25 DIAGNOSIS — G89.29 CHRONIC NECK PAIN: ICD-10-CM

## 2025-08-25 PROCEDURE — 99999 PR PBB SHADOW E&M-EST. PATIENT-LVL IV: CPT | Mod: PBBFAC,,, | Performed by: STUDENT IN AN ORGANIZED HEALTH CARE EDUCATION/TRAINING PROGRAM

## 2025-08-25 PROCEDURE — 99214 OFFICE O/P EST MOD 30 MIN: CPT | Mod: PBBFAC,PN | Performed by: STUDENT IN AN ORGANIZED HEALTH CARE EDUCATION/TRAINING PROGRAM

## (undated) DEVICE — SUT STRATAFIX PDS 1 CTX 18IN

## (undated) DEVICE — PRESSURIZER BN CEMENT NOZZLE

## (undated) DEVICE — PADDING CAST SPECIALIST 6X4YD

## (undated) DEVICE — DRESSING XEROFORM FOIL PK 1X8

## (undated) DEVICE — NDL SAFETY 22G X 1.5 ECLIPSE

## (undated) DEVICE — GLOVE BIOGEL SKINSENSE PI 7.0

## (undated) DEVICE — BANDAGE ACE ELASTIC 6"

## (undated) DEVICE — SUT VICRYL PLUS 2-0 CT1 18

## (undated) DEVICE — SEE MEDLINE ITEM 146231

## (undated) DEVICE — SUT VICRYL+ 1 CTX 18IN VIOL

## (undated) DEVICE — ELECTRODE REM PLYHSV RETURN 9

## (undated) DEVICE — SOL 9P NACL IRR PIC IL

## (undated) DEVICE — BLADE RECIP DOUBLE SIDED

## (undated) DEVICE — SUT STRATAFIX PDS 4 FS-2 14IN

## (undated) DEVICE — SEE MEDLINE ITEM 152523

## (undated) DEVICE — SOL NACL 0.9% INJ 500ML BG

## (undated) DEVICE — GAUZE SPONGE 4X4 12PLY

## (undated) DEVICE — GLOVE BIOGEL SKINSENSE PI 7.5

## (undated) DEVICE — KIT TOTAL HIP OPTIVAC

## (undated) DEVICE — GLOVE BIOGEL SKINSENSE PI 6.5

## (undated) DEVICE — APPLICATOR CHLORAPREP ORN 26ML

## (undated) DEVICE — PAD CAST SPECIALIST STRL 6

## (undated) DEVICE — KIT EVACUATOR 3-SPRING 1/8 DRN

## (undated) DEVICE — PULSAVAC ZIMMER

## (undated) DEVICE — TOURNIQUET SB QC SP 34X4IN

## (undated) DEVICE — STAPLER SKIN PROXIMATE WIDE

## (undated) DEVICE — UNDERGLOVES BIOGEL PI SZ 6 LF

## (undated) DEVICE — PAD COLD THERAPY KNEE WRAP ON

## (undated) DEVICE — TRAY FOLEY 16FR INFECTION CONT

## (undated) DEVICE — SYR 30CC LUER LOCK

## (undated) DEVICE — UNDERGLOVES BIOGEL PI SIZE 8.5

## (undated) DEVICE — COVER BACK TABLE 72X21

## (undated) DEVICE — PAD KNEE POLAR XL

## (undated) DEVICE — BLADE SAG DUAL 18MMX1.27MMX90M

## (undated) DEVICE — SEALER BIPOLAR TISSUE 6.0

## (undated) DEVICE — Device

## (undated) DEVICE — DRESSING XEROFORM 1X8IN

## (undated) DEVICE — DRAPE STERI INSTRUMENT 1018

## (undated) DEVICE — SEE MEDLINE ITEM 146298

## (undated) DEVICE — HOOD T-5 TEAR AWAY STERILE

## (undated) DEVICE — SEE MEDLINE ITEM 152530

## (undated) DEVICE — SOL IRR NACL .9% 3000ML

## (undated) DEVICE — SEE MEDLINE ITEM 153151